# Patient Record
Sex: FEMALE | Race: WHITE | NOT HISPANIC OR LATINO | Employment: OTHER | URBAN - METROPOLITAN AREA
[De-identification: names, ages, dates, MRNs, and addresses within clinical notes are randomized per-mention and may not be internally consistent; named-entity substitution may affect disease eponyms.]

---

## 2017-08-28 ENCOUNTER — TRANSFERRED RECORDS (OUTPATIENT)
Dept: HEALTH INFORMATION MANAGEMENT | Facility: CLINIC | Age: 67
End: 2017-08-28

## 2017-10-17 ENCOUNTER — TRANSFERRED RECORDS (OUTPATIENT)
Dept: HEALTH INFORMATION MANAGEMENT | Facility: CLINIC | Age: 67
End: 2017-10-17

## 2017-11-01 ENCOUNTER — TRANSFERRED RECORDS (OUTPATIENT)
Dept: HEALTH INFORMATION MANAGEMENT | Facility: CLINIC | Age: 67
End: 2017-11-01

## 2017-11-06 ENCOUNTER — TRANSFERRED RECORDS (OUTPATIENT)
Dept: HEALTH INFORMATION MANAGEMENT | Facility: CLINIC | Age: 67
End: 2017-11-06

## 2017-11-24 ENCOUNTER — TRANSFERRED RECORDS (OUTPATIENT)
Dept: HEALTH INFORMATION MANAGEMENT | Facility: CLINIC | Age: 67
End: 2017-11-24

## 2017-12-27 ENCOUNTER — TRANSFERRED RECORDS (OUTPATIENT)
Dept: HEALTH INFORMATION MANAGEMENT | Facility: CLINIC | Age: 67
End: 2017-12-27

## 2017-12-28 ENCOUNTER — TRANSFERRED RECORDS (OUTPATIENT)
Dept: HEALTH INFORMATION MANAGEMENT | Facility: CLINIC | Age: 67
End: 2017-12-28

## 2018-01-15 ENCOUNTER — TRANSFERRED RECORDS (OUTPATIENT)
Dept: HEALTH INFORMATION MANAGEMENT | Facility: CLINIC | Age: 68
End: 2018-01-15

## 2018-01-17 ENCOUNTER — MEDICAL CORRESPONDENCE (OUTPATIENT)
Dept: HEALTH INFORMATION MANAGEMENT | Facility: CLINIC | Age: 68
End: 2018-01-17

## 2019-09-17 ENCOUNTER — OFFICE VISIT (OUTPATIENT)
Dept: URBAN - METROPOLITAN AREA CLINIC 29 | Facility: CLINIC | Age: 69
End: 2019-09-17
Payer: MEDICARE

## 2019-09-17 DIAGNOSIS — H40.012 OPEN ANGLE WITH BORDERLINE FINDINGS, LOW RISK, LEFT EYE: ICD-10-CM

## 2019-09-17 PROCEDURE — 76514 ECHO EXAM OF EYE THICKNESS: CPT | Performed by: OPHTHALMOLOGY

## 2019-09-17 PROCEDURE — 92020 GONIOSCOPY: CPT | Performed by: OPHTHALMOLOGY

## 2019-09-17 PROCEDURE — 92133 CPTRZD OPH DX IMG PST SGM ON: CPT | Performed by: OPHTHALMOLOGY

## 2019-09-17 PROCEDURE — 92004 COMPRE OPH EXAM NEW PT 1/>: CPT | Performed by: OPHTHALMOLOGY

## 2019-09-17 ASSESSMENT — INTRAOCULAR PRESSURE
OD: 27
OS: 20

## 2019-09-17 NOTE — IMPRESSION/PLAN
Impression: Primary open-angle glaucoma, right eye, moderate stage: H40.1112. Mother had glaucoma -- CCT average OU ONH Enlarged cupping OU -- IOP elevated OD>OS Plan: Discussed diagnosis, explained and understood by patient. Discussed IOP/ONH/Glaucoma management and risks. OCT ordered, performed and reviewed. Start lumigan qhs od, sample given. Discussed side effects of glaucoma meds. Will continue to monitor condition and symptoms.

## 2019-10-21 ENCOUNTER — TESTING ONLY (OUTPATIENT)
Dept: URBAN - METROPOLITAN AREA CLINIC 29 | Facility: CLINIC | Age: 69
End: 2019-10-21
Payer: MEDICARE

## 2019-10-21 PROCEDURE — 92083 EXTENDED VISUAL FIELD XM: CPT | Performed by: OPHTHALMOLOGY

## 2019-10-29 ENCOUNTER — OFFICE VISIT (OUTPATIENT)
Dept: URBAN - METROPOLITAN AREA CLINIC 29 | Facility: CLINIC | Age: 69
End: 2019-10-29
Payer: MEDICARE

## 2019-10-29 DIAGNOSIS — H40.1112 PRIMARY OPEN-ANGLE GLAUCOMA, RIGHT EYE, MODERATE STAGE: Primary | ICD-10-CM

## 2019-10-29 PROCEDURE — 99213 OFFICE O/P EST LOW 20 MIN: CPT | Performed by: OPHTHALMOLOGY

## 2019-10-29 RX ORDER — BIMATOPROST 0.1 MG/ML
0.01 % SOLUTION/ DROPS OPHTHALMIC
Qty: 2.5 | Refills: 5 | Status: INACTIVE
Start: 2019-10-29 | End: 2019-10-29

## 2019-10-29 ASSESSMENT — INTRAOCULAR PRESSURE
OD: 21
OS: 21

## 2019-10-29 NOTE — IMPRESSION/PLAN
Impression: Primary open-angle glaucoma, right eye, moderate stage: H40.1112. Mother had glaucoma -- CCT average OU ONH Enlarged cupping OU -- IOP OD lowered with lumigan - 
IOP doing well ou - Plan: Discussed diagnosis, explained and understood by patient. Discussed IOP/ONH/Glaucoma management and risks. visual field results reviewed with patient. continue lumigan qhs od. no gtts OS needed. Discussed side effects of glaucoma meds. Will continue to monitor condition and symptoms.

## 2020-02-18 ENCOUNTER — TRANSFERRED RECORDS (OUTPATIENT)
Dept: HEALTH INFORMATION MANAGEMENT | Facility: CLINIC | Age: 70
End: 2020-02-18

## 2020-02-27 ENCOUNTER — OFFICE VISIT (OUTPATIENT)
Dept: URBAN - METROPOLITAN AREA CLINIC 29 | Facility: CLINIC | Age: 70
End: 2020-02-27
Payer: MEDICARE

## 2020-02-27 PROCEDURE — 99213 OFFICE O/P EST LOW 20 MIN: CPT | Performed by: OPHTHALMOLOGY

## 2020-02-27 ASSESSMENT — INTRAOCULAR PRESSURE
OD: 19
OS: 19

## 2020-02-27 NOTE — IMPRESSION/PLAN
Impression: Primary open-angle glaucoma, right eye, moderate stage: H40.1112. Mother had glaucoma CCT average OU ONH Enlarged cupping OU
IOP OD stable with lumigan 
IOP doing well OU Plan: Discussed diagnosis, explained and understood by patient. Discussed IOP/ONH/Glaucoma management and risks. visual field results reviewed with patient. continue lumigan qhs OD,  no gtts OS needed. Discussed side effects of glaucoma meds. Will continue to monitor condition and symptoms.

## 2020-07-16 ENCOUNTER — OFFICE VISIT (OUTPATIENT)
Dept: URBAN - METROPOLITAN AREA CLINIC 29 | Facility: CLINIC | Age: 70
End: 2020-07-16
Payer: MEDICARE

## 2020-07-16 DIAGNOSIS — H25.13 AGE-RELATED NUCLEAR CATARACT, BILATERAL: ICD-10-CM

## 2020-07-16 PROCEDURE — 99213 OFFICE O/P EST LOW 20 MIN: CPT | Performed by: OPHTHALMOLOGY

## 2020-07-16 ASSESSMENT — INTRAOCULAR PRESSURE
OS: 19
OD: 17

## 2020-07-16 NOTE — IMPRESSION/PLAN
Impression: Primary open-angle glaucoma, right eye, moderate stage: H40.1112. Mother had glaucoma CCT average OU ONH/IOP stable OU Plan: Discussed diagnosis, explained and understood by patient. Discussed IOP/ONH/Glaucoma management and risks. Continue lumigan qhs OD,  no gtts OS needed. Will continue to monitor condition and symptoms.

## 2020-12-08 ENCOUNTER — OFFICE VISIT (OUTPATIENT)
Dept: URBAN - METROPOLITAN AREA CLINIC 29 | Facility: CLINIC | Age: 70
End: 2020-12-08
Payer: MEDICARE

## 2020-12-08 PROCEDURE — 92083 EXTENDED VISUAL FIELD XM: CPT | Performed by: OPHTHALMOLOGY

## 2020-12-08 PROCEDURE — 92133 CPTRZD OPH DX IMG PST SGM ON: CPT | Performed by: OPHTHALMOLOGY

## 2020-12-08 PROCEDURE — 99214 OFFICE O/P EST MOD 30 MIN: CPT | Performed by: OPHTHALMOLOGY

## 2020-12-08 ASSESSMENT — INTRAOCULAR PRESSURE
OD: 21
OS: 20

## 2020-12-08 NOTE — IMPRESSION/PLAN
Impression: Primary open-angle glaucoma, right eye, moderate stage: H40.1112. Mother had glaucoma CCT average OU ONH/IOP stable OU Plan: Discussed diagnosis, explained and understood by patient. Discussed IOP/ONH/Glaucoma management and risks. VF and OCT shows some progression OD, we discussed finding. D/C lumigan qhs OD, recommend Rocklatan OD - sample dispensed, return in 1 month for IOP check with new medication, no gtts OS needed. Will continue to monitor condition and symptoms.

## 2021-01-28 ENCOUNTER — OFFICE VISIT (OUTPATIENT)
Dept: URBAN - METROPOLITAN AREA CLINIC 29 | Facility: CLINIC | Age: 71
End: 2021-01-28
Payer: MEDICARE

## 2021-01-28 PROCEDURE — 99213 OFFICE O/P EST LOW 20 MIN: CPT | Performed by: OPHTHALMOLOGY

## 2021-01-28 ASSESSMENT — INTRAOCULAR PRESSURE
OS: 21
OD: 20

## 2021-01-28 NOTE — IMPRESSION/PLAN
Impression: Primary open-angle glaucoma, right eye, moderate stage: H40.1112. Mother had glaucoma CCT average OU ONH/IOP stable OU Plan: Discussed diagnosis, explained and understood by patient. Discussed IOP/ONH/Glaucoma management and risks. start and continue lumigan qhs OD, unable to tolerate Rocklatan OD,  no gtts OS needed. We discussed the option for SlT laser vs medication, patient elects to continue with medication.

## 2021-08-20 ENCOUNTER — OFFICE VISIT (OUTPATIENT)
Dept: URBAN - METROPOLITAN AREA CLINIC 29 | Facility: CLINIC | Age: 71
End: 2021-08-20
Payer: MEDICARE

## 2021-08-20 PROCEDURE — 99213 OFFICE O/P EST LOW 20 MIN: CPT | Performed by: OPHTHALMOLOGY

## 2021-08-20 ASSESSMENT — INTRAOCULAR PRESSURE
OD: 19
OS: 19

## 2021-08-20 NOTE — IMPRESSION/PLAN
Impression: Primary open-angle glaucoma, right eye, moderate stage: H40.1112. Mother had glaucoma CCT average OU ONH/IOP stable OU Plan: Discussed diagnosis, explained and understood by patient. Discussed IOP/ONH/Glaucoma management and risks. Continue lumigan qhs od. Will continue to monitor condition and symptoms.

## 2021-09-10 ENCOUNTER — TRANSFERRED RECORDS (OUTPATIENT)
Dept: HEALTH INFORMATION MANAGEMENT | Facility: CLINIC | Age: 71
End: 2021-09-10

## 2021-11-19 ENCOUNTER — TRANSFERRED RECORDS (OUTPATIENT)
Dept: HEALTH INFORMATION MANAGEMENT | Facility: CLINIC | Age: 71
End: 2021-11-19

## 2022-04-25 ENCOUNTER — TRANSFERRED RECORDS (OUTPATIENT)
Dept: HEALTH INFORMATION MANAGEMENT | Facility: CLINIC | Age: 72
End: 2022-04-25

## 2022-12-30 ENCOUNTER — TRANSFERRED RECORDS (OUTPATIENT)
Dept: HEALTH INFORMATION MANAGEMENT | Facility: CLINIC | Age: 72
End: 2022-12-30

## 2023-01-26 ENCOUNTER — OFFICE VISIT (OUTPATIENT)
Dept: URBAN - METROPOLITAN AREA CLINIC 28 | Facility: CLINIC | Age: 73
End: 2023-01-26
Payer: MEDICARE

## 2023-01-26 DIAGNOSIS — H40.1112 PRIMARY OPEN-ANGLE GLAUCOMA, RIGHT EYE, MODERATE STAGE: Primary | ICD-10-CM

## 2023-01-26 DIAGNOSIS — H40.1121 PRIMARY OPEN-ANGLE GLAUCOMA, LEFT EYE, MILD STAGE: ICD-10-CM

## 2023-01-26 PROCEDURE — 92020 GONIOSCOPY: CPT | Performed by: OPHTHALMOLOGY

## 2023-01-26 PROCEDURE — 92083 EXTENDED VISUAL FIELD XM: CPT | Performed by: OPHTHALMOLOGY

## 2023-01-26 PROCEDURE — 99214 OFFICE O/P EST MOD 30 MIN: CPT | Performed by: OPHTHALMOLOGY

## 2023-01-26 RX ORDER — BIMATOPROST 0.1 MG/ML
0.01 % SOLUTION/ DROPS OPHTHALMIC
Qty: 7.5 | Refills: 4 | Status: ACTIVE
Start: 2023-01-26

## 2023-01-26 ASSESSMENT — INTRAOCULAR PRESSURE
OD: 20
OS: 20

## 2023-01-26 NOTE — IMPRESSION/PLAN
Impression: Primary open-angle glaucoma, right eye, moderate stage: H40.1112. Mother had glaucoma CCT average OU ONH/IOP stable OU Plan: Discussed diagnosis, explained and understood by patient. Discussed IOP/ONH/Glaucoma management and risks. VF ordered and reviewed today shows progression OU. Continue lumigan qhs od and start qhs os. IOP elevated OU. Discussed adding drops vs laser. Patient elects SLT. Recommend Trabeculoplasty (SLT) OD to possibly lower IOP. Discussed RBA's of laser trabeculoplasty .  RL=2

## 2023-02-07 LAB
ALT SERPL-CCNC: 21 IU/L (ref 5–46)
AST SERPL-CCNC: 19 IU/L (ref 11–40)
CHOLESTEROL (EXTERNAL): 261 MG/DL
CREATININE (EXTERNAL): 0.54 MG/DL (ref 0.6–1.4)
GFR ESTIMATED (EXTERNAL): 96 ML/MIN/1.73M2
GLUCOSE (EXTERNAL): 112 MG/DL (ref 70–99)
HDLC SERPL-MCNC: 103 MG/DL
LDL CHOLESTEROL CALCULATED (EXTERNAL): 142 MG/DL
NON HDL CHOLESTEROL (EXTERNAL): 158 MG/DL
POTASSIUM (EXTERNAL): 4.6 MMOL/L (ref 3.6–5.3)
TRIGLYCERIDES (EXTERNAL): 70 MG/DL
TSH SERPL-ACNC: 1.35 MU/L (ref 0.27–4.2)

## 2023-03-01 ENCOUNTER — TRANSFERRED RECORDS (OUTPATIENT)
Dept: HEALTH INFORMATION MANAGEMENT | Facility: CLINIC | Age: 73
End: 2023-03-01

## 2023-03-23 ENCOUNTER — OFFICE VISIT (OUTPATIENT)
Dept: URBAN - METROPOLITAN AREA CLINIC 28 | Facility: CLINIC | Age: 73
End: 2023-03-23
Payer: MEDICARE

## 2023-03-23 DIAGNOSIS — H40.1112 PRIMARY OPEN-ANGLE GLAUCOMA, RIGHT EYE, MODERATE STAGE: Primary | ICD-10-CM

## 2023-03-23 DIAGNOSIS — H25.13 AGE-RELATED NUCLEAR CATARACT, BILATERAL: ICD-10-CM

## 2023-03-23 DIAGNOSIS — H40.1121 PRIMARY OPEN-ANGLE GLAUCOMA, LEFT EYE, MILD STAGE: ICD-10-CM

## 2023-03-23 PROCEDURE — 92133 CPTRZD OPH DX IMG PST SGM ON: CPT | Performed by: OPHTHALMOLOGY

## 2023-03-23 PROCEDURE — 99214 OFFICE O/P EST MOD 30 MIN: CPT | Performed by: OPHTHALMOLOGY

## 2023-03-23 ASSESSMENT — INTRAOCULAR PRESSURE
OD: 17
OS: 20

## 2023-03-23 NOTE — IMPRESSION/PLAN
Impression: Primary open-angle glaucoma, right eye, moderate stage: H40.1112. Mother had glaucoma CCT average OU
SLT OD 2/24/23 Plan: Discussed diagnosis, explained and understood by patient. Discussed IOP/ONH/Glaucoma management and risks. Continue lumigan qhs ou. SLT OD effectively lowered IOP. IOP elevated OS, cataract worse in OS. Recommend a consult with Dr. Laure Sun for combo surgery options.

## 2023-03-23 NOTE — IMPRESSION/PLAN
Impression: Age-related nuclear cataract, bilateral: H25.13. Plan: Cataracts account for the patient's vision complaints. Discussed diagnosis in detail with patient. Discussed treatment options with patient. Recommend cataract consultation with Dr. Stephanie Dan.

## 2023-04-13 ENCOUNTER — OFFICE VISIT (OUTPATIENT)
Dept: URBAN - METROPOLITAN AREA CLINIC 28 | Facility: CLINIC | Age: 73
End: 2023-04-13
Payer: MEDICARE

## 2023-04-13 DIAGNOSIS — H25.813 COMBINED FORMS OF AGE-RELATED CATARACT, BILATERAL: ICD-10-CM

## 2023-04-13 DIAGNOSIS — H40.1112 PRIMARY OPEN-ANGLE GLAUCOMA, RIGHT EYE, MODERATE STAGE: Primary | ICD-10-CM

## 2023-04-13 DIAGNOSIS — H40.1121 PRIMARY OPEN-ANGLE GLAUCOMA, LEFT EYE, MILD STAGE: ICD-10-CM

## 2023-04-13 PROCEDURE — 99214 OFFICE O/P EST MOD 30 MIN: CPT | Performed by: STUDENT IN AN ORGANIZED HEALTH CARE EDUCATION/TRAINING PROGRAM

## 2023-04-13 PROCEDURE — 76514 ECHO EXAM OF EYE THICKNESS: CPT | Performed by: STUDENT IN AN ORGANIZED HEALTH CARE EDUCATION/TRAINING PROGRAM

## 2023-04-13 PROCEDURE — 92020 GONIOSCOPY: CPT | Performed by: STUDENT IN AN ORGANIZED HEALTH CARE EDUCATION/TRAINING PROGRAM

## 2023-04-13 RX ORDER — BIMATOPROST 0.1 MG/ML
0.01 % SOLUTION/ DROPS OPHTHALMIC
Qty: 7.5 | Refills: 3 | Status: ACTIVE
Start: 2023-04-13

## 2023-04-13 ASSESSMENT — VISUAL ACUITY
OS: 20/50
OD: 20/30

## 2023-04-13 ASSESSMENT — INTRAOCULAR PRESSURE
OS: 17
OD: 17

## 2023-04-13 NOTE — IMPRESSION/PLAN
Impression: Primary open-angle glaucoma, right eye, moderate stage: H40.1112. Plan: Ocular Surgical History: s/p SLT OD Pachy: 550/570 Tmax: 27/21 Target IOP: mid teens OU Med Allergies: none Heart / Lung / Kidney disease/sulfa allergy: Pt. denies Gonioscopy: grade 4, 2+ TMP OU
OCT: 69 early pole thinning/82 wnl HVF: OD: MD -5 double step, EBS, sup arc/OS: MD -3 sup arc
C/D: .65/.6 Better seeing eye: OD
IOP Today: 17/17 Plan: IOP is doing well OU. No changes are being made to treatment at this time. Will continue to monitor IOP and testing. Drops: CHANGE Lumigan from QAM to QHS OU Discussed natural history of glaucoma and that irreversible vision loss can occur without adequate IOP control. Emphasized compliance with eyedrops and other treatment described above.

## 2023-04-13 NOTE — IMPRESSION/PLAN
Impression: Combined forms of age-related cataract, bilateral: H25.813. Plan: SEE [address]: EDU, ASCAN (darek if AT is wanted), DILATED PREOP (tech to check IOP and dilate) 5 line MAC OCT - Phaco/Hydrus OS only for now. 
Dextenza + drops

## 2023-04-18 ENCOUNTER — PRE-OPERATIVE VISIT (OUTPATIENT)
Dept: URBAN - METROPOLITAN AREA CLINIC 28 | Facility: CLINIC | Age: 73
End: 2023-04-18
Payer: MEDICARE

## 2023-04-18 DIAGNOSIS — H25.813 COMBINED FORMS OF AGE-RELATED CATARACT, BILATERAL: Primary | ICD-10-CM

## 2023-04-18 ASSESSMENT — PACHYMETRY
OS: 4.04
OD: 4.02
OS: 24.29
OD: 24.39

## 2023-04-20 ENCOUNTER — TRANSFERRED RECORDS (OUTPATIENT)
Dept: HEALTH INFORMATION MANAGEMENT | Facility: CLINIC | Age: 73
End: 2023-04-20

## 2023-04-24 ENCOUNTER — PRE-OPERATIVE VISIT (OUTPATIENT)
Dept: URBAN - METROPOLITAN AREA CLINIC 10 | Facility: CLINIC | Age: 73
End: 2023-04-24
Payer: MEDICARE

## 2023-04-24 DIAGNOSIS — H52.203 BILATERAL ASTIGMATISM: ICD-10-CM

## 2023-04-24 DIAGNOSIS — H04.123 DRY EYE SYNDROME OF BILATERAL LACRIMAL GLANDS: ICD-10-CM

## 2023-04-24 DIAGNOSIS — H25.813 COMBINED FORMS OF AGE-RELATED CATARACT, BILATERAL: ICD-10-CM

## 2023-04-24 DIAGNOSIS — H40.1112 PRIMARY OPEN-ANGLE GLAUCOMA, RIGHT EYE, MODERATE STAGE: ICD-10-CM

## 2023-04-24 DIAGNOSIS — H40.1121 PRIMARY OPEN-ANGLE GLAUCOMA, MILD STAGE, LEFT EYE: Primary | ICD-10-CM

## 2023-04-24 DIAGNOSIS — H43.813 VITREOUS DEGENERATION, BILATERAL: ICD-10-CM

## 2023-04-24 PROCEDURE — 99214 OFFICE O/P EST MOD 30 MIN: CPT | Performed by: STUDENT IN AN ORGANIZED HEALTH CARE EDUCATION/TRAINING PROGRAM

## 2023-04-24 PROCEDURE — 92134 CPTRZ OPH DX IMG PST SGM RTA: CPT | Performed by: STUDENT IN AN ORGANIZED HEALTH CARE EDUCATION/TRAINING PROGRAM

## 2023-04-24 RX ORDER — OFLOXACIN 3 MG/ML
0.3 % SOLUTION/ DROPS OPHTHALMIC
Qty: 5 | Refills: 1 | Status: ACTIVE
Start: 2023-04-24

## 2023-04-24 ASSESSMENT — INTRAOCULAR PRESSURE
OS: 16
OD: 17

## 2023-04-24 NOTE — IMPRESSION/PLAN
Impression: Dry eye syndrome of bilateral lacrimal glands: H04.123. Plan: Patient to use AFT OU for dryness and irritation. Patient understands condition may limit possible outcome of surgery.

## 2023-04-24 NOTE — IMPRESSION/PLAN
Impression: Vitreous degeneration, bilateral: H43.813. Plan: Patient to call/come in with RD symptoms. Patient understands condition may limit possible outcome of surgery.

## 2023-04-24 NOTE — IMPRESSION/PLAN
Impression: Combined forms of age-related cataract, bilateral: H25.813. Plan: Both eyes examined today and discussed cataract and glaucoma diagnoses in detail with patient. Advised patient of treatment options as well as lens options. Surgery is medically necessary due to impact in activities of daily living. Discussed risks, benefits, and expectations of cataract & MIGS surgery. Risks include bleeding, infection, loss of vision, loss of the eye, need for additional surgery to remove or exchange lens, dysphotopsias, macular/corneal edema. Patient also understands glasses will be necessary for sharpest vision after surgery. Patient understands that MIGS are an attempt to lower the IOP and protect the eye from glaucoma damage. Glaucoma will still be present and can continue to worsen without proper followup care and eye drop use. Patient wishes to proceed with Cataract/MIGS surgery. Discussed activity restrictions: no bending head below waist, rubbing the eye, no swimming straining or lifting over 10 lb. All questions answered. Patient would like to proceed with CE/MIGS OS only for now. Optical biomtery and appropriate testing reviewed. Plan:
CE/Hydrus OS 1st eye  [[Aim: -0.25 OS. IOL: monofocal ; No LRI; patient declines AMP/ORA. Dextenza + drops, Use Oflox QID ]] ERx'd Ofloxacin QID.

## 2023-04-24 NOTE — IMPRESSION/PLAN
Impression: Primary open-angle glaucoma, right eye, moderate stage: H40.1112. Plan: Ocular Surgical History: s/p SLT OD Pachy: 550/570 Tmax: 27/21 Target IOP: mid teens OU Med Allergies: none Heart / Lung / Kidney disease/sulfa allergy: Pt. denies Gonioscopy: grade 4, 2+ TMP OU
OCT: 69 early pole thinning/82 wnl HVF: OD: MD -5 double step, EBS, sup arc/OS: MD -3 sup arc
C/D: .65/.6 Better seeing eye: OD
IOP Today: 17/16 Plan: IOP is doing well. Recommend patient to proceed with phaco/hydrus OS. Drops: CONTINUE Lumigan QHS OU Discussed natural history of glaucoma and that irreversible vision loss can occur without adequate IOP control. Emphasized compliance with eyedrops and other treatment described above. Patient understands condition may limit possible outcome of surgery.

## 2023-04-24 NOTE — IMPRESSION/PLAN
Impression: Bilateral astigmatism: H52.203. Plan: Declined AMP. Patient understands condition may limit possible outcome of surgery.

## 2023-05-04 ENCOUNTER — POST-OPERATIVE VISIT (OUTPATIENT)
Dept: URBAN - METROPOLITAN AREA CLINIC 28 | Facility: CLINIC | Age: 73
End: 2023-05-04
Payer: MEDICARE

## 2023-05-04 DIAGNOSIS — Z48.810 ENCOUNTER FOR SURGICAL AFTERCARE FOLLOWING SURGERY ON A SENSE ORGAN: Primary | ICD-10-CM

## 2023-05-04 PROCEDURE — 99024 POSTOP FOLLOW-UP VISIT: CPT | Performed by: OPTOMETRIST

## 2023-05-04 RX ORDER — OFLOXACIN 3 MG/ML
0.3 % SOLUTION/ DROPS OPHTHALMIC
Qty: 5 | Refills: 1 | Status: ACTIVE
Start: 2023-05-04

## 2023-05-04 RX ORDER — BIMATOPROST 0.1 MG/ML
0.01 % SOLUTION/ DROPS OPHTHALMIC
Qty: 7.5 | Refills: 3 | Status: ACTIVE
Start: 2023-05-04

## 2023-05-04 ASSESSMENT — INTRAOCULAR PRESSURE
OS: 16
OD: 14
OD: 15

## 2023-05-04 NOTE — IMPRESSION/PLAN
Impression: S/P Cataract Extraction by phacoemulsification with IOL placement; HYDRUS; DEXTENZA OS - 1 Day. Encounter for surgical aftercare following surgery on a sense organ  Z48.810.  Plan: surgical drop: ofloxacin QID OS
glaucoma drop: lumigan QHS OD

## 2023-05-09 ENCOUNTER — TRANSFERRED RECORDS (OUTPATIENT)
Dept: HEALTH INFORMATION MANAGEMENT | Facility: CLINIC | Age: 73
End: 2023-05-09

## 2023-05-11 ENCOUNTER — POST-OPERATIVE VISIT (OUTPATIENT)
Dept: URBAN - METROPOLITAN AREA CLINIC 28 | Facility: CLINIC | Age: 73
End: 2023-05-11
Payer: MEDICARE

## 2023-05-11 DIAGNOSIS — Z48.810 ENCOUNTER FOR SURGICAL AFTERCARE FOLLOWING SURGERY ON A SENSE ORGAN: Primary | ICD-10-CM

## 2023-05-11 PROCEDURE — 99024 POSTOP FOLLOW-UP VISIT: CPT | Performed by: OPTOMETRIST

## 2023-05-11 ASSESSMENT — INTRAOCULAR PRESSURE
OS: 16
OD: 14
OS: 15
OD: 15

## 2023-05-11 NOTE — IMPRESSION/PLAN
Impression: S/P Cataract Extraction by phacoemulsification with IOL placement; HYDRUS; DEXTENZA OS - 8 Days. Encounter for surgical aftercare following surgery on a sense organ  Z48.810.  Plan: glaucoma drop: Lumigan QHS OD only

## 2023-06-01 ENCOUNTER — POST-OPERATIVE VISIT (OUTPATIENT)
Dept: URBAN - METROPOLITAN AREA CLINIC 28 | Facility: CLINIC | Age: 73
End: 2023-06-01
Payer: MEDICARE

## 2023-06-01 DIAGNOSIS — H52.223 REGULAR ASTIGMATISM, BILATERAL: Primary | ICD-10-CM

## 2023-06-01 DIAGNOSIS — Z48.810 ENCOUNTER FOR SURGICAL AFTERCARE FOLLOWING SURGERY ON A SENSE ORGAN: ICD-10-CM

## 2023-06-01 PROCEDURE — 99024 POSTOP FOLLOW-UP VISIT: CPT | Performed by: OPTOMETRIST

## 2023-06-01 ASSESSMENT — INTRAOCULAR PRESSURE
OD: 13
OS: 14

## 2023-06-01 ASSESSMENT — VISUAL ACUITY
OS: 20/20
OD: 20/25

## 2023-06-01 NOTE — IMPRESSION/PLAN
Impression: S/P Cataract Extraction by phacoemulsification with IOL placement; HYDRUS; DEXTENZA OS - 29 Days. Encounter for surgical aftercare following surgery on a sense organ  Z48.810.  Plan: glaucoma: lumigan QHS OD only

## 2023-06-07 ENCOUNTER — TRANSFERRED RECORDS (OUTPATIENT)
Dept: HEALTH INFORMATION MANAGEMENT | Facility: CLINIC | Age: 73
End: 2023-06-07

## 2023-07-20 ENCOUNTER — TRANSFERRED RECORDS (OUTPATIENT)
Dept: HEALTH INFORMATION MANAGEMENT | Facility: CLINIC | Age: 73
End: 2023-07-20

## 2023-08-25 ENCOUNTER — TRANSFERRED RECORDS (OUTPATIENT)
Dept: HEALTH INFORMATION MANAGEMENT | Facility: CLINIC | Age: 73
End: 2023-08-25

## 2023-09-07 ENCOUNTER — TRANSFERRED RECORDS (OUTPATIENT)
Dept: HEALTH INFORMATION MANAGEMENT | Facility: CLINIC | Age: 73
End: 2023-09-07

## 2023-09-10 ENCOUNTER — TRANSFERRED RECORDS (OUTPATIENT)
Dept: HEALTH INFORMATION MANAGEMENT | Facility: CLINIC | Age: 73
End: 2023-09-10

## 2023-09-14 ENCOUNTER — TRANSFERRED RECORDS (OUTPATIENT)
Dept: HEALTH INFORMATION MANAGEMENT | Facility: CLINIC | Age: 73
End: 2023-09-14

## 2023-09-18 ENCOUNTER — TRANSFERRED RECORDS (OUTPATIENT)
Dept: HEALTH INFORMATION MANAGEMENT | Facility: CLINIC | Age: 73
End: 2023-09-18

## 2023-09-20 ENCOUNTER — TRANSFERRED RECORDS (OUTPATIENT)
Dept: HEALTH INFORMATION MANAGEMENT | Facility: CLINIC | Age: 73
End: 2023-09-20

## 2023-09-27 ENCOUNTER — TRANSFERRED RECORDS (OUTPATIENT)
Dept: HEALTH INFORMATION MANAGEMENT | Facility: CLINIC | Age: 73
End: 2023-09-27

## 2023-10-05 ENCOUNTER — TRANSFERRED RECORDS (OUTPATIENT)
Dept: HEALTH INFORMATION MANAGEMENT | Facility: CLINIC | Age: 73
End: 2023-10-05

## 2023-11-02 ENCOUNTER — TRANSFERRED RECORDS (OUTPATIENT)
Dept: HEALTH INFORMATION MANAGEMENT | Facility: CLINIC | Age: 73
End: 2023-11-02

## 2024-01-23 ENCOUNTER — OFFICE VISIT (OUTPATIENT)
Dept: FAMILY MEDICINE | Facility: CLINIC | Age: 74
End: 2024-01-23
Payer: COMMERCIAL

## 2024-01-23 ENCOUNTER — MYC MEDICAL ADVICE (OUTPATIENT)
Dept: FAMILY MEDICINE | Facility: CLINIC | Age: 74
End: 2024-01-23

## 2024-01-23 VITALS
HEART RATE: 75 BPM | SYSTOLIC BLOOD PRESSURE: 134 MMHG | OXYGEN SATURATION: 97 % | RESPIRATION RATE: 16 BRPM | HEIGHT: 64 IN | DIASTOLIC BLOOD PRESSURE: 74 MMHG | TEMPERATURE: 97.7 F

## 2024-01-23 DIAGNOSIS — Z78.0 POST-MENOPAUSAL: ICD-10-CM

## 2024-01-23 DIAGNOSIS — M54.41 CHRONIC BILATERAL LOW BACK PAIN WITH BILATERAL SCIATICA: ICD-10-CM

## 2024-01-23 DIAGNOSIS — E78.2 MIXED HYPERLIPIDEMIA: ICD-10-CM

## 2024-01-23 DIAGNOSIS — E78.5 HYPERLIPIDEMIA LDL GOAL <100: ICD-10-CM

## 2024-01-23 DIAGNOSIS — I10 ESSENTIAL HYPERTENSION: ICD-10-CM

## 2024-01-23 DIAGNOSIS — E21.5 PARATHYROID ABNORMALITY (H): ICD-10-CM

## 2024-01-23 DIAGNOSIS — Z90.11 S/P RIGHT MASTECTOMY: ICD-10-CM

## 2024-01-23 DIAGNOSIS — Z85.850 HISTORY OF THYROID CANCER: ICD-10-CM

## 2024-01-23 DIAGNOSIS — E03.9 HYPOTHYROIDISM, UNSPECIFIED TYPE: ICD-10-CM

## 2024-01-23 DIAGNOSIS — Z12.31 ENCOUNTER FOR SCREENING MAMMOGRAM FOR MALIGNANT NEOPLASM OF BREAST: ICD-10-CM

## 2024-01-23 DIAGNOSIS — M54.42 CHRONIC BILATERAL LOW BACK PAIN WITH BILATERAL SCIATICA: ICD-10-CM

## 2024-01-23 DIAGNOSIS — R79.89 ELEVATED FERRITIN: ICD-10-CM

## 2024-01-23 DIAGNOSIS — Z85.3 HX: BREAST CANCER: ICD-10-CM

## 2024-01-23 DIAGNOSIS — Z00.00 ENCOUNTER FOR MEDICAL EXAMINATION TO ESTABLISH CARE: Primary | ICD-10-CM

## 2024-01-23 DIAGNOSIS — G89.29 CHRONIC BILATERAL LOW BACK PAIN WITH BILATERAL SCIATICA: ICD-10-CM

## 2024-01-23 DIAGNOSIS — E55.9 VITAMIN D DEFICIENCY: ICD-10-CM

## 2024-01-23 PROBLEM — H40.1110 PRIMARY OPEN ANGLE GLAUCOMA OF RIGHT EYE: Status: ACTIVE | Noted: 2019-09-17

## 2024-01-23 LAB
ALBUMIN SERPL BCG-MCNC: 4.5 G/DL (ref 3.5–5.2)
ALP SERPL-CCNC: 93 U/L (ref 40–150)
ALT SERPL W P-5'-P-CCNC: 29 U/L (ref 0–50)
ANION GAP SERPL CALCULATED.3IONS-SCNC: 11 MMOL/L (ref 7–15)
AST SERPL W P-5'-P-CCNC: 26 U/L (ref 0–45)
BILIRUB SERPL-MCNC: 0.8 MG/DL
BUN SERPL-MCNC: 10.4 MG/DL (ref 8–23)
CALCIUM SERPL-MCNC: 9.5 MG/DL (ref 8.8–10.2)
CHLORIDE SERPL-SCNC: 94 MMOL/L (ref 98–107)
CHOLEST SERPL-MCNC: 273 MG/DL
CREAT SERPL-MCNC: 0.65 MG/DL (ref 0.51–0.95)
DEPRECATED HCO3 PLAS-SCNC: 27 MMOL/L (ref 22–29)
EGFRCR SERPLBLD CKD-EPI 2021: >90 ML/MIN/1.73M2
ERYTHROCYTE [DISTWIDTH] IN BLOOD BY AUTOMATED COUNT: 12.7 % (ref 10–15)
FASTING STATUS PATIENT QL REPORTED: YES
GLUCOSE SERPL-MCNC: 116 MG/DL (ref 70–99)
HCT VFR BLD AUTO: 42.2 % (ref 35–47)
HDLC SERPL-MCNC: 91 MG/DL
HGB BLD-MCNC: 14.8 G/DL (ref 11.7–15.7)
LDLC SERPL CALC-MCNC: 166 MG/DL
MCH RBC QN AUTO: 36.8 PG (ref 26.5–33)
MCHC RBC AUTO-ENTMCNC: 35.1 G/DL (ref 31.5–36.5)
MCV RBC AUTO: 105 FL (ref 78–100)
NONHDLC SERPL-MCNC: 182 MG/DL
PLATELET # BLD AUTO: 185 10E3/UL (ref 150–450)
POTASSIUM SERPL-SCNC: 4.1 MMOL/L (ref 3.4–5.3)
PROT SERPL-MCNC: 7 G/DL (ref 6.4–8.3)
PTH-INTACT SERPL-MCNC: 37 PG/ML (ref 15–65)
RBC # BLD AUTO: 4.02 10E6/UL (ref 3.8–5.2)
SODIUM SERPL-SCNC: 132 MMOL/L (ref 135–145)
T4 FREE SERPL-MCNC: 1.72 NG/DL (ref 0.9–1.7)
TRIGL SERPL-MCNC: 80 MG/DL
TSH SERPL DL<=0.005 MIU/L-ACNC: 11.7 UIU/ML (ref 0.3–4.2)
WBC # BLD AUTO: 7.2 10E3/UL (ref 4–11)

## 2024-01-23 PROCEDURE — 82306 VITAMIN D 25 HYDROXY: CPT | Performed by: STUDENT IN AN ORGANIZED HEALTH CARE EDUCATION/TRAINING PROGRAM

## 2024-01-23 PROCEDURE — 36415 COLL VENOUS BLD VENIPUNCTURE: CPT | Performed by: STUDENT IN AN ORGANIZED HEALTH CARE EDUCATION/TRAINING PROGRAM

## 2024-01-23 PROCEDURE — 83970 ASSAY OF PARATHORMONE: CPT | Performed by: STUDENT IN AN ORGANIZED HEALTH CARE EDUCATION/TRAINING PROGRAM

## 2024-01-23 PROCEDURE — 84439 ASSAY OF FREE THYROXINE: CPT | Performed by: STUDENT IN AN ORGANIZED HEALTH CARE EDUCATION/TRAINING PROGRAM

## 2024-01-23 PROCEDURE — 85027 COMPLETE CBC AUTOMATED: CPT | Performed by: STUDENT IN AN ORGANIZED HEALTH CARE EDUCATION/TRAINING PROGRAM

## 2024-01-23 PROCEDURE — 80061 LIPID PANEL: CPT | Performed by: STUDENT IN AN ORGANIZED HEALTH CARE EDUCATION/TRAINING PROGRAM

## 2024-01-23 PROCEDURE — 84443 ASSAY THYROID STIM HORMONE: CPT | Performed by: STUDENT IN AN ORGANIZED HEALTH CARE EDUCATION/TRAINING PROGRAM

## 2024-01-23 PROCEDURE — 99204 OFFICE O/P NEW MOD 45 MIN: CPT | Performed by: STUDENT IN AN ORGANIZED HEALTH CARE EDUCATION/TRAINING PROGRAM

## 2024-01-23 PROCEDURE — 82728 ASSAY OF FERRITIN: CPT | Performed by: STUDENT IN AN ORGANIZED HEALTH CARE EDUCATION/TRAINING PROGRAM

## 2024-01-23 PROCEDURE — 80053 COMPREHEN METABOLIC PANEL: CPT | Performed by: STUDENT IN AN ORGANIZED HEALTH CARE EDUCATION/TRAINING PROGRAM

## 2024-01-23 RX ORDER — BIMATOPROST 0.3 MG/ML
1 SOLUTION/ DROPS OPHTHALMIC AT BEDTIME
COMMUNITY
End: 2024-04-05

## 2024-01-23 RX ORDER — LEVOTHYROXINE SODIUM 137 UG/1
137 TABLET ORAL DAILY
COMMUNITY
End: 2024-01-24

## 2024-01-23 RX ORDER — LOSARTAN POTASSIUM AND HYDROCHLOROTHIAZIDE 25; 100 MG/1; MG/1
1 TABLET ORAL DAILY
COMMUNITY
End: 2024-01-24

## 2024-01-23 RX ORDER — LOSARTAN POTASSIUM 50 MG/1
50 TABLET ORAL DAILY
COMMUNITY
End: 2024-01-24

## 2024-01-23 RX ORDER — HYDROCHLOROTHIAZIDE 25 MG/1
25 TABLET ORAL DAILY
COMMUNITY
End: 2024-01-24

## 2024-01-23 RX ORDER — METOPROLOL TARTRATE 100 MG
100 TABLET ORAL 2 TIMES DAILY
COMMUNITY
End: 2024-01-24

## 2024-01-23 RX ORDER — PRAVASTATIN SODIUM 20 MG
20 TABLET ORAL DAILY
COMMUNITY
End: 2024-01-24

## 2024-01-23 NOTE — PROGRESS NOTES
Assessment & Plan       ICD-10-CM    1. Encounter for medical examination to establish care  Z00.00       2. Hyperlipidemia LDL goal <100  E78.5 Lipid panel reflex to direct LDL Non-fasting      3. HX: breast cancer  Z85.3 *MA Screening Digital Bilateral      4. Post-menopausal  Z78.0 DEXA HIP/PELVIS/SPINE - Future      5. History of thyroid cancer  Z85.850 TSH WITH FREE T4 REFLEX     Adult Endocrinology  Referral     Comprehensive metabolic panel (BMP + Alb, Alk Phos, ALT, AST, Total. Bili, TP)     CBC with platelets     T4 free     levothyroxine (SYNTHROID/LEVOTHROID) 137 MCG tablet      6. Parathyroid abnormality (H24)  E21.5 Parathyroid Hormone Intact      7. Encounter for screening mammogram for malignant neoplasm of breast  Z12.31 *MA Screening Digital Bilateral      8. Vitamin D deficiency  E55.9 Vitamin D Deficiency      9. Hypothyroidism, unspecified type  E03.9 levothyroxine (SYNTHROID/LEVOTHROID) 137 MCG tablet      10. Essential hypertension  I10 losartan-hydrochlorothiazide (HYZAAR) 100-25 MG tablet     metoprolol tartrate (LOPRESSOR) 100 MG tablet      11. S/P right mastectomy  Z90.11       12. Chronic bilateral low back pain with bilateral sciatica  M54.42     M54.41     G89.29       13. Mixed hyperlipidemia  E78.2 pravastatin (PRAVACHOL) 20 MG tablet      14. Elevated ferritin  R79.89 Ferritin              History of parathyroid nodules s/p parathyroidectomy  History of thyroid cancer  Patient was being followed by endocrinology  Is currently asymptomatic from a thyroid standpoint  Is on Synthroid 137 mcg  Will refill it for her today     History of glaucoma in bilateral eye:  S/p stent in right eye  Takes Lumigan drops in the left eye  No acute changes in her vision  Is in the midst of getting set up with ophthalmology    Essential hypertension:  Patient is on metoprolol, Hyzaar for blood pressure  Longstanding issue  Blood pressure is within normal range today  Plan:  - Get updated  "CMP  - Please send updated CMP, will refill medications    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  She was told that she is missing her \"big vein\" in her right leg  Is asymptomatic and does not have any worsening hyperpigmentation  Does not chronically follow with vascular    History of DVT?  Patient notes that she had a blood clot in her groin when she was going to vascular.  She was treated with blood thinners for 3 months and the clot resolved.  She is currently asymptomatic.    Chronic low back pain:  Longstanding issue  Is largely from arthritis  She has poor balance and is quite sedentary due to the pain  Has had epidural steroid injections in her back  Plan:  - She does not need a referral to orthopedics today.  She will be following with [facility].  She already has an appointment coming up with them      40 minutes spent by me on the date of the encounter doing chart review, history and exam, documentation and further activities per the note       Subjective   Mirna is a 74 year old, presenting for the following health issues:  Establish Care        1/23/2024     8:51 AM   Additional Questions   Roomed by Anabelle DIAZ CMA   Accompanied by Spouse, Daughter         1/23/2024     8:51 AM   Patient Reported Additional Medications   Patient reports taking the following new medications na     Patient is a pleasant 74-year-old female who is coming here from [place].  She is establishing care    Patient was born in [place] but spent most of her adult life and [place] and [place].  She recently moved back here to [place] because that is where her children are and both she and her " " needed more care.    She has no acute concerns    I have no records except the ones from endocrinology that she brought in today.    Mainly, she would like to reestablish with her specialist        Review of Systems  As per HPI      Objective    /74 (BP Location: Left arm, Patient Position: Sitting, Cuff Size: Adult Large)   Pulse 75   Temp 97.7  F (36.5  C) (Oral)   Resp 16   Ht 1.626 m (5' 4\")   LMP  (LMP Unknown)   SpO2 97%   Breastfeeding No   There is no height or weight on file to calculate BMI.  Physical Exam   GENERAL: alert and no distress  RESP: lungs clear to auscultation - no rales, rhonchi or wheezes  CV: regular rate and rhythm, no murmur, click or rub, no peripheral edema  MS: no gross musculoskeletal defects noted, no edema  PSYCH: mentation appears normal, affect mildly anxious, somewhat confused at times.        Signed Electronically by: Ambar Hernandez DO    "

## 2024-01-24 LAB
FERRITIN SERPL-MCNC: 321 NG/ML (ref 11–328)
VIT D+METAB SERPL-MCNC: 54 NG/ML (ref 20–50)

## 2024-01-24 RX ORDER — METOPROLOL TARTRATE 100 MG
100 TABLET ORAL 2 TIMES DAILY
Qty: 90 TABLET | Refills: 0 | Status: SHIPPED | OUTPATIENT
Start: 2024-01-24 | End: 2024-01-25

## 2024-01-24 RX ORDER — PRAVASTATIN SODIUM 20 MG
20 TABLET ORAL DAILY
Qty: 90 TABLET | Refills: 0 | Status: ON HOLD | OUTPATIENT
Start: 2024-01-24 | End: 2024-04-28

## 2024-01-24 RX ORDER — LOSARTAN POTASSIUM AND HYDROCHLOROTHIAZIDE 25; 100 MG/1; MG/1
1 TABLET ORAL DAILY
Qty: 90 TABLET | Refills: 0 | Status: ON HOLD | OUTPATIENT
Start: 2024-01-24 | End: 2024-04-28

## 2024-01-24 RX ORDER — LEVOTHYROXINE SODIUM 137 UG/1
137 TABLET ORAL DAILY
Qty: 90 TABLET | Refills: 0 | Status: SHIPPED | OUTPATIENT
Start: 2024-01-24 | End: 2024-06-17

## 2024-01-25 ENCOUNTER — TELEPHONE (OUTPATIENT)
Dept: ENDOCRINOLOGY | Facility: CLINIC | Age: 74
End: 2024-01-25

## 2024-01-25 ENCOUNTER — OFFICE VISIT (OUTPATIENT)
Dept: FAMILY MEDICINE | Facility: CLINIC | Age: 74
End: 2024-01-25
Payer: COMMERCIAL

## 2024-01-25 ENCOUNTER — TELEPHONE (OUTPATIENT)
Dept: FAMILY MEDICINE | Facility: CLINIC | Age: 74
End: 2024-01-25
Payer: COMMERCIAL

## 2024-01-25 VITALS
WEIGHT: 220 LBS | TEMPERATURE: 97.7 F | HEART RATE: 79 BPM | SYSTOLIC BLOOD PRESSURE: 155 MMHG | BODY MASS INDEX: 37.76 KG/M2 | OXYGEN SATURATION: 100 % | DIASTOLIC BLOOD PRESSURE: 93 MMHG | RESPIRATION RATE: 18 BRPM

## 2024-01-25 DIAGNOSIS — I10 ESSENTIAL HYPERTENSION: ICD-10-CM

## 2024-01-25 DIAGNOSIS — J40 BRONCHITIS: Primary | ICD-10-CM

## 2024-01-25 PROCEDURE — 87635 SARS-COV-2 COVID-19 AMP PRB: CPT | Performed by: NURSE PRACTITIONER

## 2024-01-25 PROCEDURE — 99213 OFFICE O/P EST LOW 20 MIN: CPT | Performed by: NURSE PRACTITIONER

## 2024-01-25 RX ORDER — METOPROLOL TARTRATE 100 MG
100 TABLET ORAL 2 TIMES DAILY
Qty: 180 TABLET | Refills: 0 | Status: ON HOLD | OUTPATIENT
Start: 2024-01-25 | End: 2024-05-15

## 2024-01-25 ASSESSMENT — ENCOUNTER SYMPTOMS
FEVER: 0
SORE THROAT: 0
SHORTNESS OF BREATH: 0

## 2024-01-25 NOTE — TELEPHONE ENCOUNTER
Reason for Call:  Other prescription 90 Day Supply    Detailed comments: [facility] requesting 90 Day Supply for metoprolol tartrate (LOPRESSOR) 100 MG tablet   Requesting QTY of 180.  Patient RX benefits request 90 day     Patient completed Establish Care appointment with Dr. Hernandez 01/24/2023.  Epic was not updated to reflect Dr. Hernandez as the PCP.       Call taken on 1/25/2024 at 10:36 AM by Juliana Tinoco

## 2024-01-25 NOTE — PROGRESS NOTES
"Assessment & Plan     Bronchitis    - Symptomatic COVID-19 Virus (Coronavirus) by PCR Nose       Focused exam and history done due to COVID-19 pandemic in a walk-in setting.      History, exam, and vital signs consistent with a viral URI.  Is have a few scattered wheezes.  No history of asthma, COPD and or smoking.  Explained viral bronchitis, up to 3 weeks to fully resolve.  COVID screening.    Mentions persistent clear nasal drainage prior to onset of the symptoms.  Recommend asking her ophthalmologist if it is okay to trial a OTC Allegra or similar.    Recheck if shortness of breath or new fevers develop.  Rest.     OTCs recommended: None [   ].  Dextromethorphan  [  ], guaifenesin [  x], pseudoephedrine [   ], Afrin for no greater than 3 days [  ], Tylenol [ x ].                Return in about 10 days (around 2/4/2024) for If no better.    [facility]    Tien Bradley is a 74 year old female who presents to clinic today for the following health issues:  Chief Complaint   Patient presents with    Cough     Cough and more when in supine. Covid neg last couple day. Crackling.     HPI    Congestion ongoing for a couple months.  Coming out clear.  \"Allergies?\"     Started mucinex.        Now coughing up yellow and brown x a few days (4 days).  Doesn't feel sick.      Took 2 covid tests that were neg.      Cough a lot last night    Ear popping.      Has glaucoma hx.      Denies history of asthma or COPD.  Has had frequent pneumonia when she was younger.  Has had her pneumonia vaccines.        Review of Systems   Constitutional:  Negative for fever.   HENT:  Negative for ear pain and sore throat.    Respiratory:  Negative for shortness of breath.            Objective    BP (!) 155/93   Pulse 79   Temp 97.7  F (36.5  C) (Oral)   Resp 18   Wt 99.8 kg (220 lb)   LMP  (LMP Unknown)   SpO2 100%   BMI 37.76 kg/m    Physical Exam  Constitutional:       General: She is " not in acute distress.     Appearance: She is well-developed.   Eyes:      General:         Right eye: No discharge.         Left eye: No discharge.      Conjunctiva/sclera: Conjunctivae normal.   Pulmonary:      Effort: Pulmonary effort is normal.      Breath sounds: Wheezing (Scattered expiratory high-pitched) present.   Musculoskeletal:         General: Normal range of motion.   Skin:     General: Skin is warm and dry.      Capillary Refill: Capillary refill takes less than 2 seconds.   Neurological:      Mental Status: She is alert and oriented to person, place, and time.   Psychiatric:         Mood and Affect: Mood normal.         Behavior: Behavior normal.         Thought Content: Thought content normal.         Judgment: Judgment normal.

## 2024-01-25 NOTE — PATIENT INSTRUCTIONS
You are experiencing common viral symptoms. Viruses take 2-3 weeks to resolve on average. Antibiotics don't work on viruses.      If history of heart disease or high blood pressure: Try Corcidin HBP or plain Mucinex (guaifenesin)    Recheck if fevers shortness of breath, persistent ear pain or not starting to feel better in about 10 days      COVID test.  Your results will come to [facility] tomorrow.    Can take Tylenol 1000 mg 4 times a day or ibuprofen 600 mg 4 times per day asneeded for aches/pain.    For sore throat-Also try Cepacol lozenges, throat coat tea or tea with honey.    Allergies- Call your eye doctor and leave a message about ?? Allegra.

## 2024-01-25 NOTE — TELEPHONE ENCOUNTER
Patient call:     Appointment type: new endocrine   Provider: Bro or another provider who sees for history of thyroid cancer   Return date: reschedule 7/15 appt   Speciality phone number: [phone]  Additional appointment(s) needed:   Additional notes: LVOLIVER and sent sulema Gross on 1/25/2024 at 3:48 PM

## 2024-01-26 ENCOUNTER — TELEPHONE (OUTPATIENT)
Dept: FAMILY MEDICINE | Facility: CLINIC | Age: 74
End: 2024-01-26
Payer: COMMERCIAL

## 2024-01-26 LAB — SARS-COV-2 RNA RESP QL NAA+PROBE: NEGATIVE

## 2024-01-26 NOTE — TELEPHONE ENCOUNTER
Pt calling, stating that the Endo referral called them to schedule and they can't get them in them in until October, pt was wondering if theres a way you can help her get in sooner then that.

## 2024-01-26 NOTE — TELEPHONE ENCOUNTER
Spoke to pt and reviewed that I can get managed thyroid levels until she is seen by endo.     Pt thankful for the call

## 2024-02-01 ENCOUNTER — OFFICE VISIT (OUTPATIENT)
Dept: FAMILY MEDICINE | Facility: CLINIC | Age: 74
End: 2024-02-01
Payer: COMMERCIAL

## 2024-02-01 ENCOUNTER — HOSPITAL ENCOUNTER (OUTPATIENT)
Dept: GENERAL RADIOLOGY | Facility: HOSPITAL | Age: 74
Discharge: HOME | End: 2024-02-01
Attending: STUDENT IN AN ORGANIZED HEALTH CARE EDUCATION/TRAINING PROGRAM | Admitting: STUDENT IN AN ORGANIZED HEALTH CARE EDUCATION/TRAINING PROGRAM
Payer: COMMERCIAL

## 2024-02-01 VITALS
DIASTOLIC BLOOD PRESSURE: 89 MMHG | WEIGHT: 220 LBS | RESPIRATION RATE: 21 BRPM | OXYGEN SATURATION: 98 % | HEART RATE: 74 BPM | BODY MASS INDEX: 37.76 KG/M2 | SYSTOLIC BLOOD PRESSURE: 154 MMHG | TEMPERATURE: 97.6 F

## 2024-02-01 DIAGNOSIS — R05.1 ACUTE COUGH: ICD-10-CM

## 2024-02-01 DIAGNOSIS — J01.90 ACUTE SINUSITIS WITH SYMPTOMS > 10 DAYS: Primary | ICD-10-CM

## 2024-02-01 PROCEDURE — 99214 OFFICE O/P EST MOD 30 MIN: CPT | Performed by: STUDENT IN AN ORGANIZED HEALTH CARE EDUCATION/TRAINING PROGRAM

## 2024-02-01 PROCEDURE — 71046 X-RAY EXAM CHEST 2 VIEWS: CPT

## 2024-02-01 RX ORDER — ALBUTEROL SULFATE 90 UG/1
2 AEROSOL, METERED RESPIRATORY (INHALATION) EVERY 6 HOURS PRN
Qty: 18 G | Refills: 0 | Status: SHIPPED | OUTPATIENT
Start: 2024-02-01 | End: 2024-04-05

## 2024-02-01 RX ORDER — DOXYCYCLINE 100 MG/1
100 CAPSULE ORAL 2 TIMES DAILY
Qty: 20 CAPSULE | Refills: 0 | Status: SHIPPED | OUTPATIENT
Start: 2024-02-01 | End: 2024-02-11

## 2024-02-01 RX ORDER — BENZONATATE 100 MG/1
100 CAPSULE ORAL 3 TIMES DAILY PRN
Qty: 30 CAPSULE | Refills: 0 | Status: SHIPPED | OUTPATIENT
Start: 2024-02-01 | End: 2024-04-05

## 2024-02-01 NOTE — PROGRESS NOTES
ASSESSMENT & PLAN:   Diagnoses and all orders for this visit:  Acute sinusitis with symptoms > 10 days  -     doxycycline hyclate (VIBRAMYCIN) 100 MG capsule; Take 1 capsule (100 mg) by mouth 2 times daily for 10 days  Acute cough  -     XR Chest 2 Views; Future  -     benzonatate (TESSALON) 100 MG capsule; Take 1 capsule (100 mg) by mouth 3 times daily as needed for cough  -     albuterol (PROAIR HFA/PROVENTIL HFA/VENTOLIN HFA) 108 (90 Base) MCG/ACT inhaler; Inhale 2 puffs into the lungs every 6 hours as needed for shortness of breath, wheezing or cough    Productive cough and congestion x 1.5 weeks. Chest XR negative for pneumonia. With duration of symptoms and double worsening will treat for sinusitis with doxycycline x 10 days. Symptomatic treatment with tessalon and albuterol inhaler for cough.     At the end of the encounter, I discussed results, diagnosis, medications. Discussed red flags for immediate return to clinic/ER, as well as indications for follow up if no improvement. Patient and/or caregiver understood and agreed to plan. Patient was stable for discharge.    There are no Patient Instructions on file for this visit.    ------------------------------------------------------------------------  SUBJECTIVE  History was obtained from patient.    Patient presents with:  Cough: Cough is the same. Lots of mucus, was here on 1/25 for the same thing.     HPI  Mirna Grijalva is a(n) 74 year old female presenting to urgent care for URI symptoms x 1.5 week. Endorses congestion and cough. Has developed sinus pressure. Deep breaths trigger her cough. No fever or chest pain. No history of asthma or COPD. History of pneumonia as a child. Seen in  1 week ago - negative Covid test at that time. Symptoms have worsened since then.    Review of Systems    Current Outpatient Medications   Medication Sig Dispense Refill    albuterol (PROAIR HFA/PROVENTIL HFA/VENTOLIN HFA) 108 (90 Base) MCG/ACT inhaler Inhale 2 puffs into  the lungs every 6 hours as needed for shortness of breath, wheezing or cough 18 g 0    benzonatate (TESSALON) 100 MG capsule Take 1 capsule (100 mg) by mouth 3 times daily as needed for cough 30 capsule 0    doxycycline hyclate (VIBRAMYCIN) 100 MG capsule Take 1 capsule (100 mg) by mouth 2 times daily for 10 days 20 capsule 0    Acetaminophen (TYLENOL PO)       B Complex Vitamins (B COMPLEX PO) Take 1 tablet by mouth daily      bimatoprost (LUMIGAN) 0.03 % ophthalmic solution Place 1 drop into the right eye at bedtime (Unknown dose)      Cholecalciferol (VITAMIN D-3 PO) Take 1 tablet by mouth daily      levothyroxine (SYNTHROID/LEVOTHROID) 137 MCG tablet Take 1 tablet (137 mcg) by mouth daily 90 tablet 0    losartan-hydrochlorothiazide (HYZAAR) 100-25 MG tablet Take 1 tablet by mouth daily 90 tablet 0    metoprolol tartrate (LOPRESSOR) 100 MG tablet Take 1 tablet (100 mg) by mouth 2 times daily 180 tablet 0    Multiple Vitamins-Minerals (CENTRUM ADULTS PO) Take 1 tablet by mouth daily      pravastatin (PRAVACHOL) 20 MG tablet Take 1 tablet (20 mg) by mouth daily 90 tablet 0     Problem List:  2019-09: Primary open angle glaucoma of right eye  2015-11: Astigmatism  2014-11: Nuclear senile cataract    Allergies   Allergen Reactions    Atenolol      Rash and Insomnia    Ciprofloxacin      Severe tendon pain and swelling    Eliquis [Apixaban]      Wanting to faint    Nitrofurantoin      'Bad, itchy, feverish, rash on arms and legs'    Sulfa Antibiotics      Rash on feet    Penicillins Rash         OBJECTIVE  Vitals:    02/01/24 1449   BP: (!) 154/89   BP Location: Right arm   Patient Position: Sitting   Cuff Size: Adult Regular   Pulse: 74   Resp: 21   Temp: 97.6  F (36.4  C)   TempSrc: Oral   SpO2: 98%   Weight: 99.8 kg (220 lb)     Physical Exam   GENERAL: healthy, alert, no acute distress.   PSYCH: mentation appears normal. Normal affect  HEAD: normocephalic, atraumatic.  EYE: PERRL. EOMs intact. No scleral injection  bilaterally.   EAR: external ear normal. Bilateral ear canals normal and nonpainful. Bilateral TM intact, pearly, translucent without bulging.  NOSE: external nose atraumatic without lesions.  OROPHARYNX: moist mucous membranes. Posterior oropharynx without erythema or exudate. Uvula midline. Patent airway.  LUNGS: no increased work of breathing. Clear lung sounds bilaterally. No wheezing, rhonchi, or rales.   CV: regular rate and rhythm. No clicks, murmurs, or rubs.    Xrays were preliminarily reviewed by me - negative.     Results for orders placed or performed during the hospital encounter of 02/01/24   XR Chest 2 Views     Status: None    Narrative    EXAM: XR CHEST 2 VIEWS  LOCATION: [facility]  DATE: 2/1/2024    INDICATION: Cough.  COMPARISON: None.      Impression    IMPRESSION:    No focal airspace opacities, pleural effusions, or pneumothorax. Pulmonary vascularity is within normal limits.     Nonenlarged cardiac silhouette. Aortic atherosclerotic calcifications.     Multiple right axillary surgical clips.     Demineralized bones, with a mild compression deformity of a lower thoracic vertebral body.

## 2024-02-13 ENCOUNTER — HOSPITAL ENCOUNTER (OUTPATIENT)
Dept: BONE DENSITY | Facility: HOSPITAL | Age: 74
Discharge: HOME | End: 2024-02-13
Attending: STUDENT IN AN ORGANIZED HEALTH CARE EDUCATION/TRAINING PROGRAM
Payer: COMMERCIAL

## 2024-02-13 ENCOUNTER — ANCILLARY PROCEDURE (OUTPATIENT)
Dept: MAMMOGRAPHY | Facility: HOSPITAL | Age: 74
End: 2024-02-13
Attending: STUDENT IN AN ORGANIZED HEALTH CARE EDUCATION/TRAINING PROGRAM
Payer: COMMERCIAL

## 2024-02-13 DIAGNOSIS — Z85.3 HX: BREAST CANCER: ICD-10-CM

## 2024-02-13 DIAGNOSIS — Z12.31 ENCOUNTER FOR SCREENING MAMMOGRAM FOR MALIGNANT NEOPLASM OF BREAST: ICD-10-CM

## 2024-02-13 DIAGNOSIS — Z78.0 POST-MENOPAUSAL: ICD-10-CM

## 2024-02-13 PROCEDURE — 77067 SCR MAMMO BI INCL CAD: CPT | Mod: 52

## 2024-02-13 PROCEDURE — 77081 DXA BONE DENSITY APPENDICULR: CPT | Mod: XS

## 2024-02-13 PROCEDURE — 77080 DXA BONE DENSITY AXIAL: CPT

## 2024-02-14 ENCOUNTER — MYC MEDICAL ADVICE (OUTPATIENT)
Dept: FAMILY MEDICINE | Facility: CLINIC | Age: 74
End: 2024-02-14
Payer: COMMERCIAL

## 2024-02-16 ENCOUNTER — HOSPITAL ENCOUNTER (EMERGENCY)
Facility: HOSPITAL | Age: 74
Discharge: HOME | End: 2024-02-16
Admitting: STUDENT IN AN ORGANIZED HEALTH CARE EDUCATION/TRAINING PROGRAM
Payer: COMMERCIAL

## 2024-02-16 VITALS
OXYGEN SATURATION: 98 % | RESPIRATION RATE: 18 BRPM | SYSTOLIC BLOOD PRESSURE: 139 MMHG | BODY MASS INDEX: 38.22 KG/M2 | HEART RATE: 86 BPM | DIASTOLIC BLOOD PRESSURE: 82 MMHG | WEIGHT: 222.66 LBS | TEMPERATURE: 97.9 F

## 2024-02-16 DIAGNOSIS — N39.0 URINARY TRACT INFECTION IN FEMALE: ICD-10-CM

## 2024-02-16 LAB
ALBUMIN UR-MCNC: NEGATIVE MG/DL
APPEARANCE UR: CLEAR
BACTERIA #/AREA URNS HPF: ABNORMAL /HPF
BILIRUB UR QL STRIP: NEGATIVE
COLOR UR AUTO: ABNORMAL
GLUCOSE UR STRIP-MCNC: NEGATIVE MG/DL
HGB UR QL STRIP: NEGATIVE
KETONES UR STRIP-MCNC: NEGATIVE MG/DL
LEUKOCYTE ESTERASE UR QL STRIP: ABNORMAL
NITRATE UR QL: NEGATIVE
PH UR STRIP: 7 [PH] (ref 5–7)
RBC URINE: 1 /HPF
SP GR UR STRIP: 1.01 (ref 1–1.03)
SQUAMOUS EPITHELIAL: <1 /HPF
UROBILINOGEN UR STRIP-MCNC: <2 MG/DL
WBC URINE: 111 /HPF
YEAST #/AREA URNS HPF: ABNORMAL /HPF

## 2024-02-16 PROCEDURE — 99283 EMERGENCY DEPT VISIT LOW MDM: CPT

## 2024-02-16 PROCEDURE — 81001 URINALYSIS AUTO W/SCOPE: CPT | Performed by: EMERGENCY MEDICINE

## 2024-02-16 PROCEDURE — 87086 URINE CULTURE/COLONY COUNT: CPT | Performed by: EMERGENCY MEDICINE

## 2024-02-16 RX ORDER — CEFDINIR 300 MG/1
300 CAPSULE ORAL 2 TIMES DAILY
Qty: 14 CAPSULE | Refills: 0 | Status: SHIPPED | OUTPATIENT
Start: 2024-02-16 | End: 2024-02-23

## 2024-02-16 NOTE — ED PROVIDER NOTES
Emergency Department Encounter  [facility] EMERGENCY DEPARTMENT  Mirna Grijalva   AGE: 74 year old SEX: female  YOB: 1950  MRN: [id number]      EVALUATION DATE & TIME: No admission date for patient encounter. ; PCP: Ambar Hernandez   ED PROVIDER: Jany Frank PA-C    CHIEF COMPLAINT: Cystitis      MEDICAL DECISION MAKING   Mirna Grijalva is a 74 year old female with a history of recurrent urinary tract infections who presents to the ED by private vehicle with spouse for evaluation of increased urinary frequency that started 3 days ago.  Patient denies fevers, chills flank pain, nausea, and vomiting. Last UTI treated with an antibiotic was greater than 1 year ago. Patient denies vaginal discharge or other vaginal complaints.  No history of kidney stones    Vitals reviewed and hemodynamically stable, afebrile without the use of fever reducing medications.  On exam, patient is in no acute distress and nontoxic.  No abdominal tenderness.  No CVA tenderness.     Presentation is consistent with urinary tract infection.  There is no pain over the kidney region or systemic signs of infection to indicate ascending urinary tract infection. Low suspicion for kidney stone, ovarian torsion, PID, and appendicitis.  Plan to discharge patient home with antibiotic (cefdinir).  I considered patient's allergy to penicillin but based on cross-reactivity and side chains, very low risk of repeat reaction with cefdinir. Patient has a clear understanding of the discharge diagnosis, written discharge instructions, and planned follow-up with clear instructions to return to the emergency department if the condition worsens, specifically the development of systemic symptoms and the inability to tolerate fluids or antibiotics by mouth.    Medical Decision Making  Obtained supplemental history:Supplemental history obtained?: Documented in chart and Family Member/Significant Other  Reviewed external  records: External records reviewed?: Documented in chart  Care impacted by chronic illness:N/A  Care significantly affected by social determinants of health:N/A  Did you consider but not order tests?: Work up considered but not performed and documented in chart, if applicable  Did you interpret images independently?: Independent interpretation of ECG and images noted in documentation, when applicable.  Consultation discussion with other provider:Did you involve another provider (consultant, , pharmacy, etc.)?: No  Discharge. I prescribed additional prescription strength medication(s) as charted. See documentation for any additional details.    FINAL IMPRESSION       ICD-10-CM    1. Urinary tract infection in female  N39.0           ED COURSE   1:08 PM I met and introduced myself to the patient. I gathered initial history and performed my physical exam. We discussed plan for initial workup.   1:59 PM I rechecked the patient and discussed results, discharge, follow up, and reasons to return to the ED.     MEDICATIONS GIVEN IN THE EMERGENCY DEPARTMENT:   Medications - No data to display   NEW PRESCRIPTIONS STARTED AT TODAY'S ED VISIT:  Discharge Medication List as of 2/16/2024  2:00 PM        START taking these medications    Details   cefdinir (OMNICEF) 300 MG capsule Take 1 capsule (300 mg) by mouth 2 times daily for 7 days, Disp-14 capsule, R-0, E-PrescribeNoted the penicillin allergy.  Cleared cefdinir with ED pharmacist as cefdinir and penicillin do not have a high risk of cross reaction given side chains.                 =================================================================         HISTORY OF PRESENT ILLNESS   Patient information was obtained from: Patient,   Use of Intrepreter: N/A     Mirna Grijalva is a 74 year old female with a history of recurrent urinary tract infections who presents to the ED by private vehicle with spouse for evaluation of increased urinary frequency.  Patient states  she gets urinary tract infections frequently and they always feel like this.  Symptoms started 3 days ago.  Patient denies dysuria, flank pain, nausea, vomiting, and fevers/chills.  No history of kidney stones.  Last urinary tract infection treated with antibiotics was greater than 1 year ago.    MEDICAL HISTORY     No past medical history on file.    No past surgical history on file.    No family history on file.    Social History     Tobacco Use    Smoking status: Former     Types: Cigarettes     Passive exposure: Never    Smokeless tobacco: Never       cefdinir (OMNICEF) 300 MG capsule  Acetaminophen (TYLENOL PO)  albuterol (PROAIR HFA/PROVENTIL HFA/VENTOLIN HFA) 108 (90 Base) MCG/ACT inhaler  B Complex Vitamins (B COMPLEX PO)  benzonatate (TESSALON) 100 MG capsule  bimatoprost (LUMIGAN) 0.03 % ophthalmic solution  Cholecalciferol (VITAMIN D-3 PO)  levothyroxine (SYNTHROID/LEVOTHROID) 137 MCG tablet  losartan-hydrochlorothiazide (HYZAAR) 100-25 MG tablet  metoprolol tartrate (LOPRESSOR) 100 MG tablet  Multiple Vitamins-Minerals (CENTRUM ADULTS PO)  pravastatin (PRAVACHOL) 20 MG tablet        PHYSICAL EXAM   VITALS:  Patient Vitals for the past 24 hrs:   BP Temp Temp src Pulse Resp SpO2 Weight   02/16/24 1235 139/82 97.9  F (36.6  C) Oral 86 18 98 % 101 kg (222 lb 10.6 oz)       Physical Exam  Vitals and nursing note reviewed.   Constitutional:       General: She is not in acute distress.     Appearance: She is not ill-appearing or toxic-appearing.   Cardiovascular:      Rate and Rhythm: Normal rate.      Heart sounds: S1 normal and S2 normal.   Pulmonary:      Effort: Pulmonary effort is normal.      Breath sounds: Normal breath sounds.   Abdominal:      General: Abdomen is flat.      Palpations: Abdomen is soft.      Tenderness: There is no abdominal tenderness. There is no right CVA tenderness or left CVA tenderness.   Skin:     General: Skin is warm.      Capillary Refill: Capillary refill takes less than 2  seconds.      Findings: No rash.   Neurological:      Mental Status: She is alert and oriented to person, place, and time. Mental status is at baseline.       LABS AND IMAGING   All pertinent labs reviewed and interpreted  Labs Ordered and Resulted from Time of ED Arrival to Time of ED Departure   ROUTINE UA WITH MICROSCOPIC REFLEX TO CULTURE - Abnormal       Result Value    Color Urine Light Yellow      Appearance Urine Clear      Glucose Urine Negative      Bilirubin Urine Negative      Ketones Urine Negative      Specific Gravity Urine 1.009      Blood Urine Negative      pH Urine 7.0      Protein Albumin Urine Negative      Urobilinogen Urine <2.0      Nitrite Urine Negative      Leukocyte Esterase Urine 500 Caitlin/uL (*)     Bacteria Urine Few (*)     Budding Yeast Urine Few (*)     RBC Urine 1      WBC Urine 111 (*)     Squamous Epithelials Urine <1     URINE CULTURE       No orders to display     Jany Frank PA-C   Emergency Medicine   [facility] EMERGENCY DEPARTMENT  [address] [place] [id number]  [phone]  Dept: [phone]      Jany Frank PA-C  02/16/24 1524

## 2024-02-16 NOTE — DISCHARGE INSTRUCTIONS
Today your urinalysis showed that you have a urinary tract infection.  I will treat this with antibiotics.  Please take the entire course as directed and do not stop taking just because you feel better.  We have sent your urine for culture one of our nurses will call you if the antibiotics prescribed you today do not cover the bacteria isolated.     Call your doctor now or seek immediate medical care if:    Symptoms such as fever, chills, nausea, or vomiting get worse or appear for the first time.     You have new pain in your back just below your rib cage. This is called flank pain.     There is new blood or pus in your urine.     You have any problems with your antibiotic medicine.   Watch closely for changes in your health, and be sure to contact your doctor if:    You are not getting better after taking an antibiotic for 2 days.     Your symptoms go away but then come back.

## 2024-02-16 NOTE — ED TRIAGE NOTES
"Pt states \"I have a bladder infection.\" PT states she has \"chronic bladder infections\" Pt reports increased urinating and burning for a couple days.      Triage Assessment (Adult)       Row Name 02/16/24 1236          Triage Assessment    Airway WDL WDL        Respiratory WDL    Respiratory WDL WDL        Skin Circulation/Temperature WDL    Skin Circulation/Temperature WDL WDL        Cardiac WDL    Cardiac WDL WDL        Peripheral/Neurovascular WDL    Peripheral Neurovascular WDL WDL        Cognitive/Neuro/Behavioral WDL    Cognitive/Neuro/Behavioral WDL WDL                     "

## 2024-02-18 LAB
BACTERIA UR CULT: ABNORMAL
BACTERIA UR CULT: ABNORMAL

## 2024-02-19 ENCOUNTER — TELEPHONE (OUTPATIENT)
Dept: EMERGENCY MEDICINE | Facility: HOSPITAL | Age: 74
End: 2024-02-19
Payer: COMMERCIAL

## 2024-02-19 NOTE — RESULT ENCOUNTER NOTE
Final Urine Culture Report on 2/18/24  Emergency Dep/Urgent Care discharge antibiotic prescribed: Cefdinir (Omnicef) 300 mg capsule, 1 capsule (300 mg) by mouth 2 times daily for 7 days  #1. Bacteria, >100,000 CFU/ML Proteus mirabilis  is SUSCEPTIBLE to Antibiotic.    #2. Bacteria, 50,000 - 100,000 CFU/ML Proteus mirabilis  is SUSCEPTIBLE to Antibiotic.    No change in treatment per [facility] ED lab result Urine Culture protocol.

## 2024-02-19 NOTE — TELEPHONE ENCOUNTER
[facility] Emergency Department/Urgent Care Lab result notification   [Positive for uti and bacteria is susceptible to antibiotic]    Reason for call:   Notify of lab results  Assess patient current symptoms  Review ED Providers recommendations/discharge instructions (if necessary)  Advise per [facility] ED lab result Urine Culture protocol    Lab Result & Date of Final Report [copied from Result Note]:    Final Urine Culture Report on 2/18/24  Emergency Dep/Urgent Care discharge antibiotic prescribed: Cefdinir (Omnicef) 300 mg capsule, 1 capsule (300 mg) by mouth 2 times daily for 7 days  #1. Bacteria, >100,000 CFU/ML Proteus mirabilis  is SUSCEPTIBLE to Antibiotic.    #2. Bacteria, 50,000 - 100,000 CFU/ML Proteus mirabilis  is SUSCEPTIBLE to Antibiotic.    No change in treatment per [facility] ED lab result Urine Culture protocol.    RN Assessment (Patient's current Symptoms):  Time of call: 10:17A  Assessment: sx's have resolved    Recommendations/Instructions:   [facility] ED lab result protocol used: Urine Culture  Mirna notified of urine culture result  Take antibiotic as directed by the Emergency Dept/Urgent Care Provider.  RN reviewed information about UTI prevention [Yes/No]:  Yes      Please contact you PCP or return to the Emergency Department if your:  Symptoms return  Symptoms worsen or other concerning symptoms      Copy of full urine culture report below:         Yordy Ramirez RN  [facility]  Emergency Dept Lab Result RN  Ph# [phone]

## 2024-02-23 ENCOUNTER — OFFICE VISIT (OUTPATIENT)
Dept: FAMILY MEDICINE | Facility: CLINIC | Age: 74
End: 2024-02-23
Payer: COMMERCIAL

## 2024-02-23 VITALS
DIASTOLIC BLOOD PRESSURE: 83 MMHG | OXYGEN SATURATION: 97 % | WEIGHT: 222 LBS | BODY MASS INDEX: 38.11 KG/M2 | RESPIRATION RATE: 18 BRPM | SYSTOLIC BLOOD PRESSURE: 130 MMHG | TEMPERATURE: 97.8 F | HEART RATE: 77 BPM

## 2024-02-23 DIAGNOSIS — N39.0 CHRONIC LOWER URINARY TRACT INFECTION: ICD-10-CM

## 2024-02-23 DIAGNOSIS — N30.00 ACUTE CYSTITIS WITHOUT HEMATURIA: ICD-10-CM

## 2024-02-23 DIAGNOSIS — R30.0 DYSURIA: Primary | ICD-10-CM

## 2024-02-23 LAB
ALBUMIN UR-MCNC: NEGATIVE MG/DL
APPEARANCE UR: CLEAR
BACTERIA #/AREA URNS HPF: ABNORMAL /HPF
BILIRUB UR QL STRIP: NEGATIVE
COLOR UR AUTO: YELLOW
GLUCOSE UR STRIP-MCNC: NEGATIVE MG/DL
HGB UR QL STRIP: ABNORMAL
KETONES UR STRIP-MCNC: NEGATIVE MG/DL
LEUKOCYTE ESTERASE UR QL STRIP: ABNORMAL
NITRATE UR QL: NEGATIVE
PH UR STRIP: 7 [PH] (ref 5–8)
RBC #/AREA URNS AUTO: ABNORMAL /HPF
SP GR UR STRIP: 1.01 (ref 1–1.03)
SQUAMOUS #/AREA URNS AUTO: ABNORMAL /LPF
UROBILINOGEN UR STRIP-ACNC: 0.2 E.U./DL
WBC #/AREA URNS AUTO: >100 /HPF
WBC CLUMPS #/AREA URNS HPF: PRESENT /HPF

## 2024-02-23 PROCEDURE — 87088 URINE BACTERIA CULTURE: CPT | Performed by: FAMILY MEDICINE

## 2024-02-23 PROCEDURE — 87086 URINE CULTURE/COLONY COUNT: CPT | Performed by: FAMILY MEDICINE

## 2024-02-23 PROCEDURE — 81001 URINALYSIS AUTO W/SCOPE: CPT

## 2024-02-23 PROCEDURE — 87186 SC STD MICRODIL/AGAR DIL: CPT | Performed by: FAMILY MEDICINE

## 2024-02-23 PROCEDURE — 99213 OFFICE O/P EST LOW 20 MIN: CPT | Performed by: FAMILY MEDICINE

## 2024-02-23 RX ORDER — CEFDINIR 300 MG/1
300 CAPSULE ORAL 2 TIMES DAILY
Qty: 20 CAPSULE | Refills: 0 | Status: SHIPPED | OUTPATIENT
Start: 2024-02-23 | End: 2024-03-04

## 2024-02-23 NOTE — PATIENT INSTRUCTIONS
Take prescribed medication as directed.    Keep up fluid intake.    Cranberry as you did before.    Vitamin C.      Return if symptoms don't improve.

## 2024-02-23 NOTE — PROGRESS NOTES
(R30.0) Dysuria  (primary encounter diagnosis)  Comment:   Plan: UA Macroscopic with reflex to Microscopic and         Culture - Clinic Collect, Urine Microscopic         Exam, Urine Culture            (N30.00) Acute cystitis without hematuria  Comment:   Urinalysis remains abnormal.  Since patient says that these usually take quite a long time to resolve, her med is refilled for an additional 10 days.  Plan: cefdinir (OMNICEF) 300 MG capsule, Adult         Urology  Referral        She will get back on her cranberry regimen.      (N39.0) Chronic lower urinary tract infection  Comment:   History of bladder failing to empty properly.  Past urologic consultation was in [place].  Plan: Adult Urology  Referral              CHIEF COMPLAINT    Dysuria and frequency.  History of bladder problems.      HISTORY    She was seen 2- with Proteus UTI.  Treated with cefdinir.    Patient is still having urinary frequency.    She has a history of chronic UTI.  Has history of poorly emptying bladder.  Used to see a urologist down in [place] but now is up in this area.    Not currently having fever or chills.  No backache.  No lower abdominal pain.      Patient Active Problem List   Diagnosis    Astigmatism    Nuclear senile cataract    Primary open angle glaucoma of right eye       Current Outpatient Medications   Medication Sig Dispense Refill    Acetaminophen (TYLENOL PO)       albuterol (PROAIR HFA/PROVENTIL HFA/VENTOLIN HFA) 108 (90 Base) MCG/ACT inhaler Inhale 2 puffs into the lungs every 6 hours as needed for shortness of breath, wheezing or cough 18 g 0    B Complex Vitamins (B COMPLEX PO) Take 1 tablet by mouth daily      benzonatate (TESSALON) 100 MG capsule Take 1 capsule (100 mg) by mouth 3 times daily as needed for cough 30 capsule 0    bimatoprost (LUMIGAN) 0.03 % ophthalmic solution Place 1 drop into the right eye at bedtime (Unknown dose)      cefdinir (OMNICEF) 300 MG capsule Take 1  capsule (300 mg) by mouth 2 times daily for 10 days 20 capsule 0    Cholecalciferol (VITAMIN D-3 PO) Take 1 tablet by mouth daily      levothyroxine (SYNTHROID/LEVOTHROID) 137 MCG tablet Take 1 tablet (137 mcg) by mouth daily 90 tablet 0    losartan-hydrochlorothiazide (HYZAAR) 100-25 MG tablet Take 1 tablet by mouth daily 90 tablet 0    metoprolol tartrate (LOPRESSOR) 100 MG tablet Take 1 tablet (100 mg) by mouth 2 times daily 180 tablet 0    Multiple Vitamins-Minerals (CENTRUM ADULTS PO) Take 1 tablet by mouth daily      pravastatin (PRAVACHOL) 20 MG tablet Take 1 tablet (20 mg) by mouth daily 90 tablet 0       REVIEW OF SYSTEMS    No fever or chills.  No cough or shortness of breath.  No chest pain  No abdominal pain or vomiting.      EXAM  /83 (BP Location: Left arm, Patient Position: Sitting, Cuff Size: Adult Regular)   Pulse 77   Temp 97.8  F (36.6  C) (Core)   Resp 18   Wt 100.7 kg (222 lb)   LMP  (LMP Unknown)   SpO2 97%   BMI 38.11 kg/m      Alert.  Ambulates with a cane.  Nonlabored breathing.  Abdomen nondistended.      Results for orders placed or performed in visit on 02/23/24   UA Macroscopic with reflex to Microscopic and Culture - Clinic Collect     Status: Abnormal    Specimen: Urine, Clean Catch   Result Value Ref Range    Color Urine Yellow Colorless, Straw, Light Yellow, Yellow    Appearance Urine Clear Clear    Glucose Urine Negative Negative mg/dL    Bilirubin Urine Negative Negative    Ketones Urine Negative Negative mg/dL    Specific Gravity Urine 1.015 1.005 - 1.030    Blood Urine Trace (A) Negative    pH Urine 7.0 5.0 - 8.0    Protein Albumin Urine Negative Negative mg/dL    Urobilinogen Urine 0.2 0.2, 1.0 E.U./dL    Nitrite Urine Negative Negative    Leukocyte Esterase Urine Moderate (A) Negative   Urine Microscopic Exam     Status: Abnormal   Result Value Ref Range    Bacteria Urine Few (A) None Seen /HPF    RBC Urine 5-10 (A) 0-2 /HPF /HPF    WBC Urine >100 (A) 0-5 /HPF /HPF     Squamous Epithelials Urine Few (A) None Seen /LPF    WBC Clumps Urine Present (A) None Seen /HPF

## 2024-02-25 LAB — BACTERIA UR CULT: ABNORMAL

## 2024-03-01 ENCOUNTER — HOSPITAL ENCOUNTER (EMERGENCY)
Facility: HOSPITAL | Age: 74
Discharge: HOME | End: 2024-03-01
Attending: EMERGENCY MEDICINE | Admitting: EMERGENCY MEDICINE
Payer: COMMERCIAL

## 2024-03-01 ENCOUNTER — APPOINTMENT (OUTPATIENT)
Dept: CT IMAGING | Facility: HOSPITAL | Age: 74
End: 2024-03-01
Attending: EMERGENCY MEDICINE
Payer: COMMERCIAL

## 2024-03-01 ENCOUNTER — TRANSFERRED RECORDS (OUTPATIENT)
Dept: HEALTH INFORMATION MANAGEMENT | Facility: CLINIC | Age: 74
End: 2024-03-01
Payer: COMMERCIAL

## 2024-03-01 VITALS
WEIGHT: 226.7 LBS | TEMPERATURE: 98 F | HEART RATE: 79 BPM | OXYGEN SATURATION: 94 % | HEIGHT: 64 IN | BODY MASS INDEX: 38.7 KG/M2 | SYSTOLIC BLOOD PRESSURE: 137 MMHG | RESPIRATION RATE: 18 BRPM | DIASTOLIC BLOOD PRESSURE: 82 MMHG

## 2024-03-01 DIAGNOSIS — S09.90XA HEAD INJURY, INITIAL ENCOUNTER: ICD-10-CM

## 2024-03-01 DIAGNOSIS — M54.2 NECK PAIN: ICD-10-CM

## 2024-03-01 PROCEDURE — 70450 CT HEAD/BRAIN W/O DYE: CPT

## 2024-03-01 PROCEDURE — 99284 EMERGENCY DEPT VISIT MOD MDM: CPT | Mod: 25

## 2024-03-01 PROCEDURE — 70486 CT MAXILLOFACIAL W/O DYE: CPT

## 2024-03-01 PROCEDURE — 72125 CT NECK SPINE W/O DYE: CPT

## 2024-03-01 ASSESSMENT — COLUMBIA-SUICIDE SEVERITY RATING SCALE - C-SSRS
2. HAVE YOU ACTUALLY HAD ANY THOUGHTS OF KILLING YOURSELF IN THE PAST MONTH?: NO
6. HAVE YOU EVER DONE ANYTHING, STARTED TO DO ANYTHING, OR PREPARED TO DO ANYTHING TO END YOUR LIFE?: NO
1. IN THE PAST MONTH, HAVE YOU WISHED YOU WERE DEAD OR WISHED YOU COULD GO TO SLEEP AND NOT WAKE UP?: NO

## 2024-03-01 ASSESSMENT — ACTIVITIES OF DAILY LIVING (ADL)
ADLS_ACUITY_SCORE: 33
ADLS_ACUITY_SCORE: 35

## 2024-03-01 NOTE — ED PROVIDER NOTES
"  Emergency Department Encounter     Evaluation Date & Time:   3/1/2024  2:45 PM    CHIEF COMPLAINT:  Fall and Head Injury      Triage Note:Pt was tidying the living room on Monday or Tuesday and didn't use assistive devices and \"did a header\". Lost her balance, didn't have anything to grab, and fell. Tripped on the ottoman. Head hit the ground and glasses broke causing a left sided black eye.  has since glued glasses. Is not on any blood thinners. Denies LOC. No chest pain, dizziness, or sob prior to fall. Pt went to [place] today for her back and ortho doc told her she needed to come get MRI of her brain.     Denies n/v, blurred vision, diplopia, or other neuro deficits. Hx of right leg weakness and that is not new.       Pain to right side of neck. No direct cspine pain on palpation.         Impression and Plan       FINAL IMPRESSION:    ICD-10-CM    1. Head injury, initial encounter  S09.90XA       2. Neck pain  M54.2           ED COURSE & MEDICAL DECISION MAKIN:40 PM I met with the patient, obtained history, performed physical exam, and discussed ED plan.  3:54 PM I rechecked on the patient and updated them on lab results and the plan. We discussed the plan for discharge and the patient is agreeable. Reviewed supportive cares, symptomatic treatment, outpatient follow up, and reasons to return to the Emergency Department. Patient to be discharged by ED RN.     74 year old female, history of HTN, HLD, thyroid and breast cancer, who presents from [facility] for evaluation of head injury after a mechanical fall that occurred 5 days ago.     She was cleaning her living room without use of her cane or walker when she fell forward hitting her head. She denies LOC, but has had headache without nausea or vomiting. She is not anticoagulated. Reports neck pain without extremity weakness or paresthesias. She also sustained bruising to her right upper shin and a superficial laceration to the left lower " shin.     She had an appointment with [facility] today regarding her chronic back pain and they were concerned about her head injury and she was referred here.     Son-in-law reports that she has been having memory issues for some time and is currently undergoing evaluation for this.     On exam, there is left periorbital ecchymosis; EOMI without entrapment. Neuro exam is normal.    Head CT performed and negative for acute intracranial process.    Facial bones CT also performed and negative for acute facial fractures.    She has midline tenderness to palpation of entire cervical spine for which CT cervical spine was performed and negative for acute fracture. I do not suspect ligamentous injury and do not think emergent MRI is indicated.    I offered to perform additional images to evaluate her chronic back pain, however family reports that she is already set up for MRI imaging with [facility].    She has a superficial laceration to the anterior aspect of the LLE without signs of infection.  I did offer to perform x-rays of her left ankle region and her right knee area given bruising, however she and family declined as she has been ambulatory and fully weight-bearing.    Nothing in history or on exam to suggest significant chest, abdominal or pelvic injury and I do not think emergent imaging studies to evaluate for such are indicated.    Patient discharged to home with follow-up with her PCP as well as [facility], as previously arranged, to obtain MRI imaging of her back.  Return precautions provided.  Patient stable throughout ED course.    At the conclusion of the encounter I discussed the results of all the tests and   the disposition. The questions were answered. The patient and family acknowledged understanding and were agreeable with the care plan.      Medical Decision Making  Obtained supplemental history:Supplemental history obtained?: Documented in chart and Family Member/Significant  Other  Reviewed external records: External records reviewed?: Documented in chart and Outpatient Record: [facility] note - they performed thoracic and lumbar spine x-rays today with MRI imaging ordered - referred to ED for evaluation of head injury  Care impacted by chronic illness:Hyperlipidemia and Hypertension  Care significantly affected by social determinants of health:N/A  Did you consider but not order tests?: In addition to work-up documented, I considered the following work up:     Further back imaging; family reported that she is already set up for this    Discharge. No recommendations on prescription strength medication(s). I considered admission, but ultimately discharged patient given reassuring evaluation.      MEDICATIONS GIVEN IN THE EMERGENCY DEPARTMENT:  Medications - No data to display    NEW PRESCRIPTIONS STARTED AT TODAY'S ED VISIT:  New Prescriptions    No medications on file       HPI     Mirna Grijalva is a 74 year old female, history of HTN, HLD, thyroid and breast cancer, who presents to this ED via walk-in from [facility] for evaluation of head injury after a fall.     On Monday morning (5 days ago), the patient was cleaning her living room without use of her cane or walker when she fell. She fell forward hitting her head. She reports associated headache, but denies LOC, nausea / vomiting. She is not anticoagulated. Her eyeglasses broke causing bruising around her left eye; no acute vision changes. She reports associated neck pain without extremity weakness or paresthesias. She sustained bruising to her right upper shin and a superficial laceration to the left lower shin.     She otherwise denies chest pain, shortness of breath, abdominal pain or acute back pain.     She reports chronic back pain for which she had an appointment with [facility] today and they referred her for evaluation of her head injury as there was report of memory issues. The patient's son-in-law  "reports that she has been having memory issues for some time and is currently undergoing evaluation for this.     REVIEW OF SYSTEMS:  All other systems reviewed and are negative.      Medical History     No past medical history on file.    No past surgical history on file.    No family history on file.    Social History     Tobacco Use    Smoking status: Former     Types: Cigarettes     Passive exposure: Never    Smokeless tobacco: Never       Acetaminophen (TYLENOL PO)  albuterol (PROAIR HFA/PROVENTIL HFA/VENTOLIN HFA) 108 (90 Base) MCG/ACT inhaler  B Complex Vitamins (B COMPLEX PO)  benzonatate (TESSALON) 100 MG capsule  bimatoprost (LUMIGAN) 0.03 % ophthalmic solution  cefdinir (OMNICEF) 300 MG capsule  Cholecalciferol (VITAMIN D-3 PO)  levothyroxine (SYNTHROID/LEVOTHROID) 137 MCG tablet  losartan-hydrochlorothiazide (HYZAAR) 100-25 MG tablet  metoprolol tartrate (LOPRESSOR) 100 MG tablet  Multiple Vitamins-Minerals (CENTRUM ADULTS PO)  pravastatin (PRAVACHOL) 20 MG tablet        Physical Exam     First Vitals:  Patient Vitals for the past 24 hrs:   BP Temp Pulse Resp SpO2 Height Weight   03/01/24 1529 137/82 -- 79 -- 94 % -- --   03/01/24 1315 (!) 172/103 98  F (36.7  C) 75 18 96 % 1.626 m (5' 4\") 102.8 kg (226 lb 11.2 oz)       PHYSICAL EXAM:   Physical Exam    GENERAL: Awake, alert.  In no acute distress.   HEENT: Normocephalic. Left periorbital ecchymosis; head is otherwise atraumatic. Pupils equal, round and reactive. Conjunctiva normal. EOMI without entrapment.   NECK: There is midline tenderness to palpation of the entire cervical spine. No pain with ROM of neck. No stridor.  PULMONARY: Chest is atraumatic and non-tender to palpation. No palpable subcutaneous emphysema. Symmetrical breath sounds without distress.  Lungs clear to auscultation bilaterally without wheezes, rhonchi or rales.  CARDIO: Regular rate and rhythm.  No significant murmur, rub or gallop.  Radial pulses strong and " symmetrical.  ABDOMINAL: Abdomen atraumatic, soft, non-distended and non-tender to palpation.  No CVAT, BL.  BACK:  Back is atraumatic. There is midline tenderness to palpation of upper lumbar spine. No palpable bony step-offs.   EXTREMITIES: Superficial laceration to anterior aspect of left lower leg; no surrounding erythema, swelling, purulent drainage or other signs of infection. Ecchymosis to right upper shin.   NEURO: GCS 14 for some confusion (family reports patient is at baseline mental status - currently undergoing evaluation for memory issues).  She is alert and oriented to person, place and time.  Cranial nerves III-XII intact. Strength 5/5 BL upper and lower extremities with sensation to light touch grossly intact.  Independently ambulatory with her cane.  PSYCH: Normal mood and affect.  SKIN: Superficial laceration LLE, as noted above.    Results     LAB:  All pertinent labs reviewed and interpreted  Labs Ordered and Resulted from Time of ED Arrival to Time of ED Departure - No data to display    RADIOLOGY:  CT Cervical Spine w/o Contrast   Final Result   IMPRESSION:   1.  No acute cervical spine fracture.      CT Facial Bones without Contrast   Final Result   IMPRESSION:    1.  No acute facial fracture.         Head CT w/o contrast   Final Result   IMPRESSION:   1.  No CT evidence for acute intracranial process.   2.  Brain atrophy and presumed chronic microvascular ischemic changes as above.            I, Parveen Perez, am serving as a scribe to document services personally performed by Soledad Graham MD, based on my observation and the provider's statements to me. I, Soledad Graham MD, attest that Parveen Perez is acting in a scribe capacity, has observed my performance of the services and has documented them in accordance with my direction.    Soledad Graham MD  Emergency Medicine  [facility] EMERGENCY DEPARTMENT         Soledad Graham MD  03/02/24 4759

## 2024-03-01 NOTE — DISCHARGE INSTRUCTIONS
Please follow-up with your Primary Care Provider next week for a recheck; call to arrange appointment.      Please follow-up with [facility] for your MRI imaging, as previously arranged.    Return to the ER for worsening symptoms, sudden onset or severe headache, focal neurologic deficit (for example, facial droop or right arm weakness), persistent nausea / vomiting, excessive sleepiness, confusion, fever or other concerns.

## 2024-03-01 NOTE — ED TRIAGE NOTES
"Pt was tidying the living room on Monday or Tuesday and didn't use assistive devices and \"did a header\". Lost her balance, didn't have anything to grab, and fell. Tripped on the ottoman. Head hit the ground and glasses broke causing a left sided black eye.  has since glued glasses. Is not on any blood thinners. Denies LOC. No chest pain, dizziness, or sob prior to fall. Pt went [facility] today [facility] her back and ortho doc told her she needed to come get MRI of her brain.     Denies n/v, blurred vision, diplopia, or other neuro deficits. Hx of right leg weakness and that is not new.       Pain to right side of neck. No direct cspine pain on palpation.   "

## 2024-03-02 ENCOUNTER — NURSE TRIAGE (OUTPATIENT)
Dept: NURSING | Facility: CLINIC | Age: 74
End: 2024-03-02
Payer: COMMERCIAL

## 2024-03-02 NOTE — TELEPHONE ENCOUNTER
Pt fell and seen in 's ED yesterday 3/1/24.  Pt calling to let PCP know that pt was seen in ED.  Pt states cough continues with phlegm and no fever.  Pt will follow Care Advice and follow up with clinic if anything needed further.  Koki Babb RN  [facility] Nurse Advisor    Reason for Disposition   Cough    Additional Information   Negative: SEVERE difficulty breathing (e.g., struggling for each breath, speaks in single words)   Negative: Bluish (or gray) lips or face now   Negative: [1] Rapid onset of cough AND [2] has hives   Negative: Coughing started suddenly after medicine, an allergic food or bee sting   Negative: [1] Difficulty breathing AND [2] exposure to flames, smoke, or fumes   Negative: [1] Stridor AND [2] difficulty breathing   Negative: Sounds like a life-threatening emergency to the triager   Negative: Choked on object of food that could be caught in the throat   Negative: Chest pain is main symptom   Negative: [1] Previous asthma attacks AND [2] this feels like asthma attack   Negative: Cough lasts > 3 weeks   Negative: Wet cough (productive; white-yellow, yellow, green, or flores colored sputum)   Negative: [1] Dry cough (non-productive;  no sputum or minimal clear sputum) AND [2] within 14 days of COVID-19 Exposure   Negative: [1] MODERATE difficulty breathing (e.g., speaks in phrases, SOB even at rest, pulse 100-120) AND [2] still present when not coughing   Negative: Chest pain  (Exception: MILD central chest pain, present only when coughing.)   Negative: Patient sounds very sick or weak to the triager   Negative: [1] MILD difficulty breathing (e.g., minimal/no SOB at rest, SOB with walking, pulse <100) AND [2] still present when not coughing   Negative: [1] Coughed up blood AND [2] > 1 tablespoon (15 ml)   (Exception: Blood-tinged sputum.)   Negative: Fever > 103 F (39.4 C)   Negative: [1] Fever > 101 F (38.3 C) AND [2] age > 60 years   Negative: [1] Fever > 100.0 F (37.8 C) AND [2] bedridden  (e.g., CVA, chronic illness, recovering from surgery)   Negative: [1] Fever > 100.0 F (37.8 C) AND [2] diabetes mellitus or weak immune system (e.g., HIV positive, cancer chemo, splenectomy, organ transplant, chronic steroids)   Negative: Wheezing is present   Negative: [1] Ankle swelling AND [2] swelling is increasing   Negative: SEVERE coughing spells (e.g., whooping sound after coughing, vomiting after coughing)   Negative: [1] Continuous (nonstop) coughing interferes with work or school AND [2] no improvement using cough treatment per Care Advice   Negative: Fever present > 3 days (72 hours)   Negative: [1] Fever returns after gone for over 24 hours AND [2] symptoms worse or not improved   Negative: [1] Using nasal washes and pain medicine > 24 hours AND [2] sinus pain (around cheekbone or eye) persists   Negative: Earache is present   Negative: Cough has been present for > 3 weeks   Negative: [1] Nasal discharge AND [2] present > 10 days   Negative: [1] Coughed up blood-tinged sputum AND [2] more than once   Negative: [1] Patient also has allergy symptoms (e.g., itchy eyes, clear nasal discharge, postnasal drip) AND [2] they are acting up   Negative: Taking an ACE Inhibitor medicine (e.g., benazepril / LOTENSIN, captopril / CAPOTEN, enalapril / VASOTEC, lisinopril / ZESTRIL)   Negative: Exposure to TB (Tuberculosis)    Protocols used: Cough - Acute Non-Productive-A-AH

## 2024-03-06 ENCOUNTER — NURSE TRIAGE (OUTPATIENT)
Dept: NURSING | Facility: CLINIC | Age: 74
End: 2024-03-06
Payer: COMMERCIAL

## 2024-03-06 DIAGNOSIS — R35.0 URINARY FREQUENCY: Primary | ICD-10-CM

## 2024-03-06 NOTE — TELEPHONE ENCOUNTER
Provider Response to 2nd Level Triage Request    I have reviewed the RN documentation. My recommendation is:  Please assist in scheduling a Virtual appointment or E-visit today

## 2024-03-06 NOTE — TELEPHONE ENCOUNTER
Nurse Triage SBAR    Is this a 2nd Level Triage? NO    Situation: Bladder infection    Background: Finished antibiotics for bladder infection yesterday. Still having urinary frequency. Wanting to get another urine test done to check for infection.    Assessment: Denies fever, abdominal pain, dysuria or hematuria.     Protocol Recommended Disposition:   See in Office Today or Tomorrow    Recommendation: See if office today or tomorrow. Patient stated she tried to schedule an appointment but was not able to get one so was transferred to [facility]. Patient prefers to just schedule a lab appointment for UA. Will route to PCP team to see if they are willing to order UA for lab appointment only.      Routed to provider      Reason for Disposition   Taking antibiotic > 72 hours (3 days) for UTI and painful urination or frequency is SAME (unchanged, not better)    Additional Information   Negative: Shock suspected (e.g., cold/pale/clammy skin, too weak to stand, low BP, rapid pulse)   Negative: Sounds like a life-threatening emergency to the triager   Negative: Unable to urinate (or only a few drops) > 4 hours and bladder feels very full (e.g., palpable bladder or strong urge to urinate)   Negative: Passing pure blood or large blood clots (i.e., size > a dime)  (Exceptions: Flecks, small strands, or pinkish-red color.)   Negative: Fever > 103 F (39.4 C)   Negative: Patient sounds very sick or weak to the triager   Negative: SEVERE pain (e.g., excruciating) and no improvement 2 hours after pain medications   Negative: Fever > 100.0 F (37.8 C) and new-onset since starting antibiotics   Negative: Side (flank) or lower back pain and new-onset since starting antibiotics   Negative: Taking antibiotic > 24 hours for UTI and flank or lower back pain getting worse   Negative: Vomiting 2 or more times and interferes with taking oral antibiotic   Negative: Taking antibiotic > 24 hours for UTI (urinary tract or bladder infection) and fever  persists (still has fever)   Negative: Taking antibiotic > 72 hours (3 days) for UTI and flank or lower back pain is SAME (unchanged, not better)    Protocols used: Urinary Tract Infection on Antibiotic Follow-up Call - Female-A-[place]

## 2024-03-07 NOTE — TELEPHONE ENCOUNTER
Patient calling in stating she needs a UA. Educated pt that after the triage, we were instructed to inform her that she needs a virtual visit or a evisit and that evisit instructions were sent to patient.     Pt upset that she can not just bring in a UA specimen.     Educated pt that an order was pended, but not signed until a provider can speak with patient.     Pt states she will try to do the evisit.

## 2024-03-10 ENCOUNTER — HEALTH MAINTENANCE LETTER (OUTPATIENT)
Age: 74
End: 2024-03-10

## 2024-03-12 ENCOUNTER — TRANSFERRED RECORDS (OUTPATIENT)
Dept: HEALTH INFORMATION MANAGEMENT | Facility: CLINIC | Age: 74
End: 2024-03-12
Payer: COMMERCIAL

## 2024-03-15 ENCOUNTER — VIRTUAL VISIT (OUTPATIENT)
Dept: UROLOGY | Facility: CLINIC | Age: 74
End: 2024-03-15
Attending: FAMILY MEDICINE
Payer: COMMERCIAL

## 2024-03-15 ENCOUNTER — OFFICE VISIT (OUTPATIENT)
Dept: FAMILY MEDICINE | Facility: CLINIC | Age: 74
End: 2024-03-15
Payer: COMMERCIAL

## 2024-03-15 VITALS
OXYGEN SATURATION: 97 % | BODY MASS INDEX: 37.42 KG/M2 | WEIGHT: 218 LBS | DIASTOLIC BLOOD PRESSURE: 76 MMHG | HEART RATE: 86 BPM | TEMPERATURE: 97.5 F | SYSTOLIC BLOOD PRESSURE: 117 MMHG | RESPIRATION RATE: 14 BRPM

## 2024-03-15 DIAGNOSIS — L03.116 CELLULITIS OF LEFT LOWER EXTREMITY: Primary | ICD-10-CM

## 2024-03-15 DIAGNOSIS — Z23 NEED FOR TD VACCINE: ICD-10-CM

## 2024-03-15 DIAGNOSIS — R39.15 URINARY URGENCY: ICD-10-CM

## 2024-03-15 DIAGNOSIS — Z85.3 PERSONAL HISTORY OF MALIGNANT NEOPLASM OF BREAST: ICD-10-CM

## 2024-03-15 DIAGNOSIS — R32 URINARY INCONTINENCE, UNSPECIFIED TYPE: ICD-10-CM

## 2024-03-15 DIAGNOSIS — S81.802A OPEN WOUND OF LEFT LOWER EXTREMITY, INITIAL ENCOUNTER: ICD-10-CM

## 2024-03-15 DIAGNOSIS — N39.41 URGE INCONTINENCE: ICD-10-CM

## 2024-03-15 DIAGNOSIS — Z87.440 HISTORY OF UTI: Primary | ICD-10-CM

## 2024-03-15 PROCEDURE — 90471 IMMUNIZATION ADMIN: CPT | Performed by: PHYSICIAN ASSISTANT

## 2024-03-15 PROCEDURE — 99204 OFFICE O/P NEW MOD 45 MIN: CPT | Mod: 95 | Performed by: PHYSICIAN ASSISTANT

## 2024-03-15 PROCEDURE — 90714 TD VACC NO PRESV 7 YRS+ IM: CPT | Performed by: PHYSICIAN ASSISTANT

## 2024-03-15 PROCEDURE — 99213 OFFICE O/P EST LOW 20 MIN: CPT | Mod: 25 | Performed by: PHYSICIAN ASSISTANT

## 2024-03-15 RX ORDER — HYDROCODONE BITARTRATE AND ACETAMINOPHEN 5; 325 MG/1; MG/1
1 TABLET ORAL 2 TIMES DAILY PRN
COMMUNITY
Start: 2024-03-12 | End: 2024-04-24

## 2024-03-15 RX ORDER — CEPHALEXIN 500 MG/1
500 CAPSULE ORAL 4 TIMES DAILY
Qty: 40 CAPSULE | Refills: 0 | Status: SHIPPED | OUTPATIENT
Start: 2024-03-15 | End: 2024-03-25

## 2024-03-15 NOTE — PATIENT INSTRUCTIONS
If fevers, rapidly worsening symptoms of pain, fever, swelling, redness should seek immediate care.

## 2024-03-15 NOTE — NURSING NOTE
Is the patient currently in the state of [place]? YES    Visit mode:VIDEO    If the visit is dropped, the patient can be reconnected by: VIDEO VISIT: Send to e-mail at: [email or web address]    Will anyone else be joining the visit? NO  (If patient encounters technical issues they should call [phone])    How would you like to obtain your AVS? MyChart    Are changes needed to the allergy or medication list? No    Reason for visit: Consult    Pt. Reports no pain, however notes a sensitivity in clitoris. Pt states this is indicative of bladder infection from past experiences. Pt would also like to discuss setting up a system with provider where she is able to have urinalysis post-abx to see if there is any sign of infection after the course of treatment.     Lois SHULTZF

## 2024-03-15 NOTE — PROGRESS NOTES
Assessment & Plan     Cellulitis of left lower extremity  Keflex as ordered.  Warm compresses, elevation.  PI given and discussed for cellulitis.    If fevers, rapidly worsening symptoms, not improving as expected over the next 72 hours should be rechecked or present to the ED. At the end of the encounter, I discussed results, diagnosis, medications. Discussed red flags for immediate return to clinic/ER, as well as indications for follow up if no improvement. Patient understood and agreed to plan. Patient was stable for discharge      - cephALEXin (KEFLEX) 500 MG capsule  Dispense: 40 capsule; Refill: 0    Open wound of left lower extremity, initial encounter  Keep clean and dry.  Eschar may need to be debrided.  Pt has a scheduled appt with pcp next week which can serve as a follow-up.      Need for Td vaccine  TD updated.           Pinky Concepcion PA-C  [facility] KATHLEEN Bradley is a 74 year old female who presents to clinic today for the following health issues:  Chief Complaint   Patient presents with    Wound Check     Fell 02/26/24. Being seen with [facility] and has done imaging. Open sore Lt leg. Tender.     HPI: pt fell 2-26-24 with abrasion to the LLE which has been previously evaluted, however in the last 2 days redness, swelling pain have started.    2 day hx of incrased redness, swelling, pain localized to the area of the wound.   No fevers.    No T/N.  Uncertain of the last TD.        Review of Systems  Constitutional, HEENT, cardiovascular, pulmonary, gi and gu systems are negative, except as otherwise noted.      Objective    /76   Pulse 86   Temp 97.5  F (36.4  C) (Oral)   Resp 14   Wt 98.9 kg (218 lb)   LMP  (LMP Unknown)   SpO2 97%   BMI 37.42 kg/m    Physical Exam   Evaluation of the LLE reveals erythema, swelling and warmth lower 1/3 with a central eschar that is not actively draining   Calf is soft.    Sensation and peripheral pulses intact, cap  refill brisk.

## 2024-03-15 NOTE — LETTER
3/15/2024       RE: Mirna Grijalva  [address]  [facility] [address]     Dear Colleague,    Thank you for referring your patient, Mirna Grijalva, to the [facility] at [facility]. Please see a copy of my visit note below.    Virtual Visit Details    Type of service:  Video Visit   Video Start Time: 2:29 PM  Video End Time:3:01 PM    Originating Location (pt. Location): Home    Distant Location (provider location):  On-site  Platform used for Video Visit: [facility]    Urology Clinic    Name: Mirna Grijalva    MRN: [id number]   YOB: 1950  Accompanied at today's visit by: POA (daughter)              Assessment and Plan:   74 year old female with hx of UTIs, UUI, urinary urgency, urinary incontinence unspecified, hx of breast cancer.     - Plan to have patient come to clinic for UA, cathed PVR and exam.   - discussed at length with patient that clitoral pain is not a symptom of UTI and also discussed at length that we do not recommend test of cures.   - consider resuming estrogen cream if cleared by PCP given hx of breast cancer.   - Unclear treatment options and workup patient has had in the past. Will obtain records from [place] urology team.   - advised to contact clinic if develops s/s of UTI in the future.  - planning to have back surgery soon; consider daily abx for prophylaxis while undergoing surgery.   - Discussed at length that incomplete bladder emptying increases risk of UTIs and may need to consider CIC pending her PVR at follow-up appointment.   - Patient unclear of medications taking from urology team; will wait on reports from urology team from [place] and consider adding OTC supplements and or methenamine nightly with vitamin C.   - consider PFPT.   - After discussing the assessment and plan with patient, patient verbalized understanding and agreed to the above plan. All questions answered.     46 minutes were spent today on the  date of the encounter in reviewing the EMR, direct patient care, coordination of care and documentation in addition to reviewing labs.     Lise Mendoza PA-C  March 15, 2024    Patient Care Team:  Ambar Hernandez DO as PCP - General (Family Medicine)  Ambar Hernandez DO as Assigned PCP  Lise Mendoza PA-C as Physician Assistant  MILA MARTÍNEZ          Chief Complaint:   Zarina          History of Present Illness:   March 15, 2024    HISTORY: Mirna Grijalva is a 74 year old female as a new consultation for concerns of hx of UTIs. Hx complicated by breast cancer s/p right mastectomy and thyroid cancer.   Has followed with urologist in [place]; do not have these records but do see from her PCP note from [facility] she was started on estrogen cream and afluzosin. Patient reports seeing Braulio Jay PA-C from [place] urology specialists while living in [place]. Do not have any records from urology team in [place] or any other urology notes in EMR. Patient does not recall starting afluzosin. States she only took estrogen for a short period of time then stopped. Patient states she always gets a test of cure after completing an abx for UTI in the past. Per EMR, has had 2 UTIs in 2/2023. Typical UTI symptoms include soreness along clitoris, increased urinary urgency/frequency, nocturia. Denies s/s of UTI currently.  Patient reports she has hx of her bladder not fully emptying. Does double voiding techniques and positional changes to empty completely. Denies needing to catheterize herself in the past. Reports having what is described as a possible UDS or simple CMG, and was told she does not empty her bladder completely. Reports typically voids every 3 hours during the day and 1-2x/night. Reports small UUI. Changes her pad with 1-3x/day for hygenic purposes. Also reports rare incontinence without warning. Denies ADAM. Has never had a sleep study in the past but states she has been told she has sleep apnea. Denies  gross hematuria or history of kidney stones.  Denies prolapse symptoms. Not sexually active.. Bowels are regular. PMH is significant for PMH RBB, HTN, PVD, open angle glaucoma, chronic low back pain, HLD. PSH hysterectomy. Hx of falls. Scheduled for back surgery soon. Patient voices no other concerns at this time.            Past Medical History:   No past medical history on file.         Past Surgical History:   No past surgical history on file.         Social History:     Social History     Tobacco Use    Smoking status: Former     Types: Cigarettes     Passive exposure: Never    Smokeless tobacco: Never   Substance Use Topics    Alcohol use: Not on file            Family History:   No family history on file.           Allergies:     Allergies   Allergen Reactions    Atenolol      Rash and Insomnia    Ciprofloxacin      Severe tendon pain and swelling    Eliquis [Apixaban]      Wanting to faint    Nitrofurantoin      'Bad, itchy, feverish, rash on arms and legs'    Sulfa Antibiotics      Rash on feet    Penicillins Rash            Medications:     Current Outpatient Medications   Medication Sig    Acetaminophen (TYLENOL PO)     albuterol (PROAIR HFA/PROVENTIL HFA/VENTOLIN HFA) 108 (90 Base) MCG/ACT inhaler Inhale 2 puffs into the lungs every 6 hours as needed for shortness of breath, wheezing or cough    B Complex Vitamins (B COMPLEX PO) Take 1 tablet by mouth daily    benzonatate (TESSALON) 100 MG capsule Take 1 capsule (100 mg) by mouth 3 times daily as needed for cough    bimatoprost (LUMIGAN) 0.03 % ophthalmic solution Place 1 drop into the right eye at bedtime (Unknown dose)    cephALEXin (KEFLEX) 500 MG capsule Take 1 capsule (500 mg) by mouth 4 times daily for 10 days    Cholecalciferol (VITAMIN D-3 PO) Take 1 tablet by mouth daily    HYDROcodone-acetaminophen (NORCO) 5-325 MG tablet Take 1 tablet by mouth 2 times daily as needed    levothyroxine (SYNTHROID/LEVOTHROID) 137 MCG tablet Take 1 tablet (137 mcg)  by mouth daily    losartan-hydrochlorothiazide (HYZAAR) 100-25 MG tablet Take 1 tablet by mouth daily    metoprolol tartrate (LOPRESSOR) 100 MG tablet Take 1 tablet (100 mg) by mouth 2 times daily    Multiple Vitamins-Minerals (CENTRUM ADULTS PO) Take 1 tablet by mouth daily    pravastatin (PRAVACHOL) 20 MG tablet Take 1 tablet (20 mg) by mouth daily     No current facility-administered medications for this visit.             Review of Systems:    ROS: 14 point ROS neg other than the symptoms noted above in the HPI.          Physical Exam:   not currently breastfeeding.  Data Unavailable, There is no height or weight on file to calculate BMI., 0 lbs 0 oz  Gen appearance: Age-appropriate appearing female in NAD.   HEENT:  EOMI, conjunctiva clear/white. Normal ROM of neck for age.   Psych:  alert , In no acute distress.  Neuro:  A&Ox3  Resp:  Normal respiratory effort; not in acute respiratory distress.          Data:    Labs:    UC  2/16/24 >100,000 Proteus mirabilis (resistant to macrobid)  2/23/24 50,000-100,000 Pseudomonaus aeruginosa (pansensitive)    Office Visit on 02/23/2024   Component Date Value Ref Range Status    Color Urine 02/23/2024 Yellow  Colorless, Straw, Light Yellow, Yellow Final    Appearance Urine 02/23/2024 Clear  Clear Final    Glucose Urine 02/23/2024 Negative  Negative mg/dL Final    Bilirubin Urine 02/23/2024 Negative  Negative Final    Ketones Urine 02/23/2024 Negative  Negative mg/dL Final    Specific Gravity Urine 02/23/2024 1.015  1.005 - 1.030 Final    Blood Urine 02/23/2024 Trace (A)  Negative Final    pH Urine 02/23/2024 7.0  5.0 - 8.0 Final    Protein Albumin Urine 02/23/2024 Negative  Negative mg/dL Final    Urobilinogen Urine 02/23/2024 0.2  0.2, 1.0 E.U./dL Final    Nitrite Urine 02/23/2024 Negative  Negative Final    Leukocyte Esterase Urine 02/23/2024 Moderate (A)  Negative Final    Bacteria Urine 02/23/2024 Few (A)  None Seen /HPF Final    RBC Urine 02/23/2024 5-10 (A)  0-2  /HPF /HPF Final    WBC Urine 02/23/2024 >100 (A)  0-5 /HPF /HPF Final    Squamous Epithelials Urine 02/23/2024 Few (A)  None Seen /LPF Final    WBC Clumps Urine 02/23/2024 Present (A)  None Seen /HPF Final    Culture 02/23/2024 50,000-100,000 CFU/mL Pseudomonas aeruginosa (A)   Final            RAVI CASSIDY PA-C

## 2024-03-15 NOTE — PROGRESS NOTES
Virtual Visit Details    Type of service:  Video Visit   Video Start Time: 2:29 PM  Video End Time:3:01 PM    Originating Location (pt. Location): Home    Distant Location (provider location):  On-site  Platform used for Video Visit: [facility]    Urology Clinic    Name: Mirna Grijalva    MRN: [id number]   YOB: 1950  Accompanied at today's visit by: POA (daughter)              Assessment and Plan:   74 year old female with hx of UTIs, UUI, urinary urgency, urinary incontinence unspecified, hx of breast cancer.     - Plan to have patient come to clinic for UA, cathed PVR and exam.   - discussed at length with patient that clitoral pain is not a symptom of UTI and also discussed at length that we do not recommend test of cures.   - consider resuming estrogen cream if cleared by PCP given hx of breast cancer.   - Unclear treatment options and workup patient has had in the past. Will obtain records from [place] urology team.   - advised to contact clinic if develops s/s of UTI in the future.  - planning to have back surgery soon; consider daily abx for prophylaxis while undergoing surgery.   - Discussed at length that incomplete bladder emptying increases risk of UTIs and may need to consider CIC pending her PVR at follow-up appointment.   - Patient unclear of medications taking from urology team; will wait on reports from urology team from [place] and consider adding OTC supplements and or methenamine nightly with vitamin C.   - consider PFPT.   - After discussing the assessment and plan with patient, patient verbalized understanding and agreed to the above plan. All questions answered.     46 minutes were spent today on the date of the encounter in reviewing the EMR, direct patient care, coordination of care and documentation in addition to reviewing labs.     Lise Mendoza PA-C  March 15, 2024    Patient Care Team:  Ambar Hernandez DO as PCP - General (Family Medicine)  Ambar Hernandez DO as Assigned  PCP  Lise Mendoza PA-C as Physician Assistant  MILA MARTÍNEZ          Chief Complaint:   Zarina          History of Present Illness:   March 15, 2024    HISTORY: Mirna Grijalva is a 74 year old female as a new consultation for concerns of hx of UTIs. Hx complicated by breast cancer s/p right mastectomy and thyroid cancer.   Has followed with urologist in [place]; do not have these records but do see from her PCP note from [facility] she was started on estrogen cream and afluzosin. Patient reports seeing Braulio Jay PA-C from [place] urology specialists while living in [place]. Do not have any records from urology team in [place] or any other urology notes in EMR. Patient does not recall starting afluzosin. States she only took estrogen for a short period of time then stopped. Patient states she always gets a test of cure after completing an abx for UTI in the past. Per EMR, has had 2 UTIs in 2/2023. Typical UTI symptoms include soreness along clitoris, increased urinary urgency/frequency, nocturia. Denies s/s of UTI currently.  Patient reports she has hx of her bladder not fully emptying. Does double voiding techniques and positional changes to empty completely. Denies needing to catheterize herself in the past. Reports having what is described as a possible UDS or simple CMG, and was told she does not empty her bladder completely. Reports typically voids every 3 hours during the day and 1-2x/night. Reports small UUI. Changes her pad with 1-3x/day for hygenic purposes. Also reports rare incontinence without warning. Denies ADAM. Has never had a sleep study in the past but states she has been told she has sleep apnea. Denies gross hematuria or history of kidney stones.  Denies prolapse symptoms. Not sexually active.. Bowels are regular. PMH is significant for PMH RBB, HTN, PVD, open angle glaucoma, chronic low back pain, HLD. PSH hysterectomy. Hx of falls. Scheduled for back surgery soon. Patient voices no  other concerns at this time.            Past Medical History:   No past medical history on file.         Past Surgical History:   No past surgical history on file.         Social History:     Social History     Tobacco Use    Smoking status: Former     Types: Cigarettes     Passive exposure: Never    Smokeless tobacco: Never   Substance Use Topics    Alcohol use: Not on file            Family History:   No family history on file.           Allergies:     Allergies   Allergen Reactions    Atenolol      Rash and Insomnia    Ciprofloxacin      Severe tendon pain and swelling    Eliquis [Apixaban]      Wanting to faint    Nitrofurantoin      'Bad, itchy, feverish, rash on arms and legs'    Sulfa Antibiotics      Rash on feet    Penicillins Rash            Medications:     Current Outpatient Medications   Medication Sig    Acetaminophen (TYLENOL PO)     albuterol (PROAIR HFA/PROVENTIL HFA/VENTOLIN HFA) 108 (90 Base) MCG/ACT inhaler Inhale 2 puffs into the lungs every 6 hours as needed for shortness of breath, wheezing or cough    B Complex Vitamins (B COMPLEX PO) Take 1 tablet by mouth daily    benzonatate (TESSALON) 100 MG capsule Take 1 capsule (100 mg) by mouth 3 times daily as needed for cough    bimatoprost (LUMIGAN) 0.03 % ophthalmic solution Place 1 drop into the right eye at bedtime (Unknown dose)    cephALEXin (KEFLEX) 500 MG capsule Take 1 capsule (500 mg) by mouth 4 times daily for 10 days    Cholecalciferol (VITAMIN D-3 PO) Take 1 tablet by mouth daily    HYDROcodone-acetaminophen (NORCO) 5-325 MG tablet Take 1 tablet by mouth 2 times daily as needed    levothyroxine (SYNTHROID/LEVOTHROID) 137 MCG tablet Take 1 tablet (137 mcg) by mouth daily    losartan-hydrochlorothiazide (HYZAAR) 100-25 MG tablet Take 1 tablet by mouth daily    metoprolol tartrate (LOPRESSOR) 100 MG tablet Take 1 tablet (100 mg) by mouth 2 times daily    Multiple Vitamins-Minerals (CENTRUM ADULTS PO) Take 1 tablet by mouth daily     pravastatin (PRAVACHOL) 20 MG tablet Take 1 tablet (20 mg) by mouth daily     No current facility-administered medications for this visit.             Review of Systems:    ROS: 14 point ROS neg other than the symptoms noted above in the HPI.          Physical Exam:   not currently breastfeeding.  Data Unavailable, There is no height or weight on file to calculate BMI., 0 lbs 0 oz  Gen appearance: Age-appropriate appearing female in NAD.   HEENT:  EOMI, conjunctiva clear/white. Normal ROM of neck for age.   Psych:  alert , In no acute distress.  Neuro:  A&Ox3  Resp:  Normal respiratory effort; not in acute respiratory distress.          Data:    Labs:    UC  2/16/24 >100,000 Proteus mirabilis (resistant to macrobid)  2/23/24 50,000-100,000 Pseudomonaus aeruginosa (pansensitive)    Office Visit on 02/23/2024   Component Date Value Ref Range Status    Color Urine 02/23/2024 Yellow  Colorless, Straw, Light Yellow, Yellow Final    Appearance Urine 02/23/2024 Clear  Clear Final    Glucose Urine 02/23/2024 Negative  Negative mg/dL Final    Bilirubin Urine 02/23/2024 Negative  Negative Final    Ketones Urine 02/23/2024 Negative  Negative mg/dL Final    Specific Gravity Urine 02/23/2024 1.015  1.005 - 1.030 Final    Blood Urine 02/23/2024 Trace (A)  Negative Final    pH Urine 02/23/2024 7.0  5.0 - 8.0 Final    Protein Albumin Urine 02/23/2024 Negative  Negative mg/dL Final    Urobilinogen Urine 02/23/2024 0.2  0.2, 1.0 E.U./dL Final    Nitrite Urine 02/23/2024 Negative  Negative Final    Leukocyte Esterase Urine 02/23/2024 Moderate (A)  Negative Final    Bacteria Urine 02/23/2024 Few (A)  None Seen /HPF Final    RBC Urine 02/23/2024 5-10 (A)  0-2 /HPF /HPF Final    WBC Urine 02/23/2024 >100 (A)  0-5 /HPF /HPF Final    Squamous Epithelials Urine 02/23/2024 Few (A)  None Seen /LPF Final    WBC Clumps Urine 02/23/2024 Present (A)  None Seen /HPF Final    Culture 02/23/2024 50,000-100,000 CFU/mL Pseudomonas aeruginosa (A)    Final

## 2024-03-22 ENCOUNTER — TRANSFERRED RECORDS (OUTPATIENT)
Dept: HEALTH INFORMATION MANAGEMENT | Facility: CLINIC | Age: 74
End: 2024-03-22
Payer: COMMERCIAL

## 2024-03-26 ENCOUNTER — TELEPHONE (OUTPATIENT)
Dept: FAMILY MEDICINE | Facility: CLINIC | Age: 74
End: 2024-03-26
Payer: COMMERCIAL

## 2024-03-26 NOTE — TELEPHONE ENCOUNTER
Medication Question or Refill        What medication are you calling about (include dose and sig)?: Hydrochlorothiazide 25 mg    Preferred Pharmacy:   [id number] - [place], [place] - [address]  [address]  [place] [address]  Phone: [phone] Fax: [phone]        Controlled Substance Agreement on file:   CSA -- Patient Level:    CSA: None found at the patient level.       Who prescribed the medication?: Provider in [place]- Albina figueroa endocrinologist    Do you need a refill? Yes    When did you use the medication last? Today    Patient offered an appointment? No Patient has one scheduled    Do you have any questions or concerns?  No      Could we send this information to you in [facility] or would you prefer to receive a phone call?:   No preference   Okay to leave a detailed message?: Yes at Cell number on file:    Telephone Information:   Mobile [phone]

## 2024-03-27 NOTE — TELEPHONE ENCOUNTER
"Pt states that she does not have the losartan-hydrochlorothiazide, she states that she only has \"losartan potassium\"    Writer asked if H C T Z was written on the bottle anywhere. Pt states yes that this follows the losartan and it is 100mg-25mg.     Writer relayed to patient that this is the correct medication we want pt on. Pt states she did not know what H C T Z meant and that she thought she was only taking losartan.     No further questions at this time  "

## 2024-03-27 NOTE — TELEPHONE ENCOUNTER
"Called pt to verify she is only taking the combo losartan-hydrochlorothiazide.     Pt states she has been taking the combo and also plain hydrochlorothiazide \"no one explained to me that I was to stop this\"    Pt denies lightheadness or dizziness, unsure what BP have been running.     Pt states will discontinue the hydrochlorothiazide now.   "

## 2024-03-27 NOTE — TELEPHONE ENCOUNTER
Call pharmacy back     Hydrochlorothiazide was disoncitnue because she is now on a combo losarta-hydrochlorothiazide med.

## 2024-03-29 ENCOUNTER — OFFICE VISIT (OUTPATIENT)
Dept: FAMILY MEDICINE | Facility: CLINIC | Age: 74
End: 2024-03-29
Payer: COMMERCIAL

## 2024-03-29 VITALS
OXYGEN SATURATION: 95 % | BODY MASS INDEX: 38.48 KG/M2 | HEIGHT: 64 IN | DIASTOLIC BLOOD PRESSURE: 72 MMHG | TEMPERATURE: 97.6 F | RESPIRATION RATE: 16 BRPM | HEART RATE: 79 BPM | WEIGHT: 225.4 LBS | SYSTOLIC BLOOD PRESSURE: 124 MMHG

## 2024-03-29 DIAGNOSIS — Z85.850 HISTORY OF THYROID CANCER: ICD-10-CM

## 2024-03-29 DIAGNOSIS — Z23 NEED FOR TD VACCINE: ICD-10-CM

## 2024-03-29 DIAGNOSIS — M54.41 CHRONIC BILATERAL LOW BACK PAIN WITH BILATERAL SCIATICA: ICD-10-CM

## 2024-03-29 DIAGNOSIS — M54.42 CHRONIC BILATERAL LOW BACK PAIN WITH BILATERAL SCIATICA: ICD-10-CM

## 2024-03-29 DIAGNOSIS — G89.29 CHRONIC BILATERAL LOW BACK PAIN WITH BILATERAL SCIATICA: ICD-10-CM

## 2024-03-29 DIAGNOSIS — E67.3 HYPERVITAMINOSIS D: ICD-10-CM

## 2024-03-29 DIAGNOSIS — M85.80 OSTEOPENIA, UNSPECIFIED LOCATION: ICD-10-CM

## 2024-03-29 DIAGNOSIS — Z63.6 CAREGIVER BURDEN: Primary | ICD-10-CM

## 2024-03-29 DIAGNOSIS — E03.9 HYPOTHYROIDISM, UNSPECIFIED TYPE: ICD-10-CM

## 2024-03-29 PROBLEM — I10 ESSENTIAL (PRIMARY) HYPERTENSION: Status: ACTIVE | Noted: 2024-03-29

## 2024-03-29 PROBLEM — E66.812 CLASS 2 OBESITY: Status: ACTIVE | Noted: 2024-03-29

## 2024-03-29 LAB
ANION GAP SERPL CALCULATED.3IONS-SCNC: 12 MMOL/L (ref 7–15)
BUN SERPL-MCNC: 21.4 MG/DL (ref 8–23)
CALCIUM SERPL-MCNC: 9.6 MG/DL (ref 8.8–10.2)
CHLORIDE SERPL-SCNC: 102 MMOL/L (ref 98–107)
CREAT SERPL-MCNC: 0.66 MG/DL (ref 0.51–0.95)
DEPRECATED HCO3 PLAS-SCNC: 25 MMOL/L (ref 22–29)
EGFRCR SERPLBLD CKD-EPI 2021: 90 ML/MIN/1.73M2
GLUCOSE SERPL-MCNC: 105 MG/DL (ref 70–99)
POTASSIUM SERPL-SCNC: 3.9 MMOL/L (ref 3.4–5.3)
SODIUM SERPL-SCNC: 139 MMOL/L (ref 135–145)
TSH SERPL DL<=0.005 MIU/L-ACNC: 2.31 UIU/ML (ref 0.3–4.2)
VIT D+METAB SERPL-MCNC: 51 NG/ML (ref 20–50)

## 2024-03-29 PROCEDURE — 90471 IMMUNIZATION ADMIN: CPT | Mod: GZ | Performed by: STUDENT IN AN ORGANIZED HEALTH CARE EDUCATION/TRAINING PROGRAM

## 2024-03-29 PROCEDURE — 90714 TD VACC NO PRESV 7 YRS+ IM: CPT | Mod: GZ | Performed by: STUDENT IN AN ORGANIZED HEALTH CARE EDUCATION/TRAINING PROGRAM

## 2024-03-29 PROCEDURE — 82306 VITAMIN D 25 HYDROXY: CPT | Performed by: STUDENT IN AN ORGANIZED HEALTH CARE EDUCATION/TRAINING PROGRAM

## 2024-03-29 PROCEDURE — 80048 BASIC METABOLIC PNL TOTAL CA: CPT | Performed by: STUDENT IN AN ORGANIZED HEALTH CARE EDUCATION/TRAINING PROGRAM

## 2024-03-29 PROCEDURE — 36415 COLL VENOUS BLD VENIPUNCTURE: CPT | Performed by: STUDENT IN AN ORGANIZED HEALTH CARE EDUCATION/TRAINING PROGRAM

## 2024-03-29 PROCEDURE — 99215 OFFICE O/P EST HI 40 MIN: CPT | Mod: 25 | Performed by: STUDENT IN AN ORGANIZED HEALTH CARE EDUCATION/TRAINING PROGRAM

## 2024-03-29 PROCEDURE — 99417 PROLNG OP E/M EACH 15 MIN: CPT | Performed by: STUDENT IN AN ORGANIZED HEALTH CARE EDUCATION/TRAINING PROGRAM

## 2024-03-29 PROCEDURE — 84443 ASSAY THYROID STIM HORMONE: CPT | Performed by: STUDENT IN AN ORGANIZED HEALTH CARE EDUCATION/TRAINING PROGRAM

## 2024-03-29 RX ORDER — RESPIRATORY SYNCYTIAL VIRUS VACCINE 120MCG/0.5
0.5 KIT INTRAMUSCULAR ONCE
Qty: 1 EACH | Refills: 0 | Status: CANCELLED | OUTPATIENT
Start: 2024-03-29 | End: 2024-03-29

## 2024-03-29 RX ORDER — BIMATOPROST 0.1 MG/ML
1 SOLUTION/ DROPS OPHTHALMIC AT BEDTIME
COMMUNITY
End: 2024-07-24

## 2024-03-29 SDOH — SOCIAL STABILITY - SOCIAL INSECURITY: DEPENDENT RELATIVE NEEDING CARE AT HOME: Z63.6

## 2024-03-29 ASSESSMENT — ANXIETY QUESTIONNAIRES
1. FEELING NERVOUS, ANXIOUS, OR ON EDGE: NOT AT ALL
7. FEELING AFRAID AS IF SOMETHING AWFUL MIGHT HAPPEN: NOT AT ALL
5. BEING SO RESTLESS THAT IT IS HARD TO SIT STILL: NOT AT ALL
6. BECOMING EASILY ANNOYED OR IRRITABLE: NOT AT ALL
GAD7 TOTAL SCORE: 0
3. WORRYING TOO MUCH ABOUT DIFFERENT THINGS: NOT AT ALL
2. NOT BEING ABLE TO STOP OR CONTROL WORRYING: NOT AT ALL
7. FEELING AFRAID AS IF SOMETHING AWFUL MIGHT HAPPEN: NOT AT ALL
4. TROUBLE RELAXING: NOT AT ALL

## 2024-03-29 ASSESSMENT — PATIENT HEALTH QUESTIONNAIRE - PHQ9
SUM OF ALL RESPONSES TO PHQ QUESTIONS 1-9: 0
SUM OF ALL RESPONSES TO PHQ QUESTIONS 1-9: 0
10. IF YOU CHECKED OFF ANY PROBLEMS, HOW DIFFICULT HAVE THESE PROBLEMS MADE IT FOR YOU TO DO YOUR WORK, TAKE CARE OF THINGS AT HOME, OR GET ALONG WITH OTHER PEOPLE: NOT DIFFICULT AT ALL

## 2024-03-29 NOTE — PROGRESS NOTES
"  Assessment & Plan      Diagnosis Comments   1. Caregiver burden  Adult Mental Health  Referral       2. Need for Td vaccine  TD,PF 7+(TENIVAC)       3. Hypothyroidism, unspecified type  TSH with free T4 reflex, Basic metabolic panel  (Ca, Cl, CO2, Creat, Gluc, K, Na, BUN)       4. Hypervitaminosis D  Vitamin D Deficiency       5. History of thyroid cancer        6. Osteopenia, unspecified location        7. Chronic bilateral low back pain with bilateral sciatica              Low mood  Caregiver burnout  Patient notes she has been under a lot of stress due to the recent move and ongoing health issues with her .  She sees her  and chronic pain and she feels that he is \"changed\" because of the pain.  This is disheartening for her.  She feels worried about him \"all the time\"  She notes she has \"very good support\" and is quite hesitant about the idea that she may either have depression or anxiety.  Notes that she \"knows herself\" and tries to keep \"busy\".  Her mood has improved now that she is \"settling in\"  She notes that her decreased mobility from her chronic low back pain also affects her mood but \"that is getting better as well\"  Her daughter feels that she is \"more emotional\" than she has been and sometimes \"very sensitive\".  Patient does ultimately acknowledge that she has been crying a bit more and is stressed.  Does drink about 4 glasses of wine in a day  Plan:  - Supportive listening provided  - Advised to cut down alcohol use (see discussion below)  - Patient is not amenable to any medications but does think she could benefit from talk therapy.  Referral placed.    Alcohol use:  Patient is drinking 4 glasses of wine daily.  Patient notes she \"just likes her wine\" and does not feel that \"there is an issue\".  She does use alcohol to help with ongoing stressors.  Patient is not interested in cutting down  She does not want to explore this issue further today  Plan:  - Continue to " monitor  - Reminded patient persistent/consistent alcohol use can affect bone density    Hypothyroidism:  History of parathyroid nodules s/p parathyroidectomy  History of thyroid cancer  Last TSH out of range at 11.70  PTH within normal range  We have not received records from her prior PCP regarding the type of cancer.  She notes she used to get blood work done every 3 months to monitor it.  Synthroid was increased to 137 mcg  Plan:  - Will recheck TSH and manage thyroid dose appropriately  - Patient does have an upcoming appointment with endocrinology in July of this year to take over her care    Hypervitaminosis D:  Last vitamin D levels elevated  Had patient discontinue her vitamin D supplementation  She is currently taking multivitamin with calcium and vitamin D in it  Last PTH was normal  Plan:  - Will recheck vitamin D today  -If low/normal, may resume at a lower dose given osteopenia    Osteopenia:  DEXA scan done 2/13/2024 showed osteopenia  They were unable to calculate the FRAX risk due to lack of evaluable hip  Plan is to do vitamin D and calcium for now and repeat in 2 years  Will resume vitamin D based on levels today    Chronic low back pain:  Has arthritis in her low back and follows with [facility]  Is in chronic pain and on pain medications for it  Her movement is significantly limited due to her low back pain  She just got an injection done in her low back that is supposed to be diagnostic.  If it does not work, then they will move ahead with nerve ablation to help with her pain.    Since I do not have records from her prior PCP, it is hard to reconcile her vaccines.  Patient notes that she recalls getting COVID, flu and pneumonia series.  She does not want them today.  She is okay getting the tetanus vaccine.  She says she will follow-up with her pharmacy regarding the shingles and RSV.        60 minutes spent by me on the date of the encounter doing chart review, history and exam,  "documentation and further activities per the note    Subjective   Mirna is a 74 year old, presenting for the following health issues:  Depression        3/29/2024     2:42 PM   Additional Questions   Roomed by Reena MALLORY MA         Review of Systems  As per HPI       Objective    /72 (BP Location: Right arm, Patient Position: Sitting, Cuff Size: Adult Regular)   Pulse 79   Temp 97.6  F (36.4  C) (Oral)   Resp 16   Ht 1.626 m (5' 4\")   Wt 102.2 kg (225 lb 6.4 oz)   LMP  (LMP Unknown)   SpO2 95%   BMI 38.69 kg/m    Body mass index is 38.69 kg/m .  Physical Exam   GENERAL: alert and no distress  MS: no gross musculoskeletal defects noted, no edema  PSYCH: mentation appears normal, affect mildly anxious, somewhat circular reasoning) psych      Signed Electronically by: Ambar Hernandez DO    "

## 2024-04-04 ENCOUNTER — TELEPHONE (OUTPATIENT)
Dept: FAMILY MEDICINE | Facility: CLINIC | Age: 74
End: 2024-04-04
Payer: COMMERCIAL

## 2024-04-04 NOTE — LETTER
April 15, 2024      Mirna Grijalva  [address] [address]        Dear Mirna,     We attempted to reach you via phone, but received no response. We were trying to gather more information. Please call us at [phone]      Sincerely,        Ambar Hernandez, DO

## 2024-04-04 NOTE — TELEPHONE ENCOUNTER
Pt calling and wanted to update PCP that her Records from [facility] are here, she stated she called our medical records and was we have them.

## 2024-04-05 ENCOUNTER — OFFICE VISIT (OUTPATIENT)
Dept: UROLOGY | Facility: CLINIC | Age: 74
End: 2024-04-05
Payer: COMMERCIAL

## 2024-04-05 VITALS
HEIGHT: 64 IN | BODY MASS INDEX: 38.24 KG/M2 | HEART RATE: 61 BPM | OXYGEN SATURATION: 97 % | DIASTOLIC BLOOD PRESSURE: 82 MMHG | WEIGHT: 224 LBS | SYSTOLIC BLOOD PRESSURE: 145 MMHG

## 2024-04-05 DIAGNOSIS — R10.2 VULVAR PAIN: ICD-10-CM

## 2024-04-05 DIAGNOSIS — Z87.440 HISTORY OF UTI: Primary | ICD-10-CM

## 2024-04-05 DIAGNOSIS — R39.15 URINARY URGENCY: ICD-10-CM

## 2024-04-05 DIAGNOSIS — R39.81 FUNCTIONAL INCONTINENCE: ICD-10-CM

## 2024-04-05 DIAGNOSIS — N39.41 URGE INCONTINENCE: ICD-10-CM

## 2024-04-05 LAB — RESIDUAL VOLUME (RV) (EXTERNAL): 13

## 2024-04-05 PROCEDURE — 99215 OFFICE O/P EST HI 40 MIN: CPT | Mod: 25 | Performed by: PHYSICIAN ASSISTANT

## 2024-04-05 PROCEDURE — 51798 US URINE CAPACITY MEASURE: CPT | Performed by: PHYSICIAN ASSISTANT

## 2024-04-05 RX ORDER — MIRABEGRON 25 MG/1
25 TABLET, FILM COATED, EXTENDED RELEASE ORAL DAILY
Qty: 90 TABLET | Refills: 3 | Status: SHIPPED | OUTPATIENT
Start: 2024-04-05 | End: 2024-07-15

## 2024-04-05 RX ORDER — ESTRADIOL 0.1 MG/G
1 CREAM VAGINAL
Qty: 40 G | Refills: 3 | Status: ON HOLD | OUTPATIENT
Start: 2024-04-08 | End: 2024-04-28

## 2024-04-05 ASSESSMENT — PAIN SCALES - GENERAL: PAINLEVEL: NO PAIN (1)

## 2024-04-05 NOTE — PATIENT INSTRUCTIONS
- Start estrogen cream 2/week at night. Apply blueberry sized amount on finger and rub along vaginal tissue along clitoris, vaginal opening and up inside the vagina like a face cream. Call clinic if medication is too expensive. You can also try using a Good Rx card to see if cheaper than insurance coverage. Please clear estrogen cream with your PCP, given your history of breast cancer. If not cleared by your primary care doctor contact our clinic and do not  the prescription.     _______________________________________________     You have been referred to Pelvic Floor Physical Therapy. They will call you to schedule this appointment  Locations for Pelvic Floor Physical Therapy:  [facility] - [facility] - [facility] - [facility] services - [facility] - [facility]    _________________________________________________     Myrbetriq 25mg daily can help with urinary urgency/frequency and urge incontinence. Some potential risks of Myrbetriq include but not limited to headache, nasal congestion and increase in blood pressure. Advise you to monitor your blood pressure  at home while taking Myrbetriq, and to contact our clinic if develop elevated blood pressures. Studies show that changes in blood pressure are typically not clinically significant (per UpToDate). Recommend notifying your primary care provider and/or your cardiologist if have history of HTN or other heart issues before taking Myrbetriq, and to contact our clinic if your provider does not recommend you start this medication. Contact our clinic if you develop side effects to this medication. Remember, that the medication can take up to 6 weeks to take full effect.        _____________________    Follow-up in 3 months

## 2024-04-05 NOTE — LETTER
4/5/2024       RE: Mirna Grijalva  [address]  [place] [address]     Dear Colleague,    Thank you for referring your patient, Mirna Grijalva, to the [facility] at [facility]. Please see a copy of my visit note below.    Urology Clinic      Name: Mirna Grijalva    MRN: [id number]   YOB: 1950  Accompanied at today's visit by:spouse                 Chief Complaint:   UUI          History of Present Illness:   April 5, 2024    HISTORY:   We have been following 74 year old Mirna Grijalva for hx of UTIs, UUI, urinary urgency, urinary incontinence unspecified, hx of breast cancer and thyroid cancer. Also has hx of glaucoma and hysterectomy. Last seen on 3/15/24 for consultation via VV here today for PVR and exam. Patient has been struggling with pain along the clitorus and been using desitin along this area which helps some but not completely. Have discussed at length at last encounter that clitoral pain is not a typical symptom of a UTI. Does have UUI. Per [facility] urology team for hx of incomplete bladder emptying, UTIs and urinary incontinence. Her PVR at the encounter on 9/14/23 was 9mL. Was started on Alfuzosin ER 10mg daily on 9/14/23, and advised to start estrogen cream and OTC supplements of cranberry and D-mannose. Patient states she did start the estrogen cream but has not used it in the remote past. Continues on cranberry supplement. Of note, UC from urology team in [place] showed no growth. Patient reports she did not think Alfuzosin helped. Continues to have UUI and functional incontinence due to her back pain that limits her mobility. States she also will do positional changes on toilet which helps her empty as well. changes her pad 1-3x/day. Hoping to have back surgery in the near future. Patient voices no other concerns at this time.            Allergies:     Allergies   Allergen Reactions    Atenolol      Rash and Insomnia  "   Ciprofloxacin      Severe tendon pain and swelling    Eliquis [Apixaban]      Wanting to faint    Nitrofurantoin      'Bad, itchy, feverish, rash on arms and legs'    Sulfa Antibiotics      Rash on feet    Penicillins Rash            Medications:     Current Outpatient Medications   Medication Sig Dispense Refill    Acetaminophen (TYLENOL PO)       bimatoprost (LUMIGAN) 0.01 % SOLN Lumigan      [START ON 4/8/2024] estradiol (ESTRACE) 0.1 MG/GM vaginal cream Place 1 g vaginally twice a week 40 g 3    levothyroxine (SYNTHROID/LEVOTHROID) 137 MCG tablet Take 1 tablet (137 mcg) by mouth daily 90 tablet 0    losartan-hydrochlorothiazide (HYZAAR) 100-25 MG tablet Take 1 tablet by mouth daily 90 tablet 0    metoprolol tartrate (LOPRESSOR) 100 MG tablet Take 1 tablet (100 mg) by mouth 2 times daily 180 tablet 0    mirabegron (MYRBETRIQ) 25 MG 24 hr tablet Take 1 tablet (25 mg) by mouth daily 90 tablet 3    Multiple Vitamins-Minerals (CENTRUM ADULTS PO) Take 1 tablet by mouth daily      pravastatin (PRAVACHOL) 20 MG tablet Take 1 tablet (20 mg) by mouth daily 90 tablet 0    HYDROcodone-acetaminophen (NORCO) 5-325 MG tablet Take 1 tablet by mouth 2 times daily as needed (Patient not taking: Reported on 3/29/2024)       No current facility-administered medications for this visit.            Past  Surgical History:     Past Surgical History:   Procedure Laterality Date    CYSTOSCOPY              Physical Exam:     Vitals:    04/05/24 1141   BP: (!) 145/82   BP Location: Left arm   Patient Position: Sitting   Pulse: 61   SpO2: 97%   Weight: 101.6 kg (224 lb)   Height: 1.626 m (5' 4\")     PSYCH: NAD  EYES: EOMI  NEURO: AAO x3  : Vulva is normal in appearance. Desitin cream noted along vulva and clitoral rayo mostly. Mild irritation along introitus likely from body habitus/rubbing and pad usage; no signs of infection, ulcers, lesions, bleeding, drainage. Urethra normal. Negative for ADAM with reduction of speculum when " instructed to cough. Vaginal mucosa atrophic. No pain to palpation on internal exam. Cystocele grade 1.. Strength 1/5. No obvious masses, lesions, ulcers, bleeding noted on internal or external exam.       LABS:   PVR 13 mL    Creatinine   Date Value Ref Range Status   03/29/2024 0.66 0.51 - 0.95 mg/dL Final            Assessment and Plan:   74 year old is a pleasant female who has hx of UTI, functional incontinence, UUI, occasional incontinence without warning, vaginal atrophy, hx of incomplete emptying.    Plan:  -  PVR WNL.  -  Clear estrogen cream with PCP first given hx of breast cancer.   - continue cranberry supplement.  - start probiotic daily.  - consider methenamine with Vit. C in the future along with further workup if UTIs persist.   - discussed that clitoral and introital pain likely secondary to combination of vaginal atrophy and irritation from pad usage and body habitus vs other. Recommend starting estrogen cream and using Vaseline.   - start myrbetriq 25mg daily. Discussed risks/benfits and potential side effects. Do not recommend antimuscarinics given age, hx of fall/dizziness, hx of glaucoma. Contact clinic if feeling of incomplete emptying worsens. If unable to afford advise going to PFPT and re-evaluate  in 3 months.  - consider VUDS if fails conservative therapies for incontinence.   - referral to PFPT.   - avoid bladder irritants. Discussed how the ICE drinks can irritate the bladder as well and to cut back on these.   - Plan to follow-up in 3 months with PVR and BP check  - After discussing the assessment and plan with patient, patient verbalizes understanding and agrees to the above plan. All questions answered.       Other orders as below:  Orders Placed This Encounter   Procedures    MEASURE POST-VOID RESIDUAL URINE/BLADDER CAPACITY, US NON-IMAGING (48668)    Physical Therapy  Referral     45 minutes spent on the date of the encounter doing chart review, review of outside records  from [place] (total time 10minutes), review of test results, ordering myrbetriq, ordering estrogen cream, referral to PFTP, interpretation of tests, patient visit and documentation.      Lise Mendoza PA-C  Urology  April 5, 2024      Patient Care Team:  Ambar Hernandez DO as PCP - General (Family Medicine)  Ambar Hernandez DO as Assigned PCP  Lise Mendoza PA-C as Physician Assistant  Lise Mendoza PA-C as Assigned Surgical Provider

## 2024-04-05 NOTE — PROGRESS NOTES
Urology Clinic      Name: Mirna Grijalva    MRN: [id number]   YOB: 1950  Accompanied at today's visit by:spouse                 Chief Complaint:   UUI          History of Present Illness:   April 5, 2024    HISTORY:   We have been following 74 year old Mirna Grijalva for hx of UTIs, UUI, urinary urgency, urinary incontinence unspecified, hx of breast cancer and thyroid cancer. Also has hx of glaucoma and hysterectomy. Last seen on 3/15/24 for consultation via VV here today for PVR and exam. Patient has been struggling with pain along the clitorus and been using desitin along this area which helps some but not completely. Have discussed at length at last encounter that clitoral pain is not a typical symptom of a UTI. Does have UUI. Per [facility] urology team for hx of incomplete bladder emptying, UTIs and urinary incontinence. Her PVR at the encounter on 9/14/23 was 9mL. Was started on Alfuzosin ER 10mg daily on 9/14/23, and advised to start estrogen cream and OTC supplements of cranberry and D-mannose. Patient states she did start the estrogen cream but has not used it in the remote past. Continues on cranberry supplement. Of note, UC from urology team in [place] showed no growth. Patient reports she did not think Alfuzosin helped. Continues to have UUI and functional incontinence due to her back pain that limits her mobility. States she also will do positional changes on toilet which helps her empty as well. changes her pad 1-3x/day. Hoping to have back surgery in the near future. Patient voices no other concerns at this time.            Allergies:     Allergies   Allergen Reactions    Atenolol      Rash and Insomnia    Ciprofloxacin      Severe tendon pain and swelling    Eliquis [Apixaban]      Wanting to faint    Nitrofurantoin      'Bad, itchy, feverish, rash on arms and legs'    Sulfa Antibiotics      Rash on feet    Penicillins Rash            Medications:     Current Outpatient Medications   Medication  "Sig Dispense Refill    Acetaminophen (TYLENOL PO)       bimatoprost (LUMIGAN) 0.01 % SOLN Lumigan      [START ON 4/8/2024] estradiol (ESTRACE) 0.1 MG/GM vaginal cream Place 1 g vaginally twice a week 40 g 3    levothyroxine (SYNTHROID/LEVOTHROID) 137 MCG tablet Take 1 tablet (137 mcg) by mouth daily 90 tablet 0    losartan-hydrochlorothiazide (HYZAAR) 100-25 MG tablet Take 1 tablet by mouth daily 90 tablet 0    metoprolol tartrate (LOPRESSOR) 100 MG tablet Take 1 tablet (100 mg) by mouth 2 times daily 180 tablet 0    mirabegron (MYRBETRIQ) 25 MG 24 hr tablet Take 1 tablet (25 mg) by mouth daily 90 tablet 3    Multiple Vitamins-Minerals (CENTRUM ADULTS PO) Take 1 tablet by mouth daily      pravastatin (PRAVACHOL) 20 MG tablet Take 1 tablet (20 mg) by mouth daily 90 tablet 0    HYDROcodone-acetaminophen (NORCO) 5-325 MG tablet Take 1 tablet by mouth 2 times daily as needed (Patient not taking: Reported on 3/29/2024)       No current facility-administered medications for this visit.            Past  Surgical History:     Past Surgical History:   Procedure Laterality Date    CYSTOSCOPY              Physical Exam:     Vitals:    04/05/24 1141   BP: (!) 145/82   BP Location: Left arm   Patient Position: Sitting   Pulse: 61   SpO2: 97%   Weight: 101.6 kg (224 lb)   Height: 1.626 m (5' 4\")     PSYCH: NAD  EYES: EOMI  NEURO: AAO x3  : Vulva is normal in appearance. Desitin cream noted along vulva and clitoral rayo mostly. Mild irritation along introitus likely from body habitus/rubbing and pad usage; no signs of infection, ulcers, lesions, bleeding, drainage. Urethra normal. Negative for ADAM with reduction of speculum when instructed to cough. Vaginal mucosa atrophic. No pain to palpation on internal exam. Cystocele grade 1.. Strength 1/5. No obvious masses, lesions, ulcers, bleeding noted on internal or external exam.       LABS:   PVR 13 mL    Creatinine   Date Value Ref Range Status   03/29/2024 0.66 0.51 - 0.95 mg/dL " Final            Assessment and Plan:   74 year old is a pleasant female who has hx of UTI, functional incontinence, UUI, occasional incontinence without warning, vaginal atrophy, hx of incomplete emptying.    Plan:  -  PVR WNL.  -  Clear estrogen cream with PCP first given hx of breast cancer.   - continue cranberry supplement.  - start probiotic daily.  - consider methenamine with Vit. C in the future along with further workup if UTIs persist.   - discussed that clitoral and introital pain likely secondary to combination of vaginal atrophy and irritation from pad usage and body habitus vs other. Recommend starting estrogen cream and using Vaseline.   - start myrbetriq 25mg daily. Discussed risks/benfits and potential side effects. Do not recommend antimuscarinics given age, hx of fall/dizziness, hx of glaucoma. Contact clinic if feeling of incomplete emptying worsens. If unable to afford advise going to PFPT and re-evaluate  in 3 months.  - consider VUDS if fails conservative therapies for incontinence.   - referral to PFPT.   - avoid bladder irritants. Discussed how the ICE drinks can irritate the bladder as well and to cut back on these.   - Plan to follow-up in 3 months with PVR and BP check  - After discussing the assessment and plan with patient, patient verbalizes understanding and agrees to the above plan. All questions answered.       Other orders as below:  Orders Placed This Encounter   Procedures    MEASURE POST-VOID RESIDUAL URINE/BLADDER CAPACITY, US NON-IMAGING ([id number])    Physical Therapy  Referral     45 minutes spent on the date of the encounter doing chart review, review of outside records from [place] (total time 10minutes), review of test results, ordering myrbetriq, ordering estrogen cream, referral to PFTP, interpretation of tests, patient visit and documentation.      Lise Mendoza PA-C  Urology  April 5, 2024      Patient Care Team:  Ambar Hernandez DO as PCP - General (Family  Medicine)  Ambar Hernandez DO as Assigned PCP  Lise Mendoza PA-C as Physician Assistant  Lise Mendoza PA-C as Assigned Surgical Provider

## 2024-04-09 ENCOUNTER — TRANSFERRED RECORDS (OUTPATIENT)
Dept: HEALTH INFORMATION MANAGEMENT | Facility: CLINIC | Age: 74
End: 2024-04-09
Payer: COMMERCIAL

## 2024-04-11 NOTE — TELEPHONE ENCOUNTER
Please call patient back and let her know-thank you so much for following up on that.    It looks like the records have not been uploaded to the chart yet.  It may take a few days.  I will review them once they are available.  It looks like there is maybe 1 office note from 9/20/2023 that is available for review but it does not have the list of immunizations.

## 2024-04-19 NOTE — TELEPHONE ENCOUNTER
Pt calling to state that they have never received immunizations at her clinic, that they were always done at [facility] and that she is up todate.     Writer informed pt if she can get a record from [facility] we can add this to her chart. She states she will try to get a copy prior to her next appointment.

## 2024-04-23 ENCOUNTER — HOSPITAL ENCOUNTER (OUTPATIENT)
Dept: GENERAL RADIOLOGY | Facility: HOSPITAL | Age: 74
Discharge: HOME | End: 2024-04-23
Attending: STUDENT IN AN ORGANIZED HEALTH CARE EDUCATION/TRAINING PROGRAM | Admitting: STUDENT IN AN ORGANIZED HEALTH CARE EDUCATION/TRAINING PROGRAM
Payer: COMMERCIAL

## 2024-04-23 ENCOUNTER — OFFICE VISIT (OUTPATIENT)
Dept: FAMILY MEDICINE | Facility: CLINIC | Age: 74
End: 2024-04-23
Payer: COMMERCIAL

## 2024-04-23 VITALS
SYSTOLIC BLOOD PRESSURE: 112 MMHG | HEART RATE: 84 BPM | DIASTOLIC BLOOD PRESSURE: 64 MMHG | TEMPERATURE: 97.6 F | OXYGEN SATURATION: 96 % | BODY MASS INDEX: 38.45 KG/M2 | WEIGHT: 224 LBS | RESPIRATION RATE: 20 BRPM

## 2024-04-23 DIAGNOSIS — R39.15 URINARY URGENCY: ICD-10-CM

## 2024-04-23 DIAGNOSIS — Z78.9 ALCOHOL USE: ICD-10-CM

## 2024-04-23 DIAGNOSIS — H52.13 MYOPIA, BILATERAL: ICD-10-CM

## 2024-04-23 DIAGNOSIS — M79.645 PAIN IN FINGER OF BOTH HANDS: ICD-10-CM

## 2024-04-23 DIAGNOSIS — E66.01 CLASS 2 SEVERE OBESITY DUE TO EXCESS CALORIES WITH SERIOUS COMORBIDITY AND BODY MASS INDEX (BMI) OF 38.0 TO 38.9 IN ADULT (H): ICD-10-CM

## 2024-04-23 DIAGNOSIS — M79.644 PAIN IN FINGER OF BOTH HANDS: ICD-10-CM

## 2024-04-23 DIAGNOSIS — E66.812 CLASS 2 SEVERE OBESITY DUE TO EXCESS CALORIES WITH SERIOUS COMORBIDITY AND BODY MASS INDEX (BMI) OF 38.0 TO 38.9 IN ADULT (H): ICD-10-CM

## 2024-04-23 DIAGNOSIS — N95.2 ATROPHIC VAGINITIS: ICD-10-CM

## 2024-04-23 DIAGNOSIS — N39.0 RECURRENT UTI: ICD-10-CM

## 2024-04-23 DIAGNOSIS — E67.3 HYPERVITAMINOSIS D: ICD-10-CM

## 2024-04-23 DIAGNOSIS — M54.41 CHRONIC BILATERAL LOW BACK PAIN WITH BILATERAL SCIATICA: ICD-10-CM

## 2024-04-23 DIAGNOSIS — M54.42 CHRONIC BILATERAL LOW BACK PAIN WITH BILATERAL SCIATICA: ICD-10-CM

## 2024-04-23 DIAGNOSIS — M85.80 OSTEOPENIA, UNSPECIFIED LOCATION: ICD-10-CM

## 2024-04-23 DIAGNOSIS — G89.29 CHRONIC BILATERAL LOW BACK PAIN WITH BILATERAL SCIATICA: ICD-10-CM

## 2024-04-23 DIAGNOSIS — Z73.0 BURNOUT OF CAREGIVER: Primary | ICD-10-CM

## 2024-04-23 PROCEDURE — 99213 OFFICE O/P EST LOW 20 MIN: CPT | Performed by: STUDENT IN AN ORGANIZED HEALTH CARE EDUCATION/TRAINING PROGRAM

## 2024-04-23 PROCEDURE — 73130 X-RAY EXAM OF HAND: CPT | Mod: 50

## 2024-04-23 PROCEDURE — G2211 COMPLEX E/M VISIT ADD ON: HCPCS | Performed by: STUDENT IN AN ORGANIZED HEALTH CARE EDUCATION/TRAINING PROGRAM

## 2024-04-23 ASSESSMENT — PAIN SCALES - GENERAL: PAINLEVEL: NO PAIN (0)

## 2024-04-23 NOTE — PROGRESS NOTES
"  Assessment & Plan      Diagnosis Comments   1. Burnout of caregiver        2. Pain in finger of both hands  XR Hand Bilateral G/E 3 Views, diclofenac (VOLTAREN) 1 % topical gel       3. Myopia, bilateral  Adult Eye  Referral       4. Class 2 severe obesity due to excess calories with serious comorbidity and body mass index (BMI) of 38.0 to 38.9 in adult (H)        5. Atrophic vaginitis        6. Recurrent UTI        7. Hypervitaminosis D        8. Osteopenia, unspecified location        9. Urinary urgency        10. Alcohol use        11. Chronic bilateral low back pain with bilateral sciatica              Chronic low back pain:  Morbid obesity  Underwent ablation #2 with [facility] yesterday.  Will have her third 1 in a few weeks.  Feels like it is helping her pain level.  Weight is elevated but stable.  Movement is limited 2/2 pain.  Uses cane at baseline.    Hypervitaminosis D:  Osteopenia  Vitamin D was downtrending.  Patient notes that she was getting high doses of vitamin D and her daily Centrum plus independent vitamin D supplementation.  She is holding on that for now and we will reassess in 2 months to see if she can perhaps resume vitamin D every other day given history of osteopenia.    Low mood  Caregiver burnout  Alcohol use  Recently met with patient regarding this  Notes that her mood is \"much better today\"  She feels that she is settled down from her move and has no other concerns.  Has not pursued talk therapy.  She would like to monitor for now.  She is continuing to drink about 4 glasses of wine a day but did not want to talk about that    Recurrent UTIs:  Atrophic vaginitis  Urinary urgency  Following with urology  Just saw them 4/5/2020 for and has been started on Myrbetriq and estrogen cream that she is doing twice a week.  Feels that her symptoms are improving.  Will continue to follow with urology.    Bilateral hand pain:  Has advanced arthritis in both of her hands.  Has " been bothering her a bit more recently.  Notes that when she used to live in or [facility], she had a provider do an injection in her joints and that helped immensely.  Is not sure if she needs to follow-up with orthopedics or rheumatology for that.  Plan:  - We will start with x-rays of the hand today and based on the results, we will refer either to rheumatology or orthopedics.  - Can do Voltaren gel 4 times daily for symptom control in the interim    Patient would also like referral to ophthalmology.  Referral placed.      The longitudinal plan of care for the diagnosis(es)/condition(s) as documented were addressed during this visit. Due to the added complexity in care, I will continue to support Mirna in the subsequent management and with ongoing continuity of care.        Subjective   Mirna is a 74 year old, presenting for the following health issues:  RECHECK      4/23/2024    12:26 PM   Additional Questions   Roomed by davonte         Review of Systems  As per HPI       Objective    /64 (BP Location: Right arm, Patient Position: Sitting)   Pulse 84   Temp 97.6  F (36.4  C) (Oral)   Resp 20   Wt 101.6 kg (224 lb)   LMP  (LMP Unknown)   SpO2 96%   BMI 38.45 kg/m    Body mass index is 38.45 kg/m .  Physical Exam   GENERAL: alert and no distress  MS: no gross musculoskeletal defects noted, no edema  (+) arthritis related changes of all 10 digits of the hangs  PSYCH: mentation appears normal, affect  mildly anxious     Signed Electronically by: Ambar Hernandez DO

## 2024-04-24 DIAGNOSIS — M19.049 HAND ARTHRITIS: Primary | ICD-10-CM

## 2024-04-27 ENCOUNTER — NURSE TRIAGE (OUTPATIENT)
Dept: NURSING | Facility: CLINIC | Age: 74
End: 2024-04-27
Payer: COMMERCIAL

## 2024-04-27 ENCOUNTER — APPOINTMENT (OUTPATIENT)
Dept: MRI IMAGING | Facility: HOSPITAL | Age: 74
End: 2024-04-27
Attending: EMERGENCY MEDICINE
Payer: COMMERCIAL

## 2024-04-27 ENCOUNTER — HOSPITAL ENCOUNTER (OUTPATIENT)
Facility: HOSPITAL | Age: 74
Setting detail: OBSERVATION
Discharge: HOME | End: 2024-04-28
Attending: EMERGENCY MEDICINE | Admitting: STUDENT IN AN ORGANIZED HEALTH CARE EDUCATION/TRAINING PROGRAM
Payer: COMMERCIAL

## 2024-04-27 ENCOUNTER — APPOINTMENT (OUTPATIENT)
Dept: CT IMAGING | Facility: HOSPITAL | Age: 74
End: 2024-04-27
Attending: EMERGENCY MEDICINE
Payer: COMMERCIAL

## 2024-04-27 DIAGNOSIS — H54.61 VISION LOSS OF RIGHT EYE: ICD-10-CM

## 2024-04-27 DIAGNOSIS — G45.3 AFX (AMAUROSIS FUGAX): Primary | ICD-10-CM

## 2024-04-27 LAB
ALBUMIN SERPL BCG-MCNC: 3.9 G/DL (ref 3.5–5.2)
ALP SERPL-CCNC: 93 U/L (ref 40–150)
ALT SERPL W P-5'-P-CCNC: 32 U/L (ref 0–50)
ANION GAP SERPL CALCULATED.3IONS-SCNC: 11 MMOL/L (ref 7–15)
APTT PPP: 29 SECONDS (ref 22–38)
AST SERPL W P-5'-P-CCNC: 26 U/L (ref 0–45)
BASOPHILS # BLD AUTO: 0.1 10E3/UL (ref 0–0.2)
BASOPHILS NFR BLD AUTO: 1 %
BILIRUB SERPL-MCNC: 0.8 MG/DL
BUN SERPL-MCNC: 11.9 MG/DL (ref 8–23)
CALCIUM SERPL-MCNC: 9.4 MG/DL (ref 8.8–10.2)
CHLORIDE SERPL-SCNC: 95 MMOL/L (ref 98–107)
CHOLEST SERPL-MCNC: 259 MG/DL
CREAT SERPL-MCNC: 0.55 MG/DL (ref 0.51–0.95)
DEPRECATED HCO3 PLAS-SCNC: 26 MMOL/L (ref 22–29)
EGFRCR SERPLBLD CKD-EPI 2021: >90 ML/MIN/1.73M2
EOSINOPHIL # BLD AUTO: 0.2 10E3/UL (ref 0–0.7)
EOSINOPHIL NFR BLD AUTO: 3 %
ERYTHROCYTE [DISTWIDTH] IN BLOOD BY AUTOMATED COUNT: 12.9 % (ref 10–15)
GLUCOSE BLDC GLUCOMTR-MCNC: 138 MG/DL (ref 70–99)
GLUCOSE SERPL-MCNC: 110 MG/DL (ref 70–99)
HBA1C MFR BLD: 5.4 %
HCT VFR BLD AUTO: 40.3 % (ref 35–47)
HDLC SERPL-MCNC: 75 MG/DL
HGB BLD-MCNC: 14.3 G/DL (ref 11.7–15.7)
IMM GRANULOCYTES # BLD: 0 10E3/UL
IMM GRANULOCYTES NFR BLD: 1 %
INR PPP: 0.94 (ref 0.85–1.15)
LDLC SERPL CALC-MCNC: 160 MG/DL
LYMPHOCYTES # BLD AUTO: 1.8 10E3/UL (ref 0.8–5.3)
LYMPHOCYTES NFR BLD AUTO: 29 %
MAGNESIUM SERPL-MCNC: 2.1 MG/DL (ref 1.7–2.3)
MCH RBC QN AUTO: 36.6 PG (ref 26.5–33)
MCHC RBC AUTO-ENTMCNC: 35.5 G/DL (ref 31.5–36.5)
MCV RBC AUTO: 103 FL (ref 78–100)
MONOCYTES # BLD AUTO: 0.7 10E3/UL (ref 0–1.3)
MONOCYTES NFR BLD AUTO: 12 %
NEUTROPHILS # BLD AUTO: 3.4 10E3/UL (ref 1.6–8.3)
NEUTROPHILS NFR BLD AUTO: 54 %
NONHDLC SERPL-MCNC: 184 MG/DL
NRBC # BLD AUTO: 0 10E3/UL
NRBC BLD AUTO-RTO: 0 /100
PHOSPHATE SERPL-MCNC: 3.3 MG/DL (ref 2.5–4.5)
PLATELET # BLD AUTO: 170 10E3/UL (ref 150–450)
POTASSIUM SERPL-SCNC: 4.2 MMOL/L (ref 3.4–5.3)
PROT SERPL-MCNC: 6.7 G/DL (ref 6.4–8.3)
RBC # BLD AUTO: 3.91 10E6/UL (ref 3.8–5.2)
SODIUM SERPL-SCNC: 132 MMOL/L (ref 135–145)
TRIGL SERPL-MCNC: 118 MG/DL
TROPONIN T SERPL HS-MCNC: 14 NG/L
WBC # BLD AUTO: 6.2 10E3/UL (ref 4–11)

## 2024-04-27 PROCEDURE — 80053 COMPREHEN METABOLIC PANEL: CPT | Performed by: EMERGENCY MEDICINE

## 2024-04-27 PROCEDURE — G0378 HOSPITAL OBSERVATION PER HR: HCPCS

## 2024-04-27 PROCEDURE — 85610 PROTHROMBIN TIME: CPT | Performed by: EMERGENCY MEDICINE

## 2024-04-27 PROCEDURE — 250N000013 HC RX MED GY IP 250 OP 250 PS 637: Performed by: EMERGENCY MEDICINE

## 2024-04-27 PROCEDURE — 258N000003 HC RX IP 258 OP 636: Performed by: STUDENT IN AN ORGANIZED HEALTH CARE EDUCATION/TRAINING PROGRAM

## 2024-04-27 PROCEDURE — 99291 CRITICAL CARE FIRST HOUR: CPT | Mod: 25

## 2024-04-27 PROCEDURE — 82962 GLUCOSE BLOOD TEST: CPT

## 2024-04-27 PROCEDURE — 250N000009 HC RX 250: Performed by: EMERGENCY MEDICINE

## 2024-04-27 PROCEDURE — 82465 ASSAY BLD/SERUM CHOLESTEROL: CPT | Performed by: STUDENT IN AN ORGANIZED HEALTH CARE EDUCATION/TRAINING PROGRAM

## 2024-04-27 PROCEDURE — 93005 ELECTROCARDIOGRAM TRACING: CPT | Performed by: EMERGENCY MEDICINE

## 2024-04-27 PROCEDURE — 83735 ASSAY OF MAGNESIUM: CPT | Performed by: STUDENT IN AN ORGANIZED HEALTH CARE EDUCATION/TRAINING PROGRAM

## 2024-04-27 PROCEDURE — 250N000011 HC RX IP 250 OP 636: Performed by: EMERGENCY MEDICINE

## 2024-04-27 PROCEDURE — 85025 COMPLETE CBC W/AUTO DIFF WBC: CPT | Performed by: EMERGENCY MEDICINE

## 2024-04-27 PROCEDURE — 84100 ASSAY OF PHOSPHORUS: CPT | Performed by: STUDENT IN AN ORGANIZED HEALTH CARE EDUCATION/TRAINING PROGRAM

## 2024-04-27 PROCEDURE — 83036 HEMOGLOBIN GLYCOSYLATED A1C: CPT | Performed by: STUDENT IN AN ORGANIZED HEALTH CARE EDUCATION/TRAINING PROGRAM

## 2024-04-27 PROCEDURE — 99223 1ST HOSP IP/OBS HIGH 75: CPT | Performed by: STUDENT IN AN ORGANIZED HEALTH CARE EDUCATION/TRAINING PROGRAM

## 2024-04-27 PROCEDURE — 70551 MRI BRAIN STEM W/O DYE: CPT

## 2024-04-27 PROCEDURE — 70496 CT ANGIOGRAPHY HEAD: CPT

## 2024-04-27 PROCEDURE — 70544 MR ANGIOGRAPHY HEAD W/O DYE: CPT

## 2024-04-27 PROCEDURE — 36415 COLL VENOUS BLD VENIPUNCTURE: CPT | Performed by: EMERGENCY MEDICINE

## 2024-04-27 PROCEDURE — 85730 THROMBOPLASTIN TIME PARTIAL: CPT | Performed by: EMERGENCY MEDICINE

## 2024-04-27 PROCEDURE — 84484 ASSAY OF TROPONIN QUANT: CPT | Performed by: STUDENT IN AN ORGANIZED HEALTH CARE EDUCATION/TRAINING PROGRAM

## 2024-04-27 RX ORDER — NALOXONE HYDROCHLORIDE 0.4 MG/ML
0.4 INJECTION, SOLUTION INTRAMUSCULAR; INTRAVENOUS; SUBCUTANEOUS
Status: DISCONTINUED | OUTPATIENT
Start: 2024-04-27 | End: 2024-04-28 | Stop reason: HOSPADM

## 2024-04-27 RX ORDER — TETRACAINE HYDROCHLORIDE 5 MG/ML
1-2 SOLUTION OPHTHALMIC ONCE
Status: COMPLETED | OUTPATIENT
Start: 2024-04-27 | End: 2024-04-27

## 2024-04-27 RX ORDER — SODIUM CHLORIDE 9 MG/ML
INJECTION, SOLUTION INTRAVENOUS CONTINUOUS
Status: DISCONTINUED | OUTPATIENT
Start: 2024-04-27 | End: 2024-04-28

## 2024-04-27 RX ORDER — ATORVASTATIN CALCIUM 40 MG/1
80 TABLET, FILM COATED ORAL EVERY EVENING
Status: DISCONTINUED | OUTPATIENT
Start: 2024-04-27 | End: 2024-04-28 | Stop reason: HOSPADM

## 2024-04-27 RX ORDER — LIDOCAINE 40 MG/G
CREAM TOPICAL
Status: DISCONTINUED | OUTPATIENT
Start: 2024-04-27 | End: 2024-04-28 | Stop reason: HOSPADM

## 2024-04-27 RX ORDER — OXYCODONE HYDROCHLORIDE 5 MG/1
5 TABLET ORAL ONCE
Status: COMPLETED | OUTPATIENT
Start: 2024-04-27 | End: 2024-04-27

## 2024-04-27 RX ORDER — IOPAMIDOL 755 MG/ML
75 INJECTION, SOLUTION INTRAVASCULAR ONCE
Status: COMPLETED | OUTPATIENT
Start: 2024-04-27 | End: 2024-04-27

## 2024-04-27 RX ORDER — ACETAMINOPHEN 500 MG
500 TABLET ORAL EVERY 6 HOURS PRN
Status: DISCONTINUED | OUTPATIENT
Start: 2024-04-27 | End: 2024-04-28 | Stop reason: HOSPADM

## 2024-04-27 RX ORDER — MIRABEGRON 25 MG/1
25 TABLET, FILM COATED, EXTENDED RELEASE ORAL DAILY
Status: DISCONTINUED | OUTPATIENT
Start: 2024-04-28 | End: 2024-04-28 | Stop reason: HOSPADM

## 2024-04-27 RX ORDER — CLOPIDOGREL BISULFATE 75 MG/1
75 TABLET ORAL DAILY
Status: DISCONTINUED | OUTPATIENT
Start: 2024-04-28 | End: 2024-04-28 | Stop reason: HOSPADM

## 2024-04-27 RX ORDER — ASPIRIN 81 MG/1
81 TABLET ORAL DAILY
Status: DISCONTINUED | OUTPATIENT
Start: 2024-04-28 | End: 2024-04-28 | Stop reason: HOSPADM

## 2024-04-27 RX ORDER — METOPROLOL TARTRATE 100 MG
100 TABLET ORAL 2 TIMES DAILY
Status: DISCONTINUED | OUTPATIENT
Start: 2024-04-27 | End: 2024-04-28 | Stop reason: HOSPADM

## 2024-04-27 RX ORDER — ONDANSETRON 2 MG/ML
4 INJECTION INTRAMUSCULAR; INTRAVENOUS EVERY 6 HOURS PRN
Status: DISCONTINUED | OUTPATIENT
Start: 2024-04-27 | End: 2024-04-28 | Stop reason: HOSPADM

## 2024-04-27 RX ORDER — ONDANSETRON 4 MG/1
4 TABLET, ORALLY DISINTEGRATING ORAL EVERY 6 HOURS PRN
Status: DISCONTINUED | OUTPATIENT
Start: 2024-04-27 | End: 2024-04-28 | Stop reason: HOSPADM

## 2024-04-27 RX ORDER — OXYCODONE HYDROCHLORIDE 5 MG/1
5 TABLET ORAL EVERY 4 HOURS PRN
Status: DISCONTINUED | OUTPATIENT
Start: 2024-04-27 | End: 2024-04-28 | Stop reason: HOSPADM

## 2024-04-27 RX ORDER — CLOPIDOGREL BISULFATE 75 MG/1
300 TABLET ORAL ONCE
Status: COMPLETED | OUTPATIENT
Start: 2024-04-27 | End: 2024-04-27

## 2024-04-27 RX ORDER — NALOXONE HYDROCHLORIDE 0.4 MG/ML
0.2 INJECTION, SOLUTION INTRAMUSCULAR; INTRAVENOUS; SUBCUTANEOUS
Status: DISCONTINUED | OUTPATIENT
Start: 2024-04-27 | End: 2024-04-28 | Stop reason: HOSPADM

## 2024-04-27 RX ORDER — ASPIRIN 325 MG
325 TABLET ORAL ONCE
Status: COMPLETED | OUTPATIENT
Start: 2024-04-27 | End: 2024-04-27

## 2024-04-27 RX ORDER — ATORVASTATIN CALCIUM 40 MG/1
40 TABLET, FILM COATED ORAL EVERY EVENING
Status: DISCONTINUED | OUTPATIENT
Start: 2024-04-27 | End: 2024-04-27

## 2024-04-27 RX ORDER — LOSARTAN POTASSIUM 50 MG/1
100 TABLET ORAL DAILY
Status: DISCONTINUED | OUTPATIENT
Start: 2024-04-28 | End: 2024-04-28 | Stop reason: HOSPADM

## 2024-04-27 RX ORDER — ACETAMINOPHEN 500 MG
500 TABLET ORAL EVERY 6 HOURS PRN
COMMUNITY
End: 2024-05-02

## 2024-04-27 RX ADMIN — ASPIRIN 325 MG ORAL TABLET 325 MG: 325 PILL ORAL at 15:26

## 2024-04-27 RX ADMIN — SODIUM CHLORIDE: 9 INJECTION, SOLUTION INTRAVENOUS at 19:22

## 2024-04-27 RX ADMIN — TETRACAINE HYDROCHLORIDE 2 DROP: 5 SOLUTION OPHTHALMIC at 12:35

## 2024-04-27 RX ADMIN — OXYCODONE HYDROCHLORIDE 5 MG: 5 TABLET ORAL at 15:03

## 2024-04-27 RX ADMIN — IOPAMIDOL 75 ML: 755 INJECTION, SOLUTION INTRAVENOUS at 16:59

## 2024-04-27 RX ADMIN — FLUORESCEIN SODIUM 1 STRIP: 1 STRIP OPHTHALMIC at 12:34

## 2024-04-27 RX ADMIN — CLOPIDOGREL BISULFATE 300 MG: 75 TABLET ORAL at 19:07

## 2024-04-27 ASSESSMENT — ACTIVITIES OF DAILY LIVING (ADL)
ADLS_ACUITY_SCORE: 35
ADLS_ACUITY_SCORE: 38
ADLS_ACUITY_SCORE: 35

## 2024-04-27 ASSESSMENT — VISUAL ACUITY
OD: 20/200
OS: NOT TESTED

## 2024-04-27 ASSESSMENT — COLUMBIA-SUICIDE SEVERITY RATING SCALE - C-SSRS
1. IN THE PAST MONTH, HAVE YOU WISHED YOU WERE DEAD OR WISHED YOU COULD GO TO SLEEP AND NOT WAKE UP?: NO
6. HAVE YOU EVER DONE ANYTHING, STARTED TO DO ANYTHING, OR PREPARED TO DO ANYTHING TO END YOUR LIFE?: NO
2. HAVE YOU ACTUALLY HAD ANY THOUGHTS OF KILLING YOURSELF IN THE PAST MONTH?: NO

## 2024-04-27 NOTE — CONSULTS
"[facility]    Stroke Telephone Note    I was called by Imani Peters on 04/27/24 regarding patient Mirna Grijalva. The patient is a 74 year old female presenting with R EYE vision loss and possible RIGHT CRAO and called R ophthalmic artery occlusion possibly.     Vitals  BP: (!) 156/78   Pulse: 78   Resp: 16   Temp: 97.9  F (36.6  C)   Weight: 101.6 kg (224 lb)    Imaging Findings  IMPRESSION:  HEAD MRI:   1.  No acute intracranial process.  2.  Generalized brain atrophy and presumed microvascular ischemic changes as detailed above.  3.  Possible high left frontal scalp lesion or foreign body; suggest correlation with physical exam.     HEAD MRA:   1.  Possible occlusion within the proximal right ophthalmic artery.  2.  Otherwise patent intracranial vasculature.    Impression  Acute ischemic stroke of right eye due to undetermined etiology   Suspect CRAO based upon artery occlusion    Recommendations  Obtain CTA of head and neck to evaluate for any sign of carotid disease including source of embolus.  Need to determine why that she is allergic to Eliquis past: Hold on the initiation of any antiplatelet results from the CTA.  Call vascular neurology again CTA complete for further recommendations regarding disposition.    My recommendations are based on the information provided over the phone by Mirna Grijalva's in-person providers. They are not intended to replace the clinical judgment of her in-person providers. I was not requested to personally see or examine the patient at this time.     Jesse Esteban MD  Vascular Neurology/Neurocritical Care    To page me or covering stroke neurology team member, click here: AMCOM  Choose \"On Call\" tab at top, then select \"NEUROLOGY/ALL SITES\" from middle drop-down box, press Enter, then look for \"stroke\" or \"telestroke\" for your site.         "

## 2024-04-27 NOTE — TELEPHONE ENCOUNTER
Woke this am not being able to see clearly out of R eye. Is fuzzy. Does have cataract in R Eye. Also has glaucoma but has never been this fuzzy. No pain in eye. Can see out of Left eye per usual. On meds for hypertension. BP during phone call is: 171/100. Has not taken meds yet this am. HR 82. 2nd reading is: 150/92 HR 79.    Protocols reviewed, DISPOSITION: Go to ED Now, Call back with worsening symptoms, further questions/concerns.     JAKE BOWLING RN  [facility] nurse advisors  4/27/2024  8:25 AM  Reason for Disposition   [1] Blurred vision or visual changes AND [2] present now AND [3] sudden onset or new (e.g., minutes, hours, days)  (Exception: Seeing floaters / black specks OR previously diagnosed migraine headaches with same symptoms.)   [1] Systolic BP  >= 160 OR Diastolic >= 100 AND [2] cardiac (e.g., breathing difficulty, chest pain) or neurologic symptoms (e.g., new-onset blurred or double vision, unsteady gait)    Additional Information   Negative: Weakness of the face, arm or leg on one side of the body   Negative: Followed getting substance in the eye   Negative: Foreign body stuck in the eye   Negative: Followed an eye injury   Negative: Followed sun lamp or sun exposure (UV keratitis)   Negative: Yellow or green discharge (pus) in the eye   Negative: Pregnant   Negative: Postpartum (from 0 to 6 weeks after delivery)   Negative: Complete loss of vision in one or both eyes   Negative: SEVERE eye pain   Negative: SEVERE headache   Negative: Double vision   Negative: Difficult to awaken or acting confused (e.g., disoriented, slurred speech)   Negative: SEVERE difficulty breathing (e.g., struggling for each breath, speaks in single words)   Negative: [1] Weakness of the face, arm or leg on one side of the body AND [2] new-onset   Negative: [1] Numbness (i.e., loss of sensation) of the face, arm or leg on one side of the body AND [2] new-onset   Negative: [1] Chest pain lasts > 5 minutes AND [2]  history of heart disease (i.e., heart attack, bypass surgery, angina, angioplasty, CHF)   Negative: [1] Chest pain AND [2] took nitrogylcerin AND [3] pain was not relieved   Negative: Sounds like a life-threatening emergency to the triager   Negative: Symptom is main concern (e.g., headache, chest pain)   Negative: Low blood pressure is main concern    Protocols used: Vision Loss or Change-A-AH, Blood Pressure - High-A-AH

## 2024-04-27 NOTE — ED PROVIDER NOTES
EMERGENCY DEPARTMENT ENCOUNTER      NAME: Mirna Grijalva  AGE: 74 year old female  YOB: 1950  MRN: [id number]  EVALUATION DATE & TIME: 2024  9:24 AM    PCP: Ambar Hernandez    ED PROVIDER: Imani Peters M.D.      Chief Complaint   Patient presents with    Eye Problem       FINAL IMPRESSION:  1. Vision loss of right eye        ED COURSE & MEDICAL DECISION MAKIN.  Acute vision loss.  Pressures are normal in the right eye, no acute angle closure glaucoma.  Slit-lamp exam showed no uptake of fluorescein.  Patient does not have any eye pain regardless.  Patient's presentation possibly due to central retinal artery occlusion, I discussed imaging with neurology, I ordered MRI brain without contrast, MRA brain with and without contrast as recommended by neurology. I considered tPA but patient was out of window.  I considered possible LVO but this is not consistent with patient's presentation.  I attempted panoptic exam of right eye, did not dilate eye. Fundus appeared normal but I could not visualize other structures, no definitive diagnosis suspected from fundus exam.  I also considered worsening cataract, unlikely as patient's vision changed acutely overnight.  MRIs MRAs showed possible proximal right ophthalmic artery occlusion.  EKG ordered and reviewed by myself which shows sinus rhythm.  Aspirin ordered x 1.  Patient requested some pain medication due to chronic back pain, history of multiple back surgeries.  I ordered 5 mg oxycodone.          9:35 AM I met with the patient to gather history and to perform my initial exam. I discussed the plan for care while in the Emergency Department.  11:44 AM I spoke with Dr. Love, neurology.  Imaging recommended including MRI brain with and without contrast, MRI brain without contrast.  Results of MRI MRA reviewed, revealed possible proximal ophthalmic artery occlusion.  1522-I paged stroke neurology.[id number] I spoke to stroke neurology. Dr. Esteban recgaurav'd  CTA head and neck.  1627 I signed patient out to Dr. Patel pending CTA head and neck and likely admission afterwards.      ED Course as of 05/05/24 0735   Sat Apr 27, 2024 1627 Patient was signed out to me at change of shift pending CTA of the head and neck.  Patient had presented with right vision loss and no other symptoms that started yesterday.  Stroke neurology was consulted and recommended MRI/MRA imaging.  The MRA imaging was done without contrast and there was an area of question so CTA head and neck were ordered.  Patient was given aspirin and stroke neurology once a call back once the imaging returns.   1745 CTA shows narrowing of the right ophthalmic artery similar to what they saw on MRA.  I have put a call out to stroke neurology again and waiting for them to respond.   1834 I went and checked that time with patient.  Today she says that her vision is nearly back to normal and that right eye.  She is sitting or eating dinner.   1841 I spoke with Stroke Neurology who recommended admission here.   1850 I spoke with Dr. Gondal with hospitalist who agrees with a neuro tele obs admission.    3:07 PM I spoke with Radiology  3:51 PM I spoke with Dr. Esteban, stroke Neurology who recommended CT CTA.    Pertinent Labs & Imaging studies reviewed. (See chart for details).    Medical Decision Making  Obtained supplemental history:Supplemental history obtained?: Documented in chart and Family Member/Significant Other  Reviewed external records: External records reviewed?: Documented in chart and Other: office visit 4/5/24  Care impacted by chronic illness:Hyperlipidemia and Other: cataracts, macular degeneration, hypothyroidism  Care significantly affected by social determinants of health:Access to Medical Care  Did you consider but not order tests?: Work up considered but not performed and documented in chart, if applicable  Did you interpret images independently?: Independent interpretation of ECG and images  noted in documentation, when applicable.  Consultation discussion with other provider:Did you involve another provider (consultant, , pharmacy, etc.)?: I discussed the care with another health care provider, see documentation for details.  Admission considered. Patient was signed out to the oncoming physician, disposition pending.    At the conclusion of the encounter I discussed the results of all of the tests and the disposition. The questions were answered. The patient or family acknowledged understanding and was agreeable with the care plan.      CRITICAL CARE:  Performed by: Imani Peters  Authorized by: Imani Peters  Total critical care time: 58 minutes  Critical care time was exclusive of separately billable procedures and treating other patients.  Critical care was necessary to treat or prevent imminent or life-threatening deterioration of the following conditions: acute cva with TPA considered  Critical care was time spent personally by me on the following activities: development of treatment plan with patient or surrogate, discussions with consultants, examination of patient, evaluation of patient's response to treatment, obtaining history from patient or surrogate, ordering and performing treatments and interventions, ordering and review of laboratory studies, ordering and review of radiographic studies and re-evaluation of patient's condition, this excludes any separately billable procedures.      HPI    Patient information was obtained from: patient,     Use of : N/A.       Mirna Grijalva is a 74 year old female who presents for right eye blurred vision starting at 4am today, had gone to sleep 11 PM last night.  Woke up to go to the bathroom and noticed that her right eye vision was not normal.  She has had left cataract and glaucoma surgery, she has a known right eye cataract that was not operated on, waiting for it to ripen.  Previous ophthalmologic surgery and [place], lived there for  11 years and moved back home recently.  Has an appointment at  for ophthalmology at the end of the month.  Uses glasses for near and far distance.  Denies any other eye symptoms such as foreign body sensation, pain, injection, discharge.  Denies any floaters or flashers.  Denies any visual field cuts to the right eye.  Denies any motor or sensory changes.  Is not on any blood thinners.    Per Chart Review: hx of allergy to eliquis, patient denies knowing why she was on this med in the past.      REVIEW OF SYSTEMS  All other systems negative unless noted in HPI.    PAST MEDICAL HISTORY:  Past Medical History:   Diagnosis Date    Mumps        PAST SURGICAL HISTORY:  Past Surgical History:   Procedure Laterality Date    CYSTOSCOPY           CURRENT MEDICATIONS:    No current facility-administered medications for this encounter.     Current Outpatient Medications   Medication Sig Dispense Refill    aspirin 81 MG EC tablet Take 1 tablet (81 mg) by mouth daily 90 tablet 0    atorvastatin (LIPITOR) 80 MG tablet Take 1 tablet (80 mg) by mouth every evening 30 tablet 0    bimatoprost (LUMIGAN) 0.01 % SOLN Place 1 drop into the right eye at bedtime      clopidogrel (PLAVIX) 75 MG tablet Take 1 tablet (75 mg) by mouth daily 90 tablet 0    CRANBERRY-VITAMIN C PO Take 1 capsule by mouth daily      diclofenac (VOLTAREN) 1 % topical gel Apply 4 g topically 4 times daily 350 g 1    levothyroxine (SYNTHROID/LEVOTHROID) 137 MCG tablet Take 1 tablet (137 mcg) by mouth daily 90 tablet 0    metoprolol tartrate (LOPRESSOR) 100 MG tablet Take 1 tablet (100 mg) by mouth 2 times daily 180 tablet 0    mirabegron (MYRBETRIQ) 25 MG 24 hr tablet Take 1 tablet (25 mg) by mouth daily 90 tablet 3    acetaminophen (TYLENOL) 500 MG tablet Take 1 tablet (500 mg) by mouth every 6 hours as needed for mild pain 60 tablet 1         ALLERGIES:  Allergies   Allergen Reactions    Atenolol      Rash and Insomnia    Ciprofloxacin      Severe tendon pain and  swelling    Eliquis [Apixaban]      Wanting to faint    Nitrofurantoin      'Bad, itchy, feverish, rash on arms and legs'    Sulfa Antibiotics      Rash on feet    Penicillins Rash       FAMILY HISTORY:  No family history on file.    SOCIAL HISTORY:  Social History     Socioeconomic History    Marital status:    Tobacco Use    Smoking status: Former     Types: Cigarettes     Passive exposure: Never    Smokeless tobacco: Never   Vaping Use    Vaping status: Never Used   Substance and Sexual Activity    Alcohol use: Yes    Drug use: Never     Social Determinants of Health     Financial Resource Strain: Low Risk  (1/23/2024)    Financial Resource Strain     Within the past 12 months, have you or your family members you live with been unable to get utilities (heat, electricity) when it was really needed?: No   Food Insecurity: Low Risk  (1/23/2024)    Food Insecurity     Within the past 12 months, did you worry that your food would run out before you got money to buy more?: No     Within the past 12 months, did the food you bought just not last and you didn t have money to get more?: No   Transportation Needs: Low Risk  (1/23/2024)    Transportation Needs     Within the past 12 months, has lack of transportation kept you from medical appointments, getting your medicines, non-medical meetings or appointments, work, or from getting things that you need?: No   Interpersonal Safety: Low Risk  (4/23/2024)    Interpersonal Safety     Do you feel physically and emotionally safe where you currently live?: Yes     Within the past 12 months, have you been hit, slapped, kicked or otherwise physically hurt by someone?: No     Within the past 12 months, have you been humiliated or emotionally abused in other ways by your partner or ex-partner?: No   Housing Stability: Low Risk  (1/23/2024)    Housing Stability     Do you have housing? : Yes     Are you worried about losing your housing?: No       VITALS:  No data  found.      PHYSICAL EXAM    VITAL SIGNS: BP (!) 161/98 (BP Location: Left arm)   Pulse 84   Temp 97.9  F (36.6  C) (Oral)   Resp 19   Wt 101.6 kg (224 lb)   LMP  (LMP Unknown)   SpO2 95%   BMI 38.45 kg/m    Physical Exam  Vitals and nursing note reviewed.   Constitutional:       General: She is not in acute distress.     Appearance: She is obese. She is not toxic-appearing.   HENT:      Head: Normocephalic and atraumatic.      Mouth/Throat:      Mouth: Mucous membranes are moist.   Eyes:      General: No scleral icterus.        Right eye: No discharge.         Left eye: No discharge.      Extraocular Movements: Extraocular movements intact.      Pupils: Pupils are equal, round, and reactive to light.      Comments: R eye: visual acuity 20/200 corrected, cornea is cloudy diffusely to the right eye.  Urbano-Pen measurement 14 right eye.  Visual fields are intact to confrontation bilaterally. extraocular movements are intact bilaterally.   Cardiovascular:      Rate and Rhythm: Normal rate and regular rhythm.   Pulmonary:      Effort: Pulmonary effort is normal. No respiratory distress.      Breath sounds: Normal breath sounds.   Abdominal:      General: There is no distension.      Palpations: Abdomen is soft.      Tenderness: There is no abdominal tenderness.   Musculoskeletal:         General: No swelling or deformity.      Cervical back: Neck supple. No rigidity.   Skin:     General: Skin is warm and dry.      Capillary Refill: Capillary refill takes less than 2 seconds.      Findings: No bruising or erythema.   Neurological:      Mental Status: She is alert.      Comments: Alert and oriented x3, finger-to-nose intact bilaterally, clear speech without dysarthria or aphasia, no facial droop, 5 out of 5 strength to upper and lower extremities bilaterally.  Sensation grossly intact to upper and lower extremities bilaterally.    Psychiatric:         Mood and Affect: Mood normal.         Behavior: Behavior normal.          LABS  Labs Ordered and Resulted from Time of ED Arrival to Time of ED Departure   GLUCOSE BY METER - Abnormal       Result Value    GLUCOSE BY METER POCT 138 (*)    COMPREHENSIVE METABOLIC PANEL - Abnormal    Sodium 132 (*)     Potassium 4.2      Carbon Dioxide (CO2) 26      Anion Gap 11      Urea Nitrogen 11.9      Creatinine 0.55      GFR Estimate >90      Calcium 9.4      Chloride 95 (*)     Glucose 110 (*)     Alkaline Phosphatase 93      AST 26      ALT 32      Protein Total 6.7      Albumin 3.9      Bilirubin Total 0.8     CBC WITH PLATELETS AND DIFFERENTIAL - Abnormal    WBC Count 6.2      RBC Count 3.91      Hemoglobin 14.3      Hematocrit 40.3       (*)     MCH 36.6 (*)     MCHC 35.5      RDW 12.9      Platelet Count 170      % Neutrophils 54      % Lymphocytes 29      % Monocytes 12      % Eosinophils 3      % Basophils 1      % Immature Granulocytes 1      NRBCs per 100 WBC 0      Absolute Neutrophils 3.4      Absolute Lymphocytes 1.8      Absolute Monocytes 0.7      Absolute Eosinophils 0.2      Absolute Basophils 0.1      Absolute Immature Granulocytes 0.0      Absolute NRBCs 0.0     LIPID PROFILE - Abnormal    Cholesterol 259 (*)     Triglycerides 118      Direct Measure HDL 75      LDL Cholesterol Calculated 160 (*)     Non HDL Cholesterol 184 (*)    INR - Normal    INR 0.94     PARTIAL THROMBOPLASTIN TIME - Normal    aPTT 29     TROPONIN T, HIGH SENSITIVITY - Normal    Troponin T, High Sensitivity 14     HEMOGLOBIN A1C - Normal    Hemoglobin A1C 5.4     MAGNESIUM - Normal    Magnesium 2.1     PHOSPHORUS - Normal    Phosphorus 3.3           RADIOLOGY  Echocardiogram Complete - For age > 60 yrs   Final Result      XR Lumbar Spine 2/3 Views   Final Result   IMPRESSION:  Redemonstrated severe degenerative changes throughout the lumbar spine with facet arthropathy, disc height loss, vertebral body osteophyte formation, and sclerotic endplate changes, better characterized on recent MRI.  Redemonstrated moderate    stepwise retrolisthesis from L1-L3. Grade 1 anterolisthesis at L1-L2. Vertebral body heights unchanged. No findings to suggest acute fracture.      CTA Head Neck with Contrast   Final Result   IMPRESSION:    HEAD CT:   1.  No CT evidence for acute intracranial process.      2.  Brain atrophy and presumed chronic microvascular ischemic changes as above.      3.  Partial opacification right sphenoid sinus.      4.  Stable intracranial appearance from 03/01/2024.      HEAD CTA:    1.  Possible short segment high-grade stenosis at the right ophthalmic arterial origin at the level of the right orbital apex corresponds to MRA abnormality suggested on earlier examination today 04/27/2024. Right-sided visual loss is reported. The    remainder of the intraorbital portion of the right ophthalmic artery appears well-perfused. Left ophthalmic arterial appearance is satisfactory. There is mild to moderate stenosis of the cavernous ICA segments bilaterally with calcific plaque. 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 Vascular variation anatomy at the    level of the [place] with small - moderate bilateral posterior communicating arteries. No additional evidence for thromboembolic occlusion, aneurysm, dissection or hemodynamic high-grade stenosis. No high flow vascular malformation. Dural venous    sinus enhancement is normal. No additional pathologic enhancement intracranially.      NECK CTA:   1.  No high-grade stenosis of the major neck arteries or at the great vessel origin level. Calcific plaque at the distal CCA, bifurcation proximal ICA level noted bilaterally more prominent than right with mild to moderate right ICA stenosis 30-45%    proximally. No high-grade stenosis of the internal carotid arteries  within the neck. No dissection. No evidence for hemodynamically significant great vessel origin stenosis. 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      2.  No additional pathologic enhancement noted intracranially, within the neck or in the mediastinum.      MRA Brain ([place]) wo Contrast   Final Result   Addendum (preliminary) 1 of 1   COMMUNICATION ADDENDUM:   Findings were discussed with Dr. Peters on 4/27/2024 3:08 PM CDT.      END ADDENDUM      Final   IMPRESSION:   HEAD MRI:    1.  No acute intracranial process.   2.  Generalized brain atrophy and presumed microvascular ischemic changes as detailed above.   3.  Possible high left frontal scalp lesion or foreign body; suggest correlation with physical exam.      HEAD MRA:    1.  Possible occlusion within the proximal right ophthalmic artery.   2.  Otherwise patent intracranial vasculature.      Report finalized to expedite patient care. Communication pending.      MR Brain w/o Contrast   Final Result   Addendum (preliminary) 1 of 1   COMMUNICATION ADDENDUM:   Findings were discussed with Dr. Peters on 4/27/2024 3:08 PM CDT.      END ADDENDUM      Final   IMPRESSION:   HEAD MRI:    1.  No acute intracranial process.   2.  Generalized brain atrophy and presumed microvascular ischemic changes as detailed above.   3.  Possible high left frontal scalp lesion or foreign body; suggest correlation with physical exam.      HEAD MRA:    1.  Possible occlusion within the proximal right ophthalmic artery.   2.  Otherwise patent intracranial vasculature.      Report finalized to expedite patient care. Communication pending.         I have independently reviewed the above image. See radiology report for detail.      EKG:    EKG obtained at 1528 and shows sinus rhythm rate 74, Qtc 461 compared to previous EKG on incomplete right bundle branch block with PVCs present. I have independently reviewed and interpreted today's EKG, pending Cardiologist  read.       PROCEDURES:    PROCEDURE: Slit lamp and [place] lamp Exam   INDICATIONS: Vision loss   PROCEDURE PROVIDER: Dr Imani Peters   SITE: right eye   CONSENT:  The risks, benefits and alternatives for this procedure were explained to the patient and verbally accepted.     MEDICATION: fluorescein stain   EXAM FINDINGS: Right Eye: no uptake, cornea is diffusely slightly cloudy  Left Eye: na   COMPLICATIONS: Patient tolerated procedure well, without complication           MEDICATIONS GIVEN IN THE EMERGENCY:  Medications   fluorescein (FUL-REINA) ophthalmic strip 1 strip (1 strip Right Eye $Given by Other Clinician 4/27/24 1237)   tetracaine (PONTOCAINE) 0.5 % ophthalmic solution 1-2 drop (2 drops Both Eyes $Given by Other 4/27/24 123)   oxyCODONE (ROXICODONE) tablet 5 mg (5 mg Oral $Given 4/27/24 1503)   aspirin (ASA) tablet 325 mg (325 mg Oral $Given 4/27/24 1526)   iopamidol (ISOVUE-370) solution 75 mL (75 mLs Intravenous $Given 4/27/24 1659)   clopidogrel (PLAVIX) tablet 300 mg (300 mg Oral $Given 4/27/24 1907)       NEW PRESCRIPTIONS STARTED AT TODAY'S ER VISIT  ALLAN Peters MD  Emergency Medicine  [facility] EMERGENCY DEPARTMENT  [address] [id number] [phone]  Dept: [phone]             Imani Peters MD  05/05/24 0849

## 2024-04-27 NOTE — ED NOTES
Pt returns from MRI she reports low / mid back pain 9/10. She has chronic back pain and states that she twisted wrong while in the BR. And she took 2 tabs of tylenol that she had in her purse. Provider aware.

## 2024-04-27 NOTE — CONSULTS
"  Neurology Update:    75 yo with R eye vision loss concern for possible CRA. MRA suggestive of possible ophthalmic artery occlusion. CTA completed which shows high-grade stenosis at the right ophthalmic arterial origin and athero changes in bilateral carotid artery. Per ED MD repeat exam, vision improved to baseline. Initial plan to [place] [facility] [place] however given improvement will defer transfer and admit for further workup.    Impression  Amaurosis Fugax  Ophthalmic artery stenosis    Recommendations  - Use orderset: \"Ischemic Stroke Routine Admission\" or \"Ischemic Stroke No Thrombolytics/No Thrombectomy ICU Admission\"  - Place Neurology IP Stroke Consult order   - Neurochecks and Vital Signs every 4h   - Permissive HTN; goal SBP < 220 mmHg  - Daily aspirin 81 mg for secondary stroke prevention  - Plavix (clopidogrel) 300 mg PO loading dose x 1  - Plavix (clopidogrel) 75 mg PO Daily  - Statin: initiate based on lipid profile  - TTE (with Bubble Study if age 60 yrs or less)  - Telemetry, EKG  - Bedside Glucose Monitoring  - A1c, Lipid Panel, Troponin x 3  - PT/OT/SLP  - Stroke Education  - Euthermia, Euglycemia    My recommendations are based on the information provided over the phone by Mirna Grijalva's in-person providers. They are not intended to replace the clinical judgment of her in-person providers. I was not requested to personally see or examine the patient at this time.     The Stroke Staff is Dr. Devi.    Saniya Barba MD  Vascular Neurology Fellow    To page me or covering stroke neurology team member, click here: [facility]  Choose \"On Call\" tab at top, then select \"NEUROLOGY/ALL SITES\" from middle drop-down box, press Enter, then look for \"stroke\" or \"telestroke\" for your site.          "

## 2024-04-27 NOTE — ED TRIAGE NOTES
Patient reports she awoke this AM at 0400 with blurred vision to her right eye.  States able to see objects but is unclear.  Denies any visual field cut.  Hx of glaucoma and cataract to eye.  Hx of HTN.  Asked for provider to triage for neuro exam- Dr. Peters assessed patient and no stroke code.

## 2024-04-28 ENCOUNTER — APPOINTMENT (OUTPATIENT)
Dept: RADIOLOGY | Facility: HOSPITAL | Age: 74
End: 2024-04-28
Attending: STUDENT IN AN ORGANIZED HEALTH CARE EDUCATION/TRAINING PROGRAM
Payer: COMMERCIAL

## 2024-04-28 ENCOUNTER — APPOINTMENT (OUTPATIENT)
Dept: PHYSICAL THERAPY | Facility: HOSPITAL | Age: 74
End: 2024-04-28
Attending: STUDENT IN AN ORGANIZED HEALTH CARE EDUCATION/TRAINING PROGRAM
Payer: COMMERCIAL

## 2024-04-28 ENCOUNTER — APPOINTMENT (OUTPATIENT)
Dept: CARDIOLOGY | Facility: HOSPITAL | Age: 74
End: 2024-04-28
Attending: STUDENT IN AN ORGANIZED HEALTH CARE EDUCATION/TRAINING PROGRAM
Payer: COMMERCIAL

## 2024-04-28 VITALS
TEMPERATURE: 97.9 F | DIASTOLIC BLOOD PRESSURE: 98 MMHG | OXYGEN SATURATION: 95 % | SYSTOLIC BLOOD PRESSURE: 161 MMHG | WEIGHT: 224 LBS | RESPIRATION RATE: 19 BRPM | HEART RATE: 84 BPM | BODY MASS INDEX: 38.45 KG/M2

## 2024-04-28 PROBLEM — G45.3: Status: ACTIVE | Noted: 2024-04-28

## 2024-04-28 LAB
ANION GAP SERPL CALCULATED.3IONS-SCNC: 13 MMOL/L (ref 7–15)
ATRIAL RATE - MUSE: 74 BPM
BUN SERPL-MCNC: 10.9 MG/DL (ref 8–23)
CALCIUM SERPL-MCNC: 9 MG/DL (ref 8.8–10.2)
CHLORIDE SERPL-SCNC: 100 MMOL/L (ref 98–107)
CREAT SERPL-MCNC: 0.62 MG/DL (ref 0.51–0.95)
CRP SERPL-MCNC: <3 MG/L
DEPRECATED HCO3 PLAS-SCNC: 22 MMOL/L (ref 22–29)
DIASTOLIC BLOOD PRESSURE - MUSE: NORMAL MMHG
EGFRCR SERPLBLD CKD-EPI 2021: >90 ML/MIN/1.73M2
ERYTHROCYTE [DISTWIDTH] IN BLOOD BY AUTOMATED COUNT: 13 % (ref 10–15)
ERYTHROCYTE [SEDIMENTATION RATE] IN BLOOD BY WESTERGREN METHOD: 11 MM/HR (ref 0–30)
GLUCOSE BLDC GLUCOMTR-MCNC: 117 MG/DL (ref 70–99)
GLUCOSE BLDC GLUCOMTR-MCNC: 128 MG/DL (ref 70–99)
GLUCOSE SERPL-MCNC: 128 MG/DL (ref 70–99)
HCT VFR BLD AUTO: 41 % (ref 35–47)
HCYS SERPL-SCNC: 13.1 UMOL/L (ref 0–15)
HGB BLD-MCNC: 14.3 G/DL (ref 11.7–15.7)
INTERPRETATION ECG - MUSE: NORMAL
LVEF ECHO: NORMAL
MAGNESIUM SERPL-MCNC: 2 MG/DL (ref 1.7–2.3)
MCH RBC QN AUTO: 36.5 PG (ref 26.5–33)
MCHC RBC AUTO-ENTMCNC: 34.9 G/DL (ref 31.5–36.5)
MCV RBC AUTO: 105 FL (ref 78–100)
P AXIS - MUSE: 10 DEGREES
PHOSPHATE SERPL-MCNC: 3.4 MG/DL (ref 2.5–4.5)
PLATELET # BLD AUTO: 165 10E3/UL (ref 150–450)
POTASSIUM SERPL-SCNC: 4 MMOL/L (ref 3.4–5.3)
PR INTERVAL - MUSE: 156 MS
QRS DURATION - MUSE: 106 MS
QT - MUSE: 416 MS
QTC - MUSE: 461 MS
R AXIS - MUSE: 37 DEGREES
RBC # BLD AUTO: 3.92 10E6/UL (ref 3.8–5.2)
SODIUM SERPL-SCNC: 135 MMOL/L (ref 135–145)
SYSTOLIC BLOOD PRESSURE - MUSE: NORMAL MMHG
T AXIS - MUSE: 0 DEGREES
VENTRICULAR RATE- MUSE: 74 BPM
WBC # BLD AUTO: 6.4 10E3/UL (ref 4–11)

## 2024-04-28 PROCEDURE — 86140 C-REACTIVE PROTEIN: CPT | Performed by: PSYCHIATRY & NEUROLOGY

## 2024-04-28 PROCEDURE — 86038 ANTINUCLEAR ANTIBODIES: CPT | Performed by: PSYCHIATRY & NEUROLOGY

## 2024-04-28 PROCEDURE — 99239 HOSP IP/OBS DSCHRG MGMT >30: CPT | Performed by: INTERNAL MEDICINE

## 2024-04-28 PROCEDURE — 80048 BASIC METABOLIC PNL TOTAL CA: CPT | Performed by: STUDENT IN AN ORGANIZED HEALTH CARE EDUCATION/TRAINING PROGRAM

## 2024-04-28 PROCEDURE — G0378 HOSPITAL OBSERVATION PER HR: HCPCS

## 2024-04-28 PROCEDURE — 86147 CARDIOLIPIN ANTIBODY EA IG: CPT | Performed by: PSYCHIATRY & NEUROLOGY

## 2024-04-28 PROCEDURE — 93306 TTE W/DOPPLER COMPLETE: CPT

## 2024-04-28 PROCEDURE — 250N000013 HC RX MED GY IP 250 OP 250 PS 637: Performed by: STUDENT IN AN ORGANIZED HEALTH CARE EDUCATION/TRAINING PROGRAM

## 2024-04-28 PROCEDURE — 84100 ASSAY OF PHOSPHORUS: CPT | Performed by: STUDENT IN AN ORGANIZED HEALTH CARE EDUCATION/TRAINING PROGRAM

## 2024-04-28 PROCEDURE — 82962 GLUCOSE BLOOD TEST: CPT

## 2024-04-28 PROCEDURE — 99417 PROLNG OP E/M EACH 15 MIN: CPT | Performed by: PSYCHIATRY & NEUROLOGY

## 2024-04-28 PROCEDURE — 85652 RBC SED RATE AUTOMATED: CPT | Performed by: PSYCHIATRY & NEUROLOGY

## 2024-04-28 PROCEDURE — 99205 OFFICE O/P NEW HI 60 MIN: CPT | Performed by: PSYCHIATRY & NEUROLOGY

## 2024-04-28 PROCEDURE — 36415 COLL VENOUS BLD VENIPUNCTURE: CPT | Performed by: STUDENT IN AN ORGANIZED HEALTH CARE EDUCATION/TRAINING PROGRAM

## 2024-04-28 PROCEDURE — 85027 COMPLETE CBC AUTOMATED: CPT | Performed by: STUDENT IN AN ORGANIZED HEALTH CARE EDUCATION/TRAINING PROGRAM

## 2024-04-28 PROCEDURE — 72100 X-RAY EXAM L-S SPINE 2/3 VWS: CPT

## 2024-04-28 PROCEDURE — 36415 COLL VENOUS BLD VENIPUNCTURE: CPT | Performed by: PSYCHIATRY & NEUROLOGY

## 2024-04-28 PROCEDURE — 97161 PT EVAL LOW COMPLEX 20 MIN: CPT | Mod: GP

## 2024-04-28 PROCEDURE — 83090 ASSAY OF HOMOCYSTEINE: CPT | Performed by: PSYCHIATRY & NEUROLOGY

## 2024-04-28 PROCEDURE — 93306 TTE W/DOPPLER COMPLETE: CPT | Mod: 26 | Performed by: STUDENT IN AN ORGANIZED HEALTH CARE EDUCATION/TRAINING PROGRAM

## 2024-04-28 PROCEDURE — 85303 CLOT INHIBIT PROT C ACTIVITY: CPT | Performed by: PSYCHIATRY & NEUROLOGY

## 2024-04-28 PROCEDURE — 999N000226 HC STATISTIC SLP IP EVAL DEFER

## 2024-04-28 PROCEDURE — 83735 ASSAY OF MAGNESIUM: CPT | Performed by: STUDENT IN AN ORGANIZED HEALTH CARE EDUCATION/TRAINING PROGRAM

## 2024-04-28 PROCEDURE — 85306 CLOT INHIBIT PROT S FREE: CPT | Performed by: PSYCHIATRY & NEUROLOGY

## 2024-04-28 RX ORDER — CLOPIDOGREL BISULFATE 75 MG/1
75 TABLET ORAL DAILY
Qty: 90 TABLET | Refills: 0 | Status: SHIPPED | OUTPATIENT
Start: 2024-04-29 | End: 2024-07-15

## 2024-04-28 RX ORDER — ATORVASTATIN CALCIUM 80 MG/1
80 TABLET, FILM COATED ORAL EVERY EVENING
Qty: 30 TABLET | Refills: 0 | Status: SHIPPED | OUTPATIENT
Start: 2024-04-28 | End: 2024-04-28

## 2024-04-28 RX ORDER — ATORVASTATIN CALCIUM 80 MG/1
80 TABLET, FILM COATED ORAL EVERY EVENING
Qty: 30 TABLET | Refills: 0 | Status: SHIPPED | OUTPATIENT
Start: 2024-04-28 | End: 2024-05-20

## 2024-04-28 RX ORDER — ASPIRIN 81 MG/1
81 TABLET ORAL DAILY
Qty: 90 TABLET | Refills: 0 | Status: SHIPPED | OUTPATIENT
Start: 2024-04-29 | End: 2024-07-24

## 2024-04-28 RX ORDER — ASPIRIN 81 MG/1
81 TABLET ORAL DAILY
Qty: 90 TABLET | Refills: 0 | Status: SHIPPED | OUTPATIENT
Start: 2024-04-29 | End: 2024-04-28

## 2024-04-28 RX ORDER — CLOPIDOGREL BISULFATE 75 MG/1
75 TABLET ORAL DAILY
Qty: 90 TABLET | Refills: 0 | Status: SHIPPED | OUTPATIENT
Start: 2024-04-29 | End: 2024-04-28

## 2024-04-28 RX ADMIN — LEVOTHYROXINE SODIUM 137 MCG: 0.11 TABLET ORAL at 06:31

## 2024-04-28 RX ADMIN — BIMATOPROST 1 DROP: 0.1 SOLUTION/ DROPS OPHTHALMIC at 00:07

## 2024-04-28 RX ADMIN — ASPIRIN 81 MG: 81 TABLET, COATED ORAL at 08:20

## 2024-04-28 RX ADMIN — CLOPIDOGREL BISULFATE 75 MG: 75 TABLET ORAL at 08:20

## 2024-04-28 RX ADMIN — ATORVASTATIN CALCIUM 80 MG: 40 TABLET, FILM COATED ORAL at 16:54

## 2024-04-28 RX ADMIN — OXYCODONE HYDROCHLORIDE 5 MG: 5 TABLET ORAL at 08:20

## 2024-04-28 RX ADMIN — MIRABEGRON 25 MG: 25 TABLET, FILM COATED, EXTENDED RELEASE ORAL at 08:20

## 2024-04-28 RX ADMIN — Medication 5 MG: at 00:08

## 2024-04-28 RX ADMIN — OXYCODONE HYDROCHLORIDE 5 MG: 5 TABLET ORAL at 13:15

## 2024-04-28 RX ADMIN — ATORVASTATIN CALCIUM 80 MG: 40 TABLET, FILM COATED ORAL at 00:10

## 2024-04-28 ASSESSMENT — ACTIVITIES OF DAILY LIVING (ADL)
ADLS_ACUITY_SCORE: 23
ADLS_ACUITY_SCORE: 38
ADLS_ACUITY_SCORE: 23
ADLS_ACUITY_SCORE: 38
ADLS_ACUITY_SCORE: 23
ADLS_ACUITY_SCORE: 23

## 2024-04-28 NOTE — DISCHARGE SUMMARY
"[facility]  Hospitalist Discharge Summary      Date of Admission:  4/27/2024  Date of Discharge:  4/28/2024  Discharging Provider: Kayla Hale MD  Discharge Service: Hospitalist Service    Discharge Diagnoses   Active Problems:    Astigmatism    Nuclear senile cataract    Primary open angle glaucoma of right eye    Class 2 obesity    Essential (primary) hypertension    Class 2 severe obesity due to excess calories with serious comorbidity in adult (H)    Vision loss of right eye    AFX (amaurosis fugax)      Clinically Significant Risk Factors     # Obesity: Estimated body mass index is 38.45 kg/m  as calculated from the following:    Height as of 4/5/24: 1.626 m (5' 4\").    Weight as of this encounter: 101.6 kg (224 lb).       Follow-ups Needed After Discharge   Follow-up Appointments     Follow-up and recommended labs and tests       Follow up with primary care provider, Ambar Hernandez, within 3-5days, to   evaluate medication change, for hospital follow- up, and regarding new   diagnosis. Follow up Blood pressures. Per neurology recommendations   stopped Losartan-hydrochlorothiazide     Neurology arranging outpatient follow up            Unresulted Labs Ordered in the Past 30 Days of this Admission       No orders found for last 31 day(s).        These results will be followed up by     Discharge Disposition   Discharged to home  Condition at discharge: Stable    Hospital Course   Mirna Grijalva is a 74 year old female admitted on 4/27/2024. She presented to the ER with complaint of blurry and double vision.       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 Right-sided visual loss is reported. The remainder of " the intraorbital portion of the right ophthalmic artery appears well-perfused. Left ophthalmic arterial appearance is satisfactory. There is mild to moderate stenosis of the cavernous ICA segments bilaterally with calcific plaque. 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 Vascular variation anatomy at the level of the [place] of [place] with small - moderate bilateral posterior communicating arteries. No additional evidence for thromboembolic occlusion, aneurysm, dissection or hemodynamic high-grade stenosis. No high flow vascular malformation. Dural venous sinus enhancement is normal.   NECK CTA:  1.  No high-grade stenosis of the major neck arteries or at the great vessel origin level. Calcific plaque at the distal CCA, bifurcation proximal ICA level noted bilaterally more prominent than right with mild to moderate right ICA stenosis 30-45%  proximally. No high-grade stenosis of the internal carotid arteries within the neck. No dissection. No evidence for hemodynamically significant great vessel origin stenosis. 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  - MRI  No acute intracranial process. Generalized brain atrophy and presumed microvascular ischemic changes as detailed above. Possible high left frontal scalp lesion or foreign body; suggest correlation with physical exam.HEAD MRA: Possible occlusion within the proximal right ophthalmic artery.  Otherwise patent intracranial vasculature.  - neurology seen and case discussed outpatient follow up with stroke neuro  - Asa and plavix DAPT for 3months   - sent out on atorvastatin 80mg daily stopped previous statin, repeat FLP in 2-3 months   - stop estradiol       Mild hyponatremia and hypochloremia  - resolved      Mild hyperglycemia most likely reactive  Hemoglobin A1c  5.4     History of hypertension  - Ok to resume metoprolol  - holding losartan and hydrochlorothiazide for permissive control of blood pressure and reassess at outpatient follow up     History of glaucoma  Continue with Lumigan     History of hyperlipidemia  Continue levothyroxine    Consultations This Hospital Stay   SPEECH LANGUAGE PATH ADULT IP CONSULT  PHARMACY IP CONSULT  PHARMACY IP CONSULT  PHARMACY IP CONSULT  PHYSICAL THERAPY ADULT IP CONSULT  OCCUPATIONAL THERAPY ADULT IP CONSULT  REHAB ADMISSIONS LIAISON IP CONSULT  CARE MANAGEMENT / SOCIAL WORK IP CONSULT  NEUROLOGY IP CONSULT  SMOKING CESSATION PROGRAM IP CONSULT    Code Status   Full Code    Time Spent on this Encounter   I, Kayla Hale MD, personally saw the patient today and spent greater than 30 minutes discharging this patient.       Kayla Hale MD  [address] [address]  Phone: [phone]  Fax: [phone]  ______________________________________________________________________    Physical Exam   Vital Signs: Temp: 97.9  F (36.6  C) Temp src: Oral BP: (!) 161/98 (nurse notified) Pulse: 84   Resp: 19 SpO2: 95 % O2 Device: None (Room air)    Weight: 224 lbs 0 oz  Physical Exam  Vitals and nursing note reviewed.   Constitutional:       General: She is not in acute distress.     Appearance: She is obese. She is not ill-appearing.   Eyes:      General: No visual field deficit.  Neck:      Vascular: No carotid bruit.   Cardiovascular:      Rate and Rhythm: Normal rate and regular rhythm.      Pulses: Normal pulses.   Pulmonary:      Effort: Pulmonary effort is normal.      Breath sounds: Normal breath sounds.   Abdominal:      General: Bowel sounds are normal.      Palpations: Abdomen is soft.   Musculoskeletal:         General: Normal range of motion.      Cervical back: Normal range of motion and neck supple. No rigidity or tenderness.   Lymphadenopathy:      Cervical: No  cervical adenopathy.   Skin:     General: Skin is warm and dry.      Capillary Refill: Capillary refill takes less than 2 seconds.   Neurological:      General: No focal deficit present.      Mental Status: She is alert and oriented to person, place, and time. Mental status is at baseline.      Cranial Nerves: Cranial nerves 2-12 are intact. No dysarthria or facial asymmetry.      Sensory: Sensation is intact.      Motor: Motor function is intact.      Coordination: Coordination is intact. Coordination normal. Finger-Nose-Finger Test normal.      Deep Tendon Reflexes: Babinski sign absent on the right side. Babinski sign absent on the left side.              Primary Care Physician   Ambar Hernandez    Discharge Orders      Reason for your hospital stay    Active Problems:    Astigmatism    Nuclear senile cataract    Primary open angle glaucoma of right eye    Class 2 obesity    Essential (primary) hypertension    Class 2 severe obesity due to excess calories with serious comorbidity in adult (H)    Vision loss of right eye    AFX (amaurosis fugax)     Follow-up and recommended labs and tests     Follow up with primary care provider, Ambar Hernandez, within 3-5days, to evaluate medication change, for hospital follow- up, and regarding new diagnosis. Follow up Blood pressures. Per neurology recommendations stopped Losartan-hydrochlorothiazide     Neurology arranging outpatient follow up     Activity    Your activity upon discharge: activity as tolerated     Discharge Instructions    Per neurolgoy recommendations stopped Losartan-hydrochlorothiazide and follow up with your PCP for blood pressure recheck    Stop estradiol even though topical can be still can be absorbed increase risk for thrombus and stroke     Diet    Follow this diet upon discharge: Orders Placed This Encounter      Regular Diet Adult       Significant Results and Procedures   Most Recent 3 CBC's:  Recent Labs   Lab Test 04/28/24  0925 04/27/24  1218  01/23/24  1019   WBC 6.4 6.2 7.2   HGB 14.3 14.3 14.8   * 103* 105*    170 185     Most Recent 3 BMP's:  Recent Labs   Lab Test 04/28/24  1204 04/28/24  0925 04/28/24  0724 04/27/24  1218 04/27/24  0922 03/29/24  1604   NA  --  135  --  132*  --  139   POTASSIUM  --  4.0  --  4.2  --  3.9   CHLORIDE  --  100  --  95*  --  102   CO2  --  22  --  26  --  25   BUN  --  10.9  --  11.9  --  21.4   CR  --  0.62  --  0.55  --  0.66   ANIONGAP  --  13  --  11  --  12   SAMANTHA  --  9.0  --  9.4  --  9.6   * 128* 128* 110*   < > 105*    < > = values in this interval not displayed.     Most Recent 2 LFT's:  Recent Labs   Lab Test 04/27/24  1218 01/23/24  1019   AST 26 26   ALT 32 29   ALKPHOS 93 93   BILITOTAL 0.8 0.8     7-Day Micro Results       No results found for the last 168 hours.          Most Recent TSH and T4:  Recent Labs   Lab Test 03/29/24  1604 01/23/24  1019   TSH 2.31 11.70*   T4  --  1.72*     Most Recent Hemoglobin A1c:  Recent Labs   Lab Test 04/27/24  1218   A1C 5.4     Most Recent 6 glucoses:  Recent Labs   Lab Test 04/28/24  1204 04/28/24  0925 04/28/24  0724 04/27/24  1218 04/27/24  0922 03/29/24  1604   * 128* 128* 110* 138* 105*     Most Recent Urinalysis:  Recent Labs   Lab Test 02/23/24  1158   COLOR Yellow   APPEARANCE Clear   URINEGLC Negative   URINEBILI Negative   URINEKETONE Negative   SG 1.015   UBLD Trace*   URINEPH 7.0   PROTEIN Negative   UROBILINOGEN 0.2   NITRITE Negative   LEUKEST Moderate*   RBCU 5-10*   WBCU >100*   ,   Results for orders placed or performed during the hospital encounter of 04/27/24   MR Brain w/o Contrast    Addendum: 4/27/2024    COMMUNICATION ADDENDUM:  Findings were discussed with Dr. Peters on 4/27/2024 3:08 PM CDT.    END ADDENDUM      Narrative    EXAM: MR BRAIN W/O CONTRAST, MRA BRAIN ([place] [facility] [place]) W/O CONTRAST  LOCATION: [facility]  DATE: 4/27/2024    INDICATION: right vision loss  COMPARISON: Noncontrast  head CT 03/01/2024.  TECHNIQUE:   1) Routine multiplanar multisequence head MRI without intravenous contrast.  2) 3D time-of-flight head MRA without intravenous contrast.    FINDINGS:  HEAD MRI:  INTRACRANIAL CONTENTS: No acute or subacute infarct. No mass, acute hemorrhage, or extra-axial fluid collections. Patchy nonspecific T2/FLAIR hyperintensities within the cerebral white matter most consistent with mild to moderate chronic microvascular   ischemic change. Moderate generalized cerebral atrophy. No hydrocephalus. Normal position of the cerebellar tonsils.     SELLA: No abnormality accounting for technique.    OSSEOUS STRUCTURES/SOFT TISSUES: Normal marrow signal. Possible high left frontal scalp lesion, measuring up to 18 mm in dimension.    ORBITS: No abnormality accounting for technique.     SINUSES/MASTOIDS: No paranasal sinus mucosal disease. No middle ear or mastoid effusion.     HEAD MRA:   ANTERIOR CIRCULATION: Possible occlusion within the proximal right ophthalmic artery (series 2, image 80). Otherwise patent anterior circulation. Anterior communicating artery fenestration. No aneurysm.    POSTERIOR CIRCULATION: Dominant right vertebral artery. Patent posterior circulation with robust posterior communicating arteries. Balanced vertebral arteries supply a normal basilar artery. No aneurysm.      Impression    IMPRESSION:  HEAD MRI:   1.  No acute intracranial process.  2.  Generalized brain atrophy and presumed microvascular ischemic changes as detailed above.  3.  Possible high left frontal scalp lesion or foreign body; suggest correlation with physical exam.    HEAD MRA:   1.  Possible occlusion within the proximal right ophthalmic artery.  2.  Otherwise patent intracranial vasculature.    Report finalized to expedite patient care. Communication pending.   MRA Brain ([place] [facility] [place]) wo Contrast    Addendum: 4/27/2024    COMMUNICATION ADDENDUM:  Findings were discussed with Dr. Peters on 4/27/2024  3:08 PM CDT.    END ADDENDUM      Narrative    EXAM: MR BRAIN W/O CONTRAST, MRA BRAIN ([place] [facility] [place]) W/O CONTRAST  LOCATION: [facility]  DATE: 4/27/2024    INDICATION: right vision loss  COMPARISON: Noncontrast head CT 03/01/2024.  TECHNIQUE:   1) Routine multiplanar multisequence head MRI without intravenous contrast.  2) 3D time-of-flight head MRA without intravenous contrast.    FINDINGS:  HEAD MRI:  INTRACRANIAL CONTENTS: No acute or subacute infarct. No mass, acute hemorrhage, or extra-axial fluid collections. Patchy nonspecific T2/FLAIR hyperintensities within the cerebral white matter most consistent with mild to moderate chronic microvascular   ischemic change. Moderate generalized cerebral atrophy. No hydrocephalus. Normal position of the cerebellar tonsils.     SELLA: No abnormality accounting for technique.    OSSEOUS STRUCTURES/SOFT TISSUES: Normal marrow signal. Possible high left frontal scalp lesion, measuring up to 18 mm in dimension.    ORBITS: No abnormality accounting for technique.     SINUSES/MASTOIDS: No paranasal sinus mucosal disease. No middle ear or mastoid effusion.     HEAD MRA:   ANTERIOR CIRCULATION: Possible occlusion within the proximal right ophthalmic artery (series 2, image 80). Otherwise patent anterior circulation. Anterior communicating artery fenestration. No aneurysm.    POSTERIOR CIRCULATION: Dominant right vertebral artery. Patent posterior circulation with robust posterior communicating arteries. Balanced vertebral arteries supply a normal basilar artery. No aneurysm.      Impression    IMPRESSION:  HEAD MRI:   1.  No acute intracranial process.  2.  Generalized brain atrophy and presumed microvascular ischemic changes as detailed above.  3.  Possible high left frontal scalp lesion or foreign body; suggest correlation with physical exam.    HEAD MRA:   1.  Possible occlusion within the proximal right ophthalmic artery.  2.  Otherwise patent  intracranial vasculature.    Report finalized to expedite patient care. Communication pending.   CTA Head Neck with Contrast    Narrative    EXAM: CTA HEAD NECK W CONTRAST  LOCATION: [facility]  DATE: 4/27/2024    INDICATION: Right-sided visual loss.  COMPARISON: Brain MRI 04/27/2024 head CT 03/01/2024  CONTRAST: Isovue 370 75ml  TECHNIQUE: Head and neck CT angiogram with IV contrast. Noncontrast head CT followed by axial helical CT images of the head and neck vessels obtained during the arterial phase of intravenous contrast administration. Axial 2D reconstructed images and   multiplanar 3D MIP reconstructed images of the head and neck vessels were performed by the technologist. Dose reduction techniques were used. All stenosis measurements made according to NASCET criteria unless otherwise specified.    FINDINGS:   NONCONTRAST HEAD CT:   INTRACRANIAL CONTENTS: No intracranial hemorrhage, extraaxial collection, or mass effect.  No CT evidence of acute infarct. Mild to moderate presumed chronic small vessel ischemic changes. Moderate generalized volume loss. No hydrocephalus. Corpus   callosum is normal. Cerebellar tonsillar position is within normal limits. Sella and supersellar structures are normal. Vascular calcification.     VISUALIZED ORBITS/SINUSES/MASTOIDS: Prior left cataract surgery. Visualized portions of the orbits are otherwise unremarkable. No retrobulbar edema. There is partial opacification of the right sphenoid sinus. No middle ear or mastoid effusion.    BONES/SOFT TISSUES: No scalp hematoma. No skull fracture. Demineralization of skull base. Degenerative changes both TMJs. Nasopharynx is patent.    HEAD CTA: Prior MRA reviewed from 04/27/2024 suggested possible short segment occlusion proximal right ophthalmic artery. This corresponds to apparent loss of intravascular enhancement which may represent a short segment occlusion or small area of   high-grade stenosis at the  level of the right orbital apex series 11 images 397 - 401, with the area of interest suggested series 11 image 399. The remainder of the intraorbital portion of the right ophthalmic artery appears well perfused with no   additional areas of stenosis or distal thrombosis. Left ophthalmic artery appears satisfactory.    ANTERIOR CIRCULATION: No additional anterior circulation stenosis/occlusion, aneurysm, or high flow vascular malformation. Bilateral posterior communicating arteries larger on the left augment the posterior circulation. Calcific plaque and tortuosity of   the cavernous ICA segments bilaterally left more than right with mild to moderate stenosis 30-45%. Satisfactory branch arborization pattern and caliber of the ALEKSEY and MCA vascular territory bilaterally.    POSTERIOR CIRCULATION: No stenosis/occlusion, aneurysm, or high flow vascular malformation. Normal caliber basilar artery supplied from the right V4 segment. Augmentation of the posterior circulation with small - moderate bilateral posterior   communicating arteries larger on the left. Satisfactory and symmetric branch arborization pattern and caliber of the PCA vascular territory overall.     DURAL VENOUS SINUSES: Expected enhancement of the major dural venous sinuses.    NECK CTA:  RIGHT CAROTID: Coarse eccentric calcific plaque at the distal CCA, bifurcation and proximal right ICA. There is moderate stenosis of the distal right CCA level probably 40% in degree. No ulcerated plaque or endoluminal hypodense thrombosis noted. No   tandem right ICA stenosis or dissection. No high-grade right ICA stenosis. Mild origin stenosis right ECA under 15%.    LEFT CAROTID: Calcific and fibrofatty plaque at the distal CCA and bifurcation/proximal left ICA with very mild 5-10% hemodynamic stenosis. No tandem stenosis, high-grade stenosis or dissection of left ICA within the neck.    VERTEBRAL ARTERIES: No focal stenosis or dissection. Dominant right and smaller  left vertebral arteries. The right V4 segment is congenitally dominant and supplies the majority of the basilar artery with left diminutive V3 and V4 segment terminating   predominantly in a left PICA.    AORTIC ARCH: Classic aortic arch anatomy with no significant stenosis at the origin of the great vessels. Tortuosity at the great vessel origin level noted including medial deviation of the proximal common carotid arteries with retroesophageal course.    NONVASCULAR STRUCTURES: Unremarkable.      Impression    IMPRESSION:   HEAD CT:  1.  No CT evidence for acute intracranial process.    2.  Brain atrophy and presumed chronic microvascular ischemic changes as above.    3.  Partial opacification right sphenoid sinus.    4.  Stable intracranial appearance from 03/01/2024.    HEAD CTA:   1.  Possible short segment high-grade stenosis at the right ophthalmic arterial origin at the level of the right orbital apex corresponds to MRA abnormality suggested on earlier examination today 04/27/2024. Right-sided visual loss is reported. The   remainder of the intraorbital portion of the right ophthalmic artery appears well-perfused. Left ophthalmic arterial appearance is satisfactory. There is mild to moderate stenosis of the cavernous ICA segments bilaterally with calcific plaque. 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 Vascular variation anatomy at the   level of the [place] of [place] with small - moderate bilateral posterior communicating arteries. No additional evidence for thromboembolic occlusion, aneurysm, dissection or hemodynamic high-grade stenosis. No high flow vascular malformation. Dural venous   sinus enhancement is normal. No additional pathologic enhancement intracranially.    NECK CTA:  1.  No high-grade stenosis of the major  neck arteries or at the great vessel origin level. Calcific plaque at the distal CCA, bifurcation proximal ICA level noted bilaterally more prominent than right with mild to moderate right ICA stenosis 30-45%   proximally. No high-grade stenosis of the internal carotid arteries within the neck. No dissection. No evidence for hemodynamically significant great vessel origin stenosis. 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    2.  No additional pathologic enhancement noted intracranially, within the neck or in the mediastinum.   XR Lumbar Spine 2/3 Views    Narrative    EXAM: XR LUMBAR SPINE 2/3 VIEWS  LOCATION: [facility]  DATE: 2024    INDICATION: back pain  COMPARISON: 2024 MRI lumbar spine      Impression    IMPRESSION:  Redemonstrated severe degenerative changes throughout the lumbar spine with facet arthropathy, disc height loss, vertebral body osteophyte formation, and sclerotic endplate changes, better characterized on recent MRI. Redemonstrated moderate   stepwise retrolisthesis from L1-L3. Grade 1 anterolisthesis at L1-L2. Vertebral body heights unchanged. No findings to suggest acute fracture.   Echocardiogram Complete - For age > 60 yrs     Value    LVEF  60-65%    Narrative    [id number]  [id number]  [id number]  [id number]^GONDAL^RENAE^[place] [address]     Name: LINDA YI  MRN: [id number]  : 1950  Study Date: 2024 09:12 AM  Age: 74 yrs  Gender: Female  Patient Location: [facility]  Reason For Study: Cerebrovascular Incident  Ordering Physician: GONDAL, SAAD J  Performed By: MAREN     BSA: 2.1 m2  Height: 64 in  Weight: 224 lb  BP: 134/86 mmHg  ______________________________________________________________________________  Procedure  Adequate quality two-dimensional was performed and interpreted. There is no  comparison study  available.  ______________________________________________________________________________  Interpretation Summary     The left ventricle is normal in size. There is mild concentric left  ventricular hypertrophy.  Left ventricular systolic function is normal. The visual ejection fraction is  60-65%. No regional wall motion abnormalities noted.  Grade I or early diastolic dysfunction.     The right ventricle is normal in size and function.  The left atrium is mildly dilated. Right atrium not well visualized.  IVC diameter <2.1 cm collapsing >50% with sniff suggests a normal RA pressure  of 3 mmHg.  There is no comparison study available.  ______________________________________________________________________________  Left Ventricle  The left ventricle is normal in size. There is mild concentric left  ventricular hypertrophy. Left ventricular systolic function is normal. The  visual ejection fraction is 60-65%. Grade I or early diastolic dysfunction. No  regional wall motion abnormalities noted.     Right Ventricle  The right ventricle is normal in size and function.     Atria  The left atrium is mildly dilated. Right atrium not well visualized.     Mitral Valve  Mitral valve leaflets appear normal. There is mild to moderate mitral annular  calcification. There is trace mitral regurgitation. There is no mitral valve  stenosis.     Tricuspid Valve  The tricuspid valve is not well visualized. There is trace tricuspid  regurgitation. Right ventricular systolic pressure could not be approximated  due to inadequate tricuspid regurgitation.     Aortic Valve  The aortic valve is trileaflet. Mild aortic valve calcification is present. No  aortic regurgitation is present. No aortic stenosis is present.     Pulmonic Valve  The pulmonic valve is not well visualized. There is trace pulmonic valvular  regurgitation.     Vessels  The aorta root is normal. IVC diameter <2.1 cm collapsing >50% with sniff  suggests a normal RA  pressure of 3 mmHg.     Pericardium  There is no pericardial effusion.     Rhythm  Sinus rhythm was noted.  ______________________________________________________________________________  MMode/2D Measurements & Calculations  IVSd: 1.3 cm  LVIDd: 4.8 cm  LVIDs: 3.6 cm  LVPWd: 1.3 cm  FS: 25.6 %  LV mass(C)d: 233.0 grams  LV mass(C)dI: 113.5 grams/m2  Ao root diam: 3.8 cm  asc Aorta Diam: 3.6 cm  LVOT diam: 2.2 cm  LVOT area: 3.8 cm2  Ao root diam index Ht(cm/m): 2.3  Ao root diam index BSA (cm/m2): 1.9  Asc Ao diam index BSA (cm/m2): 1.8  Asc Ao diam index Ht(cm/m): 2.2  EF Biplane: 70.5 %     LA Volume Indexed (AL/bp): 42.3 ml/m2  RWT: 0.52  TAPSE: 2.7 cm     Doppler Measurements & Calculations  MV E max kwan: 95.6 cm/sec  MV A max kwan: 140.0 cm/sec  MV E/A: 0.68  MV dec slope: 392.0 cm/sec2  MV dec time: 0.24 sec  Ao V2 max: 146.0 cm/sec  Ao max P.0 mmHg  Ao V2 mean: 95.9 cm/sec  Ao mean P.0 mmHg  Ao V2 VTI: 23.8 cm  DAILY(I,D): 3.9 cm2  DAILY(V,D): 3.5 cm2  LV V1 max P.3 mmHg  LV V1 max: 135.0 cm/sec  LV V1 VTI: 24.5 cm  SV(LVOT): 93.1 ml  SI(LVOT): 45.4 ml/m2  PA V2 max: 92.4 cm/sec  PA max PG: 3.4 mmHg  PA acc time: 0.11 sec  AV Kwan Ratio (DI): 0.92  DAILY Index (cm2/m2): 1.9     E/E' av.7  Lateral E/e': 12.9  Medial E/e': 24.4  RV S Kwan: 20.8 cm/sec     ______________________________________________________________________________  Report approved by: Breanna Kwan 2024 03:55 PM             Discharge Medications   Current Discharge Medication List        START taking these medications    Details   aspirin 81 MG EC tablet Take 1 tablet (81 mg) by mouth daily for 90 days  Qty: 90 tablet, Refills: 0    Associated Diagnoses: AFX (amaurosis fugax)      atorvastatin (LIPITOR) 80 MG tablet Take 1 tablet (80 mg) by mouth every evening for 30 days  Qty: 30 tablet, Refills: 0    Associated Diagnoses: Vision loss of right eye      clopidogrel (PLAVIX) 75 MG tablet Take 1 tablet (75 mg) by mouth daily  for 90 days  Qty: 90 tablet, Refills: 0    Associated Diagnoses: AFX (amaurosis fugax)           CONTINUE these medications which have NOT CHANGED    Details   acetaminophen (TYLENOL) 500 MG tablet Take 500 mg by mouth every 6 hours as needed for mild pain      bimatoprost (LUMIGAN) 0.01 % SOLN Place 1 drop into the right eye at bedtime      CRANBERRY-VITAMIN C PO Take 1 capsule by mouth daily      diclofenac (VOLTAREN) 1 % topical gel Apply 4 g topically 4 times daily  Qty: 350 g, Refills: 1    Associated Diagnoses: Pain in finger of both hands      levothyroxine (SYNTHROID/LEVOTHROID) 137 MCG tablet Take 1 tablet (137 mcg) by mouth daily  Qty: 90 tablet, Refills: 0    Associated Diagnoses: History of thyroid cancer; Hypothyroidism, unspecified type      metoprolol tartrate (LOPRESSOR) 100 MG tablet Take 1 tablet (100 mg) by mouth 2 times daily  Qty: 180 tablet, Refills: 0    Associated Diagnoses: Essential hypertension      mirabegron (MYRBETRIQ) 25 MG 24 hr tablet Take 1 tablet (25 mg) by mouth daily  Qty: 90 tablet, Refills: 3    Associated Diagnoses: Urge incontinence           STOP taking these medications       estradiol (ESTRACE) 0.1 MG/GM vaginal cream Comments:   Reason for Stopping:         losartan-hydrochlorothiazide (HYZAAR) 100-25 MG tablet Comments:   Reason for Stopping:         pravastatin (PRAVACHOL) 20 MG tablet Comments:   Reason for Stopping:             Allergies   Allergies   Allergen Reactions    Atenolol      Rash and Insomnia    Ciprofloxacin      Severe tendon pain and swelling    Eliquis [Apixaban]      Wanting to faint    Nitrofurantoin      'Bad, itchy, feverish, rash on arms and legs'    Sulfa Antibiotics      Rash on feet    Penicillins Rash

## 2024-04-28 NOTE — CONSULTS
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 Right eye affected    - MRI shows nothing acute  - MRA/CTA concerning for Right ophthalmic artery occlusion   - Spoke with stroke neurology.  No need for hyperbaric treatment at this point, given the timeframe  - Neurochecks and Vital Signs every 4h while in house  - Permissive HTN; goal SBP < 220 mmHg  - Continue aspirin and Plavix per stroke neurology (at least 3 months)  - . Continue high dose atorvastatin  - TTE  is unremarkable.  - PT/OT/SLP  - Stroke Education  - Neurology will sign off.  Our office out to Mirna to set up follow-up in about a month.  Okay to discharge from my standpoint        Chief Complaint   Patient presents with    Eye Problem        HISTORY OF PRESENT ILLNESS     We have been requested by Dr. Gondal to evaluate Mirna Grijalva who is a 74 year old female for blurred vision in the Right eye. She presented on 24 with the bluured vision and double vision. Stroke neurology initially consulted. Possible ophthalmic artery occlusion on vessel imaging. There was discussion about transfer to [facility] [place] [facility] for hyperbaric treatment, but stroke neurologist recommended admission for stroke work-up instead. Vision reportedly improved to baseline in ED. However, nursing notes indicate continued symptoms overnight. Patient had trouble sleeping.     Mirna and her family are at bedside (daughter is a patient of mine).  Mirna clarifies for me that her vision is not back to normal, improved but not normal.  Even her glasses do not correct the blurriness that she is noticing postop.  She mentions that she does have an appointment with an ophthalmologist in about a month.  No weakness  otherwise.  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  She had side effects on the Eliquis.  Mirna tells me that she was taking aspirin here and there for pain but not on a regular basis, no other blood thinners currently.     PAST MEDICAL & SURGICAL HISTORY     Medical History  Past Medical History:   Diagnosis Date    Mumps      Surgical History  Past Surgical History:   Procedure Laterality Date    CYSTOSCOPY          SOCIAL HISTORY     Social History      FAMILY HISTORY     Reviewed, and family history is not on file.     ALLERGIES     Allergies   Allergen Reactions    Atenolol      Rash and Insomnia    Ciprofloxacin      Severe tendon pain and swelling    Eliquis [Apixaban]      Wanting to faint    Nitrofurantoin      'Bad, itchy, feverish, rash on arms and legs'    Sulfa Antibiotics      Rash on feet    Penicillins Rash        REVIEW OF SYSTEMS     Pertinent items are noted in HPI.     HOME & HOSPITAL MEDICATIONS     Prior to Admission Medications  Medications Prior to Admission   Medication Sig Dispense Refill Last Dose    acetaminophen (TYLENOL) 500 MG tablet Take 500 mg by mouth every 6 hours as needed for mild pain       bimatoprost (LUMIGAN) 0.01 % SOLN Place 1 drop into the right eye at bedtime   4/26/2024 at hs    CRANBERRY-VITAMIN C PO Take 1 capsule by mouth daily   4/27/2024 at am    diclofenac (VOLTAREN) 1 % topical gel Apply 4 g topically 4 times daily 350 g 1 4/27/2024 at am    estradiol (ESTRACE) 0.1 MG/GM vaginal cream Place 1 g vaginally twice a week 40 g 3 Past Week at am    levothyroxine (SYNTHROID/LEVOTHROID) 137 MCG tablet Take 1 tablet (137 mcg) by mouth daily 90 tablet 0 4/27/2024 at 0600    losartan-hydrochlorothiazide (HYZAAR) 100-25 MG tablet Take 1 tablet by mouth daily 90 tablet 0 4/27/2024 at am    metoprolol tartrate (LOPRESSOR) 100 MG tablet Take 1 tablet (100 mg) by mouth 2 times daily 180 tablet 0 4/27/2024 at  am    mirabegron (MYRBETRIQ) 25 MG 24 hr tablet Take 1 tablet (25 mg) by mouth daily 90 tablet 3 4/27/2024 at am    pravastatin (PRAVACHOL) 20 MG tablet Take 1 tablet (20 mg) by mouth daily 90 tablet 0 4/27/2024 at am       Hospital Medications  Current Facility-Administered Medications   Medication Dose Route Frequency Provider Last Rate Last Admin    aspirin EC tablet 81 mg  81 mg Oral Daily Gondal, Saad J, MD        atorvastatin (LIPITOR) tablet 80 mg  80 mg Oral QPM Gondal, Saad J, MD   80 mg at 04/28/24 0010    bimatoprost (LUMIGAN) 0.01 % ophthalmic drops 1 drop  1 drop Right Eye At Bedtime Gondal, Saad J, MD   1 drop at 04/28/24 0007    clopidogrel (PLAVIX) tablet 75 mg  75 mg Oral Daily Gondal, Saad J, MD        levothyroxine (SYNTHROID/LEVOTHROID) tablet 137 mcg  137 mcg Oral Daily Gondal, Saad J, MD   137 mcg at 04/28/24 0631    [Held by provider] losartan (COZAAR) tablet 100 mg  100 mg Oral Daily Gondal, Saad J, MD        [Held by provider] metoprolol tartrate (LOPRESSOR) tablet 100 mg  100 mg Oral BID Gondal, Saad J, MD        mirabegron (MYRBETRIQ) 24 hr tablet 25 mg  25 mg Oral Daily Gondal, Saad J, MD        sodium chloride (PF) 0.9% PF flush 3 mL  3 mL Intracatheter Q8H Gondal, Saad J, MD            PHYSICAL EXAM     Vital signs  Temp:  [97  F (36.1  C)-98.3  F (36.8  C)] 97  F (36.1  C)  Pulse:  [78-83] 82  Resp:  [16-20] 20  BP: (127-170)/(73-88) 134/86  SpO2:  [94 %-97 %] 96 %    Weight:   [unfilled]    General Physical Exam: Patient is alert and oriented x 3. Vital signs were reviewed and are documented in EMR.   Neurological Exam:  Mental status: Attentive, interactive  Speech: Fluent  Cranial nerves: 2-12 intact, other than some blurriness with acuity in the right eye  Motor: Moves all extremities without difficulty  Gait: Deferred for safety     DIAGNOSTIC STUDIES     Pertinent Radiology   Radiology Results: Personally reviewed image/s    CT/CTA  IMPRESSION:   HEAD CT:  1.  No CT evidence for  acute intracranial process.     2.  Brain atrophy and presumed chronic microvascular ischemic changes as above.     3.  Partial opacification right sphenoid sinus.     4.  Stable intracranial appearance from 03/01/2024.     HEAD CTA:   1.  Possible short segment high-grade stenosis at the right ophthalmic arterial origin at the level of the right orbital apex corresponds to MRA abnormality suggested on earlier examination today 04/27/2024. Right-sided visual loss is reported. The   remainder of the intraorbital portion of the right ophthalmic artery appears well-perfused. Left ophthalmic arterial appearance is satisfactory. There is mild to moderate stenosis of the cavernous ICA segments bilaterally with calcific plaque. 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 Vascular variation anatomy at the   level of the [place] with small - moderate bilateral posterior communicating arteries. No additional evidence for thromboembolic occlusion, aneurysm, dissection or hemodynamic high-grade stenosis. No high flow vascular malformation. Dural venous   sinus enhancement is normal. No additional pathologic enhancement intracranially.     NECK CTA:  1.  No high-grade stenosis of the major neck arteries or at the great vessel origin level. Calcific plaque at the distal CCA, bifurcation proximal ICA level noted bilaterally more prominent than right with mild to moderate right ICA stenosis 30-45%   proximally. No high-grade stenosis of the internal carotid arteries within the neck. No dissection. No evidence for hemodynamically significant great vessel origin stenosis. 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     2.  No additional pathologic enhancement noted intracranially, within the neck or in the  mediastinum.    MRI/MRA  IMPRESSION:  HEAD MRI:   1.  No acute intracranial process.  2.  Generalized brain atrophy and presumed microvascular ischemic changes as detailed above.  3.  Possible high left frontal scalp lesion or foreign body; suggest correlation with physical exam.     HEAD MRA:   1.  Possible occlusion within the proximal right ophthalmic artery.  2.  Otherwise patent intracranial vasculature.    Echocardiogram  Interpretation Summary     The left ventricle is normal in size. There is mild concentric left  ventricular hypertrophy.  Left ventricular systolic function is normal. The visual ejection fraction is  60-65%. No regional wall motion abnormalities noted.  Grade I or early diastolic dysfunction.     The right ventricle is normal in size and function.  The left atrium is mildly dilated. Right atrium not well visualized.  IVC diameter <2.1 cm collapsing >50% with sniff suggests a normal RA pressure  of 3 mmHg.  There is no comparison study available.    Pertinent Labs   Lab Results: personally reviewed.   Recent Results (from the past 24 hour(s))   Glucose by meter    Collection Time: 04/27/24  9:22 AM   Result Value Ref Range    GLUCOSE BY METER POCT 138 (H) 70 - 99 mg/dL   Comprehensive metabolic panel    Collection Time: 04/27/24 12:18 PM   Result Value Ref Range    Sodium 132 (L) 135 - 145 mmol/L    Potassium 4.2 3.4 - 5.3 mmol/L    Carbon Dioxide (CO2) 26 22 - 29 mmol/L    Anion Gap 11 7 - 15 mmol/L    Urea Nitrogen 11.9 8.0 - 23.0 mg/dL    Creatinine 0.55 0.51 - 0.95 mg/dL    GFR Estimate >90 >60 mL/min/1.73m2    Calcium 9.4 8.8 - 10.2 mg/dL    Chloride 95 (L) 98 - 107 mmol/L    Glucose 110 (H) 70 - 99 mg/dL    Alkaline Phosphatase 93 40 - 150 U/L    AST 26 0 - 45 U/L    ALT 32 0 - 50 U/L    Protein Total 6.7 6.4 - 8.3 g/dL    Albumin 3.9 3.5 - 5.2 g/dL    Bilirubin Total 0.8 <=1.2 mg/dL   INR    Collection Time: 04/27/24 12:18 PM   Result Value Ref Range    INR 0.94 0.85 - 1.15   PTT     Collection Time: 04/27/24 12:18 PM   Result Value Ref Range    aPTT 29 22 - 38 Seconds   CBC with platelets and differential    Collection Time: 04/27/24 12:18 PM   Result Value Ref Range    WBC Count 6.2 4.0 - 11.0 10e3/uL    RBC Count 3.91 3.80 - 5.20 10e6/uL    Hemoglobin 14.3 11.7 - 15.7 g/dL    Hematocrit 40.3 35.0 - 47.0 %     (H) 78 - 100 fL    MCH 36.6 (H) 26.5 - 33.0 pg    MCHC 35.5 31.5 - 36.5 g/dL    RDW 12.9 10.0 - 15.0 %    Platelet Count 170 150 - 450 10e3/uL    % Neutrophils 54 %    % Lymphocytes 29 %    % Monocytes 12 %    % Eosinophils 3 %    % Basophils 1 %    % Immature Granulocytes 1 %    NRBCs per 100 WBC 0 <1 /100    Absolute Neutrophils 3.4 1.6 - 8.3 10e3/uL    Absolute Lymphocytes 1.8 0.8 - 5.3 10e3/uL    Absolute Monocytes 0.7 0.0 - 1.3 10e3/uL    Absolute Eosinophils 0.2 0.0 - 0.7 10e3/uL    Absolute Basophils 0.1 0.0 - 0.2 10e3/uL    Absolute Immature Granulocytes 0.0 <=0.4 10e3/uL    Absolute NRBCs 0.0 10e3/uL   Troponin T, High Sensitivity    Collection Time: 04/27/24 12:18 PM   Result Value Ref Range    Troponin T, High Sensitivity 14 <=14 ng/L   Hemoglobin A1c    Collection Time: 04/27/24 12:18 PM   Result Value Ref Range    Hemoglobin A1C 5.4 <5.7 %   Lipid panel reflex to direct LDL: Non-fasting    Collection Time: 04/27/24 12:18 PM   Result Value Ref Range    Cholesterol 259 (H) <200 mg/dL    Triglycerides 118 <150 mg/dL    Direct Measure HDL 75 >=50 mg/dL    LDL Cholesterol Calculated 160 (H) <=100 mg/dL    Non HDL Cholesterol 184 (H) <130 mg/dL   Magnesium    Collection Time: 04/27/24 12:18 PM   Result Value Ref Range    Magnesium 2.1 1.7 - 2.3 mg/dL   Phosphorus    Collection Time: 04/27/24 12:18 PM   Result Value Ref Range    Phosphorus 3.3 2.5 - 4.5 mg/dL   ECG 12-LEAD WITH MUSE (LHE)    Collection Time: 04/27/24  3:28 PM   Result Value Ref Range    Systolic Blood Pressure  mmHg    Diastolic Blood Pressure  mmHg    Ventricular Rate 74 BPM    Atrial Rate 74 BPM    GA  Interval 156 ms    QRS Duration 106 ms     ms    QTc 461 ms    P Axis 10 degrees    R AXIS 37 degrees    T Axis 0 degrees    Interpretation ECG       Sinus rhythm with frequent Premature ventricular complexes  Possible Left atrial enlargement  Incomplete right bundle branch block  ST & T wave abnormality, consider anterolateral ischemia  Abnormal ECG  No previous ECGs available  Confirmed by SEE ED PROVIDER NOTE FOR, ECG INTERPRETATION ([id number]),  STELLA MARTINES ([id number]) on 4/28/2024 2:33:13 AM     Glucose by meter    Collection Time: 04/28/24  7:24 AM   Result Value Ref Range    GLUCOSE BY METER POCT 128 (H) 70 - 99 mg/dL       Total time spent for face to face visit, reviewing labs/imaging studies, counseling and coordination of care was: 1 Hour 30 Minutes. More than 50% of this time was spent on counseling and coordination of care.    Dragon software used in the dictation of this note.    Sravanthi Love MD  [facility] - [address], [address]  [place] [address]  [phone]

## 2024-04-28 NOTE — PHARMACY-ADMISSION MEDICATION HISTORY
Pharmacist Admission Medication History    Admission medication history is complete. The information provided in this note is only as accurate as the sources available at the time of the update.    Information Source(s): Patient, Family member, Clinic records, and CareEverywhere/SureScripts via in-person    Pertinent Information: none    Changes made to PTA medication list:  Added: cranberry-C  Deleted: None  Changed: Lumigan, APAP    Allergies reviewed with patient and updates made in EHR: yes    Medication History Completed By: Jose Grier [facility] 4/27/2024 7:13 PM    PTA Med List   Medication Sig Note Last Dose    acetaminophen (TYLENOL) 500 MG tablet Take 500 mg by mouth every 6 hours as needed for mild pain      bimatoprost (LUMIGAN) 0.01 % SOLN Place 1 drop into the right eye at bedtime  4/26/2024 at hs    CRANBERRY-VITAMIN C PO Take 1 capsule by mouth daily  4/27/2024 at am    diclofenac (VOLTAREN) 1 % topical gel Apply 4 g topically 4 times daily 4/27/2024: Hands   4/27/2024 at am    estradiol (ESTRACE) 0.1 MG/GM vaginal cream Place 1 g vaginally twice a week  Past Week at am    levothyroxine (SYNTHROID/LEVOTHROID) 137 MCG tablet Take 1 tablet (137 mcg) by mouth daily  4/27/2024 at 0600    losartan-hydrochlorothiazide (HYZAAR) 100-25 MG tablet Take 1 tablet by mouth daily  4/27/2024 at am    metoprolol tartrate (LOPRESSOR) 100 MG tablet Take 1 tablet (100 mg) by mouth 2 times daily  4/27/2024 at am    mirabegron (MYRBETRIQ) 25 MG 24 hr tablet Take 1 tablet (25 mg) by mouth daily  4/27/2024 at am    pravastatin (PRAVACHOL) 20 MG tablet Take 1 tablet (20 mg) by mouth daily  4/27/2024 at am

## 2024-04-28 NOTE — ED NOTES
[facility] ED Handoff Report    ED Chief Complaint: blurred vision R eye     ED Diagnosis:  (H54.61) Vision loss of right eye  Comment:  Plan:        PMH:    Past Medical History:   Diagnosis Date    Mumps         Code Status:  No Order     Falls Risk: No Band: Not applicable    Current Living Situation/Residence: lives with a significant other     Elimination Status: Continent: Yes     Activity Level: Independent    Patients Preferred Language:  English     Needed: No    Vital Signs:  BP (!) 170/88   Pulse 78   Temp 97.9  F (36.6  C) (Temporal)   Resp 16   Wt 101.6 kg (224 lb)   LMP  (LMP Unknown)   SpO2 97%   BMI 38.45 kg/m       Cardiac Rhythm: NA    Pain Score: 4/10    Is the Patient Confused:  No    Last Food or Drink: 04/27/24 at 1800    Focused Assessment:  74F awoke at 0400 with blurred vision R eye. Has hx cataracts  Denies lightheaded / dizziness. Neuros intact w/o deficit.Up independently in room.     Tests Performed: Done: Labs and Imaging    Treatments Provided:  Plavix started    Family Dynamics/Concerns: No    Family Updated On Visitor Policy: Yes    Plan of Care Communicated to Family: Yes    Who Was Updated about Plan of Care:  Gene [phone] Daughter Jada [phone]    Belongings Checklist Done and Signed by Patient: No    Belongings Sent with Patient: yes    Medications sent with patient: NA    Covid: asymptomatic , not tested    Additional Information:     RN: Mirna Dela Cruz RN   4/27/2024 7:23 PM

## 2024-04-28 NOTE — PROGRESS NOTES
Writer was not informed that pt was assigned or arrived to unit. Writer made aware of pt at 2100 and immediately checked on pt. Vitals stable, A&Ox4.

## 2024-04-28 NOTE — PROGRESS NOTES
Chart reviewed - low risk for readmission. Patient is a 73 yo female admitted following loss of vision in right eye. She is  and lives independently with her spouse in a house locally. She uses a cane at baseline. She was assessed to be independent by therapy services here. Per hospitalist, no discharge needs anticipated. Insurance is in place. No advance care planning documents. CM to follow should needs arise. [place] notice reviewed with patient and signed.    ANUP Manriquez

## 2024-04-28 NOTE — PROGRESS NOTES
Speech-Language Pathology  SLP orders received. Chart reviewed and discussed with care team.? Speech-Language Pathology Evaluations not indicated due to no reported or resolved deficits related to speech, language, or cognition. Patient tolerating current diet of regular and thin with no s/s aspiration per RN. No acute SLP needs identified.? Defer discharge recommendations to treatment team.? Will complete orders.

## 2024-04-28 NOTE — PROGRESS NOTES
04/28/24 0800   Appointment Info   Signing Clinician's Name / Credentials (PT) Orly Hess, PT, DPT   Living Environment   People in Home spouse   Current Living Arrangements house   Home Accessibility no concerns   Transportation Anticipated family or friend will provide   Self-Care   Usual Activity Tolerance moderate   Current Activity Tolerance moderate   Equipment Currently Used at Home cane, straight   Fall history within last six months yes   Number of times patient has fallen within last six months 1   General Information   Onset of Illness/Injury or Date of Surgery 04/27/24   Referring Physician Gondal, Saad J, MD   Patient/Family Therapy Goals Statement (PT) Return home   Pertinent History of Current Problem (include personal factors and/or comorbidities that impact the POC) Vision loss of right eye   Existing Precautions/Restrictions fall   General Observations Chronic LBP   Range of Motion (ROM)   Range of Motion ROM is WFL   Strength (Manual Muscle Testing)   Strength (Manual Muscle Testing) strength is WFL   Bed Mobility   Bed Mobility no deficits identified   Transfers   Transfers no deficits identified   Gait/Stairs (Locomotion)   Cisco Level (Gait) modified independence   Assistive Device (Gait) cane, straight   Distance in Feet (Gait) 200'   Pattern (Gait) 2-point   Deviations/Abnormal Patterns (Gait) gait speed decreased;stride length decreased   Balance   Balance no deficits were identified   Sensory Examination   Sensory Perception patient reports no sensory changes   Coordination   Coordination no deficits were identified   Clinical Impression   Criteria for Skilled Therapeutic Intervention Evaluation only   Clinical Presentation (PT Evaluation Complexity) stable   Clinical Presentation Rationale Patient presents as medically diagnosed.   Clinical Decision Making (Complexity) low complexity   Risk & Benefits of therapy have been explained evaluation/treatment results reviewed;care  plan/treatment goals reviewed;patient   PT Total Evaluation Time   PT Brenden, Low Complexity Minutes ([id number]) 15   PT Discharge Planning   PT Plan Evaluation only   PT Discharge Recommendation (DC Rec) home with assist   PT Rationale for DC Rec Patient is at her baseline for mobility. No new balance or strength deficits noted.   PT Brief overview of current status Mod I with straight cane.   PT Equipment Needed at Discharge cane, straight   Total Session Time   Total Session Time (sum of timed and untimed services) 15

## 2024-04-28 NOTE — PROGRESS NOTES
PRIMARY DIAGNOSIS: SYNCOPE/TIA  OUTPATIENT/OBSERVATION GOALS TO BE MET BEFORE DISCHARGE:  1. Orthostatic performed: N/A    2. Diagnostic testing complete & at baseline neurologic testing: No    3. Cleared by consultants (if involved): No    4. Interpretation of cardiac rhythm per telemetry tech:    5. Tolerating adequate PO diet and medications: Yes    6. Return to near baseline physical activity or neurologic status: Yes    Discharge Planner Nurse   Safe discharge environment identified: No  Barriers to discharge: Yes       Entered by: Nuzhat Hope RN 04/27/2024 10:55 PM   No acute events this shift. VSS. NIH 1.  Please review provider order for any additional goals.   Nurse to notify provider when observation goals have been met and patient is ready for discharge.

## 2024-04-28 NOTE — PLAN OF CARE
"PRIMARY DIAGNOSIS: \"GENERIC\" NURSING  OUTPATIENT/OBSERVATION GOALS TO BE MET BEFORE DISCHARGE:  ADLs back to baseline: Yes    Activity and level of assistance: Up with standby assistance.    Pain status: Improved-controlled with oral pain medications.    Return to near baseline physical activity: Yes     Discharge Planner Nurse   Safe discharge environment identified: Yes  Barriers to discharge: Yes       Entered by: Leida Smith RN 04/28/2024 2:29 PM     Please review provider order for any additional goals.   Nurse to notify provider when observation goals have been met and patient is ready for discharge.Goal Outcome Evaluation:       Leida Smith RN                  "

## 2024-04-28 NOTE — PROGRESS NOTES
PRIMARY DIAGNOSIS: Right eye vision loss. Stroke rule out.  OUTPATIENT/OBSERVATION GOALS TO BE MET BEFORE DISCHARGE:  1. Orthostatic performed: No    2. Diagnostic testing complete & at baseline neurologic testing: Yes    3. Cleared by consultants (if involved): No    4. Interpretation of cardiac rhythm per telemetry tech: NSR with PVC's.     5. Tolerating adequate PO diet and medications: Yes    6. Return to near baseline physical activity or neurologic status: Yes    Discharge Planner Nurse   Safe discharge environment identified: Yes  Barriers to discharge: Yes- further neuro work up.       Entered by: Maribell Duffy RN 04/28/2024 5:02 AM     Please review provider order for any additional goals.   Nurse to notify provider when observation goals have been met and patient is ready for discharge.

## 2024-04-28 NOTE — H&P
[facility]    History and Physical - Hospitalist Service       Date of Admission:  4/27/2024    Assessment & Plan    Assessment:  Mirna Grijalva is a 74 year old female admitted on 4/27/2024. She presented to the ER with complaint of blurry and double vision since this morning. Neurology recommended admission to hospital and stroke workup, also recommended aspirin with Plavix load and followed by daily Plavix, echocardiogram patient received aspirin and Plavix load in the ED along with oxycodone and is going to be admitted for stroke workup.    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    CTA head and neck showed possible short segment high-grade stenosis at the right ophthalmic arterial origin at the level of the right orbital apex corresponds to MRI abnormality suggested on earlier examination today, mild to moderate stenosis of the cavernous ICA segment bilaterally with calcific plaque, no high-grade stenosis of the major neck arteries or at the greater vessel origin levels, mild to moderate right ICA stenosis proximally.   MRI of the brain showed no acute intracranial process but generalized brain atrophy and presumed microvascular ischemic changes, high left frontal scalp lesion or foreign body.   Head MRA showed possible occlusion within the proximal right ophthalmic artery, otherwise patent intraventricular vasculature  Ordered echocardiogram to complete stroke workup  Order neurology for further evaluation  Patient stated that her vision has improved slightly  Stroke neurology recommended admission to hospital and stroke workup also recommended aspirin with Plavix load and followed by daily Plavix  Patient received aspirin and Plavix load in the ED along with oxycodone   Continue on daily aspirin Plavix and uptitrated  statins  Holding blood pressure medication for permissive control of blood pressure  Gentle IV hydration as patient appears to be dehydrated but monitor volume status closely  Aspiration and fall precautions  PT and OT evaluation along with speech evaluation  Monitor on cardiac telemetry monitoring    Back pain after returning from MRI  Ordered lumbar x-ray  Pain management as per protocol    Mild hyponatremia and hypochloremia  Gentle IV hydration  Monitor sodium and chloride levels    Hyperlipidemia and hypercholesterolemia  Patient on pravastatin which is discontinued and switched to Lipitor 80 mg  Patient may need lipid panel repeated in 4 to 6 weeks as outpatient  Total cholesterol of 259 with a LDL of 160  If cholesterol and LDL continue to be high may need to be placed on additional antihyperlipidemic medication    Mild hyperglycemia most likely reactive  Hemoglobin A1c 5.4  Monitor blood sugar level intermittently    History of hypertension  Holding metoprolol, losartan and hydrochlorothiazide for permissive control of blood pressure  Monitor vital signs as per protocol  Will continue with metoprolol and losartan in the morning but would advise to continue to hold hydrochlorothiazide as patient appears to be somewhat dehydrated possibly in the setting of hydrochlorothiazide use, would recommend alternative blood pressure medication.    History of glaucoma  Continue with Lumigan    History of hyperlipidemia  Continue levothyroxine    DVT PPx  Intermittent pneumatic compression    CODE STATUS  Full code as per discussion with the patient, as per patient she does want to be resuscitated but only if she can recover and she does not want to continue on a ventilator in a vegetative state.          Diet: Regular Diet Adult    DVT Prophylaxis: Pneumatic Compression Devices  Yun Catheter: Not present  Lines: None     Cardiac Monitoring: ACTIVE order. Indication: Stroke, acute (48 hours)  Code Status: Full Code   "  Mental status: Patient was alert awake and oriented x 3, patient was a good historian most of the history was obtained from the patient, some history was obtained from chart review and the rest of the history was obtained from discussion with the ER physician.  Clinically Significant Risk Factors Present on Admission                  # Hypertension: Noted on problem list      # Obesity: Estimated body mass index is 38.45 kg/m  as calculated from the following:    Height as of 4/5/24: 1.626 m (5' 4\").    Weight as of this encounter: 101.6 kg (224 lb).              Disposition Plan     Medically Ready for Discharge: Anticipated in 2-4 Days           Saad J. Gondal, MD  Hospitalist Service  [facility]  Securely message with [employer] (more info)  Text page via "[employer] Paging/Directory     ______________________________________________________________________    Chief Complaint   Blurry vision, double vision    History is obtained from the patient    History of Present Illness   Mirna Grijalva is a 74 year old female with a past medical history documented below presented to the ER with complaint of blurry and double vision since this morning.  As per patient she was all right till this morning at 4:30 AM she woke up and had blurry vision in the right eye, she also felt some double vision, she initially disregarded it as having a reaction to glaucoma and cataract but as she went to ophthalmologist who got concerned about stroke and advised her to come to the hospital, in the ER vitals were significant for elevated blood pressure, labs were significant for mild hyponatremia, hypochloremia, mild hyperglycemia, hyperlipidemia with a total cholesterol of 259 and LDL of 160, CTA head and neck showed possible short segment high-grade stenosis at the right ophthalmic arterial origin at the level of the right orbital apex corresponds to MRI abnormality suggested on earlier examination today, mild to moderate " stenosis of the cavernous ICA segment bilaterally with calcific plaque, no high-grade stenosis of the major neck arteries or at the greater vessel origin levels, mild to moderate right ICA stenosis proximally. MRI of the brain showed no acute intracranial process but generalized brain atrophy and presumed microvascular ischemic changes, high left frontal scalp lesion or foreign body. Head MRA showed possible occlusion within the proximal right ophthalmic artery, otherwise patent intraventricular vasculature, patient stated that her vision has improved slightly, later after returning from CAT scan was also complaining of some back pain, neurology recommended admission to hospital and stroke workup, also recommended aspirin with Plavix load and followed by daily Plavix, echocardiogram patient received aspirin and Plavix load in the ED along with oxycodone and is going to be admitted for stroke workup.      Past Medical History    Past Medical History:   Diagnosis Date    Mumps        Past Surgical History   Past Surgical History:   Procedure Laterality Date    CYSTOSCOPY         Prior to Admission Medications   Prior to Admission Medications   Prescriptions Last Dose Informant Patient Reported? Taking?   CRANBERRY-VITAMIN C PO 4/27/2024 at am  Yes Yes   Sig: Take 1 capsule by mouth daily   acetaminophen (TYLENOL) 500 MG tablet   Yes Yes   Sig: Take 500 mg by mouth every 6 hours as needed for mild pain   bimatoprost (LUMIGAN) 0.01 % SOLN 4/26/2024 at hs  Yes Yes   Sig: Place 1 drop into the right eye at bedtime   diclofenac (VOLTAREN) 1 % topical gel 4/27/2024 at am  No Yes   Sig: Apply 4 g topically 4 times daily   estradiol (ESTRACE) 0.1 MG/GM vaginal cream Past Week at am  No Yes   Sig: Place 1 g vaginally twice a week   levothyroxine (SYNTHROID/LEVOTHROID) 137 MCG tablet 4/27/2024 at 0600  No Yes   Sig: Take 1 tablet (137 mcg) by mouth daily   losartan-hydrochlorothiazide (HYZAAR) 100-25 MG tablet 4/27/2024 at am   No Yes   Sig: Take 1 tablet by mouth daily   metoprolol tartrate (LOPRESSOR) 100 MG tablet 4/27/2024 at am  No Yes   Sig: Take 1 tablet (100 mg) by mouth 2 times daily   mirabegron (MYRBETRIQ) 25 MG 24 hr tablet 4/27/2024 at am  No Yes   Sig: Take 1 tablet (25 mg) by mouth daily   pravastatin (PRAVACHOL) 20 MG tablet 4/27/2024 at am  No Yes   Sig: Take 1 tablet (20 mg) by mouth daily      Facility-Administered Medications: None        Review of Systems    The 10 point Review of Systems is negative other than noted in the HPI or here.  Blurry and double vision    Physical Exam   Vital Signs: Temp: 98.3  F (36.8  C) Temp src: Oral BP: (!) 167/82 Pulse: 80   Resp: 18 SpO2: 97 %      Weight: 224 lbs 0 oz    GENERAL: Patient was seen and examined at bedside with no acute distress  HENT:  Head is normocephalic, atraumatic.   EYES:  Eyes have anicteric sclerae without conjunctival injection   LUNGS: Bilateral air entry, clear to auscultation bilaterally  CARDIOVASCULAR:  Regular rate and rhythm with no murmurs, gallops or rubs.  ABDOMEN:  Normal bowel sounds. Soft; nontender   EXT: no edema    SKIN:  No acute rashes.   NEUROLOGIC:  Grossly nonfocal.      Medical Decision Making       80 MINUTES SPENT BY ME on the date of service doing chart review, history, exam, documentation & further activities per the note.      Data     I have personally reviewed the following data over the past 24 hrs:    6.2  \   14.3   / 170     132 (L) 95 (L) 11.9 /  110 (H)   4.2 26 0.55 \     ALT: 32 AST: 26 AP: 93 TBILI: 0.8   ALB: 3.9 TOT PROTEIN: 6.7 LIPASE: N/A     Trop: 14 BNP: N/A     TSH: N/A T4: N/A A1C: 5.4     INR:  0.94 PTT:  29   D-dimer:  N/A Fibrinogen:  N/A       Imaging results reviewed over the past 24 hrs:   Recent Results (from the past 24 hour(s))   MR Brain w/o Contrast    Addendum: 4/27/2024    COMMUNICATION ADDENDUM:  Findings were discussed with Dr. Peters on 4/27/2024 3:08 PM CDT.    END ADDENDUM      Narrative    EXAM:  MR BRAIN W/O CONTRAST, MRA BRAIN ([place]) W/O CONTRAST  LOCATION: [facility]  DATE: 4/27/2024    INDICATION: right vision loss  COMPARISON: Noncontrast head CT 03/01/2024.  TECHNIQUE:   1) Routine multiplanar multisequence head MRI without intravenous contrast.  2) 3D time-of-flight head MRA without intravenous contrast.    FINDINGS:  HEAD MRI:  INTRACRANIAL CONTENTS: No acute or subacute infarct. No mass, acute hemorrhage, or extra-axial fluid collections. Patchy nonspecific T2/FLAIR hyperintensities within the cerebral white matter most consistent with mild to moderate chronic microvascular   ischemic change. Moderate generalized cerebral atrophy. No hydrocephalus. Normal position of the cerebellar tonsils.     SELLA: No abnormality accounting for technique.    OSSEOUS STRUCTURES/SOFT TISSUES: Normal marrow signal. Possible high left frontal scalp lesion, measuring up to 18 mm in dimension.    ORBITS: No abnormality accounting for technique.     SINUSES/MASTOIDS: No paranasal sinus mucosal disease. No middle ear or mastoid effusion.     HEAD MRA:   ANTERIOR CIRCULATION: Possible occlusion within the proximal right ophthalmic artery (series 2, image 80). Otherwise patent anterior circulation. Anterior communicating artery fenestration. No aneurysm.    POSTERIOR CIRCULATION: Dominant right vertebral artery. Patent posterior circulation with robust posterior communicating arteries. Balanced vertebral arteries supply a normal basilar artery. No aneurysm.      Impression    IMPRESSION:  HEAD MRI:   1.  No acute intracranial process.  2.  Generalized brain atrophy and presumed microvascular ischemic changes as detailed above.  3.  Possible high left frontal scalp lesion or foreign body; suggest correlation with physical exam.    HEAD MRA:   1.  Possible occlusion within the proximal right ophthalmic artery.  2.  Otherwise patent intracranial vasculature.    Report finalized to expedite  patient care. Communication pending.   MRA Brain ([place]) wo Contrast    Addendum: 4/27/2024    COMMUNICATION ADDENDUM:  Findings were discussed with Dr. Peters on 4/27/2024 3:08 PM CDT.    END ADDENDUM      Narrative    EXAM: MR BRAIN W/O CONTRAST, MRA BRAIN ([place]) W/O CONTRAST  LOCATION: [facility]  DATE: 4/27/2024    INDICATION: right vision loss  COMPARISON: Noncontrast head CT 03/01/2024.  TECHNIQUE:   1) Routine multiplanar multisequence head MRI without intravenous contrast.  2) 3D time-of-flight head MRA without intravenous contrast.    FINDINGS:  HEAD MRI:  INTRACRANIAL CONTENTS: No acute or subacute infarct. No mass, acute hemorrhage, or extra-axial fluid collections. Patchy nonspecific T2/FLAIR hyperintensities within the cerebral white matter most consistent with mild to moderate chronic microvascular   ischemic change. Moderate generalized cerebral atrophy. No hydrocephalus. Normal position of the cerebellar tonsils.     SELLA: No abnormality accounting for technique.    OSSEOUS STRUCTURES/SOFT TISSUES: Normal marrow signal. Possible high left frontal scalp lesion, measuring up to 18 mm in dimension.    ORBITS: No abnormality accounting for technique.     SINUSES/MASTOIDS: No paranasal sinus mucosal disease. No middle ear or mastoid effusion.     HEAD MRA:   ANTERIOR CIRCULATION: Possible occlusion within the proximal right ophthalmic artery (series 2, image 80). Otherwise patent anterior circulation. Anterior communicating artery fenestration. No aneurysm.    POSTERIOR CIRCULATION: Dominant right vertebral artery. Patent posterior circulation with robust posterior communicating arteries. Balanced vertebral arteries supply a normal basilar artery. No aneurysm.      Impression    IMPRESSION:  HEAD MRI:   1.  No acute intracranial process.  2.  Generalized brain atrophy and presumed microvascular ischemic changes as detailed above.  3.  Possible high left  frontal scalp lesion or foreign body; suggest correlation with physical exam.    HEAD MRA:   1.  Possible occlusion within the proximal right ophthalmic artery.  2.  Otherwise patent intracranial vasculature.    Report finalized to expedite patient care. Communication pending.   CTA Head Neck with Contrast    Narrative    EXAM: CTA HEAD NECK W CONTRAST  LOCATION: [facility]  DATE: 4/27/2024    INDICATION: Right-sided visual loss.  COMPARISON: Brain MRI 04/27/2024 head CT 03/01/2024  CONTRAST: Isovue 370 75ml  TECHNIQUE: Head and neck CT angiogram with IV contrast. Noncontrast head CT followed by axial helical CT images of the head and neck vessels obtained during the arterial phase of intravenous contrast administration. Axial 2D reconstructed images and   multiplanar 3D MIP reconstructed images of the head and neck vessels were performed by the technologist. Dose reduction techniques were used. All stenosis measurements made according to NASCET criteria unless otherwise specified.    FINDINGS:   NONCONTRAST HEAD CT:   INTRACRANIAL CONTENTS: No intracranial hemorrhage, extraaxial collection, or mass effect.  No CT evidence of acute infarct. Mild to moderate presumed chronic small vessel ischemic changes. Moderate generalized volume loss. No hydrocephalus. Corpus   callosum is normal. Cerebellar tonsillar position is within normal limits. Sella and supersellar structures are normal. Vascular calcification.     VISUALIZED ORBITS/SINUSES/MASTOIDS: Prior left cataract surgery. Visualized portions of the orbits are otherwise unremarkable. No retrobulbar edema. There is partial opacification of the right sphenoid sinus. No middle ear or mastoid effusion.    BONES/SOFT TISSUES: No scalp hematoma. No skull fracture. Demineralization of skull base. Degenerative changes both TMJs. Nasopharynx is patent.    HEAD CTA: Prior MRA reviewed from 04/27/2024 suggested possible short segment occlusion proximal  right ophthalmic artery. This corresponds to apparent loss of intravascular enhancement which may represent a short segment occlusion or small area of   high-grade stenosis at the level of the right orbital apex series 11 images 397 - 401, with the area of interest suggested series 11 image 399. The remainder of the intraorbital portion of the right ophthalmic artery appears well perfused with no   additional areas of stenosis or distal thrombosis. Left ophthalmic artery appears satisfactory.    ANTERIOR CIRCULATION: No additional anterior circulation stenosis/occlusion, aneurysm, or high flow vascular malformation. Bilateral posterior communicating arteries larger on the left augment the posterior circulation. Calcific plaque and tortuosity of   the cavernous ICA segments bilaterally left more than right with mild to moderate stenosis 30-45%. Satisfactory branch arborization pattern and caliber of the ALEKSEY and MCA vascular territory bilaterally.    POSTERIOR CIRCULATION: No stenosis/occlusion, aneurysm, or high flow vascular malformation. Normal caliber basilar artery supplied from the right V4 segment. Augmentation of the posterior circulation with small - moderate bilateral posterior   communicating arteries larger on the left. Satisfactory and symmetric branch arborization pattern and caliber of the PCA vascular territory overall.     DURAL VENOUS SINUSES: Expected enhancement of the major dural venous sinuses.    NECK CTA:  RIGHT CAROTID: Coarse eccentric calcific plaque at the distal CCA, bifurcation and proximal right ICA. There is moderate stenosis of the distal right CCA level probably 40% in degree. No ulcerated plaque or endoluminal hypodense thrombosis noted. No   tandem right ICA stenosis or dissection. No high-grade right ICA stenosis. Mild origin stenosis right ECA under 15%.    LEFT CAROTID: Calcific and fibrofatty plaque at the distal CCA and bifurcation/proximal left ICA with very mild 5-10%  hemodynamic stenosis. No tandem stenosis, high-grade stenosis or dissection of left ICA within the neck.    VERTEBRAL ARTERIES: No focal stenosis or dissection. Dominant right and smaller left vertebral arteries. The right V4 segment is congenitally dominant and supplies the majority of the basilar artery with left diminutive V3 and V4 segment terminating   predominantly in a left PICA.    AORTIC ARCH: Classic aortic arch anatomy with no significant stenosis at the origin of the great vessels. Tortuosity at the great vessel origin level noted including medial deviation of the proximal common carotid arteries with retroesophageal course.    NONVASCULAR STRUCTURES: Unremarkable.      Impression    IMPRESSION:   HEAD CT:  1.  No CT evidence for acute intracranial process.    2.  Brain atrophy and presumed chronic microvascular ischemic changes as above.    3.  Partial opacification right sphenoid sinus.    4.  Stable intracranial appearance from 03/01/2024.    HEAD CTA:   1.  Possible short segment high-grade stenosis at the right ophthalmic arterial origin at the level of the right orbital apex corresponds to MRA abnormality suggested on earlier examination today 04/27/2024. Right-sided visual loss is reported. The   remainder of the intraorbital portion of the right ophthalmic artery appears well-perfused. Left ophthalmic arterial appearance is satisfactory. There is mild to moderate stenosis of the cavernous ICA segments bilaterally with calcific plaque. 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 Vascular variation anatomy at the   level of the [place] with small - moderate bilateral posterior communicating arteries. No additional evidence for thromboembolic occlusion, aneurysm, dissection or hemodynamic high-grade stenosis.  No high flow vascular malformation. Dural venous   sinus enhancement is normal. No additional pathologic enhancement intracranially.    NECK CTA:  1.  No high-grade stenosis of the major neck arteries or at the great vessel origin level. Calcific plaque at the distal CCA, bifurcation proximal ICA level noted bilaterally more prominent than right with mild to moderate right ICA stenosis 30-45%   proximally. No high-grade stenosis of the internal carotid arteries within the neck. No dissection. No evidence for hemodynamically significant great vessel origin stenosis. 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    2.  No additional pathologic enhancement noted intracranially, within the neck or in the mediastinum.

## 2024-04-28 NOTE — PROGRESS NOTES
PRIMARY DIAGNOSIS: right eye vision loss  OUTPATIENT/OBSERVATION GOALS TO BE MET BEFORE DISCHARGE:  1. Orthostatic performed: No    2. Diagnostic testing complete & at baseline neurologic testing: Yes    3. Cleared by consultants (if involved): No    4. Interpretation of cardiac rhythm per telemetry tech: NSR with PVC's    5. Tolerating adequate PO diet and medications: Yes    6. Return to near baseline physical activity or neurologic status: Yes    Discharge Planner Nurse   Safe discharge environment identified: No  Barriers to discharge: Yes- further neuro work up       Entered by: Maribell Duffy RN 04/28/2024 2:07 AM     Please review provider order for any additional goals.   Nurse to notify provider when observation goals have been met and patient is ready for discharge.

## 2024-04-28 NOTE — PHARMACY-CONSULT NOTE
Pharmacy Consult to evaluate for medication related stroke core measures    Mirna rGijalva, 74 year old female admitted for blurry and double vision  on 4/27/2024.    Thrombolytic was not given because of not documentated  at this time.    VTE Prophylaxis SCDs /PCDs placed on 4/28/24, as appropriate prior to end of hospital day 2.    Antithrombotic: aspirin and clopidogrel started on 4/27/24, as appropriate by end of hospital day 2. Continue antithrombotic therapy on discharge to meet quality measures, unless contraindicated.    Anticoagulation if history of A-fib/flutter: Patient does not have history of A-fib/flutter - anticoagulation not required for medication related stroke core measures.     LDL Cholesterol Calculated   Date Value Ref Range Status   04/27/2024 160 (H) <=100 mg/dL Final       Patient currently receiving Lipitor (atorvastatin) continue statin on discharge to meet quality measures, unless contraindicated.(Changed for home statin (pravastatin)    Recommendations: None at this time    Thank you for the consult.    Dereck Hughes RPH 4/28/2024 11:54 AM

## 2024-04-28 NOTE — PLAN OF CARE
Goal Outcome Evaluation:    Slept poorly- reports she does not sleep well at home either. Paged nocturnist for sleep aid and got order for prn melatonin at midnight- not very helpful. Also provided ear plugs which pt  states she uses at home. Pt tearful intermittently about situation. Alert, oriented. NIHSS scores of 1 for right eye blurryness and continued double vision. SBA with cane with ambulation. SCD's in place. Tele- NSR with occasional PVC's. Denies back pain except when out of bed and declines prn's.     Temp: 97  F (36.1  C) Temp src: Oral BP: 134/86 Pulse: 82   Resp: 20 SpO2: 96 % O2 Device: None (Room air)

## 2024-04-28 NOTE — PLAN OF CARE
"PRIMARY DIAGNOSIS: \"GENERIC\" NURSING  OUTPATIENT/OBSERVATION GOALS TO BE MET BEFORE DISCHARGE:  ADLs back to baseline: Yes    Activity and level of assistance: Up with standby assistance.    Pain status: Improved-controlled with oral pain medications.    Return to near baseline physical activity: Yes     Discharge Planner Nurse   Safe discharge environment identified: Yes  Barriers to discharge: No       Entered by: Leida Smith RN 04/28/2024 12:27 PM     Please review provider order for any additional goals.   Nurse to notify provider when observation goals have been met and patient is ready for discharge.Goal Outcome Evaluation:       NIHSS 0 and 0.  Pt has chronic low back pain, Oxycodone given with fair pain relief. Pt is ambulating in room and halls with stand by assist with cane.  Pt hoping to discharge to home later today.    Leida Smith RN                   "

## 2024-04-29 ENCOUNTER — PATIENT OUTREACH (OUTPATIENT)
Dept: CARE COORDINATION | Facility: CLINIC | Age: 74
End: 2024-04-29
Payer: COMMERCIAL

## 2024-04-29 LAB
ANA PAT SER IF-IMP: ABNORMAL
ANA SER QL IF: ABNORMAL
ANA TITR SER IF: ABNORMAL {TITER}

## 2024-04-29 NOTE — PROGRESS NOTES
"Clinic Care Coordination Contact  [facility]: Post-Discharge Note  SITUATION                                                      Admission:    Admission Date: 04/27/24   Reason for Admission: Astigmatism    Nuclear senile cataract    Primary open angle glaucoma of right eye    Class 2 obesity    Essential (primary) hypertension    Class 2 severe obesity due to excess calories with serious comorbidity in adult (H)    Vision loss of right eye    AFX (amaurosis fugax)  Discharge:   Discharge Date: 04/28/24  Discharge Diagnosis: Astigmatism    Nuclear senile cataract    Primary open angle glaucoma of right eye    Class 2 obesity    Essential (primary) hypertension    Class 2 severe obesity due to excess calories with serious comorbidity in adult (H)    Vision loss of right eye    AFX (amaurosis fugax)    BACKGROUND                                                      Per hospital discharge summary and inpatient provider notes:    Mirna Grijalva is a 74 year old female admitted on 4/27/2024. She presented to the ER with complaint of blurry and double vision.   ASSESSMENT           Discharge Assessment  How are you doing now that you are home?: \" I am Improving \"  How are your symptoms? (Red Flag symptoms escalate to triage hotline per guidelines): Improved  Do you feel your condition is stable enough to be safe at home until your provider visit?: Yes  Does the patient have their discharge instructions? : Yes  Does the patient have questions regarding their discharge instructions? : No  Were you started on any new medications or were there changes to any of your previous medications? : Yes  Does the patient have all of their medications?: Yes  Do you have questions regarding any of your medications? : No  Do you have all of your needed medical supplies or equipment (DME)?  (i.e. oxygen tank, CPAP, cane, etc.): Yes  Discharge follow-up appointment scheduled within 14 calendar days? : No  Discharge Follow Up " Appointment Date: 05/28/24  Discharge Follow Up Appointment Scheduled with?: Specialty Care Provider  Is patient agreeable to assistance with scheduling? : No    Post-op ([facility] CTA Only)  If the patient had a surgery or procedure, do they have any questions for a nurse?: No             PLAN                                                      Outpatient Plan:    Follow up with primary care provider, Ambar Hernandez, within 3-5days, to evaluate medication change, for hospital follow- up, and regarding new diagnosis. Follow up Blood pressures. Per neurology recommendations stopped Losartan-hydrochlorothiazide      Neurology arranging outpatient follow up          Activity     Your activity upon discharge: activity as tolerated        Future Appointments   Date Time Provider Department Center   5/28/2024  1:40 PM Aurelio Salinas MD [facility]   5/31/2024  2:30 PM Ryan Koo MD [facility]   6/25/2024  2:00 PM Ambar Hernandez DO MDF[facility]B [facility]   7/9/2024  1:30 PM Lise Mendoza, PA-C [facility]   7/22/2024 10:30 AM Arlene Crabtree MD [facility]         For any urgent concerns, please contact our 24 hour nurse triage line: [phone] ([phone])         Sara Kaur MA

## 2024-04-30 LAB
PROT C ACT/NOR PPP CHRO: 111 % (ref 70–170)
PROT S FREE AG ACT/NOR PPP IA: 115 % (ref 55–125)

## 2024-05-02 ENCOUNTER — MYC REFILL (OUTPATIENT)
Dept: FAMILY MEDICINE | Facility: CLINIC | Age: 74
End: 2024-05-02
Payer: COMMERCIAL

## 2024-05-02 DIAGNOSIS — G89.29 CHRONIC BILATERAL LOW BACK PAIN WITH BILATERAL SCIATICA: Primary | ICD-10-CM

## 2024-05-02 DIAGNOSIS — M54.41 CHRONIC BILATERAL LOW BACK PAIN WITH BILATERAL SCIATICA: Primary | ICD-10-CM

## 2024-05-02 DIAGNOSIS — M54.42 CHRONIC BILATERAL LOW BACK PAIN WITH BILATERAL SCIATICA: Primary | ICD-10-CM

## 2024-05-03 LAB
CARDIOLIPIN IGG SER IA-ACNC: <2 GPL-U/ML
CARDIOLIPIN IGG SER IA-ACNC: NEGATIVE
CARDIOLIPIN IGM SER IA-ACNC: 2.8 MPL-U/ML
CARDIOLIPIN IGM SER IA-ACNC: NEGATIVE

## 2024-05-03 RX ORDER — ACETAMINOPHEN 500 MG
500 TABLET ORAL EVERY 6 HOURS PRN
Qty: 60 TABLET | Refills: 1 | Status: SHIPPED | OUTPATIENT
Start: 2024-05-03 | End: 2024-09-14

## 2024-05-09 ENCOUNTER — TELEPHONE (OUTPATIENT)
Dept: FAMILY MEDICINE | Facility: CLINIC | Age: 74
End: 2024-05-09
Payer: COMMERCIAL

## 2024-05-09 DIAGNOSIS — G45.3 AFX (AMAUROSIS FUGAX): Primary | ICD-10-CM

## 2024-05-09 NOTE — TELEPHONE ENCOUNTER
Please call patient back     I will put in referral for neuro but she needs to be seen for hospital follow up as she has had some med changes. Ok to override to get her in to see me

## 2024-05-09 NOTE — TELEPHONE ENCOUNTER
Order/Referral Request    Who is requesting: Patient    Orders being requested: Neurology referral    Reason service is needed/diagnosis: T.I.A    When are orders needed by: ASAP    Has this been discussed with Provider: No    Does patient have a preference on a Group/Provider/Facility? Dr. Love - Neurology [facility]    Does patient have an appointment scheduled?: No    Where to send orders: Place orders within Epic    Could we send this information to you in [facility] or would you prefer to receive a phone call?:   Patient would prefer a phone call   Okay to leave a detailed message?: Yes at Cell number on file:    Telephone Information:   Mobile [phone]

## 2024-05-09 NOTE — TELEPHONE ENCOUNTER
Mirna asked to have the name of the neurology doctor she wants to see added to the message sent to Dr Hernandez about getting a referral to see a neurologist.    Dr Love is who Mirna liked to see.    Mirna would like a call once this has been addressed.    Lolis ZHOU RN [facility]

## 2024-05-09 NOTE — TELEPHONE ENCOUNTER
Spoke with patient on referral and scheduling hospital follow up with her. Patient is scheduled on Tuesday

## 2024-05-10 ENCOUNTER — TELEPHONE (OUTPATIENT)
Dept: FAMILY MEDICINE | Facility: CLINIC | Age: 74
End: 2024-05-10

## 2024-05-10 ENCOUNTER — TELEPHONE (OUTPATIENT)
Dept: NEUROLOGY | Facility: CLINIC | Age: 74
End: 2024-05-10

## 2024-05-10 NOTE — TELEPHONE ENCOUNTER
This encounter is being sent to inform the clinic that this patient has a referral from GUILHERME JACOBS for the diagnoses of AFX (amaurosis fugax)  and has requested that this patient be seen within (time not indicated)  and/or with (Not indicated).  Based on the availability of our provider(s), we are unable to accommodate this request.    Were all sites offered this patient?  Offer all available sites    Does scheduling algorithm request to schedule next available?  Patient appointment has not been scheduled.  Please review the referral request for accommodation and contact the patient.  If unable to accommodate, please resubmit a referral and indicate a preferred partner or affiliate location using Provider Finder or Scheduling Instructions field.     Patient was discharged on 04/28/2024. Writer sending TE as no time line is indicated for follow up and General or ANA [facility] also not indicated, writer needing more information for scheduling.

## 2024-05-10 NOTE — TELEPHONE ENCOUNTER
Left message for patient to call back. When she calls back please see if patient is able to come in on Monday May 13th instead. Ok to override provider lunch hour to see patient.

## 2024-05-12 ENCOUNTER — APPOINTMENT (OUTPATIENT)
Dept: CARDIOLOGY | Facility: HOSPITAL | Age: 74
DRG: 291 | End: 2024-05-12
Attending: EMERGENCY MEDICINE
Payer: COMMERCIAL

## 2024-05-12 ENCOUNTER — HOSPITAL ENCOUNTER (INPATIENT)
Facility: HOSPITAL | Age: 74
LOS: 2 days | Discharge: HOME HEALTH | DRG: 291 | End: 2024-05-15
Attending: EMERGENCY MEDICINE | Admitting: INTERNAL MEDICINE
Payer: COMMERCIAL

## 2024-05-12 ENCOUNTER — APPOINTMENT (OUTPATIENT)
Dept: CT IMAGING | Facility: HOSPITAL | Age: 74
DRG: 291 | End: 2024-05-12
Attending: EMERGENCY MEDICINE
Payer: COMMERCIAL

## 2024-05-12 DIAGNOSIS — R07.9 CHEST PAIN, UNSPECIFIED TYPE: ICD-10-CM

## 2024-05-12 DIAGNOSIS — I50.9 ACUTE ON CHRONIC CONGESTIVE HEART FAILURE, UNSPECIFIED HEART FAILURE TYPE (H): ICD-10-CM

## 2024-05-12 DIAGNOSIS — J96.01 ACUTE RESPIRATORY FAILURE WITH HYPOXIA (H): ICD-10-CM

## 2024-05-12 DIAGNOSIS — E87.70 HYPERVOLEMIA, UNSPECIFIED HYPERVOLEMIA TYPE: ICD-10-CM

## 2024-05-12 DIAGNOSIS — I16.1 HYPERTENSIVE EMERGENCY: ICD-10-CM

## 2024-05-12 DIAGNOSIS — J81.0 ACUTE PULMONARY EDEMA (H): Primary | ICD-10-CM

## 2024-05-12 LAB
ALBUMIN UR-MCNC: NEGATIVE MG/DL
ANION GAP SERPL CALCULATED.3IONS-SCNC: 10 MMOL/L (ref 7–15)
APPEARANCE UR: CLEAR
BACTERIA #/AREA URNS HPF: ABNORMAL /HPF
BASE EXCESS BLDV CALC-SCNC: -0.7 MMOL/L (ref -3–3)
BASE EXCESS BLDV CALC-SCNC: 0.9 MMOL/L (ref -3–3)
BASOPHILS # BLD AUTO: 0.1 10E3/UL (ref 0–0.2)
BASOPHILS NFR BLD AUTO: 1 %
BILIRUB UR QL STRIP: NEGATIVE
BUN SERPL-MCNC: 7.9 MG/DL (ref 8–23)
CALCIUM SERPL-MCNC: 8.8 MG/DL (ref 8.8–10.2)
CHLORIDE SERPL-SCNC: 105 MMOL/L (ref 98–107)
COLOR UR AUTO: COLORLESS
CREAT SERPL-MCNC: 0.61 MG/DL (ref 0.51–0.95)
DEPRECATED HCO3 PLAS-SCNC: 21 MMOL/L (ref 22–29)
EGFRCR SERPLBLD CKD-EPI 2021: >90 ML/MIN/1.73M2
EOSINOPHIL # BLD AUTO: 0.2 10E3/UL (ref 0–0.7)
EOSINOPHIL NFR BLD AUTO: 3 %
ERYTHROCYTE [DISTWIDTH] IN BLOOD BY AUTOMATED COUNT: 13.3 % (ref 10–15)
FLUAV RNA SPEC QL NAA+PROBE: NEGATIVE
FLUBV RNA RESP QL NAA+PROBE: NEGATIVE
GLUCOSE SERPL-MCNC: 123 MG/DL (ref 70–99)
GLUCOSE UR STRIP-MCNC: NEGATIVE MG/DL
HCO3 BLDV-SCNC: 24 MMOL/L (ref 21–28)
HCO3 BLDV-SCNC: 25 MMOL/L (ref 21–28)
HCT VFR BLD AUTO: 40.9 % (ref 35–47)
HGB BLD-MCNC: 14.1 G/DL (ref 11.7–15.7)
HGB UR QL STRIP: NEGATIVE
HOLD SPECIMEN: NORMAL
HYALINE CASTS: 1 /LPF
IMM GRANULOCYTES # BLD: 0 10E3/UL
IMM GRANULOCYTES NFR BLD: 1 %
KETONES UR STRIP-MCNC: NEGATIVE MG/DL
LACTATE SERPL-SCNC: 1.2 MMOL/L (ref 0.7–2)
LEUKOCYTE ESTERASE UR QL STRIP: ABNORMAL
LVEF ECHO: NORMAL
LYMPHOCYTES # BLD AUTO: 1.6 10E3/UL (ref 0.8–5.3)
LYMPHOCYTES NFR BLD AUTO: 21 %
MAGNESIUM SERPL-MCNC: 2 MG/DL (ref 1.7–2.3)
MCH RBC QN AUTO: 35.9 PG (ref 26.5–33)
MCHC RBC AUTO-ENTMCNC: 34.5 G/DL (ref 31.5–36.5)
MCV RBC AUTO: 104 FL (ref 78–100)
MONOCYTES # BLD AUTO: 0.8 10E3/UL (ref 0–1.3)
MONOCYTES NFR BLD AUTO: 10 %
NEUTROPHILS # BLD AUTO: 5 10E3/UL (ref 1.6–8.3)
NEUTROPHILS NFR BLD AUTO: 64 %
NITRATE UR QL: NEGATIVE
NRBC # BLD AUTO: 0 10E3/UL
NRBC BLD AUTO-RTO: 0 /100
NT-PROBNP SERPL-MCNC: 1678 PG/ML (ref 0–1800)
O2/TOTAL GAS SETTING VFR VENT: 28 %
O2/TOTAL GAS SETTING VFR VENT: 32 %
OXYHGB MFR BLDV: 66 % (ref 70–75)
OXYHGB MFR BLDV: 69 % (ref 70–75)
PCO2 BLDV: 37 MM HG (ref 40–50)
PCO2 BLDV: 39 MM HG (ref 40–50)
PH BLDV: 7.42 [PH] (ref 7.32–7.43)
PH BLDV: 7.43 [PH] (ref 7.32–7.43)
PH UR STRIP: 6.5 [PH] (ref 5–7)
PLATELET # BLD AUTO: 212 10E3/UL (ref 150–450)
PO2 BLDV: 37 MM HG (ref 25–47)
PO2 BLDV: 37 MM HG (ref 25–47)
POTASSIUM SERPL-SCNC: 4.4 MMOL/L (ref 3.4–5.3)
PROCALCITONIN SERPL IA-MCNC: 0.04 NG/ML
RADIOLOGIST FLAGS: NORMAL
RBC # BLD AUTO: 3.93 10E6/UL (ref 3.8–5.2)
RBC URINE: 1 /HPF
RSV RNA SPEC NAA+PROBE: NEGATIVE
SAO2 % BLDV: 67.7 % (ref 70–75)
SAO2 % BLDV: 69.8 % (ref 70–75)
SARS-COV-2 RNA RESP QL NAA+PROBE: NEGATIVE
SODIUM SERPL-SCNC: 136 MMOL/L (ref 135–145)
SP GR UR STRIP: 1.01 (ref 1–1.03)
SQUAMOUS EPITHELIAL: 4 /HPF
TROPONIN T SERPL HS-MCNC: 14 NG/L
TSH SERPL DL<=0.005 MIU/L-ACNC: 2.46 UIU/ML (ref 0.3–4.2)
UROBILINOGEN UR STRIP-MCNC: <2 MG/DL
WBC # BLD AUTO: 7.7 10E3/UL (ref 4–11)
WBC URINE: 12 /HPF

## 2024-05-12 PROCEDURE — 250N000013 HC RX MED GY IP 250 OP 250 PS 637: Performed by: EMERGENCY MEDICINE

## 2024-05-12 PROCEDURE — 87637 SARSCOV2&INF A&B&RSV AMP PRB: CPT | Performed by: EMERGENCY MEDICINE

## 2024-05-12 PROCEDURE — 36415 COLL VENOUS BLD VENIPUNCTURE: CPT | Performed by: STUDENT IN AN ORGANIZED HEALTH CARE EDUCATION/TRAINING PROGRAM

## 2024-05-12 PROCEDURE — 87040 BLOOD CULTURE FOR BACTERIA: CPT | Performed by: EMERGENCY MEDICINE

## 2024-05-12 PROCEDURE — 93308 TTE F-UP OR LMTD: CPT | Mod: 26 | Performed by: INTERNAL MEDICINE

## 2024-05-12 PROCEDURE — 36415 COLL VENOUS BLD VENIPUNCTURE: CPT | Performed by: EMERGENCY MEDICINE

## 2024-05-12 PROCEDURE — 250N000013 HC RX MED GY IP 250 OP 250 PS 637

## 2024-05-12 PROCEDURE — 82805 BLOOD GASES W/O2 SATURATION: CPT | Performed by: EMERGENCY MEDICINE

## 2024-05-12 PROCEDURE — 99223 1ST HOSP IP/OBS HIGH 75: CPT | Mod: FS | Performed by: INTERNAL MEDICINE

## 2024-05-12 PROCEDURE — 83605 ASSAY OF LACTIC ACID: CPT | Performed by: EMERGENCY MEDICINE

## 2024-05-12 PROCEDURE — 81001 URINALYSIS AUTO W/SCOPE: CPT | Performed by: EMERGENCY MEDICINE

## 2024-05-12 PROCEDURE — 250N000011 HC RX IP 250 OP 636: Performed by: EMERGENCY MEDICINE

## 2024-05-12 PROCEDURE — 99285 EMERGENCY DEPT VISIT HI MDM: CPT | Mod: 25

## 2024-05-12 PROCEDURE — 83880 ASSAY OF NATRIURETIC PEPTIDE: CPT | Performed by: EMERGENCY MEDICINE

## 2024-05-12 PROCEDURE — 85025 COMPLETE CBC W/AUTO DIFF WBC: CPT | Performed by: EMERGENCY MEDICINE

## 2024-05-12 PROCEDURE — 250N000009 HC RX 250: Performed by: EMERGENCY MEDICINE

## 2024-05-12 PROCEDURE — G0378 HOSPITAL OBSERVATION PER HR: HCPCS

## 2024-05-12 PROCEDURE — 87086 URINE CULTURE/COLONY COUNT: CPT | Performed by: EMERGENCY MEDICINE

## 2024-05-12 PROCEDURE — 82805 BLOOD GASES W/O2 SATURATION: CPT | Performed by: STUDENT IN AN ORGANIZED HEALTH CARE EDUCATION/TRAINING PROGRAM

## 2024-05-12 PROCEDURE — 71275 CT ANGIOGRAPHY CHEST: CPT

## 2024-05-12 PROCEDURE — 93321 DOPPLER ECHO F-UP/LMTD STD: CPT | Mod: 26 | Performed by: INTERNAL MEDICINE

## 2024-05-12 PROCEDURE — HZ2ZZZZ DETOXIFICATION SERVICES FOR SUBSTANCE ABUSE TREATMENT: ICD-10-PCS

## 2024-05-12 PROCEDURE — 96375 TX/PRO/DX INJ NEW DRUG ADDON: CPT

## 2024-05-12 PROCEDURE — 83735 ASSAY OF MAGNESIUM: CPT | Performed by: EMERGENCY MEDICINE

## 2024-05-12 PROCEDURE — 96374 THER/PROPH/DIAG INJ IV PUSH: CPT | Mod: 59

## 2024-05-12 PROCEDURE — 80048 BASIC METABOLIC PNL TOTAL CA: CPT | Performed by: EMERGENCY MEDICINE

## 2024-05-12 PROCEDURE — 84443 ASSAY THYROID STIM HORMONE: CPT | Performed by: EMERGENCY MEDICINE

## 2024-05-12 PROCEDURE — 84145 PROCALCITONIN (PCT): CPT

## 2024-05-12 PROCEDURE — 84484 ASSAY OF TROPONIN QUANT: CPT | Performed by: EMERGENCY MEDICINE

## 2024-05-12 PROCEDURE — 93325 DOPPLER ECHO COLOR FLOW MAPG: CPT | Mod: 26 | Performed by: INTERNAL MEDICINE

## 2024-05-12 PROCEDURE — 93005 ELECTROCARDIOGRAM TRACING: CPT | Performed by: STUDENT IN AN ORGANIZED HEALTH CARE EDUCATION/TRAINING PROGRAM

## 2024-05-12 PROCEDURE — 99207 PR APP CREDIT; MD BILLING SHARED VISIT: CPT | Mod: FS

## 2024-05-12 PROCEDURE — 93325 DOPPLER ECHO COLOR FLOW MAPG: CPT

## 2024-05-12 RX ORDER — FUROSEMIDE 10 MG/ML
40 INJECTION INTRAMUSCULAR; INTRAVENOUS ONCE
Status: COMPLETED | OUTPATIENT
Start: 2024-05-12 | End: 2024-05-12

## 2024-05-12 RX ORDER — METOPROLOL TARTRATE 25 MG/1
100 TABLET, FILM COATED ORAL 2 TIMES DAILY
Status: DISCONTINUED | OUTPATIENT
Start: 2024-05-12 | End: 2024-05-13

## 2024-05-12 RX ORDER — ATORVASTATIN CALCIUM 40 MG/1
80 TABLET, FILM COATED ORAL EVERY EVENING
Status: DISCONTINUED | OUTPATIENT
Start: 2024-05-12 | End: 2024-05-15 | Stop reason: HOSPADM

## 2024-05-12 RX ORDER — LIDOCAINE 40 MG/G
CREAM TOPICAL
Status: DISCONTINUED | OUTPATIENT
Start: 2024-05-12 | End: 2024-05-15 | Stop reason: HOSPADM

## 2024-05-12 RX ORDER — METOPROLOL TARTRATE 1 MG/ML
5 INJECTION, SOLUTION INTRAVENOUS ONCE
Status: COMPLETED | OUTPATIENT
Start: 2024-05-12 | End: 2024-05-12

## 2024-05-12 RX ORDER — METOPROLOL TARTRATE 25 MG/1
50 TABLET, FILM COATED ORAL ONCE
Status: COMPLETED | OUTPATIENT
Start: 2024-05-12 | End: 2024-05-12

## 2024-05-12 RX ORDER — HYDROCHLOROTHIAZIDE 25 MG/1
25 TABLET ORAL ONCE
Status: COMPLETED | OUTPATIENT
Start: 2024-05-12 | End: 2024-05-12

## 2024-05-12 RX ORDER — LOSARTAN POTASSIUM 50 MG/1
100 TABLET ORAL DAILY
Status: DISCONTINUED | OUTPATIENT
Start: 2024-05-13 | End: 2024-05-15 | Stop reason: HOSPADM

## 2024-05-12 RX ORDER — POLYETHYLENE GLYCOL 3350 17 G/17G
17 POWDER, FOR SOLUTION ORAL DAILY PRN
Status: DISCONTINUED | OUTPATIENT
Start: 2024-05-12 | End: 2024-05-15 | Stop reason: HOSPADM

## 2024-05-12 RX ORDER — IOPAMIDOL 755 MG/ML
75 INJECTION, SOLUTION INTRAVASCULAR ONCE
Status: COMPLETED | OUTPATIENT
Start: 2024-05-12 | End: 2024-05-12

## 2024-05-12 RX ORDER — CLOPIDOGREL BISULFATE 75 MG/1
75 TABLET ORAL DAILY
Status: DISCONTINUED | OUTPATIENT
Start: 2024-05-13 | End: 2024-05-15 | Stop reason: HOSPADM

## 2024-05-12 RX ORDER — ACETAMINOPHEN 325 MG/1
650 TABLET ORAL EVERY 4 HOURS PRN
Status: DISCONTINUED | OUTPATIENT
Start: 2024-05-12 | End: 2024-05-15 | Stop reason: HOSPADM

## 2024-05-12 RX ORDER — ASPIRIN 81 MG/1
81 TABLET ORAL DAILY
Status: DISCONTINUED | OUTPATIENT
Start: 2024-05-13 | End: 2024-05-15 | Stop reason: HOSPADM

## 2024-05-12 RX ORDER — MIRABEGRON 25 MG/1
25 TABLET, FILM COATED, EXTENDED RELEASE ORAL DAILY
Status: DISCONTINUED | OUTPATIENT
Start: 2024-05-13 | End: 2024-05-15 | Stop reason: HOSPADM

## 2024-05-12 RX ORDER — CHOLECALCIFEROL (VITAMIN D3) 125 MCG
2 CAPSULE ORAL DAILY
COMMUNITY

## 2024-05-12 RX ORDER — ZINC OXIDE 20 %
OINTMENT (GRAM) TOPICAL
Status: DISCONTINUED | OUTPATIENT
Start: 2024-05-12 | End: 2024-05-15 | Stop reason: HOSPADM

## 2024-05-12 RX ADMIN — METOPROLOL TARTRATE 50 MG: 25 TABLET, FILM COATED ORAL at 12:36

## 2024-05-12 RX ADMIN — BIMATOPROST 1 DROP: 0.1 SOLUTION/ DROPS OPHTHALMIC at 22:17

## 2024-05-12 RX ADMIN — ACETAMINOPHEN 650 MG: 325 TABLET ORAL at 18:20

## 2024-05-12 RX ADMIN — METOPROLOL TARTRATE 100 MG: 25 TABLET, FILM COATED ORAL at 19:53

## 2024-05-12 RX ADMIN — ATORVASTATIN CALCIUM 80 MG: 40 TABLET, FILM COATED ORAL at 19:53

## 2024-05-12 RX ADMIN — HYDROCHLOROTHIAZIDE 25 MG: 25 TABLET ORAL at 12:36

## 2024-05-12 RX ADMIN — IOPAMIDOL 75 ML: 755 INJECTION, SOLUTION INTRAVENOUS at 11:52

## 2024-05-12 RX ADMIN — FUROSEMIDE 40 MG: 10 INJECTION, SOLUTION INTRAMUSCULAR; INTRAVENOUS at 10:42

## 2024-05-12 RX ADMIN — METOPROLOL TARTRATE 5 MG: 5 INJECTION INTRAVENOUS at 10:52

## 2024-05-12 RX ADMIN — Medication 1 MG: at 22:14

## 2024-05-12 RX ADMIN — NITROGLYCERIN 15 MG: 20 OINTMENT TOPICAL at 11:06

## 2024-05-12 RX ADMIN — ZINC OXIDE: 200 OINTMENT TOPICAL at 12:10

## 2024-05-12 ASSESSMENT — ACTIVITIES OF DAILY LIVING (ADL)
ADLS_ACUITY_SCORE: 38
ADLS_ACUITY_SCORE: 38
DEPENDENT_IADLS:: INDEPENDENT
ADLS_ACUITY_SCORE: 30
ADLS_ACUITY_SCORE: 29
ADLS_ACUITY_SCORE: 30
ADLS_ACUITY_SCORE: 30
ADLS_ACUITY_SCORE: 38
ADLS_ACUITY_SCORE: 23
ADLS_ACUITY_SCORE: 38
ADLS_ACUITY_SCORE: 38
ADLS_ACUITY_SCORE: 27

## 2024-05-12 NOTE — ED TRIAGE NOTES
The patient presents feeling SOB and rates c/p 1/10. Pt states she started getting SOB on 5/11/2024 and has not felt any better since. Pt had a recent TIA, pt is hypoxic on room air, placed on 2 LNC sats now 92%.      Triage Assessment (Adult)       Row Name 05/12/24 1022          Triage Assessment    Airway WDL WDL        Respiratory WDL    Respiratory WDL X;cough     Cough Frequency infrequent        Skin Circulation/Temperature WDL    Skin Circulation/Temperature WDL X        Cardiac WDL    Cardiac WDL WDL        Peripheral/Neurovascular WDL    Peripheral Neurovascular WDL WDL        Cognitive/Neuro/Behavioral WDL    Cognitive/Neuro/Behavioral WDL WDL

## 2024-05-12 NOTE — H&P
[facility]    History and Physical - Hospitalist Service       Date of Admission:  5/12/2024    Assessment & Plan    Mirna Grijalva is a 74 year old female with PMHx of essential HTN, HLD, hypothyroidism, recurrent UTI, recent amaurosis fugax (4/27/24) who was admitted on 5/12/2024 for acute hypoxic respiratory failure. Presented to the ED with 2 days of progressive SOB, dry cough. Hypoxic in ED requiring 3 L NC O2. CT chest showing moderate pulmonary edema (BNP within normal limits).     Acute Hypoxic Respiratory Failure   ? Mild CHF Exacerbation   Presented to ED w 2 days of progressive SOB, dry cough. Suspect secondary to recent discontinuation of hydrochlorothiazide   -- Hypoxic in ED, on 3 L O2 (no home O2 use)  -- CT Chest: mod pulm edema, small b/l pleural effusions, no PE.   -- BNP within normal limits. Bilat LE edema but only trace pitting    -- Resp panel negative  -- Procal within normal limits   -- Echo 5/12: EF 60-65%, small pericardial effusion, no echocardiographic indications of tamponade   - Recent echo (4/27): EF at that time EF 60-65%, no WMA, grade 1 diastolic dysfunction. No pericardial effusion seen at that time  -- Consider pericarditis but without c/o chest pain less likely etiology  -- Received 40 mg IV Lasix and 25 mg hydrochlorothiazide in ED    -- Will hold on further diuresis for now, clinical monitoring  -- Cont PRN O2, continuous pulseox. Wean as tolerated   -- PT/OT. If unable to wean, may need home O2 eval     Recent Amaurosis Fugax (4/27)   -- Neuro recs from that admission; Continue aspirin and Plavix per stroke neurology (at least 3 months)  -- Was discontinued off losartan-hydrochlorothiazide 100-25 mg per neuro recs at discharge for permissive HTN   -- Cont PTA statin, ASA, Plavix   -- Continue with outpatient follow-up     Hypertensive Urgency   Essential HTN   in the ED  -- Given Lasix, hydrochlorothiazide, metoprolol IV 5 mg and metoprolol tab 50  mg in ED   -- Cont PTA metoprolol 100 mg BID with hold parameters  -- Would resume losartan in the AM to avoid significant BP drop   -- Will hold on further hydrochlorothiazide for now, patient may be better candidate for Lasix   -- Patient states she threw out her rx of her losartan-hydrochlorothiazide after most recent discharge, ** will need new rx ** after discharge from this stay if continuing  -- Close monitoring    Atrophic Vaginitis   Urinary Urgency   H/o Frequent / Recurrent UTI  -- UA in ED slight leuk esterase, 12 WBC but patient states chronic dysuria, urgency have been improved as of late   -- Will hold on further abx for now, await UC   -- follows w urology; recently started on Myretriq and estrogen cream  -- Cont PTA myretriq cream     Hypothyroidism   History of parathyroid nodules s/p parathyroidectomy  History of thyroid cancer  -- TSH within normal limits   -- Cont PTA levothyroxine   - Per PCP note, patient does have an upcoming appointment with endo in Julyto take over her care    ? Heavy Alcohol Use   -- recent PCP note (4/23) reports patient drinking 4 glasses of wine daily   -- CIWA, close monitoring while here     HLD - PTA statin    Hyperglycemia  Likely reactive  -- A1C 5.4 (4/2024)  -- Trend on BMP     Incidential Findings on Imaging - Nodular areas of consolidation within both lungs measuring up to 19 mm. Recommend follow-up chest CT in one month to evaluate for resolution    -- Patient does have remote 20 yr PPD smoking history    -- Discussed findings with patient and family, and recommended follow-up      Observation Goals: List all goals to be met before discharge home:  , - Return to baseline functional status, - Dyspnea improved and oxygen saturations greater than 88% on room air or prior home oxygen levels, - ECHO or other diagnostic tests and consults completed and resulted, - Vital signs normal or at patient baseline, - Safe disposition plan has been identified, - Nurse to  "notify provider when observation goals have been met and patient is ready for discharge  Diet: 2 Gram Sodium Diet  DVT Prophylaxis: Pneumatic Compression Devices  Yun Catheter: Not present  Lines: None     Cardiac Monitoring: ACTIVE order. Indication: Acute decompensated heart failure (48 hours)  Code Status: Full Code - discussed and confirmed with patient and family at bedside     Clinically Significant Risk Factors Present on Admission                # Drug Induced Platelet Defect: home medication list includes an antiplatelet medication   # Hypertension: Noted on problem list  # Acute heart failure with preserved ejection fraction: heart failure noted on problem list, last echo with EF >50%, and receiving IV diuretics     # Obesity: Estimated body mass index is 38.11 kg/m  as calculated from the following:    Height as of this encounter: 1.626 m (5' 4\").    Weight as of this encounter: 100.7 kg (222 lb).              Disposition Plan     Medically Ready for Discharge: Anticipated Tomorrow       The patient's care was discussed with the Attending Physician, Dr. Mary Kwok who independently met with and assessed the patient and is in agreement with the assessment and plan     Nia Tyler PA-C  Hospitalist Service  [facility]  Securely message with [employer] (more info)  Text page via [employer] Paging/Directory     ______________________________________________________________________    Chief Complaint   Shortness of Breath   Dry cough    History is obtained from the patient, patient's daughters     History of Present Illness   Mirna Grijalva is a 74 year old female with PMHx of HTN, HLD, hypothyroidism, recurrent UTI, recent amaurosis fugax (4/27/24) who presented to the ED with 2 days of progressive SOB, dry cough. Patient states that over the past 2 days she has been \"huffing and puffing\" and had progressive SOB. Associated dry cough but denies any chest pain, chest congestion/pressure. " "Does not have O2 at home, no home inhalers. Does have remote history of 20 year PPD smoking history. States that she thinks she was on a \"water pill\" years ago but does not report any known history of CHF. Has some lower extremity edema but patient and daughter states this is somewhat chronic, do not notice a significant acute change.     Recently admitted 4/27-4/28/2024 for amaurosis fugax. Was discontinued off her losartan-hydrochlorothiazide at discharge to allow for permissive HTN (states threw out her meds). Has not had PCP/neurology follow-up since, but is scheduled for neuro follow-up 5/14. No acute or progressive vision changes. States aside from the above has \"felt fine\" since discharge.     Hx of chronic dysuria and UTI symptoms but states she feels like these actually been improved in the past days-weeks.     Past Medical History    Past Medical History:   Diagnosis Date    Mumps        Past Surgical History   Past Surgical History:   Procedure Laterality Date    CYSTOSCOPY         Prior to Admission Medications   Prior to Admission Medications   Prescriptions Last Dose Informant Patient Reported? Taking?   Cranberry-Vitamin C (CRANBERRY CONCENTRATE/VITAMINC) 72096-981 MG CAPS 5/12/2024 at am  Yes Yes   Sig: Take 2 capsules by mouth daily   acetaminophen (TYLENOL) 500 MG tablet Unknown at prn  No Yes   Sig: Take 1 tablet (500 mg) by mouth every 6 hours as needed for mild pain   aspirin 81 MG EC tablet 5/12/2024 at am  No Yes   Sig: Take 1 tablet (81 mg) by mouth daily   atorvastatin (LIPITOR) 80 MG tablet 5/11/2024 at pm  No Yes   Sig: Take 1 tablet (80 mg) by mouth every evening   bimatoprost (LUMIGAN) 0.01 % SOLN 5/11/2024 at pm  Yes Yes   Sig: Place 1 drop into the right eye at bedtime   clopidogrel (PLAVIX) 75 MG tablet 5/12/2024 at am  No Yes   Sig: Take 1 tablet (75 mg) by mouth daily   diclofenac (VOLTAREN) 1 % topical gel 5/11/2024  No Yes   Sig: Apply 4 g topically 4 times daily   levothyroxine " (SYNTHROID/LEVOTHROID) 137 MCG tablet 5/12/2024 at am  No Yes   Sig: Take 1 tablet (137 mcg) by mouth daily   metoprolol tartrate (LOPRESSOR) 100 MG tablet 5/12/2024 at am  No Yes   Sig: Take 1 tablet (100 mg) by mouth 2 times daily   mirabegron (MYRBETRIQ) 25 MG 24 hr tablet 5/12/2024 at am  No Yes   Sig: Take 1 tablet (25 mg) by mouth daily      Facility-Administered Medications: None           Physical Exam   Vital Signs: Temp: 98.7  F (37.1  C) Temp src: Oral BP: (!) 150/83 Pulse: 79   Resp: 26 SpO2: 96 % O2 Device: Nasal cannula Oxygen Delivery: 3 LPM  Weight: 222 lbs 0 oz    Constitutional: awake, alert, no apparent distress, and appears stated age  Eyes: Lids and lashes normal, extra ocular muscles intact, sclera clear, conjunctiva normal  Respiratory: No increased work of breathing, no accessory muscle use, clear to auscultation bilaterally, no crackles or wheezing  Cardiovascular: Regular rate and rhythm, normal S1 and S2, no S3 or S4, and no murmur noted  GI: Soft, non-distended, normal bowel sounds  Skin: Normal skin color, texture, turgor. No rashes and no jaundice  Musculoskeletal: Bilateral lower extremity edema, only trace pitting. 2+ DP pulses  Neurologic: Awake, alert.   Neuropsychiatric: Appropriate mood, affect and eye contact. Cooperative.    Medical Decision Making             Data     I have personally reviewed the following data over the past 24 hrs:    7.7  \   14.1   / 212     136 105 7.9 (L) /  123 (H)   4.4 21 (L) 0.61 \     Trop: 14 BNP: 1,678     TSH: 2.46 T4: N/A A1C: N/A     Procal: 0.04 CRP: N/A Lactic Acid: 1.2         Imaging results reviewed over the past 24 hrs:   Recent Results (from the past 24 hour(s))   CT Chest Pulmonary Embolism w Contrast   Result Value    Radiologist flags Lung nodule    Narrative    EXAM: CT CHEST PULMONARY EMBOLISM W CONTRAST  LOCATION: [facility]  DATE: 5/12/2024    INDICATION: r o PE, recent admission, hypoxia  COMPARISON:  None.  TECHNIQUE: CT chest pulmonary angiogram during arterial phase injection of IV contrast. Multiplanar reformats and MIP reconstructions were performed. Dose reduction techniques were used.   CONTRAST: ISOVUE 370 75ML    FINDINGS:  ANGIOGRAM CHEST: Normal caliber pulmonary trunk. No pulmonary embolism to the subsegmental level.    LUNGS AND PLEURA: Central airways are patent. Small bilateral pleural effusions and moderate pulmonary edema. No pneumothorax. Scattered pulmonary nodules. For example:  - Right lower lobe, 19 mm (series 6 image 186)  - Right lower lobe, 8 mm (series 6 image 150)  - Left upper lobe, 11 mm (series 6 image 61).    MEDIASTINUM/AXILLAE: Right axillary lymph node dissection. Enlarged mediastinal and hilar lymph nodes (series 4 image 90, 145). Unremarkable thyroid. Normal caliber thoracic aorta. Cardiomegaly. No pericardial effusion.    CORONARY ARTERY CALCIFICATION: Mild.     UPPER ABDOMEN: Gastric band, with borderline phi angle of 57 degrees. 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    MUSCULOSKELETAL: Multilevel degenerative disc disease of the thoracolumbar spine. Chronic appearing compression fracture of T11. No definite acute fractures. Bilateral breast prostheses.      Impression    IMPRESSION:  1.  No pulmonary embolism to the subsegmental level.  2.  Small bilateral pleural effusions and moderate pulmonary edema.  3.  Nodular areas of consolidation within both lungs measuring up to 19 mm. Recommend follow-up chest CT in one month to evaluate for resolution.    [Recommend Follow Up: Lung nodule]    This report will be copied to the [facility] to ensure a provider acknowledges the finding.   Echocardiogram Limited   Result Value    LVEF  60-65%    [facility]    [id number]  [id number]  [id number]  [id number]^[address]ESMOND^[place] [address]     Name: LINDA YI  MRN: [id number]  : 1950  Study Date: 2024 02:03  PM  Age: 74 yrs  Gender: Female  Patient Location: [facility]  Reason For Study: CHF  Ordering Physician: AURORA LOGAN  Performed By: ARTHUR     BSA: 2.0 m2  Height: 64 in  Weight: 222 lb  BP: 156/88 mmHg  ______________________________________________________________________________  Procedure  Limited Portable Echo Adult.  ______________________________________________________________________________  Interpretation Summary     Left ventricular size, wall motion and function are normal. The ejection  fraction is 60-65%.  Normal right ventricle size and systolic function.  IVC diameter <2.1 cm collapsing >50% with sniff suggests a normal RA pressure  of 3 mmHg.  Small pericardial effusion  There are no echocardiographic indications of cardiac tamponade.  ______________________________________________________________________________  Left Ventricle  Left ventricular size, wall motion and function are normal. The ejection  fraction is 60-65%. No regional wall motion abnormalities noted.     Right Ventricle  Normal right ventricle size and systolic function.     Atria  Normal left atrial size. Right atrial size is normal. There is no color  Doppler evidence of an atrial shunt.     Mitral Valve  There is mild mitral annular calcification.     Aortic Valve  No hemodynamically significant valvular aortic stenosis.     Pulmonic Valve  The pulmonic valve is not well seen, but is grossly normal.     Vessels  IVC diameter <2.1 cm collapsing >50% with sniff suggests a normal RA pressure  of 3 mmHg.     Pericardium  Small pericardial effusion. There are no echocardiographic indications of  cardiac tamponade.     ______________________________________________________________________________  MMode/2D Measurements & Calculations  IVSd: 0.87 cm  LVIDd: 5.1 cm  LVIDs: 3.8 cm  LVPWd: 1.0 cm  FS: 24.9 %  LV mass(C)d: 174.4 grams  LV mass(C)dI: 85.3 grams/m2  EF Biplane: 53.2 %     RWT: 0.40     Time Measurements  MM HR: 70.0 BPM      ______________________________________________________________________________  Report approved by: Breanna Orozco 05/12/2024 03:52 PM

## 2024-05-12 NOTE — ED PROVIDER NOTES
EMERGENCY DEPARTMENT ENCOUNTER      NAME: Mirna Grijalva  AGE: 74 year old female  YOB: 1950  MRN: [id number]  EVALUATION DATE & TIME: 5/12/2024 10:13 AM    PCP: Ambar Hernandez    ED PROVIDER: Allie Perez M.D.      Chief Complaint   Patient presents with    Chest Pain    Shortness of Breath         FINAL IMPRESSION:  1. Acute on chronic congestive heart failure, unspecified heart failure type (H)    2. Acute pulmonary edema (H)    3. Acute respiratory failure with hypoxia (H)    4. Hypertensive emergency    5. Hypervolemia, unspecified hypervolemia type    6. Chest pain, unspecified type          ED COURSE & MEDICAL DECISION MAKING:    ED Course as of 05/12/24 1403   Sun May 12, 2024   1030 Pt with bilateral LE pitting edema worse than usual for her with bibasilar rales, hypoxia sat 85% RA and 93% 3L NC, with recent decrease in BP medications with known HTN and prior use of diuretic therapy when she moved to [place] from [place], denies h/o diagnosis of CHF or MI, 1/10 chest pressure with moderate risk for ACS with EKG without MI apparent, pending NTG paste with O2 and metoprolol which she uses at home along with reinstated HCTZ and supplemental O2, not in failure requiring bipap at this time, with likely HTN emergency with pulmonary edema. Will CTA r/o PE, BNP pending with troponin screening for ACS and will admit for echo, diuresis, BP medication titration. Viral PCR and cultures/lactic acid underway with CBC and chemistry in case HCAP after admission 2 weeks ago, patient and  ok with plan.   1343 I spoke with echo tech  who notes limited would be ideal as valve function was recently assessed in late April 2 weeks ago, echo order changed. CT chest with moderate pulmonary edema, fitting with clinical picture, no PE. Hospitalist paged for admission   1403 Patient endorsed to hospitalist PA to cardiac tele obs       Pertinent Labs & Imaging studies reviewed. (See chart for details)      At the  conclusion of the encounter I discussed the results of all of the tests and the disposition. The questions were answered. The patient or family acknowledged understanding and was agreeable with the care plan.     Critical Care time was 45 minutes for this patient excluding procedures.      MEDICATIONS GIVEN IN THE EMERGENCY:  Medications   nitroGLYcerin (NITRO-BID) 2 % ointment 15 mg (15 mg Transdermal $Patch/Med Applied 5/12/24 1106)   zinc oxide (DESITIN) 20 % ointment ( Topical $Given 5/12/24 1210)   metoprolol (LOPRESSOR) injection 5 mg (5 mg Intravenous $Given 5/12/24 1052)   metoprolol tartrate (LOPRESSOR) tablet 50 mg (50 mg Oral $Given 5/12/24 1236)   furosemide (LASIX) injection 40 mg (40 mg Intravenous $Given 5/12/24 1042)   hydrochlorothiazide (HYDRODIURIL) tablet 25 mg (25 mg Oral $Given 5/12/24 1236)   iopamidol (ISOVUE-370) solution 75 mL (75 mLs Intravenous $Given 5/12/24 1152)       NEW PRESCRIPTIONS STARTED AT TODAY'S ER VISIT  New Prescriptions    No medications on file          =================================================================    HPI      Mirna Grijalva is a 74 year old female with PMHx of obesity and HTN, recent amaeurosis fugax admission now on plavix who presents to the ED today via private vehicle with shortness of breath.     Patient has been experiencing constant shortness of breath that onset gradually since last evening. She also endorses diffuse anterior chest pressure, rated 1/10 and increased bilateral leg swelling from baseline. Notes a slight cough. Patient denies sick contacts. She has not taken a home COVID test.     She was remotely on a water pill, but it was discontinued when she moved from [place] to [place] about 1 year ago. She is not sure why it was discontinued. Patient reports her antihypertensive therapy was reduced during a recent hospital admission. She does not check her blood pressure at home, and she is not sure what her pressures have been like over  the past week. She also does not routinely weight herself, so she is not sure her weight gain, but patient reports she feels she weighs more than usual.     The patient denies a history of blood clots, heart failure, or heart stents. She does not follow with a cardiologist. No recent antibiotic use or diet changes. No other complaints at this time.       Per my chart review, patient was admitted to [facility] 4/27-4/28/2024 (2 weeks ago) with right eye vision loss, diagnosed with amaurosis fugax, started on aspirin and plavix with planned treatment for 3 months, statin therapy, and plan to stop estradiol therapy.        REVIEW OF SYSTEMS   All other systems reviewed and are negative except as noted above in HPI.    PAST MEDICAL HISTORY:  Past Medical History:   Diagnosis Date    Mumps        PAST SURGICAL HISTORY:  Past Surgical History:   Procedure Laterality Date    CYSTOSCOPY         CURRENT MEDICATIONS:    acetaminophen (TYLENOL) 500 MG tablet  aspirin 81 MG EC tablet  atorvastatin (LIPITOR) 80 MG tablet  bimatoprost (LUMIGAN) 0.01 % SOLN  clopidogrel (PLAVIX) 75 MG tablet  Cranberry-Vitamin C (CRANBERRY CONCENTRATE/VITAMINC) 25815-760 MG CAPS  diclofenac (VOLTAREN) 1 % topical gel  levothyroxine (SYNTHROID/LEVOTHROID) 137 MCG tablet  metoprolol tartrate (LOPRESSOR) 100 MG tablet  mirabegron (MYRBETRIQ) 25 MG 24 hr tablet        ALLERGIES:  Allergies   Allergen Reactions    Atenolol      Rash and Insomnia    Ciprofloxacin      Severe tendon pain and swelling    Eliquis [Apixaban]      Wanting to faint    Nitrofurantoin      'Bad, itchy, feverish, rash on arms and legs'    Sulfa Antibiotics      Rash on feet    Penicillins Rash       FAMILY HISTORY:  History reviewed. No pertinent family history.    SOCIAL HISTORY:   Social History     Socioeconomic History    Marital status:      Spouse name: None    Number of children: None    Years of education: None    Highest education level: None   Tobacco Use     Smoking status: Former     Types: Cigarettes     Passive exposure: Never    Smokeless tobacco: Never   Vaping Use    Vaping status: Never Used   Substance and Sexual Activity    Alcohol use: Yes    Drug use: Never     Social Determinants of Health     Financial Resource Strain: Low Risk  (1/23/2024)    Financial Resource Strain     Within the past 12 months, have you or your family members you live with been unable to get utilities (heat, electricity) when it was really needed?: No   Food Insecurity: Low Risk  (1/23/2024)    Food Insecurity     Within the past 12 months, did you worry that your food would run out before you got money to buy more?: No     Within the past 12 months, did the food you bought just not last and you didn t have money to get more?: No   Transportation Needs: Low Risk  (1/23/2024)    Transportation Needs     Within the past 12 months, has lack of transportation kept you from medical appointments, getting your medicines, non-medical meetings or appointments, work, or from getting things that you need?: No   Interpersonal Safety: Low Risk  (4/23/2024)    Interpersonal Safety     Do you feel physically and emotionally safe where you currently live?: Yes     Within the past 12 months, have you been hit, slapped, kicked or otherwise physically hurt by someone?: No     Within the past 12 months, have you been humiliated or emotionally abused in other ways by your partner or ex-partner?: No   Housing Stability: Low Risk  (1/23/2024)    Housing Stability     Do you have housing? : Yes     Are you worried about losing your housing?: No       VITALS:  Patient Vitals for the past 24 hrs:   BP Temp Temp src Pulse Resp SpO2 Height Weight   05/12/24 1330 (!) 158/87 -- -- 74 (!) 35 94 % -- --   05/12/24 1315 -- -- -- 73 25 95 % -- --   05/12/24 1300 (!) 161/87 -- -- 74 28 95 % -- --   05/12/24 1236 (!) 171/89 -- -- 91 (!) 34 94 % -- --   05/12/24 1215 (!) 177/92 -- -- 80 (!) 36 93 % -- --   05/12/24  "1130 (!) 188/92 -- -- 80 (!) 33 93 % -- --   05/12/24 1115 (!) 199/115 -- -- 84 15 92 % -- --   05/12/24 1102 (!) 194/93 -- -- 79 30 93 % -- --   05/12/24 1100 -- -- -- 80 (!) 34 93 % -- --   05/12/24 1047 (!) 184/94 -- -- 81 30 93 % -- --   05/12/24 1045 (!) 166/85 -- -- 79 (!) 33 94 % -- --   05/12/24 1031 (!) 181/84 -- -- 79 (!) 36 93 % -- --   05/12/24 1024 (!) 193/107 -- -- 83 (!) 38 93 % -- --   05/12/24 1022 -- 98.8  F (37.1  C) Oral -- -- -- -- --   05/12/24 1020 (!) 209/100 -- -- 83 (!) 41 93 % 1.626 m (5' 4\") 100.7 kg (222 lb)   05/12/24 1018 -- -- -- 81 28 -- -- --   05/12/24 1017 -- -- -- -- -- (!) 86 % -- --       PHYSICAL EXAM    GENERAL: Awake, alert.  In no acute distress.   HEENT: Normocephalic, atraumatic.  Pupils equal, round and reactive.  Conjunctiva normal.  EOMI.  NECK: No stridor or apparent deformity.  PULMONARY: Symmetrical breath sounds. Coarse bibasilar rales, tachypnea, speaking in half sentences.   CARDIO: Regular rate and rhythm.  No significant murmur, rub or gallop.  Radial pulses strong and symmetrical.  ABDOMINAL: Abdomen soft, non-distended and non-tender to palpation.  No CVAT, no palpable hepatosplenomegaly.  EXTREMITIES: 2+ bilateral lower extremity pitting edema.   NEURO: Alert and oriented to person, place and time.  Cranial nerves grossly intact.  No focal motor deficit.  PSYCH: Normal mood and affect  SKIN: No rashes      LAB:  All pertinent labs reviewed and interpreted.  Results for orders placed or performed during the hospital encounter of 05/12/24   CT Chest Pulmonary Embolism w Contrast   Result Value Ref Range    Radiologist flags Lung nodule     Impression    IMPRESSION:  1.  No pulmonary embolism to the subsegmental level.  2.  Small bilateral pleural effusions and moderate pulmonary edema.  3.  Nodular areas of consolidation within both lungs measuring up to 19 mm. Recommend follow-up chest CT in one month to evaluate for resolution.    [Recommend Follow Up: Lung " nodule]    This report will be copied to the [facility] to ensure a provider acknowledges the finding.   Extra Blue Top Tube   Result Value Ref Range    Hold Specimen JIC    Extra Red Top Tube   Result Value Ref Range    Hold Specimen JIC    Extra Green Top (Lithium Heparin) Tube   Result Value Ref Range    Hold Specimen JIC    Extra Purple Top Tube   Result Value Ref Range    Hold Specimen JIC    Blood gas venous   Result Value Ref Range    pH Venous 7.42 7.32 - 7.43    pCO2 Venous 37 (L) 40 - 50 mm Hg    pO2 Venous 37 25 - 47 mm Hg    Bicarbonate Venous 24 21 - 28 mmol/L    Base Excess/Deficit Venous -0.7 -3.0 - 3.0 mmol/L    FIO2 28     Oxyhemoglobin Venous 66 (L) 70 - 75 %    O2 Sat, Venous 67.7 (L) 70.0 - 75.0 %   Basic metabolic panel   Result Value Ref Range    Sodium 136 135 - 145 mmol/L    Potassium 4.4 3.4 - 5.3 mmol/L    Chloride 105 98 - 107 mmol/L    Carbon Dioxide (CO2) 21 (L) 22 - 29 mmol/L    Anion Gap 10 7 - 15 mmol/L    Urea Nitrogen 7.9 (L) 8.0 - 23.0 mg/dL    Creatinine 0.61 0.51 - 0.95 mg/dL    GFR Estimate >90 >60 mL/min/1.73m2    Calcium 8.8 8.8 - 10.2 mg/dL    Glucose 123 (H) 70 - 99 mg/dL   Result Value Ref Range    Troponin T, High Sensitivity 14 <=14 ng/L   Result Value Ref Range    Magnesium 2.0 1.7 - 2.3 mg/dL   TSH with free T4 reflex   Result Value Ref Range    TSH 2.46 0.30 - 4.20 uIU/mL   Nt probnp inpatient (BNP)   Result Value Ref Range    N terminal Pro BNP Inpatient 1,678 0 - 1,800 pg/mL   UA with Microscopic reflex to Culture    Specimen: Urine, Clean Catch   Result Value Ref Range    Color Urine Colorless Colorless, Straw, Light Yellow, Yellow    Appearance Urine Clear Clear    Glucose Urine Negative Negative mg/dL    Bilirubin Urine Negative Negative    Ketones Urine Negative Negative mg/dL    Specific Gravity Urine 1.009 1.001 - 1.030    Blood Urine Negative Negative    pH Urine 6.5 5.0 - 7.0    Protein Albumin Urine Negative Negative mg/dL    Urobilinogen Urine  <2.0 <2.0 mg/dL    Nitrite Urine Negative Negative    Leukocyte Esterase Urine 75 Caitlin/uL (A) Negative    Bacteria Urine Few (A) None Seen /HPF    RBC Urine 1 <=2 /HPF    WBC Urine 12 (H) <=5 /HPF    Squamous Epithelials Urine 4 (H) <=1 /HPF    Hyaline Casts Urine 1 <=2 /LPF   Symptomatic Influenza A/B, RSV, & SARS-CoV2 PCR (COVID-19) Nose    Specimen: Nose; Swab   Result Value Ref Range    Influenza A PCR Negative Negative    Influenza B PCR Negative Negative    RSV PCR Negative Negative    SARS CoV2 PCR Negative Negative   Lactic acid whole blood   Result Value Ref Range    Lactic Acid 1.2 0.7 - 2.0 mmol/L   Blood gas venous   Result Value Ref Range    pH Venous 7.43 7.32 - 7.43    pCO2 Venous 39 (L) 40 - 50 mm Hg    pO2 Venous 37 25 - 47 mm Hg    Bicarbonate Venous 25 21 - 28 mmol/L    Base Excess/Deficit Venous 0.9 -3.0 - 3.0 mmol/L    FIO2 32     Oxyhemoglobin Venous 69 (L) 70 - 75 %    O2 Sat, Venous 69.8 (L) 70.0 - 75.0 %   CBC with platelets and differential   Result Value Ref Range    WBC Count 7.7 4.0 - 11.0 10e3/uL    RBC Count 3.93 3.80 - 5.20 10e6/uL    Hemoglobin 14.1 11.7 - 15.7 g/dL    Hematocrit 40.9 35.0 - 47.0 %     (H) 78 - 100 fL    MCH 35.9 (H) 26.5 - 33.0 pg    MCHC 34.5 31.5 - 36.5 g/dL    RDW 13.3 10.0 - 15.0 %    Platelet Count 212 150 - 450 10e3/uL    % Neutrophils 64 %    % Lymphocytes 21 %    % Monocytes 10 %    % Eosinophils 3 %    % Basophils 1 %    % Immature Granulocytes 1 %    NRBCs per 100 WBC 0 <1 /100    Absolute Neutrophils 5.0 1.6 - 8.3 10e3/uL    Absolute Lymphocytes 1.6 0.8 - 5.3 10e3/uL    Absolute Monocytes 0.8 0.0 - 1.3 10e3/uL    Absolute Eosinophils 0.2 0.0 - 0.7 10e3/uL    Absolute Basophils 0.1 0.0 - 0.2 10e3/uL    Absolute Immature Granulocytes 0.0 <=0.4 10e3/uL    Absolute NRBCs 0.0 10e3/uL       RADIOLOGY:  Reviewed all pertinent imaging. Please see official radiology report.  CT Chest Pulmonary Embolism w Contrast   Final Result   IMPRESSION:   1.  No  pulmonary embolism to the subsegmental level.   2.  Small bilateral pleural effusions and moderate pulmonary edema.   3.  Nodular areas of consolidation within both lungs measuring up to 19 mm. Recommend follow-up chest CT in one month to evaluate for resolution.      [Recommend Follow Up: Lung nodule]      This report will be copied to the [facility] to ensure a provider acknowledges the finding.      Echocardiogram Limited    (Results Pending)         EKG:    Reviewed and interpreted as: Performed at 1020. 79 BPM. Sinus rhythm with premature supraventricular complexes. Similar compared to previous from 4/27/24.      I have independently reviewed and interpreted the EKG(s) documented above.        I, Christine James, am serving as a scribe to document services personally performed by Dr. Allie Perez based on my observation and the provider's statements to me. I, Allie Perez MD attest that Christine James is acting in a scribe capacity, has observed my performance of the services and has documented them in accordance with my direction.       Allie Perez MD  05/12/24 0430

## 2024-05-12 NOTE — PHARMACY-ADMISSION MEDICATION HISTORY
Pharmacist Admission Medication History    Admission medication history is complete. The information provided in this note is only as accurate as the sources available at the time of the update.    Information Source(s): Patient, Family member, and CareEverywhere/SureScripts via in-person    Pertinent Information: N/A    Changes made to PTA medication list:  Added: None  Deleted: None  Changed: None    Allergies reviewed with patient and updates made in EHR: yes    Medication History Completed By: MIKAYLA MORENO [facility] 5/12/2024 11:36 AM    PTA Med List   Medication Sig Last Dose    acetaminophen (TYLENOL) 500 MG tablet Take 1 tablet (500 mg) by mouth every 6 hours as needed for mild pain Unknown at prn    aspirin 81 MG EC tablet Take 1 tablet (81 mg) by mouth daily 5/12/2024 at am    atorvastatin (LIPITOR) 80 MG tablet Take 1 tablet (80 mg) by mouth every evening 5/11/2024 at pm    bimatoprost (LUMIGAN) 0.01 % SOLN Place 1 drop into the right eye at bedtime 5/11/2024 at pm    clopidogrel (PLAVIX) 75 MG tablet Take 1 tablet (75 mg) by mouth daily 5/12/2024 at am    Cranberry-Vitamin C (CRANBERRY CONCENTRATE/VITAMINC) 53396-688 MG CAPS Take 2 capsules by mouth daily 5/12/2024 at am    diclofenac (VOLTAREN) 1 % topical gel Apply 4 g topically 4 times daily 5/11/2024    levothyroxine (SYNTHROID/LEVOTHROID) 137 MCG tablet Take 1 tablet (137 mcg) by mouth daily 5/12/2024 at am    metoprolol tartrate (LOPRESSOR) 100 MG tablet Take 1 tablet (100 mg) by mouth 2 times daily 5/12/2024 at am    mirabegron (MYRBETRIQ) 25 MG 24 hr tablet Take 1 tablet (25 mg) by mouth daily 5/12/2024 at am

## 2024-05-12 NOTE — CONSULTS
"Care Management Initial Consult    General Information  Assessment completed with: Patient, Spouse or significant other, Children, Mirna and  Gene and daughters Jada and Theresea  Type of CM/SW Visit: Initial Assessment    Primary Care Provider verified and updated as needed: Yes   Readmission within the last 30 days: previous discharge plan unsuccessful (4/27/24 - 4/28/24)   Return Category: Exacerbation of disease  Reason for Consult: discharge planning  Advance Care Planning: Advance Care Planning Reviewed: no concerns identified; (\"My  has the document right now. We will have to bring it in next time\".)        Communication Assessment  Patient's communication style: spoken language (English or Bilingual)             Cognitive  Cognitive/Neuro/Behavioral: WDL                      Living Environment:   People in home: spouse  Mirna and  Serg  Current living Arrangements: house      Able to return to prior arrangements: yes       Family/Social Support:  Care provided by: self  Provides care for: no one  Marital Status:   , Children  Serg       Description of Support System: Involved, Supportive    Support Assessment: Adequate family and caregiver support, Adequate social supports, Patient communicates needs well met    Current Resources:   Patient receiving home care services: No     Community Resources: None  Equipment currently used at home: walker, rolling, cane, straight, wheelchair, manual (\"I mostly use my FWW, but I also have a cane an wheelchair I can use if needed\".)  Supplies currently used at home: Other (\"glasses and I am getting a left ear hearing aid, but it isn't ready yet.\")    Employment/Financial:  Employment Status: retired     Employment/ Comments: \"no  history\"  Financial Concerns:     Referral to Financial Worker: No       Does the patient's insurance plan have a 3 day qualifying hospital stay waiver?  No      Functional Status:  Prior to " "admission patient needed assistance:   Dependent ADLs:: Ambulation-walker, Ambulation-cane, Independent  Dependent IADLs:: Independent       Mental Health Status:          Chemical Dependency Status:                Values/Beliefs:  Spiritual, Cultural Beliefs, Moravian Practices, Values that affect care:                 Additional Information:  Mirna lives in a house with her  Serg. She is independent with ADLs and IADLs. Both she and her  drive.    \"I mostly use my FWW, but I also have a cane an wheelchair I can use if needed\".    Unknown discharge needs at this time.    Family to transport at discharge.    [place] was given and discussed. All questions were answered.    CM to follow for medical progression of care, discharge recommendations, and final discharge plan.    Geraldine Silvestre RN    "

## 2024-05-12 NOTE — PLAN OF CARE
"PRIMARY DIAGNOSIS: \"GENERIC\" NURSING  OUTPATIENT/OBSERVATION GOALS TO BE MET BEFORE DISCHARGE:  ADLs back to baseline: No    Activity and level of assistance: Up with maximum assistance. Consider SW and/or PT evaluation.     Pain status: Pain free.    Return to near baseline physical activity: No     Discharge Planner Nurse   Safe discharge environment identified: Yes  Barriers to discharge: No       Entered by: Andreina Tubbs RN 05/12/2024 4:21 PM     Please review provider order for any additional goals.   Nurse to notify provider when observation goals have been met and patient is ready for discharge.Goal Outcome Evaluation:                        "

## 2024-05-13 ENCOUNTER — APPOINTMENT (OUTPATIENT)
Dept: OCCUPATIONAL THERAPY | Facility: HOSPITAL | Age: 74
DRG: 291 | End: 2024-05-13
Payer: COMMERCIAL

## 2024-05-13 ENCOUNTER — APPOINTMENT (OUTPATIENT)
Dept: PHYSICAL THERAPY | Facility: HOSPITAL | Age: 74
DRG: 291 | End: 2024-05-13
Payer: COMMERCIAL

## 2024-05-13 ENCOUNTER — TELEPHONE (OUTPATIENT)
Dept: CARDIOLOGY | Facility: CLINIC | Age: 74
End: 2024-05-13
Payer: COMMERCIAL

## 2024-05-13 DIAGNOSIS — I50.9 ACUTE DECOMPENSATED HEART FAILURE (H): Primary | ICD-10-CM

## 2024-05-13 LAB
ANION GAP SERPL CALCULATED.3IONS-SCNC: 12 MMOL/L (ref 7–15)
BACTERIA UR CULT: NORMAL
BUN SERPL-MCNC: 11.4 MG/DL (ref 8–23)
CALCIUM SERPL-MCNC: 8.9 MG/DL (ref 8.8–10.2)
CHLORIDE SERPL-SCNC: 101 MMOL/L (ref 98–107)
CREAT SERPL-MCNC: 0.73 MG/DL (ref 0.51–0.95)
DEPRECATED HCO3 PLAS-SCNC: 25 MMOL/L (ref 22–29)
EGFRCR SERPLBLD CKD-EPI 2021: 85 ML/MIN/1.73M2
ERYTHROCYTE [DISTWIDTH] IN BLOOD BY AUTOMATED COUNT: 13.7 % (ref 10–15)
GLUCOSE SERPL-MCNC: 130 MG/DL (ref 70–99)
HCT VFR BLD AUTO: 41.8 % (ref 35–47)
HGB BLD-MCNC: 14.2 G/DL (ref 11.7–15.7)
MCH RBC QN AUTO: 36.2 PG (ref 26.5–33)
MCHC RBC AUTO-ENTMCNC: 34 G/DL (ref 31.5–36.5)
MCV RBC AUTO: 107 FL (ref 78–100)
PLATELET # BLD AUTO: 194 10E3/UL (ref 150–450)
POTASSIUM SERPL-SCNC: 3.7 MMOL/L (ref 3.4–5.3)
RBC # BLD AUTO: 3.92 10E6/UL (ref 3.8–5.2)
SODIUM SERPL-SCNC: 138 MMOL/L (ref 135–145)
WBC # BLD AUTO: 10.1 10E3/UL (ref 4–11)

## 2024-05-13 PROCEDURE — G0378 HOSPITAL OBSERVATION PER HR: HCPCS

## 2024-05-13 PROCEDURE — 85027 COMPLETE CBC AUTOMATED: CPT

## 2024-05-13 PROCEDURE — 250N000013 HC RX MED GY IP 250 OP 250 PS 637: Performed by: INTERNAL MEDICINE

## 2024-05-13 PROCEDURE — 97110 THERAPEUTIC EXERCISES: CPT | Mod: GO

## 2024-05-13 PROCEDURE — 96376 TX/PRO/DX INJ SAME DRUG ADON: CPT

## 2024-05-13 PROCEDURE — 80048 BASIC METABOLIC PNL TOTAL CA: CPT

## 2024-05-13 PROCEDURE — 250N000011 HC RX IP 250 OP 636: Performed by: EMERGENCY MEDICINE

## 2024-05-13 PROCEDURE — 36415 COLL VENOUS BLD VENIPUNCTURE: CPT

## 2024-05-13 PROCEDURE — 250N000013 HC RX MED GY IP 250 OP 250 PS 637

## 2024-05-13 PROCEDURE — 99223 1ST HOSP IP/OBS HIGH 75: CPT | Performed by: INTERNAL MEDICINE

## 2024-05-13 PROCEDURE — 120N000001 HC R&B MED SURG/OB

## 2024-05-13 PROCEDURE — 97161 PT EVAL LOW COMPLEX 20 MIN: CPT | Mod: GP

## 2024-05-13 PROCEDURE — 99233 SBSQ HOSP IP/OBS HIGH 50: CPT | Performed by: EMERGENCY MEDICINE

## 2024-05-13 PROCEDURE — 97165 OT EVAL LOW COMPLEX 30 MIN: CPT | Mod: GO

## 2024-05-13 RX ORDER — FUROSEMIDE 10 MG/ML
40 INJECTION INTRAMUSCULAR; INTRAVENOUS EVERY 12 HOURS
Status: DISCONTINUED | OUTPATIENT
Start: 2024-05-13 | End: 2024-05-14

## 2024-05-13 RX ADMIN — MIRABEGRON 25 MG: 25 TABLET, FILM COATED, EXTENDED RELEASE ORAL at 08:56

## 2024-05-13 RX ADMIN — FUROSEMIDE 40 MG: 10 INJECTION, SOLUTION INTRAMUSCULAR; INTRAVENOUS at 21:23

## 2024-05-13 RX ADMIN — SALINE NASAL SPRAY 1 SPRAY: 1.5 SOLUTION NASAL at 03:24

## 2024-05-13 RX ADMIN — ASPIRIN 81 MG: 81 TABLET, COATED ORAL at 08:56

## 2024-05-13 RX ADMIN — CLOPIDOGREL BISULFATE 75 MG: 75 TABLET ORAL at 08:56

## 2024-05-13 RX ADMIN — LEVOTHYROXINE SODIUM 137 MCG: 0.11 TABLET ORAL at 06:14

## 2024-05-13 RX ADMIN — FUROSEMIDE 40 MG: 10 INJECTION, SOLUTION INTRAMUSCULAR; INTRAVENOUS at 08:57

## 2024-05-13 RX ADMIN — METOPROLOL TARTRATE 100 MG: 25 TABLET, FILM COATED ORAL at 08:56

## 2024-05-13 RX ADMIN — LOSARTAN POTASSIUM 100 MG: 50 TABLET, FILM COATED ORAL at 08:56

## 2024-05-13 RX ADMIN — BIMATOPROST 1 DROP: 0.1 SOLUTION/ DROPS OPHTHALMIC at 21:26

## 2024-05-13 RX ADMIN — ATORVASTATIN CALCIUM 80 MG: 40 TABLET, FILM COATED ORAL at 21:26

## 2024-05-13 ASSESSMENT — ACTIVITIES OF DAILY LIVING (ADL)
ADLS_ACUITY_SCORE: 27
ADLS_ACUITY_SCORE: 25
ADLS_ACUITY_SCORE: 27
ADLS_ACUITY_SCORE: 25
ADLS_ACUITY_SCORE: 27
ADLS_ACUITY_SCORE: 27
ADLS_ACUITY_SCORE: 26
ADLS_ACUITY_SCORE: 27
ADLS_ACUITY_SCORE: 25
ADLS_ACUITY_SCORE: 26
ADLS_ACUITY_SCORE: 29
ADLS_ACUITY_SCORE: 25
ADLS_ACUITY_SCORE: 27
ADLS_ACUITY_SCORE: 29
ADLS_ACUITY_SCORE: 29
ADLS_ACUITY_SCORE: 27
ADLS_ACUITY_SCORE: 29
ADLS_ACUITY_SCORE: 29
ADLS_ACUITY_SCORE: 27
ADLS_ACUITY_SCORE: 27
ADLS_ACUITY_SCORE: 25
ADLS_ACUITY_SCORE: 25
ADLS_ACUITY_SCORE: 29

## 2024-05-13 NOTE — PLAN OF CARE
"PRIMARY DIAGNOSIS: \"GENERIC\" NURSING  OUTPATIENT/OBSERVATION GOALS TO BE MET BEFORE DISCHARGE:  ADLs back to baseline: No    Activity and level of assistance: Assist of 1    Pain status: Pain free.    Return to near baseline physical activity: No     Discharge Planner Nurse   Safe discharge environment identified: Yes  Barriers to discharge: Yes       Entered by: Marcell Cleaning RN 05/13/2024 7:12 AM     Please review provider order for any additional goals.   Nurse to notify provider when observation goals have been met and patient is ready for discharge.Goal Outcome Evaluation: Pt remains alert and oriented. C/o nasal congestion. Prn saline spray given with relief. Pt remains on 3L oxygen via NC.                         "

## 2024-05-13 NOTE — PROGRESS NOTES
Physical Therapy        05/13/24 1100   Appointment Info   Signing Clinician's Name / Credentials (PT) Dotty Wick DPT   Living Environment   People in Home spouse   Current Living Arrangements house   Home Accessibility no concerns   Transportation Anticipated family or friend will provide   Self-Care   Equipment Currently Used at Home walker, rolling;cane, straight;wheelchair, manual   Fall history within last six months yes   Number of times patient has fallen within last six months 1   Activity/Exercise/Self-Care Comment mobile with FWW, independent with ADLs/IADLs, spouse does the driving   General Information   Onset of Illness/Injury or Date of Surgery 05/12/24   Referring Physician Nia Tyler PA-C   Patient/Family Therapy Goals Statement (PT) go home   Pertinent History of Current Problem (include personal factors and/or comorbidities that impact the POC) 74 year old female with PMHx of essential HTN, HLD, hypothyroidism, recurrent UTI, recent amaurosis fugax (4/27/24) who was admitted on 5/12/2024 for acute hypoxic respiratory failure. Presented to the ED with 2 days of progressive SOB, dry cough. Hypoxic in ED requiring 3 L NC O2. CT chest showing moderate pulmonary edema (BNP within normal limits).   Existing Precautions/Restrictions fall   Pain Assessment   Patient Currently in Pain No   Integumentary/Edema   Integumentary/Edema Comments bruises on BLE   Posture    Posture Not impaired   Range of Motion (ROM)   Range of Motion ROM is WFL   Strength (Manual Muscle Testing)   Strength (Manual Muscle Testing) strength is WFL   Transfers   Comment, (Transfers) SBA sit<>stand with FWW   Gait/Stairs (Locomotion)   Comment, (Gait/Stairs) 150', 100' with FWW, modified independent. O2 95% on 1L O2 before and after walking   Sensory Examination   Sensory Perception patient reports no sensory changes   Clinical Impression   Criteria for Skilled Therapeutic Intervention Evaluation only   Clinical Presentation  (PT Evaluation Complexity) stable   Clinical Presentation Rationale presents as medically diagnosed   Clinical Decision Making (Complexity) low complexity   Risk & Benefits of therapy have been explained evaluation/treatment results reviewed   PT Total Evaluation Time   PT Brenden Low Complexity Minutes ([id number]) 16   PT Discharge Planning   PT Discharge Recommendation (DC Rec) home with assist   PT Rationale for DC Rec Patient is at her baseline for mobility. No new balance or strength deficits noted.   PT Brief overview of current status MI with FWW, O2 saturation stable   PT Equipment Needed at Discharge walker, rolling   Total Session Time   Total Session Time (sum of timed and untimed services) 16         Dotty Wick, DPT 5/13/2024

## 2024-05-13 NOTE — PLAN OF CARE
"PRIMARY DIAGNOSIS: \"GENERIC\" NURSING  OUTPATIENT/OBSERVATION GOALS TO BE MET BEFORE DISCHARGE:  ADLs back to baseline: No    Activity and level of assistance: assist of 1    Pain status: Pain free.    Return to near baseline physical activity: No     Discharge Planner Nurse   Safe discharge environment identified: Yes  Barriers to discharge: Yes       Entered by: Marcell Cleaning RN 05/13/2024 7:06 AM     Please review provider order for any additional goals.   Nurse to notify provider when observation goals have been met and patient is ready for discharge.Goal Outcome Evaluation: pt is alert and oriented. Denies pain or discomfort. Voiding without difficulty. No complaints of SOB but rather congestion that happens nightly even when at home. 3L O2 via NC. Telemetry reading NSR with PVC's.                         "

## 2024-05-13 NOTE — PLAN OF CARE
Goal Outcome Evaluation:      Plan of Care Reviewed With: patient    Overall Patient Progress: no change     Patient alert and oriented x4. Tele is NSR. Patient has a purewick in place. Patient has scattered bruising. Patient is on a 2 gr NA diet. Last CIWA was 0. Patient is an assist of 1 for mobility.

## 2024-05-13 NOTE — PLAN OF CARE
"Patient is alert and oriented times 4. Patient is on 3 L NC at 95%. Patient is on lasix and is voiding with purewick external catheter.       Andreina Tubbs RN    Problem: Adult Inpatient Plan of Care  Goal: Plan of Care Review  Description: The Plan of Care Review/Shift note should be completed every shift.  The Outcome Evaluation is a brief statement about your assessment that the patient is improving, declining, or no change.  This information will be displayed automatically on your shift  note.  5/12/2024 1934 by Andreina Tubbs RN  Outcome: Progressing  5/12/2024 1619 by Andreina Tubbs RN  Outcome: Progressing  Goal: Patient-Specific Goal (Individualized)  Description: You can add care plan individualizations to a care plan. Examples of Individualization might be:  \"Parent requests to be called daily at 9am for status\", \"I have a hard time hearing out of my right ear\", or \"Do not touch me to wake me up as it startles  me\".  5/12/2024 1934 by Andreina Tubbs RN  Outcome: Progressing  5/12/2024 1619 by Andreina Tubbs RN  Outcome: Progressing  Goal: Absence of Hospital-Acquired Illness or Injury  5/12/2024 1934 by Andreina Tubbs RN  Outcome: Progressing  5/12/2024 1619 by Andreina Tubbs RN  Outcome: Progressing  Intervention: Identify and Manage Fall Risk  Recent Flowsheet Documentation  Taken 5/12/2024 1659 by Andreina Tubbs RN  Safety Promotion/Fall Prevention:   activity supervised   clutter free environment maintained   nonskid shoes/slippers when out of bed   patient and family education   safety round/check completed   supervised activity  Intervention: Prevent Skin Injury  Recent Flowsheet Documentation  Taken 5/12/2024 1700 by Andreina Tubbs RN  Body Position: position maintained  Taken 5/12/2024 1659 by Andreina Tubbs RN  Body Position: position maintained  Goal: Optimal Comfort and Wellbeing  5/12/2024 1934 by Andreina Tubbs RN  Outcome: Progressing  5/12/2024 1619 by Andreina Tubbs RN  Outcome: Progressing  Intervention: Monitor " Pain and Promote Comfort  Recent Flowsheet Documentation  Taken 5/12/2024 1700 by Andreina Tubbs RN  Pain Management Interventions:   medication (see MAR)   emotional support  Goal: Readiness for Transition of Care  5/12/2024 1934 by Andreina Tubbs RN  Outcome: Progressing  5/12/2024 1619 by Andreina Tubbs RN  Outcome: Progressing  Intervention: Mutually Develop Transition Plan  Recent Flowsheet Documentation  Taken 5/12/2024 1600 by Andreina Tubbs RN  Equipment Currently Used at Home:   walker, rolling   cane, straight   wheelchair, manual   Goal Outcome Evaluation:

## 2024-05-13 NOTE — PROGRESS NOTES
Occupational Therapy       05/13/24 0730   Appointment Info   Signing Clinician's Name / Credentials (OT) Mary Dorado OTR/L   Quick Adds   Quick Adds Certification   Living Environment   People in Home spouse   Current Living Arrangements house   Home Accessibility no concerns   Self-Care   Equipment Currently Used at Home walker, rolling;cane, straight;wheelchair, manual   Fall history within last six months yes   Number of times patient has fallen within last six months 1   Activity/Exercise/Self-Care Comment Patient independent with ADLs at baseline   General Information   Onset of Illness/Injury or Date of Surgery 05/12/24   Referring Physician Nia Tyler PA-C   Patient/Family Therapy Goal Statement (OT) get home today   Additional Occupational Profile Info/Pertinent History of Current Problem Mirna Grijalva is a 74 year old female with PMHx of essential HTN, HLD, hypothyroidism, recurrent UTI, recent amaurosis fugax (4/27/24) who was admitted on 5/12/2024 for acute hypoxic respiratory failure. Presented to the ED with 2 days of progressive SOB, dry cough. Hypoxic in ED requiring 3 L NC O2. CT chest showing moderate pulmonary edema (BNP within normal limits).   Existing Precautions/Restrictions no known precautions/restrictions   Cognitive Status Examination   Orientation Status orientation to person, place and time   Range of Motion Comprehensive   General Range of Motion no range of motion deficits identified   Strength Comprehensive (MMT)   General Manual Muscle Testing (MMT) Assessment no strength deficits identified   Bed Mobility   Bed Mobility supine-sit   Supine-Sit [place] (Bed Mobility) independent   Transfers   Transfers sit-stand transfer   Sit-Stand Transfer   Sit-Stand [place] (Transfers) independent   Activities of Daily Living   BADL Assessment/Intervention lower body dressing   Lower Body Dressing Assessment/Training   Comment, (Lower Body Dressing) Patient ambulated around room  with SBA.   [place] Level (Lower Body Dressing) supervision   Clinical Impression   Criteria for Skilled Therapeutic Interventions Met (OT) Yes, treatment indicated   OT Diagnosis Decreased ADL independence   OT Problem List-Impairments impacting ADL problems related to;activity tolerance impaired   Assessment of Occupational Performance 1-3 Performance Deficits   Identified Performance Deficits endurance for ADLs   Planned Therapy Interventions (OT) ADL retraining;home program guidelines;progressive activity/exercise   Clinical Decision Making Complexity (OT) problem focused assessment/low complexity   Risk & Benefits of therapy have been explained evaluation/treatment results reviewed;care plan/treatment goals reviewed   OT Total Evaluation Time   OT Eval, Low Complexity Minutes (45522) 15   Therapy Certification   Medical Diagnosis respiratory failure   Start of Care Date 05/13/24   Certification date from 05/13/24   Certification date to 05/20/24   OT Goals   Therapy Frequency (OT) Daily   OT Predicted Duration/Target Date for Goal Attainment 05/20/24   OT Goals Cardiac Phase 1   OT: Understanding of cardiac education to maximize quality of life, condition management, and health outcomes Patient;Verbalize   OT: Functional/aerobic ambulation tolerance with stable cardiovascular response in order to return to home and community environment Independent;Greater than 300 feet   Interventions   Interventions Quick Adds Therapeutic Procedures/Exercise   Therapeutic Procedures/Exercise   Therapeutic Procedure: strength, endurance, ROM, flexibillity minutes (99056) 10   Symptoms Noted During/After Treatment fatigue   Treatment Detail/Skilled Intervention Patient ambulated 125 feet with SBA and FWW, ambulates on RA, oxgyen decreasd to 88%.   OT Discharge Planning   OT Plan Increase ambulation   OT Discharge Recommendation (DC Rec) home   OT Rationale for DC Rec SBA for ADLs, decreased ADL independence   OT Brief  overview of current status SBA , low endurance   Total Session Time   Timed Code Treatment Minutes 10   Total Session Time (sum of timed and untimed services) 25      [facility]  OUTPATIENT OCCUPATIONAL THERAPY  EVALUATION  PLAN OF TREATMENT FOR OUTPATIENT REHABILITATION  (COMPLETE FOR INITIAL CLAIMS ONLY)  Patient's Last Name, First Name, M.I.  YOB: 1950  Mirna Grijalva                          Provider's Name  [facility] Medical Record No.  [id number]                             Onset Date:  05/12/24   Start of Care Date:  05/13/24   Type:     ___PT   _X_OT   ___SLP Medical Diagnosis:  respiratory failure                    OT Diagnosis:  Decreased ADL independence Visits from SOC:  1     See note for plan of treatment, functional goals and certification details    I CERTIFY THE NEED FOR THESE SERVICES FURNISHED UNDER        THIS PLAN OF TREATMENT AND WHILE UNDER MY CARE     (Physician co-signature of this document indicates review and certification of the therapy plan).

## 2024-05-13 NOTE — CONSULTS
"HEART CARE NOTE        Thank you, Dr. Rosas, for asking the [facility] Heart Care team to see Mirna Grijalva to evaluate ADHF.    Assessment/Recommendations     1. HFpEF c/b severe ADHF  Assessment / Plan  Hypervolemic on physical exam; diuresing well on current regimen - no changes at this time; continue to monitor UOP and renal function closely  Patient is high risk for adverse cardiac events 2/2 advanced age, frailty, HTN  GDMT as detailed below; mainstay of treatment for HFpEF includes diuretics and adequate BP control (class I) and SGLT2-I (class 2a); additional medical therapy (ARNI, MRA, ARB) demonstrated less robust evidence for indication but may be considered per guideline recommendations (2b); no indication for BBlockers   Hold Bblocker as no cardiac indication at this time    Current Pharmacotherapy AHA Guideline-Directed Medical Therapy   Losartan 100 mg daily ARNI/ARB   Spironolactone not started  MRA   SGLT2 inhibitor: not started SGLT2-I    Furosemide IV Loop diuretic      2. Pericardial effusion  Assessment / Plan  Small; no imaging or clinical signs consistent with cardiac tamponade - diuresis as above    3. Hypertensive urgency  Assessment / Plan  Improving; Titrate oral afterload reduction as tolerated    4. Acute hypoxic respiratory failure  Assessment / Plan  In the setting of acute cardiogenic pulmonary edema likely precipitated by uncontrolled HTN/elevated SVR - diuresis as above    Clinically Significant Risk Factors Present on Admission                # Drug Induced Platelet Defect: home medication list includes an antiplatelet medication   # Hypertension: Noted on problem list  # Acute heart failure with preserved ejection fraction: heart failure noted on problem list, last echo with EF >50%, and receiving IV diuretics     # Obesity: Estimated body mass index is 38.11 kg/m  as calculated from the following:    Height as of this encounter: 1.626 m (5' 4\").    Weight as of this " encounter: 100.7 kg (222 lb).             Cardiomyopathy  Diastolic acute    Fluid overload, unspecified, Other fluid overload, and Other disorders of electrolyte and fluid balance, not elsewhere classified      85 minutes spent reviewing prior records (including documentation, laboratory studies, cardiac testing/imaging), history and physical exam, planning, and subsequent documentation.    History of Present Illness/Subjective    Ms. Mirna Grijalva is a 74 year old female with a PMHx significant for (per Epic notation) essential HTN, HLD, hypothyroidism, recurrent UTI, recent amaurosis fugax (4/27/24) who was admitted on 5/12/2024 for acute hypoxic respiratory failure. Presented to the ED with 2 days of progressive SOB, dry cough. Hypoxic in ED requiring 3 L NC O2. CT chest showing moderate pulmonary edema (BNP within normal limits).     Today, Mrs. Grijalva endorses severe respiratory distress as the precipitating event leading to her current hospitalization; she reports significant improvement in symptoms since IV diuresis was initiated; she reports resolving dyspnea at rest, orthopnea, edema; Management plan as detailed above    ECG: Personally reviewed. normal sinus rhythm, occasional PVC noted, unifocal.    ECHO (personnaly Reviewed on 5/13/24):   Left ventricular size, wall motion and function are normal. The ejection  fraction is 60-65%.  Normal right ventricle size and systolic function.  IVC diameter <2.1 cm collapsing >50% with sniff suggests a normal RA pressure  of 3 mmHg.  Small pericardial effusion  There are no echocardiographic indications of cardiac tamponade.    Telemetry: personally reviewed May 13, 2024; notable for sinus rhythm     Lab results: personally reviewed May 13, 2024; notable for stable renal function and electrolytes wnl    Medical history and pertinent documents reviewed in Care Everywhere please where applicable see details above          Physical Examination Review of Systems   BP  "132/81 (BP Location: Left arm)   Pulse 72   Temp 98.9  F (37.2  C) (Oral)   Resp 22   Ht 1.626 m (5' 4\")   Wt 100.7 kg (222 lb)   LMP  (LMP Unknown)   SpO2 96%   BMI 38.11 kg/m    Body mass index is 38.11 kg/m .  Wt Readings from Last 3 Encounters:   05/12/24 100.7 kg (222 lb)   04/27/24 101.6 kg (224 lb)   04/23/24 101.6 kg (224 lb)     General Appearance:   no distress   ENT/Mouth: membranes moist, no oral lesions or bleeding gums.      EYES:  no scleral icterus, normal conjunctivae   Neck: no carotid bruits or thyromegaly   Chest/Lungs:   lungs are clear to auscultation, no rales or wheezing, equal chest wall expansion    Cardiovascular:   Regular. Normal first and second heart sounds with no murmurs, rubs, or gallops; the carotid, radial and posterior tibial pulses are intact, + JVD and trace-mild LE edema bilaterally    Abdomen:  no organomegaly, masses, bruits, or tenderness; bowel sounds are present   Extremities: no cyanosis or clubbing   Skin: no xanthelasma, warm.    Neurologic: NAD     Psychiatric: alert and oriented x3, calm     A complete 10 systems ROS was reviewed  And is negative except what is listed in the HPI.          Medical History  Surgical History Family History Social History   Past Medical History:   Diagnosis Date    Mumps     Past Surgical History:   Procedure Laterality Date    CYSTOSCOPY      no family history of premature coronary artery disease Social History     Socioeconomic History    Marital status:      Spouse name: Not on file    Number of children: Not on file    Years of education: Not on file    Highest education level: Not on file   Occupational History    Not on file   Tobacco Use    Smoking status: Former     Types: Cigarettes     Passive exposure: Never    Smokeless tobacco: Never   Vaping Use    Vaping status: Never Used   Substance and Sexual Activity    Alcohol use: Yes    Drug use: Never    Sexual activity: Not on file   Other Topics Concern    Not on " file   Social History Narrative    Not on file     Social Determinants of Health     Financial Resource Strain: Low Risk  (1/23/2024)    Financial Resource Strain     Within the past 12 months, have you or your family members you live with been unable to get utilities (heat, electricity) when it was really needed?: No   Food Insecurity: Low Risk  (1/23/2024)    Food Insecurity     Within the past 12 months, did you worry that your food would run out before you got money to buy more?: No     Within the past 12 months, did the food you bought just not last and you didn t have money to get more?: No   Transportation Needs: Low Risk  (1/23/2024)    Transportation Needs     Within the past 12 months, has lack of transportation kept you from medical appointments, getting your medicines, non-medical meetings or appointments, work, or from getting things that you need?: No   Physical Activity: Not on file   Stress: Not on file   Social Connections: Not on file   Interpersonal Safety: Low Risk  (4/23/2024)    Interpersonal Safety     Do you feel physically and emotionally safe where you currently live?: Yes     Within the past 12 months, have you been hit, slapped, kicked or otherwise physically hurt by someone?: No     Within the past 12 months, have you been humiliated or emotionally abused in other ways by your partner or ex-partner?: No   Housing Stability: Low Risk  (1/23/2024)    Housing Stability     Do you have housing? : Yes     Are you worried about losing your housing?: No           Lab Results    Chemistry/lipid CBC Cardiac Enzymes/BNP/TSH/INR   Lab Results   Component Value Date    CHOL 259 (H) 04/27/2024    HDL 75 04/27/2024    TRIG 118 04/27/2024    BUN 7.9 (L) 05/12/2024     05/12/2024    CO2 21 (L) 05/12/2024    Lab Results   Component Value Date    WBC 7.7 05/12/2024    HGB 14.1 05/12/2024    HCT 40.9 05/12/2024     (H) 05/12/2024     05/12/2024    Lab Results   Component Value Date     "TSH 2.46 05/12/2024    INR 0.94 04/27/2024     No results found for: \"CKTOTAL\", \"CKMB\", \"TROPONINI\"       Weight:    Wt Readings from Last 3 Encounters:   05/12/24 100.7 kg (222 lb)   04/27/24 101.6 kg (224 lb)   04/23/24 101.6 kg (224 lb)       Allergies  Allergies   Allergen Reactions    Atenolol      Rash and Insomnia    Ciprofloxacin      Severe tendon pain and swelling    Eliquis [Apixaban]      Wanting to faint    Nitrofurantoin      'Bad, itchy, feverish, rash on arms and legs'    Sulfa Antibiotics      Rash on feet    Penicillins Rash         Surgical History  Past Surgical History:   Procedure Laterality Date    CYSTOSCOPY         Social History  Tobacco:   History   Smoking Status    Former    Types: Cigarettes   Smokeless Tobacco    Never    Alcohol:   Social History    Substance and Sexual Activity      Alcohol use: Yes   Illicit Drugs:   History   Drug Use Unknown       Family History  History reviewed. No pertinent family history.       Gelacio Molina MD on 5/13/2024      cc: Ambar Hernandez,     "

## 2024-05-13 NOTE — PLAN OF CARE
PRIMARY DIAGNOSIS: ACUTE PULMONARY EDEMA   OUTPATIENT/OBSERVATION GOALS TO BE MET BEFORE DISCHARGE:  ADLs back to baseline: No    Activity and level of assistance: SBA    Pain status: Pain free.    Return to near baseline physical activity: No     Discharge Planner Nurse   Safe discharge environment identified: Yes  Barriers to discharge: Yes       Entered by: Abdias Redd RN 05/13/2024 10:51 AM     Please review provider order for any additional goals.   Nurse to notify provider when observation goals have been met and patient is ready for discharge.    Assumed cares: [id number]. Pt A&Ox4. VSS on RA. Pt denies any pain. Pt reports shortness of breath with ambulation, able to wean down to room air for a short period of time, O2 dropped down to 86-88%, pt put back on 1.5L, O2 up to 94%. SBA. L PIV- occluded, replaced with other PIV in L AC- SL.

## 2024-05-13 NOTE — PROGRESS NOTES
Care Management Follow Up    Length of Stay (days): 0    Expected Discharge Date: 05/13/2024     Concerns to be Addressed: discharge planning     Patient plan of care discussed at interdisciplinary rounds: Yes    Anticipated Discharge Disposition:  home no needs   Anticipated Discharge Services: n/a   Anticipated Discharge DME:  n/a    Patient/family educated on Medicare website which has current facility and service quality ratings:  n/a  Education Provided on the Discharge Plan:  yes  Patient/Family in Agreement with the Plan:  yes    Referrals Placed by CM/SW:  n/a  Private pay costs discussed: Not applicable    Additional Information:  No needs anticipated from CM at discharge per pt; independent at baseline. Care management to follow for discharge recommendations (therapy recommending home at this time) and progression of care through hospitalization. Family to transport home.  9:34 AM    Brenna Kjellberg, BSW LSW  5/13/2024

## 2024-05-13 NOTE — UTILIZATION REVIEW
Inpatient appropriate    Admission Status; Secondary Review Determination       Under the authority of the Utilization Management Committee, the utilization review process indicated a secondary review on the above patient. The review outcome is based on review of the medical records, discussions with staff, and applying clinical experience noted on the date of the review.     (x) Inpatient Status Appropriate - This patient's medical care is consistent with medical management for inpatient care and reasonable inpatient medical practice.     RATIONALE FOR DETERMINATION   74 year old female with PMHx of essential HTN, HLD, hypothyroidism, recurrent UTI, recent amaurosis fugax (4/27/24) who was admitted on 5/12/2024 for acute hypoxic respiratory failure due to Acute on chronic heart failure. Pt continue to be hypervolemic on exam. Cardiology recommends continue with IV diuretic. Pt hypoxic and on supplemental oxygen.   At the time of admission with the information available to the attending physician more than 2 nights Hospital complex care was anticipated, based on patient risk of adverse outcome if treated as outpatient and complex care required. Inpatient admission is appropriate based on the Medicare guidelines.     The information on this document is developed by the utilization review team in order for the business office to ensure compliance. This only denotes the appropriateness of proper admission status and does not reflect the quality of care rendered.   The definitions of Inpatient Status and Observation Status used in making the determination above are those provided in the CMS Coverage Manual, Chapter 1 and Chapter 6, section 70.4.   Sincerely,   Fredi Byrne MD  Utilization Review  Physician Advisor  [facility].

## 2024-05-13 NOTE — PROGRESS NOTES
Daily Progress Note    Assessment/Plan:  74 year old female with PMHx of essential HTN, HLD, hypothyroidism, recurrent UTI, recent amaurosis fugax (4/27/24) who was admitted on 5/12/2024 for acute hypoxic respiratory failure. Presented to the ED with 2 days of progressive SOB, dry cough. Hypoxic in ED requiring 3 L NC O2. CT chest showing moderate pulmonary edema (BNP within normal limits).  Echo shows normal systolic function and small pericardial effusion.  Given 1 dose of IV Lasix yesterday.  Still hypoxic, will start Lasix IV every 12 hours and consult cardiology.     Acute Hypoxic Respiratory Failure -continues requiring 3 L of O2  Likely acute diastolic heart failure, will try weaning O2 as tolerated.    HCTZ recently discontinued  -- CT Chest: mod pulm edema, small b/l pleural effusions, no PE.   -- BNP within normal limits. Bilat LE edema but only trace pitting    -- Resp panel negative  -- Procal within normal limits   -- Echo 5/12: EF 60-65%, small pericardial effusion, no echocardiographic indications of tamponade, no further treatment or monitoring per cardiology other than continued IV diuresis.  - Recent echo (4/27): EF at that time EF 60-65%, no WMA, grade 1 diastolic dysfunction. No pericardial effusion seen at that time  -- Consider pericarditis but without c/o chest pain less likely etiology  -- Received 40 mg IV Lasix and 25 mg hydrochlorothiazide in ED    --Seen by cardiology, will continue IV Lasix 40 mg every 12 hours  -- PT/OT. If unable to wean, may need home O2 eval      Recent Amaurosis Fugax (4/27)   -- Neuro recs from that admission; Continue aspirin and Plavix per stroke neurology (at least 3 months)  -- Was discontinued off losartan-hydrochlorothiazide 100-25 mg per neuro recs at discharge for permissive HTN   -- Cont PTA statin, ASA, Plavix   -- Continue with outpatient follow-up      Hypertensive Urgency   Essential HTN   in the ED  -- Given Lasix, hydrochlorothiazide,  metoprolol IV 5 mg and metoprolol tab 50 mg in ED   -- Cont PTA metoprolol 100 mg BID with hold parameters  -- Will resume losartan today  -- Will hold on further hydrochlorothiazide for now, she may be better candidate for Lasix   -- Patient states she threw out her rx of her losartan-hydrochlorothiazide after most recent discharge, with a new prescription at discharge if continuing.    -- Close monitoring     Atrophic Vaginitis   Urinary Urgency   H/o Frequent / Recurrent UTI  -- UA in ED slight leuk esterase, 12 WBC but patient states chronic dysuria, urgency have been improved as of late   -- Will hold on further abx for now, await UC   -- follows w urology; recently started on Myretriq and estrogen cream  -- Cont PTA myretriq cream      Hypothyroidism   History of parathyroid nodules s/p parathyroidectomy  History of thyroid cancer  -- TSH within normal limits   -- Cont PTA levothyroxine   - Per PCP note, patient does have an upcoming appointment with endo in Julyto take over her care     ? Heavy Alcohol Use   -- recent PCP note (4/23) reports patient drinking 4 glasses of wine daily   -- CIWA, close monitoring while here   -No current evidence of withdrawal     HLD - PTA statin     Hyperglycemia  Likely reactive  -- A1C 5.4 (4/2024)  -- Trend on BMP      Incidential Findings on Imaging - Nodular areas of consolidation within both lungs measuring up to 19 mm. Recommend follow-up chest CT in one month to evaluate for resolution               -- Patient does have remote 20 yr PPD smoking history               -- Discussed findings with patient and family, and recommended follow-up     Clinically Significant Risk Factors Present on Admission                # Drug Induced Platelet Defect: home medication list includes an antiplatelet medication   # Hypertension: Noted on problem list  # Acute heart failure with preserved ejection fraction: heart failure noted on problem list, last echo with EF >50%, and receiving  "IV diuretics     # Obesity: Estimated body mass index is 38.11 kg/m  as calculated from the following:    Height as of this encounter: 1.626 m (5' 4\").    Weight as of this encounter: 100.7 kg (222 lb).               Code status:Full Code        Barriers to Discharge: Hypoxia, IV diuresis    Disposition: Anticipate discharge in 1 to 2 days    Subjective:  Linda feels better today, less dyspnea.  Still requiring O2 at rest.  No chest pain, no fevers or chills or nausea or vomiting.  Reports her appetite has been good, no abdominal pain.        Current Medications Reviewed via EHR List    Objective:  Vital signs in last 24 hours:  [unfilled]  .prog  Weight:   @THISENCWEIGHTS(1)@  Weight change:   Body mass index is 38.11 kg/m .    Intake/Output last 3 shifts:  I/O last 3 completed shifts:  In: -   Out:  [Urine:]  Intake/Output this shift:  I/O this shift:  In: -   Out: 500 [Urine:500]    Physical Exam:  General: No apparent distress  CV: Regular in rhythm, mild bilateral lower extremity peripheral edema which she states is chronic  Lungs: Decreased breath sounds with scattered rales  Abdomen: Soft, nontender        Imaging:  Personally Reviewed.  Echocardiogram Limited    Result Date: 2024  [id number]^DESMOND^AURORA^[place] [address]  Name: LINDA YI MRN: [id number] : 1950 Study Date: 2024 02:03 PM Age: 74 yrs Gender: Female Patient Location: [facility] Reason For Study: CHF Ordering Physician: AURORA LOGAN Performed By: ARTHUR  BSA: 2.0 m2 Height: 64 in Weight: 222 lb BP: 156/88 mmHg ______________________________________________________________________________ Procedure Limited Portable Echo Adult. ______________________________________________________________________________ Interpretation Summary  Left ventricular size, wall motion and function are normal. The ejection fraction is 60-65%. Normal right ventricle size and " systolic function. IVC diameter <2.1 cm collapsing >50% with sniff suggests a normal RA pressure of 3 mmHg. Small pericardial effusion There are no echocardiographic indications of cardiac tamponade. ______________________________________________________________________________ Left Ventricle Left ventricular size, wall motion and function are normal. The ejection fraction is 60-65%. No regional wall motion abnormalities noted.  Right Ventricle Normal right ventricle size and systolic function.  Atria Normal left atrial size. Right atrial size is normal. There is no color Doppler evidence of an atrial shunt.  Mitral Valve There is mild mitral annular calcification.  Aortic Valve No hemodynamically significant valvular aortic stenosis.  Pulmonic Valve The pulmonic valve is not well seen, but is grossly normal.  Vessels IVC diameter <2.1 cm collapsing >50% with sniff suggests a normal RA pressure of 3 mmHg.  Pericardium Small pericardial effusion. There are no echocardiographic indications of cardiac tamponade.  ______________________________________________________________________________ MMode/2D Measurements & Calculations IVSd: 0.87 cm LVIDd: 5.1 cm LVIDs: 3.8 cm LVPWd: 1.0 cm FS: 24.9 % LV mass(C)d: 174.4 grams LV mass(C)dI: 85.3 grams/m2 EF Biplane: 53.2 %  RWT: 0.40  Time Measurements MM HR: 70.0 BPM  ______________________________________________________________________________ Report approved by: Breanna Orozco 05/12/2024 03:52 PM       CT Chest Pulmonary Embolism w Contrast    Result Date: 5/12/2024  EXAM: CT CHEST PULMONARY EMBOLISM W CONTRAST LOCATION: [facility] DATE: 5/12/2024 INDICATION: r o PE, recent admission, hypoxia COMPARISON: None. TECHNIQUE: CT chest pulmonary angiogram during arterial phase injection of IV contrast. Multiplanar reformats and MIP reconstructions were performed. Dose reduction techniques were used. CONTRAST: ISOVUE 370 75ML FINDINGS: ANGIOGRAM  CHEST: Normal caliber pulmonary trunk. No pulmonary embolism to the subsegmental level. LUNGS AND PLEURA: Central airways are patent. Small bilateral pleural effusions and moderate pulmonary edema. No pneumothorax. Scattered pulmonary nodules. For example: - Right lower lobe, 19 mm (series 6 image 186) - Right lower lobe, 8 mm (series 6 image 150) - Left upper lobe, 11 mm (series 6 image 61). MEDIASTINUM/AXILLAE: Right axillary lymph node dissection. Enlarged mediastinal and hilar lymph nodes (series 4 image 90, 145). Unremarkable thyroid. Normal caliber thoracic aorta. Cardiomegaly. No pericardial effusion. CORONARY ARTERY CALCIFICATION: Mild. UPPER ABDOMEN: Gastric band, with borderline phi angle of 57 degrees. 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 MUSCULOSKELETAL: Multilevel degenerative disc disease of the thoracolumbar spine. Chronic appearing compression fracture of T11. No definite acute fractures. Bilateral breast prostheses.     IMPRESSION: 1.  No pulmonary embolism to the subsegmental level. 2.  Small bilateral pleural effusions and moderate pulmonary edema. 3.  Nodular areas of consolidation within both lungs measuring up to 19 mm. Recommend follow-up chest CT in one month to evaluate for resolution. [Recommend Follow Up: Lung nodule] This report will be copied to the [facility] to ensure a provider acknowledges the finding.    XR Lumbar Spine 2/3 Views    Result Date: 4/28/2024  EXAM: XR LUMBAR SPINE 2/3 VIEWS LOCATION: [facility] DATE: 4/28/2024 INDICATION: back pain COMPARISON: 03/19/2024 MRI lumbar spine     IMPRESSION:  Redemonstrated severe degenerative changes throughout the lumbar spine with facet arthropathy, disc height loss, vertebral body osteophyte formation, and sclerotic endplate changes, better characterized on recent MRI. Redemonstrated moderate  stepwise retrolisthesis from L1-L3. Grade 1 anterolisthesis at L1-L2. Vertebral body heights  unchanged. No findings to suggest acute fracture.    Echocardiogram Complete - For age > 60 yrs    Result Date: 2024  [id number]^GONDAL^RENAE^[place] [address]  Name: LINDA YI MRN: [id number] : 1950 Study Date: 2024 09:12 AM Age: 74 yrs Gender: Female Patient Location: [facility] Reason For Study: Cerebrovascular Incident Ordering Physician: GONDAL, SAAD J Performed By: MAREN  BSA: 2.1 m2 Height: 64 in Weight: 224 lb BP: 134/86 mmHg ______________________________________________________________________________ Procedure Adequate quality two-dimensional was performed and interpreted. There is no comparison study available. ______________________________________________________________________________ Interpretation Summary  The left ventricle is normal in size. There is mild concentric left ventricular hypertrophy. Left ventricular systolic function is normal. The visual ejection fraction is 60-65%. No regional wall motion abnormalities noted. Grade I or early diastolic dysfunction.  The right ventricle is normal in size and function. The left atrium is mildly dilated. Right atrium not well visualized. IVC diameter <2.1 cm collapsing >50% with sniff suggests a normal RA pressure of 3 mmHg. There is no comparison study available. ______________________________________________________________________________ Left Ventricle The left ventricle is normal in size. There is mild concentric left ventricular hypertrophy. Left ventricular systolic function is normal. The visual ejection fraction is 60-65%. Grade I or early diastolic dysfunction. No regional wall motion abnormalities noted.  Right Ventricle The right ventricle is normal in size and function.  Atria The left atrium is mildly dilated. Right atrium not well visualized.  Mitral Valve Mitral valve leaflets appear normal. There is mild to moderate mitral annular calcification. There is  trace mitral regurgitation. There is no mitral valve stenosis.  Tricuspid Valve The tricuspid valve is not well visualized. There is trace tricuspid regurgitation. Right ventricular systolic pressure could not be approximated due to inadequate tricuspid regurgitation.  Aortic Valve The aortic valve is trileaflet. Mild aortic valve calcification is present. No aortic regurgitation is present. No aortic stenosis is present.  Pulmonic Valve The pulmonic valve is not well visualized. There is trace pulmonic valvular regurgitation.  Vessels The aorta root is normal. IVC diameter <2.1 cm collapsing >50% with sniff suggests a normal RA pressure of 3 mmHg.  Pericardium There is no pericardial effusion.  Rhythm Sinus rhythm was noted. ______________________________________________________________________________ MMode/2D Measurements & Calculations IVSd: 1.3 cm LVIDd: 4.8 cm LVIDs: 3.6 cm LVPWd: 1.3 cm FS: 25.6 % LV mass(C)d: 233.0 grams LV mass(C)dI: 113.5 grams/m2 Ao root diam: 3.8 cm asc Aorta Diam: 3.6 cm LVOT diam: 2.2 cm LVOT area: 3.8 cm2 Ao root diam index Ht(cm/m): 2.3 Ao root diam index BSA (cm/m2): 1.9 Asc Ao diam index BSA (cm/m2): 1.8 Asc Ao diam index Ht(cm/m): 2.2 EF Biplane: 70.5 %  LA Volume Indexed (AL/bp): 42.3 ml/m2 RWT: 0.52 TAPSE: 2.7 cm  Doppler Measurements & Calculations MV E max kwan: 95.6 cm/sec MV A max kwan: 140.0 cm/sec MV E/A: 0.68 MV dec slope: 392.0 cm/sec2 MV dec time: 0.24 sec Ao V2 max: 146.0 cm/sec Ao max P.0 mmHg Ao V2 mean: 95.9 cm/sec Ao mean P.0 mmHg Ao V2 VTI: 23.8 cm DAILY(I,D): 3.9 cm2 DAILY(V,D): 3.5 cm2 LV V1 max P.3 mmHg LV V1 max: 135.0 cm/sec LV V1 VTI: 24.5 cm SV(LVOT): 93.1 ml SI(LVOT): 45.4 ml/m2 PA V2 max: 92.4 cm/sec PA max PG: 3.4 mmHg PA acc time: 0.11 sec AV Kwan Ratio (DI): 0.92 DAILY Index (cm2/m2): 1.9  E/E' av.7 Lateral E/e': 12.9 Medial E/e': 24.4 RV S Kwan: 20.8 cm/sec  ______________________________________________________________________________ Report  approved by: Breanna Kwan 04/28/2024 03:55 PM       CTA Head Neck with Contrast    Result Date: 4/27/2024  EXAM: CTA HEAD NECK W CONTRAST LOCATION: [facility] DATE: 4/27/2024 INDICATION: Right-sided visual loss. COMPARISON: Brain MRI 04/27/2024 head CT 03/01/2024 CONTRAST: Isovue 370 75ml TECHNIQUE: Head and neck CT angiogram with IV contrast. Noncontrast head CT followed by axial helical CT images of the head and neck vessels obtained during the arterial phase of intravenous contrast administration. Axial 2D reconstructed images and multiplanar 3D MIP reconstructed images of the head and neck vessels were performed by the technologist. Dose reduction techniques were used. All stenosis measurements made according to NASCET criteria unless otherwise specified. FINDINGS: NONCONTRAST HEAD CT: INTRACRANIAL CONTENTS: No intracranial hemorrhage, extraaxial collection, or mass effect.  No CT evidence of acute infarct. Mild to moderate presumed chronic small vessel ischemic changes. Moderate generalized volume loss. No hydrocephalus. Corpus callosum is normal. Cerebellar tonsillar position is within normal limits. Sella and supersellar structures are normal. Vascular calcification. VISUALIZED ORBITS/SINUSES/MASTOIDS: Prior left cataract surgery. Visualized portions of the orbits are otherwise unremarkable. No retrobulbar edema. There is partial opacification of the right sphenoid sinus. No middle ear or mastoid effusion. BONES/SOFT TISSUES: No scalp hematoma. No skull fracture. Demineralization of skull base. Degenerative changes both TMJs. Nasopharynx is patent. HEAD CTA: Prior MRA reviewed from 04/27/2024 suggested possible short segment occlusion proximal right ophthalmic artery. This corresponds to apparent loss of intravascular enhancement which may represent a short segment occlusion or small area of high-grade stenosis at the level of the right orbital apex series 11 images 397 - 401,  with the area of interest suggested series 11 image 399. The remainder of the intraorbital portion of the right ophthalmic artery appears well perfused with no additional areas of stenosis or distal thrombosis. Left ophthalmic artery appears satisfactory. ANTERIOR CIRCULATION: No additional anterior circulation stenosis/occlusion, aneurysm, or high flow vascular malformation. Bilateral posterior communicating arteries larger on the left augment the posterior circulation. Calcific plaque and tortuosity of the cavernous ICA segments bilaterally left more than right with mild to moderate stenosis 30-45%. Satisfactory branch arborization pattern and caliber of the ALEKSEY and MCA vascular territory bilaterally. POSTERIOR CIRCULATION: No stenosis/occlusion, aneurysm, or high flow vascular malformation. Normal caliber basilar artery supplied from the right V4 segment. Augmentation of the posterior circulation with small - moderate bilateral posterior communicating arteries larger on the left. Satisfactory and symmetric branch arborization pattern and caliber of the PCA vascular territory overall. DURAL VENOUS SINUSES: Expected enhancement of the major dural venous sinuses. NECK CTA: RIGHT CAROTID: Coarse eccentric calcific plaque at the distal CCA, bifurcation and proximal right ICA. There is moderate stenosis of the distal right CCA level probably 40% in degree. No ulcerated plaque or endoluminal hypodense thrombosis noted. No tandem right ICA stenosis or dissection. No high-grade right ICA stenosis. Mild origin stenosis right ECA under 15%. LEFT CAROTID: Calcific and fibrofatty plaque at the distal CCA and bifurcation/proximal left ICA with very mild 5-10% hemodynamic stenosis. No tandem stenosis, high-grade stenosis or dissection of left ICA within the neck. VERTEBRAL ARTERIES: No focal stenosis or dissection. Dominant right and smaller left vertebral arteries. The right V4 segment is congenitally dominant and supplies the  majority of the basilar artery with left diminutive V3 and V4 segment terminating predominantly in a left PICA. AORTIC ARCH: Classic aortic arch anatomy with no significant stenosis at the origin of the great vessels. Tortuosity at the great vessel origin level noted including medial deviation of the proximal common carotid arteries with retroesophageal course. NONVASCULAR STRUCTURES: Unremarkable.     IMPRESSION: HEAD CT: 1.  No CT evidence for acute intracranial process. 2.  Brain atrophy and presumed chronic microvascular ischemic changes as above. 3.  Partial opacification right sphenoid sinus. 4.  Stable intracranial appearance from 03/01/2024. HEAD CTA: 1.  Possible short segment high-grade stenosis at the right ophthalmic arterial origin at the level of the right orbital apex corresponds to MRA abnormality suggested on earlier examination today 04/27/2024. Right-sided visual loss is reported. The remainder of the intraorbital portion of the right ophthalmic artery appears well-perfused. Left ophthalmic arterial appearance is satisfactory. There is mild to moderate stenosis of the cavernous ICA segments bilaterally with calcific plaque. There is overall satisfactory branch arborization pattern and caliber otherwise of the ALEKSEY, MCA, and PCA vascular territory with congenital dominance of the right vertebral artery supplying the normal caliber basilar artery. Vascular variation anatomy at the level of the [place] with small - moderate bilateral posterior communicating arteries. No additional evidence for thromboembolic occlusion, aneurysm, dissection or hemodynamic high-grade stenosis. No high flow vascular malformation. Dural venous  sinus enhancement is normal. No additional pathologic enhancement intracranially. NECK CTA: 1.  No high-grade stenosis of the major neck arteries or at the great vessel origin level. Calcific plaque at the distal CCA, bifurcation proximal ICA level noted bilaterally more  prominent than right with mild to moderate right ICA stenosis 30-45% proximally. No high-grade stenosis of the internal carotid arteries within the neck. No dissection. No evidence for hemodynamically significant great vessel origin stenosis. 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 2.  No additional pathologic enhancement noted intracranially, within the neck or in the mediastinum.    MR Brain w/o Contrast    Addendum Date: 4/27/2024    COMMUNICATION ADDENDUM: Findings were discussed with Dr. Peters on 4/27/2024 3:08 PM CDT. END ADDENDUM    Result Date: 4/27/2024  EXAM: MR BRAIN W/O CONTRAST, MRA BRAIN ([place] [facility] [place]) W/O CONTRAST LOCATION: [facility] DATE: 4/27/2024 INDICATION: right vision loss COMPARISON: Noncontrast head CT 03/01/2024. TECHNIQUE: 1) Routine multiplanar multisequence head MRI without intravenous contrast. 2) 3D time-of-flight head MRA without intravenous contrast. FINDINGS: HEAD MRI: INTRACRANIAL CONTENTS: No acute or subacute infarct. No mass, acute hemorrhage, or extra-axial fluid collections. Patchy nonspecific T2/FLAIR hyperintensities within the cerebral white matter most consistent with mild to moderate chronic microvascular ischemic change. Moderate generalized cerebral atrophy. No hydrocephalus. Normal position of the cerebellar tonsils. SELLA: No abnormality accounting for technique. OSSEOUS STRUCTURES/SOFT TISSUES: Normal marrow signal. Possible high left frontal scalp lesion, measuring up to 18 mm in dimension. ORBITS: No abnormality accounting for technique. SINUSES/MASTOIDS: No paranasal sinus mucosal disease. No middle ear or mastoid effusion. HEAD MRA: ANTERIOR CIRCULATION: Possible occlusion within the proximal right ophthalmic artery (series 2, image 80). Otherwise patent anterior circulation. Anterior communicating artery fenestration. No aneurysm. POSTERIOR CIRCULATION: Dominant right vertebral artery. Patent posterior circulation  with robust posterior communicating arteries. Balanced vertebral arteries supply a normal basilar artery. No aneurysm.     IMPRESSION: HEAD MRI: 1.  No acute intracranial process. 2.  Generalized brain atrophy and presumed microvascular ischemic changes as detailed above. 3.  Possible high left frontal scalp lesion or foreign body; suggest correlation with physical exam. HEAD MRA: 1.  Possible occlusion within the proximal right ophthalmic artery. 2.  Otherwise patent intracranial vasculature. Report finalized to expedite patient care. Communication pending.    MRA Brain ([place] [facility] [place]) wo Contrast    Addendum Date: 4/27/2024    COMMUNICATION ADDENDUM: Findings were discussed with Dr. Peters on 4/27/2024 3:08 PM CDT. END ADDENDUM    Result Date: 4/27/2024  EXAM: MR BRAIN W/O CONTRAST, MRA BRAIN ([place] [facility] [place]) W/O CONTRAST LOCATION: [facility] DATE: 4/27/2024 INDICATION: right vision loss COMPARISON: Noncontrast head CT 03/01/2024. TECHNIQUE: 1) Routine multiplanar multisequence head MRI without intravenous contrast. 2) 3D time-of-flight head MRA without intravenous contrast. FINDINGS: HEAD MRI: INTRACRANIAL CONTENTS: No acute or subacute infarct. No mass, acute hemorrhage, or extra-axial fluid collections. Patchy nonspecific T2/FLAIR hyperintensities within the cerebral white matter most consistent with mild to moderate chronic microvascular ischemic change. Moderate generalized cerebral atrophy. No hydrocephalus. Normal position of the cerebellar tonsils. SELLA: No abnormality accounting for technique. OSSEOUS STRUCTURES/SOFT TISSUES: Normal marrow signal. Possible high left frontal scalp lesion, measuring up to 18 mm in dimension. ORBITS: No abnormality accounting for technique. SINUSES/MASTOIDS: No paranasal sinus mucosal disease. No middle ear or mastoid effusion. HEAD MRA: ANTERIOR CIRCULATION: Possible occlusion within the proximal right ophthalmic artery (series 2, image 80).  "Otherwise patent anterior circulation. Anterior communicating artery fenestration. No aneurysm. POSTERIOR CIRCULATION: Dominant right vertebral artery. Patent posterior circulation with robust posterior communicating arteries. Balanced vertebral arteries supply a normal basilar artery. No aneurysm.     IMPRESSION: HEAD MRI: 1.  No acute intracranial process. 2.  Generalized brain atrophy and presumed microvascular ischemic changes as detailed above. 3.  Possible high left frontal scalp lesion or foreign body; suggest correlation with physical exam. HEAD MRA: 1.  Possible occlusion within the proximal right ophthalmic artery. 2.  Otherwise patent intracranial vasculature. Report finalized to expedite patient care. Communication pending.    XR Hand Bilateral G/E 3 Views    Result Date: 4/24/2024  EXAM: XR HAND BILATERAL G/E 3 VIEWS LOCATION: [facility] DATE: 4/23/2024 INDICATION:  Pain in fingers of both hands. COMPARISON: None.     IMPRESSION: Degenerative change at the STT joint and first MCP joint bilaterally. Severe degenerative change at multiple IP joints of both hands. There is some irregularity along the PIP joint margins of the index and long fingers bilaterally but this is  most likely secondary to severe osteoarthritis rather than an inflammatory etiology. Recommend correlation.      Lab Results:  Personally Reviewed.   Fingerstick Blood Glucose: @[id number](POCGLUFGR:10)@    Last Hbg A1C: No results found for: \"HGBA1C\"   Lab Results   Component Value Date    INR 0.94 04/27/2024     Recent Results (from the past 24 hour(s))   Extra Blue Top Tube    Collection Time: 05/12/24 10:25 AM   Result Value Ref Range    Hold Specimen JIC    Extra Red Top Tube    Collection Time: 05/12/24 10:25 AM   Result Value Ref Range    Hold Specimen JIC    Extra Green Top (Lithium Heparin) Tube    Collection Time: 05/12/24 10:25 AM   Result Value Ref Range    Hold Specimen JIC    Extra Purple Top Tube "    Collection Time: 05/12/24 10:25 AM   Result Value Ref Range    Hold Specimen [facility]    Blood gas venous    Collection Time: 05/12/24 10:25 AM   Result Value Ref Range    pH Venous 7.42 7.32 - 7.43    pCO2 Venous 37 (L) 40 - 50 mm Hg    pO2 Venous 37 25 - 47 mm Hg    Bicarbonate Venous 24 21 - 28 mmol/L    Base Excess/Deficit Venous -0.7 -3.0 - 3.0 mmol/L    FIO2 28     Oxyhemoglobin Venous 66 (L) 70 - 75 %    O2 Sat, Venous 67.7 (L) 70.0 - 75.0 %   Basic metabolic panel    Collection Time: 05/12/24 10:25 AM   Result Value Ref Range    Sodium 136 135 - 145 mmol/L    Potassium 4.4 3.4 - 5.3 mmol/L    Chloride 105 98 - 107 mmol/L    Carbon Dioxide (CO2) 21 (L) 22 - 29 mmol/L    Anion Gap 10 7 - 15 mmol/L    Urea Nitrogen 7.9 (L) 8.0 - 23.0 mg/dL    Creatinine 0.61 0.51 - 0.95 mg/dL    GFR Estimate >90 >60 mL/min/1.73m2    Calcium 8.8 8.8 - 10.2 mg/dL    Glucose 123 (H) 70 - 99 mg/dL   Troponin T, High Sensitivity    Collection Time: 05/12/24 10:25 AM   Result Value Ref Range    Troponin T, High Sensitivity 14 <=14 ng/L   Magnesium    Collection Time: 05/12/24 10:25 AM   Result Value Ref Range    Magnesium 2.0 1.7 - 2.3 mg/dL   TSH with free T4 reflex    Collection Time: 05/12/24 10:25 AM   Result Value Ref Range    TSH 2.46 0.30 - 4.20 uIU/mL   Nt probnp inpatient (BNP)    Collection Time: 05/12/24 10:25 AM   Result Value Ref Range    N terminal Pro BNP Inpatient 1,678 0 - 1,800 pg/mL   Lactic acid whole blood    Collection Time: 05/12/24 10:25 AM   Result Value Ref Range    Lactic Acid 1.2 0.7 - 2.0 mmol/L   CBC with platelets and differential    Collection Time: 05/12/24 10:25 AM   Result Value Ref Range    WBC Count 7.7 4.0 - 11.0 10e3/uL    RBC Count 3.93 3.80 - 5.20 10e6/uL    Hemoglobin 14.1 11.7 - 15.7 g/dL    Hematocrit 40.9 35.0 - 47.0 %     (H) 78 - 100 fL    MCH 35.9 (H) 26.5 - 33.0 pg    MCHC 34.5 31.5 - 36.5 g/dL    RDW 13.3 10.0 - 15.0 %    Platelet Count 212 150 - 450 10e3/uL    % Neutrophils 64 %     % Lymphocytes 21 %    % Monocytes 10 %    % Eosinophils 3 %    % Basophils 1 %    % Immature Granulocytes 1 %    NRBCs per 100 WBC 0 <1 /100    Absolute Neutrophils 5.0 1.6 - 8.3 10e3/uL    Absolute Lymphocytes 1.6 0.8 - 5.3 10e3/uL    Absolute Monocytes 0.8 0.0 - 1.3 10e3/uL    Absolute Eosinophils 0.2 0.0 - 0.7 10e3/uL    Absolute Basophils 0.1 0.0 - 0.2 10e3/uL    Absolute Immature Granulocytes 0.0 <=0.4 10e3/uL    Absolute NRBCs 0.0 10e3/uL   Procalcitonin    Collection Time: 05/12/24 10:25 AM   Result Value Ref Range    Procalcitonin 0.04 <0.50 ng/mL   Symptomatic Influenza A/B, RSV, & SARS-CoV2 PCR (COVID-19) Nose    Collection Time: 05/12/24 10:34 AM    Specimen: Nose; Swab   Result Value Ref Range    Influenza A PCR Negative Negative    Influenza B PCR Negative Negative    RSV PCR Negative Negative    SARS CoV2 PCR Negative Negative   Blood Culture Peripheral Blood    Collection Time: 05/12/24 10:38 AM    Specimen: Peripheral Blood   Result Value Ref Range    Culture No growth after 12 hours    UA with Microscopic reflex to Culture    Collection Time: 05/12/24 11:51 AM    Specimen: Urine, Clean Catch   Result Value Ref Range    Color Urine Colorless Colorless, Straw, Light Yellow, Yellow    Appearance Urine Clear Clear    Glucose Urine Negative Negative mg/dL    Bilirubin Urine Negative Negative    Ketones Urine Negative Negative mg/dL    Specific Gravity Urine 1.009 1.001 - 1.030    Blood Urine Negative Negative    pH Urine 6.5 5.0 - 7.0    Protein Albumin Urine Negative Negative mg/dL    Urobilinogen Urine <2.0 <2.0 mg/dL    Nitrite Urine Negative Negative    Leukocyte Esterase Urine 75 Caitlin/uL (A) Negative    Bacteria Urine Few (A) None Seen /HPF    RBC Urine 1 <=2 /HPF    WBC Urine 12 (H) <=5 /HPF    Squamous Epithelials Urine 4 (H) <=1 /HPF    Hyaline Casts Urine 1 <=2 /LPF   Urine Culture    Collection Time: 05/12/24 11:51 AM    Specimen: Urine, Clean Catch   Result Value Ref Range    Culture Culture  "in progress    CT Chest Pulmonary Embolism w Contrast    Collection Time: 05/12/24 11:57 AM   Result Value Ref Range    Radiologist flags Lung nodule    Blood Culture Peripheral Blood    Collection Time: 05/12/24 12:09 PM    Specimen: Peripheral Blood   Result Value Ref Range    Culture No growth after 12 hours    Blood gas venous    Collection Time: 05/12/24 12:09 PM   Result Value Ref Range    pH Venous 7.43 7.32 - 7.43    pCO2 Venous 39 (L) 40 - 50 mm Hg    pO2 Venous 37 25 - 47 mm Hg    Bicarbonate Venous 25 21 - 28 mmol/L    Base Excess/Deficit Venous 0.9 -3.0 - 3.0 mmol/L    FIO2 32     Oxyhemoglobin Venous 69 (L) 70 - 75 %    O2 Sat, Venous 69.8 (L) 70.0 - 75.0 %   Echocardiogram Limited    Collection Time: 05/12/24  3:00 PM   Result Value Ref Range    LVEF  60-65%        Clinically Significant Risk Factors Present on Admission                # Drug Induced Platelet Defect: home medication list includes an antiplatelet medication   # Hypertension: Noted on problem list  # Acute heart failure with preserved ejection fraction: heart failure noted on problem list, last echo with EF >50%, and receiving IV diuretics     # Obesity: Estimated body mass index is 38.11 kg/m  as calculated from the following:    Height as of this encounter: 1.626 m (5' 4\").    Weight as of this encounter: 100.7 kg (222 lb).               Advanced Care Planning    Medical Decision Making       50 MINUTES SPENT BY ME on the date of service doing chart review, history, exam, documentation & further activities per the note.      Gilmer Rosas MD  Date: 5/13/2024  Time: 7:46 AM  [facility]  Family Medicine   "

## 2024-05-14 ENCOUNTER — APPOINTMENT (OUTPATIENT)
Dept: OCCUPATIONAL THERAPY | Facility: HOSPITAL | Age: 74
DRG: 291 | End: 2024-05-14
Payer: COMMERCIAL

## 2024-05-14 LAB
ANION GAP SERPL CALCULATED.3IONS-SCNC: 12 MMOL/L (ref 7–15)
BUN SERPL-MCNC: 11.7 MG/DL (ref 8–23)
CALCIUM SERPL-MCNC: 9 MG/DL (ref 8.8–10.2)
CHLORIDE SERPL-SCNC: 98 MMOL/L (ref 98–107)
CREAT SERPL-MCNC: 0.61 MG/DL (ref 0.51–0.95)
DEPRECATED HCO3 PLAS-SCNC: 27 MMOL/L (ref 22–29)
EGFRCR SERPLBLD CKD-EPI 2021: >90 ML/MIN/1.73M2
GLUCOSE SERPL-MCNC: 120 MG/DL (ref 70–99)
POTASSIUM SERPL-SCNC: 3.2 MMOL/L (ref 3.4–5.3)
POTASSIUM SERPL-SCNC: 3.7 MMOL/L (ref 3.4–5.3)
SODIUM SERPL-SCNC: 137 MMOL/L (ref 135–145)

## 2024-05-14 PROCEDURE — 250N000013 HC RX MED GY IP 250 OP 250 PS 637

## 2024-05-14 PROCEDURE — 120N000001 HC R&B MED SURG/OB

## 2024-05-14 PROCEDURE — 250N000013 HC RX MED GY IP 250 OP 250 PS 637: Performed by: INTERNAL MEDICINE

## 2024-05-14 PROCEDURE — 80048 BASIC METABOLIC PNL TOTAL CA: CPT | Performed by: EMERGENCY MEDICINE

## 2024-05-14 PROCEDURE — 84132 ASSAY OF SERUM POTASSIUM: CPT | Performed by: EMERGENCY MEDICINE

## 2024-05-14 PROCEDURE — 250N000013 HC RX MED GY IP 250 OP 250 PS 637: Performed by: EMERGENCY MEDICINE

## 2024-05-14 PROCEDURE — 97110 THERAPEUTIC EXERCISES: CPT | Mod: GO

## 2024-05-14 PROCEDURE — 99233 SBSQ HOSP IP/OBS HIGH 50: CPT | Performed by: INTERNAL MEDICINE

## 2024-05-14 PROCEDURE — 99233 SBSQ HOSP IP/OBS HIGH 50: CPT | Performed by: EMERGENCY MEDICINE

## 2024-05-14 PROCEDURE — 97535 SELF CARE MNGMENT TRAINING: CPT | Mod: GO

## 2024-05-14 PROCEDURE — 36415 COLL VENOUS BLD VENIPUNCTURE: CPT | Performed by: EMERGENCY MEDICINE

## 2024-05-14 RX ORDER — POTASSIUM CHLORIDE 1500 MG/1
40 TABLET, EXTENDED RELEASE ORAL ONCE
Qty: 2 TABLET | Refills: 0 | Status: COMPLETED | OUTPATIENT
Start: 2024-05-14 | End: 2024-05-14

## 2024-05-14 RX ORDER — BUMETANIDE 2 MG/1
2 TABLET ORAL DAILY
Status: DISCONTINUED | OUTPATIENT
Start: 2024-05-14 | End: 2024-05-15 | Stop reason: HOSPADM

## 2024-05-14 RX ADMIN — NITROGLYCERIN 15 MG: 20 OINTMENT TOPICAL at 09:08

## 2024-05-14 RX ADMIN — BUMETANIDE 2 MG: 2 TABLET ORAL at 09:08

## 2024-05-14 RX ADMIN — ASPIRIN 81 MG: 81 TABLET, COATED ORAL at 09:08

## 2024-05-14 RX ADMIN — BIMATOPROST 1 DROP: 0.1 SOLUTION/ DROPS OPHTHALMIC at 21:43

## 2024-05-14 RX ADMIN — LOSARTAN POTASSIUM 100 MG: 50 TABLET, FILM COATED ORAL at 09:08

## 2024-05-14 RX ADMIN — ACETAMINOPHEN 650 MG: 325 TABLET ORAL at 18:29

## 2024-05-14 RX ADMIN — CLOPIDOGREL BISULFATE 75 MG: 75 TABLET ORAL at 09:08

## 2024-05-14 RX ADMIN — Medication 1 MG: at 23:04

## 2024-05-14 RX ADMIN — POTASSIUM CHLORIDE 40 MEQ: 1500 TABLET, EXTENDED RELEASE ORAL at 09:38

## 2024-05-14 RX ADMIN — LEVOTHYROXINE SODIUM 137 MCG: 0.11 TABLET ORAL at 06:17

## 2024-05-14 RX ADMIN — MIRABEGRON 25 MG: 25 TABLET, FILM COATED, EXTENDED RELEASE ORAL at 09:26

## 2024-05-14 RX ADMIN — ATORVASTATIN CALCIUM 80 MG: 40 TABLET, FILM COATED ORAL at 21:43

## 2024-05-14 ASSESSMENT — ACTIVITIES OF DAILY LIVING (ADL)
ADLS_ACUITY_SCORE: 31
ADLS_ACUITY_SCORE: 31
ADLS_ACUITY_SCORE: 26
ADLS_ACUITY_SCORE: 35
ADLS_ACUITY_SCORE: 35
ADLS_ACUITY_SCORE: 26
ADLS_ACUITY_SCORE: 35
ADLS_ACUITY_SCORE: 35
ADLS_ACUITY_SCORE: 26
ADLS_ACUITY_SCORE: 35
ADLS_ACUITY_SCORE: 26
ADLS_ACUITY_SCORE: 31
ADLS_ACUITY_SCORE: 31
ADLS_ACUITY_SCORE: 35
ADLS_ACUITY_SCORE: 26
ADLS_ACUITY_SCORE: 26
ADLS_ACUITY_SCORE: 35
ADLS_ACUITY_SCORE: 26

## 2024-05-14 NOTE — PROGRESS NOTES
HEART CARE NOTE          Assessment/Recommendations     1. HFpEF c/b severe ADHF  Assessment / Plan  Transition to oral diuretic regimen and continue to monitor UOP and renal function closely  Patient is high risk for adverse cardiac events 2/2 advanced age, frailty, HTN  GDMT as detailed below; mainstay of treatment for HFpEF includes diuretics and adequate BP control (class I) and SGLT2-I (class 2a); additional medical therapy (ARNI, MRA, ARB) demonstrated less robust evidence for indication but may be considered per guideline recommendations (2b); no indication for BBlockers   Hold Bblocker as no cardiac indication at this time     Current Pharmacotherapy AHA Guideline-Directed Medical Therapy   Losartan 100 mg daily ARNI/ARB   Spironolactone not started  MRA   SGLT2 inhibitor: not started SGLT2-I    Bumex 2 mg daily Loop diuretic       2. Pericardial effusion  Assessment / Plan  Small; no imaging or clinical signs consistent with cardiac tamponade - diuresis as above     3. Hypertensive urgency  Assessment / Plan  Improving; Titrate oral afterload reduction as tolerated     4. Acute hypoxic respiratory failure  Assessment / Plan  In the setting of acute cardiogenic pulmonary edema likely precipitated by uncontrolled HTN/elevated SVR - diuresis as above    Plan of care discussed on May 14, 2024 with patient at bedside, and primary team overseeing patient's care      History of Present Illness/Subjective      Ms. Mirna Grijalva is a 74 year old female with a PMHx significant for (per Epic notation) essential HTN, HLD, hypothyroidism, recurrent UTI, recent amaurosis fugax (4/27/24) who was admitted on 5/12/2024 for acute hypoxic respiratory failure. Presented to the ED with 2 days of progressive SOB, dry cough. Hypoxic in ED requiring 3 L NC O2. CT chest showing moderate pulmonary edema (BNP within normal limits).      Today, Mrs. Grijalva denies acute cardiac events or complaints; Management plan as detailed above    "  ECG: Personally reviewed. normal sinus rhythm, occasional PVC noted, unifocal.     ECHO (personnaly Reviewed on 5/13/24):   Left ventricular size, wall motion and function are normal. The ejection  fraction is 60-65%.  Normal right ventricle size and systolic function.  IVC diameter <2.1 cm collapsing >50% with sniff suggests a normal RA pressure  of 3 mmHg.  Small pericardial effusion  There are no echocardiographic indications of cardiac tamponade.    Telemetry: personally reviewed May 14, 2024; notable for sinus rhythm    Lab results: personally reviewed May 14, 2024; notable for hypokalemia    Medical history and pertinent documents reviewed in Care Everywhere please where applicable see details above        Physical Examination Review of Systems   /70 (BP Location: Left arm)   Pulse 75   Temp 97.7  F (36.5  C) (Oral)   Resp 18   Ht 1.626 m (5' 4\")   Wt 100.7 kg (222 lb)   LMP  (LMP Unknown)   SpO2 97%   BMI 38.11 kg/m    Body mass index is 38.11 kg/m .  Wt Readings from Last 3 Encounters:   05/12/24 100.7 kg (222 lb)   04/27/24 101.6 kg (224 lb)   04/23/24 101.6 kg (224 lb)     General Appearance:   no distress, normal body habitus   ENT/Mouth: membranes moist, no oral lesions or bleeding gums.      EYES:  no scleral icterus, normal conjunctivae   Neck: no carotid bruits or thyromegaly   Chest/Lungs:   lungs are clear to auscultation, no rales or wheezing, equal chest wall expansion    Cardiovascular:   Regular. Normal first and second heart sounds with no murmurs, rubs, or gallops; the carotid, radial and posterior tibial pulses are intact, no JVD or LE edema edema bilaterally    Abdomen:  no organomegaly, masses, bruits, or tenderness; bowel sounds are present   Extremities: no cyanosis or clubbing   Skin: no xanthelasma, warm.    Neurologic: NAD     Psychiatric: alert and oriented x3, calm     A complete 10 systems ROS was reviewed  And is negative except what is listed in the HPI.    "       Medical History  Surgical History Family History Social History   Past Medical History:   Diagnosis Date    Mumps     Past Surgical History:   Procedure Laterality Date    CYSTOSCOPY      no family history of premature coronary artery disease Social History     Socioeconomic History    Marital status:      Spouse name: Not on file    Number of children: Not on file    Years of education: Not on file    Highest education level: Not on file   Occupational History    Not on file   Tobacco Use    Smoking status: Former     Types: Cigarettes     Passive exposure: Never    Smokeless tobacco: Never   Vaping Use    Vaping status: Never Used   Substance and Sexual Activity    Alcohol use: Yes    Drug use: Never    Sexual activity: Not on file   Other Topics Concern    Not on file   Social History Narrative    Not on file     Social Determinants of Health     Financial Resource Strain: Low Risk  (1/23/2024)    Financial Resource Strain     Within the past 12 months, have you or your family members you live with been unable to get utilities (heat, electricity) when it was really needed?: No   Food Insecurity: Low Risk  (1/23/2024)    Food Insecurity     Within the past 12 months, did you worry that your food would run out before you got money to buy more?: No     Within the past 12 months, did the food you bought just not last and you didn t have money to get more?: No   Transportation Needs: Low Risk  (1/23/2024)    Transportation Needs     Within the past 12 months, has lack of transportation kept you from medical appointments, getting your medicines, non-medical meetings or appointments, work, or from getting things that you need?: No   Physical Activity: Not on file   Stress: Not on file   Social Connections: Not on file   Interpersonal Safety: Low Risk  (4/23/2024)    Interpersonal Safety     Do you feel physically and emotionally safe where you currently live?: Yes     Within the past 12 months, have you  "been hit, slapped, kicked or otherwise physically hurt by someone?: No     Within the past 12 months, have you been humiliated or emotionally abused in other ways by your partner or ex-partner?: No   Housing Stability: Low Risk  (1/23/2024)    Housing Stability     Do you have housing? : Yes     Are you worried about losing your housing?: No           Lab Results    Chemistry/lipid CBC Cardiac Enzymes/BNP/TSH/INR   Lab Results   Component Value Date    CHOL 259 (H) 04/27/2024    HDL 75 04/27/2024    TRIG 118 04/27/2024    BUN 11.4 05/13/2024     05/13/2024    CO2 25 05/13/2024    Lab Results   Component Value Date    WBC 10.1 05/13/2024    HGB 14.2 05/13/2024    HCT 41.8 05/13/2024     (H) 05/13/2024     05/13/2024    Lab Results   Component Value Date    TSH 2.46 05/12/2024    INR 0.94 04/27/2024     No results found for: \"CKTOTAL\", \"CKMB\", \"TROPONINI\"       Weight:    Wt Readings from Last 3 Encounters:   05/12/24 100.7 kg (222 lb)   04/27/24 101.6 kg (224 lb)   04/23/24 101.6 kg (224 lb)       Allergies  Allergies   Allergen Reactions    Atenolol      Rash and Insomnia    Ciprofloxacin      Severe tendon pain and swelling    Eliquis [Apixaban]      Wanting to faint    Nitrofurantoin      'Bad, itchy, feverish, rash on arms and legs'    Sulfa Antibiotics      Rash on feet    Penicillins Rash         Surgical History  Past Surgical History:   Procedure Laterality Date    CYSTOSCOPY         Social History  Tobacco:   History   Smoking Status    Former    Types: Cigarettes   Smokeless Tobacco    Never    Alcohol:   Social History    Substance and Sexual Activity      Alcohol use: Yes   Illicit Drugs:   History   Drug Use Unknown       Family History  History reviewed. No pertinent family history.       Gelacio Molina MD on 5/14/2024      cc: Ambar Hernandez    "

## 2024-05-14 NOTE — PLAN OF CARE
Goal Outcome Evaluation:      Plan of Care Reviewed With: patient    Overall Patient Progress: no change    Patient alert and oriented x4. Patient uses the purewick for urination. Tele is NSR. Patient has scattered bruising throughout. Patient is an assist of 1 for mobility. Last CIWA was 0. Patient is on a 2 gr NA diet. Patient is now inpatient.

## 2024-05-14 NOTE — PLAN OF CARE
"PRIMARY DIAGNOSIS: \"GENERIC\" NURSING  OUTPATIENT/OBSERVATION GOALS TO BE MET BEFORE DISCHARGE:  ADLs back to baseline: No    Activity and level of assistance: Up with standby assistance.    Pain status: Pain free.    Return to near baseline physical activity: Yes     Discharge Planner Nurse   Safe discharge environment identified: Yes  Barriers to discharge: Yes       Entered by: Emily Jasso RN 05/14/2024 2:44 AM     Please review provider order for any additional goals.   Nurse to notify provider when observation goals have been met and patient is ready for discharge.Goal Outcome Evaluation:                        "

## 2024-05-14 NOTE — PLAN OF CARE
Problem: Pain Acute  Goal: Optimal Pain Control and Function  Outcome: Progressing  Intervention: Develop Pain Management Plan  Recent Flowsheet Documentation  Taken 5/14/2024 0418 by Emily Jasso RN  Pain Management Interventions: care clustered  Taken 5/14/2024 0114 by Emily Jasso RN  Pain Management Interventions: care clustered  Intervention: Prevent or Manage Pain  Recent Flowsheet Documentation  Taken 5/14/2024 0419 by Emily Jasso RN  Medication Review/Management: medications reviewed  Taken 5/14/2024 0116 by Emily Jasso RN  Medication Review/Management: medications reviewed     Problem: Risk for Delirium  Goal: Improved Sleep  Outcome: Progressing     Problem: Adult Inpatient Plan of Care  Goal: Absence of Hospital-Acquired Illness or Injury  Intervention: Prevent Skin Injury  Recent Flowsheet Documentation  Taken 5/14/2024 0419 by Emily Jasso RN  Body Position: position changed independently  Taken 5/14/2024 0116 by Emily Jasso RN  Body Position: position changed independently   Goal Outcome Evaluation:       Patient admitted for cough and SOB. CT chest shows lung nodule, pulmonary edema and small bilateral pleural effusion. On tele with NSR, On 2L 02. Vitally stable. On CIWA for heavy alcohol use with the score of 0. Left PIV saline locked. Purewick in place. 2g sodium diet. Core clinic evaluation, nutrition services and OT consult. Denies pain, and slept well throughout the night.

## 2024-05-14 NOTE — PLAN OF CARE
"  Problem: Adult Inpatient Plan of Care  Goal: Plan of Care Review  Description: The Plan of Care Review/Shift note should be completed every shift.  The Outcome Evaluation is a brief statement about your assessment that the patient is improving, declining, or no change.  This information will be displayed automatically on your shift  note.  Outcome: Progressing  Flowsheets (Taken 5/14/2024 1802)  Plan of Care Reviewed With: patient  Overall Patient Progress: improving  Goal: Patient-Specific Goal (Individualized)  Description: You can add care plan individualizations to a care plan. Examples of Individualization might be:  \"Parent requests to be called daily at 9am for status\", \"I have a hard time hearing out of my right ear\", or \"Do not touch me to wake me up as it startles  me\".  Outcome: Progressing     Problem: Adult Inpatient Plan of Care  Goal: Absence of Hospital-Acquired Illness or Injury  Intervention: Identify and Manage Fall Risk  Recent Flowsheet Documentation  Taken 5/14/2024 1551 by Laura Shultz RN  Safety Promotion/Fall Prevention:   activity supervised   clutter free environment maintained   room near nurse's station   safety round/check completed  Taken 5/14/2024 1200 by Laura Shultz RN  Safety Promotion/Fall Prevention:   activity supervised   clutter free environment maintained   room near nurse's station   safety round/check completed  Taken 5/14/2024 0800 by Laura Shultz RN  Safety Promotion/Fall Prevention:   activity supervised   clutter free environment maintained   room near nurse's station   safety round/check completed  Intervention: Prevent Skin Injury  Recent Flowsheet Documentation  Taken 5/14/2024 1551 by Laura Shultz RN  Body Position: position changed independently  Taken 5/14/2024 1200 by Laura Shultz RN  Body Position: position changed independently  Taken 5/14/2024 0800 by Laura Shultz RN  Body Position: position changed " independently  Intervention: Prevent and Manage VTE (Venous Thromboembolism) Risk  Recent Flowsheet Documentation  Taken 5/14/2024 1551 by Laura Shultz RN  VTE Prevention/Management: SCDs (sequential compression devices) off  Taken 5/14/2024 1200 by Laura Shultz RN  VTE Prevention/Management: SCDs (sequential compression devices) off  Taken 5/14/2024 0800 by Laura Shultz RN  VTE Prevention/Management: SCDs (sequential compression devices) off  Intervention: Prevent Infection  Recent Flowsheet Documentation  Taken 5/14/2024 1551 by Laura Shultz RN  Infection Prevention:   hand hygiene promoted   rest/sleep promoted   single patient room provided         Goal Outcome Evaluation:      Plan of Care Reviewed With: patient    Overall Patient Progress: improvingOverall Patient Progress: improving     Pt A&Ox4, VSS. On Room air. CIWA score 0.

## 2024-05-14 NOTE — PROGRESS NOTES
Daily Progress Note    Assessment/Plan:  74 year old female with PMHx of essential HTN, HLD, hypothyroidism, recurrent UTI, recent amaurosis fugax (4/27/24) who was admitted on 5/12/2024 for acute hypoxic respiratory failure. Presented to the ED with 2 days of progressive SOB, dry cough. Hypoxic in ED requiring 3 L NC O2. CT chest showing moderate pulmonary edema (BNP within normal limits).       Diastolic heart failure with acute Hypoxic Respiratory Failure -continues requiring 3 L of O2 initially  Was still on half liter of O2 this morning, still trying to wean today    HCTZ recently discontinued  -- CT Chest: mod pulm edema, small b/l pleural effusions, no PE.   -- BNP within normal limits. Bilat LE edema but only trace pitting    -- Resp panel negative  -- Procal within normal limits   -- Echo 5/12: EF 60-65%, small pericardial effusion, no echocardiographic indications of tamponade, no further treatment or monitoring per cardiology other than continued IV diuresis.  - Recent echo (4/27): EF at that time EF 60-65%, no WMA, grade 1 diastolic dysfunction. No pericardial effusion seen at that time  -- Consider pericarditis but without c/o chest pain less likely etiology  -- Today transition to oral Bumex, will need continuing monitoring and recheck BMP in the morning     Recent Amaurosis Fugax (4/27)   -- Neuro recs from that admission; Continue aspirin and Plavix per stroke neurology (at least 3 months)  -- Was discontinued off losartan-hydrochlorothiazide 100-25 mg per neuro recs at discharge for permissive HTN   -- Cont PTA statin, ASA, Plavix   -- Continue with outpatient follow-up      Hypertensive Urgency   Essential HTN   in the ED  -- Given Lasix, hydrochlorothiazide, metoprolol IV 5 mg and metoprolol tab 50 mg in ED   -- Cont PTA metoprolol 100 mg BID with hold parameters  -- Losartan resumed  -- Will hold on further hydrochlorothiazide for now, she may be better candidate for Lasix   -- Patient  "states she threw out her rx of her losartan-hydrochlorothiazide after most recent discharge, with a new prescription at discharge if continuing.    -- Close monitoring     Atrophic Vaginitis   Urinary Urgency   H/o Frequent / Recurrent UTI  -- UA in ED slight leuk esterase, 12 WBC but patient states chronic dysuria, urgency have been improved as of late   -- Will hold on further abx for now, await UC   -- follows w urology; recently started on Myretriq and estrogen cream  -- Cont PTA myretriq cream      Hypothyroidism   History of parathyroid nodules s/p parathyroidectomy  History of thyroid cancer  -- TSH within normal limits   -- Cont PTA levothyroxine   - Per PCP note, patient does have an upcoming appointment with endo in Julyto take over her care     ? Heavy Alcohol Use   -- recent PCP note (4/23) reports patient drinking 4 glasses of wine daily   -- GABE, close monitoring while here   -No current evidence of withdrawal       Hyperglycemia  Likely reactive  -- A1C 5.4 (4/2024)  -- Trend on BMP      Incidential Findings on Imaging - Nodular areas of consolidation within both lungs measuring up to 19 mm. Recommend follow-up chest CT in one month to evaluate for resolution               -- Patient does have remote 20 yr PPD smoking history               -- Discussed findings with patient and family, and recommended follow-up   Clinically Significant Risk Factors Present on Admission        # Hypokalemia: Lowest K = 3.2 mmol/L in last 2 days, will replace as needed         # Drug Induced Platelet Defect: home medication list includes an antiplatelet medication   # Hypertension: Noted on problem list  # Acute heart failure with preserved ejection fraction: heart failure noted on problem list, last echo with EF >50%, and receiving IV diuretics     # Obesity: Estimated body mass index is 38.11 kg/m  as calculated from the following:    Height as of this encounter: 1.626 m (5' 4\").    Weight as of this encounter: 100.7 " kg (222 lb).                 Code status:Full Code        Barriers to Discharge: Hypoxia, continued adjustment in diuretic regimen    Disposition: Anticipate discharge tomorrow    Subjective:  Linda reports feeling better today.  Less short of breath but she has not been ambulating much.  No chest pain, no fevers or chills or abdominal pain.  I left messages with both her daughters.  Also discussed with her  present in the room.        Current Medications Reviewed via EHR List    Objective:  Vital signs in last 24 hours:  [unfilled]  .prog  Weight:   @THISENCWEIGHTS(1)@  Weight change:   Body mass index is 38.11 kg/m .    Intake/Output last 3 shifts:  I/O last 3 completed shifts:  In: 880 [P.O.:880]  Out: 3550 [Urine:3550]  Intake/Output this shift:  I/O this shift:  In: 480 [P.O.:480]  Out: 600 [Urine:600]    Physical Exam:  General: No apparent distress, lying in bed  CV: Regular, rate in the 90s, mild bilateral peripheral edema  Lungs: Improved aeration  Abdomen: Soft, nontender        Imaging:  Personally Reviewed.  Echocardiogram Limited    Result Date: 2024  [id number]^DESMOND^AURORA^[place] [address]  Name: LINDA YI MRN: [id number] : 1950 Study Date: 2024 02:03 PM Age: 74 yrs Gender: Female Patient Location: [facility] Reason For Study: CHF Ordering Physician: AURORA LOGAN Performed By: ARTHUR  BSA: 2.0 m2 Height: 64 in Weight: 222 lb BP: 156/88 mmHg ______________________________________________________________________________ Procedure Limited Portable Echo Adult. ______________________________________________________________________________ Interpretation Summary  Left ventricular size, wall motion and function are normal. The ejection fraction is 60-65%. Normal right ventricle size and systolic function. IVC diameter <2.1 cm collapsing >50% with sniff suggests a normal RA pressure of 3 mmHg. Small pericardial  effusion There are no echocardiographic indications of cardiac tamponade. ______________________________________________________________________________ Left Ventricle Left ventricular size, wall motion and function are normal. The ejection fraction is 60-65%. No regional wall motion abnormalities noted.  Right Ventricle Normal right ventricle size and systolic function.  Atria Normal left atrial size. Right atrial size is normal. There is no color Doppler evidence of an atrial shunt.  Mitral Valve There is mild mitral annular calcification.  Aortic Valve No hemodynamically significant valvular aortic stenosis.  Pulmonic Valve The pulmonic valve is not well seen, but is grossly normal.  Vessels IVC diameter <2.1 cm collapsing >50% with sniff suggests a normal RA pressure of 3 mmHg.  Pericardium Small pericardial effusion. There are no echocardiographic indications of cardiac tamponade.  ______________________________________________________________________________ MMode/2D Measurements & Calculations IVSd: 0.87 cm LVIDd: 5.1 cm LVIDs: 3.8 cm LVPWd: 1.0 cm FS: 24.9 % LV mass(C)d: 174.4 grams LV mass(C)dI: 85.3 grams/m2 EF Biplane: 53.2 %  RWT: 0.40  Time Measurements MM HR: 70.0 BPM  ______________________________________________________________________________ Report approved by: Breanna Orozco 05/12/2024 03:52 PM       CT Chest Pulmonary Embolism w Contrast    Result Date: 5/12/2024  EXAM: CT CHEST PULMONARY EMBOLISM W CONTRAST LOCATION: [facility] DATE: 5/12/2024 INDICATION: r o PE, recent admission, hypoxia COMPARISON: None. TECHNIQUE: CT chest pulmonary angiogram during arterial phase injection of IV contrast. Multiplanar reformats and MIP reconstructions were performed. Dose reduction techniques were used. CONTRAST: ISOVUE 370 75ML FINDINGS: ANGIOGRAM CHEST: Normal caliber pulmonary trunk. No pulmonary embolism to the subsegmental level. LUNGS AND PLEURA: Central airways are  patent. Small bilateral pleural effusions and moderate pulmonary edema. No pneumothorax. Scattered pulmonary nodules. For example: - Right lower lobe, 19 mm (series 6 image 186) - Right lower lobe, 8 mm (series 6 image 150) - Left upper lobe, 11 mm (series 6 image 61). MEDIASTINUM/AXILLAE: Right axillary lymph node dissection. Enlarged mediastinal and hilar lymph nodes (series 4 image 90, 145). Unremarkable thyroid. Normal caliber thoracic aorta. Cardiomegaly. No pericardial effusion. CORONARY ARTERY CALCIFICATION: Mild. UPPER ABDOMEN: Gastric band, with borderline phi angle of 57 degrees. 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 MUSCULOSKELETAL: Multilevel degenerative disc disease of the thoracolumbar spine. Chronic appearing compression fracture of T11. No definite acute fractures. Bilateral breast prostheses.     IMPRESSION: 1.  No pulmonary embolism to the subsegmental level. 2.  Small bilateral pleural effusions and moderate pulmonary edema. 3.  Nodular areas of consolidation within both lungs measuring up to 19 mm. Recommend follow-up chest CT in one month to evaluate for resolution. [Recommend Follow Up: Lung nodule] This report will be copied to the [facility] to ensure a provider acknowledges the finding.    XR Lumbar Spine 2/3 Views    Result Date: 4/28/2024  EXAM: XR LUMBAR SPINE 2/3 VIEWS LOCATION: [facility] DATE: 4/28/2024 INDICATION: back pain COMPARISON: 03/19/2024 MRI lumbar spine     IMPRESSION:  Redemonstrated severe degenerative changes throughout the lumbar spine with facet arthropathy, disc height loss, vertebral body osteophyte formation, and sclerotic endplate changes, better characterized on recent MRI. Redemonstrated moderate  stepwise retrolisthesis from L1-L3. Grade 1 anterolisthesis at L1-L2. Vertebral body heights unchanged. No findings to suggest acute fracture.    Echocardiogram Complete - For age > 60 yrs    Result Date:  2024  [id number]^GONDAL^[place] [address]  Name: LINDA YI MRN: [id number] : 1950 Study Date: 2024 09:12 AM Age: 74 yrs Gender: Female Patient Location: [facility] Reason For Study: Cerebrovascular Incident Ordering Physician: GONDAL, SAAD J Performed By: MAREN  BSA: 2.1 m2 Height: 64 in Weight: 224 lb BP: 134/86 mmHg ______________________________________________________________________________ Procedure Adequate quality two-dimensional was performed and interpreted. There is no comparison study available. ______________________________________________________________________________ Interpretation Summary  The left ventricle is normal in size. There is mild concentric left ventricular hypertrophy. Left ventricular systolic function is normal. The visual ejection fraction is 60-65%. No regional wall motion abnormalities noted. Grade I or early diastolic dysfunction.  The right ventricle is normal in size and function. The left atrium is mildly dilated. Right atrium not well visualized. IVC diameter <2.1 cm collapsing >50% with sniff suggests a normal RA pressure of 3 mmHg. There is no comparison study available. ______________________________________________________________________________ Left Ventricle The left ventricle is normal in size. There is mild concentric left ventricular hypertrophy. Left ventricular systolic function is normal. The visual ejection fraction is 60-65%. Grade I or early diastolic dysfunction. No regional wall motion abnormalities noted.  Right Ventricle The right ventricle is normal in size and function.  Atria The left atrium is mildly dilated. Right atrium not well visualized.  Mitral Valve Mitral valve leaflets appear normal. There is mild to moderate mitral annular calcification. There is trace mitral regurgitation. There is no mitral valve stenosis.  Tricuspid Valve The tricuspid valve is not well  visualized. There is trace tricuspid regurgitation. Right ventricular systolic pressure could not be approximated due to inadequate tricuspid regurgitation.  Aortic Valve The aortic valve is trileaflet. Mild aortic valve calcification is present. No aortic regurgitation is present. No aortic stenosis is present.  Pulmonic Valve The pulmonic valve is not well visualized. There is trace pulmonic valvular regurgitation.  Vessels The aorta root is normal. IVC diameter <2.1 cm collapsing >50% with sniff suggests a normal RA pressure of 3 mmHg.  Pericardium There is no pericardial effusion.  Rhythm Sinus rhythm was noted. ______________________________________________________________________________ MMode/2D Measurements & Calculations IVSd: 1.3 cm LVIDd: 4.8 cm LVIDs: 3.6 cm LVPWd: 1.3 cm FS: 25.6 % LV mass(C)d: 233.0 grams LV mass(C)dI: 113.5 grams/m2 Ao root diam: 3.8 cm asc Aorta Diam: 3.6 cm LVOT diam: 2.2 cm LVOT area: 3.8 cm2 Ao root diam index Ht(cm/m): 2.3 Ao root diam index BSA (cm/m2): 1.9 Asc Ao diam index BSA (cm/m2): 1.8 Asc Ao diam index Ht(cm/m): 2.2 EF Biplane: 70.5 %  LA Volume Indexed (AL/bp): 42.3 ml/m2 RWT: 0.52 TAPSE: 2.7 cm  Doppler Measurements & Calculations MV E max kwan: 95.6 cm/sec MV A max kwan: 140.0 cm/sec MV E/A: 0.68 MV dec slope: 392.0 cm/sec2 MV dec time: 0.24 sec Ao V2 max: 146.0 cm/sec Ao max P.0 mmHg Ao V2 mean: 95.9 cm/sec Ao mean P.0 mmHg Ao V2 VTI: 23.8 cm DAILY(I,D): 3.9 cm2 DAILY(V,D): 3.5 cm2 LV V1 max P.3 mmHg LV V1 max: 135.0 cm/sec LV V1 VTI: 24.5 cm SV(LVOT): 93.1 ml SI(LVOT): 45.4 ml/m2 PA V2 max: 92.4 cm/sec PA max PG: 3.4 mmHg PA acc time: 0.11 sec AV Kwan Ratio (DI): 0.92 DAILY Index (cm2/m2): 1.9  E/E' av.7 Lateral E/e': 12.9 Medial E/e': 24.4 RV S Kwan: 20.8 cm/sec  ______________________________________________________________________________ Report approved by: Breanna Kwan 2024 03:55 PM       CTA Head Neck with Contrast    Result Date:  4/27/2024  EXAM: CTA HEAD NECK W CONTRAST LOCATION: [facility] DATE: 4/27/2024 INDICATION: Right-sided visual loss. COMPARISON: Brain MRI 04/27/2024 head CT 03/01/2024 CONTRAST: Isovue 370 75ml TECHNIQUE: Head and neck CT angiogram with IV contrast. Noncontrast head CT followed by axial helical CT images of the head and neck vessels obtained during the arterial phase of intravenous contrast administration. Axial 2D reconstructed images and multiplanar 3D MIP reconstructed images of the head and neck vessels were performed by the technologist. Dose reduction techniques were used. All stenosis measurements made according to NASCET criteria unless otherwise specified. FINDINGS: NONCONTRAST HEAD CT: INTRACRANIAL CONTENTS: No intracranial hemorrhage, extraaxial collection, or mass effect.  No CT evidence of acute infarct. Mild to moderate presumed chronic small vessel ischemic changes. Moderate generalized volume loss. No hydrocephalus. Corpus callosum is normal. Cerebellar tonsillar position is within normal limits. Sella and supersellar structures are normal. Vascular calcification. VISUALIZED ORBITS/SINUSES/MASTOIDS: Prior left cataract surgery. Visualized portions of the orbits are otherwise unremarkable. No retrobulbar edema. There is partial opacification of the right sphenoid sinus. No middle ear or mastoid effusion. BONES/SOFT TISSUES: No scalp hematoma. No skull fracture. Demineralization of skull base. Degenerative changes both TMJs. Nasopharynx is patent. HEAD CTA: Prior MRA reviewed from 04/27/2024 suggested possible short segment occlusion proximal right ophthalmic artery. This corresponds to apparent loss of intravascular enhancement which may represent a short segment occlusion or small area of high-grade stenosis at the level of the right orbital apex series 11 images 397 - 401, with the area of interest suggested series 11 image 399. The remainder of the intraorbital portion of  the right ophthalmic artery appears well perfused with no additional areas of stenosis or distal thrombosis. Left ophthalmic artery appears satisfactory. ANTERIOR CIRCULATION: No additional anterior circulation stenosis/occlusion, aneurysm, or high flow vascular malformation. Bilateral posterior communicating arteries larger on the left augment the posterior circulation. Calcific plaque and tortuosity of the cavernous ICA segments bilaterally left more than right with mild to moderate stenosis 30-45%. Satisfactory branch arborization pattern and caliber of the ALEKSEY and MCA vascular territory bilaterally. POSTERIOR CIRCULATION: No stenosis/occlusion, aneurysm, or high flow vascular malformation. Normal caliber basilar artery supplied from the right V4 segment. Augmentation of the posterior circulation with small - moderate bilateral posterior communicating arteries larger on the left. Satisfactory and symmetric branch arborization pattern and caliber of the PCA vascular territory overall. DURAL VENOUS SINUSES: Expected enhancement of the major dural venous sinuses. NECK CTA: RIGHT CAROTID: Coarse eccentric calcific plaque at the distal CCA, bifurcation and proximal right ICA. There is moderate stenosis of the distal right CCA level probably 40% in degree. No ulcerated plaque or endoluminal hypodense thrombosis noted. No tandem right ICA stenosis or dissection. No high-grade right ICA stenosis. Mild origin stenosis right ECA under 15%. LEFT CAROTID: Calcific and fibrofatty plaque at the distal CCA and bifurcation/proximal left ICA with very mild 5-10% hemodynamic stenosis. No tandem stenosis, high-grade stenosis or dissection of left ICA within the neck. VERTEBRAL ARTERIES: No focal stenosis or dissection. Dominant right and smaller left vertebral arteries. The right V4 segment is congenitally dominant and supplies the majority of the basilar artery with left diminutive V3 and V4 segment terminating predominantly in a  left PICA. AORTIC ARCH: Classic aortic arch anatomy with no significant stenosis at the origin of the great vessels. Tortuosity at the great vessel origin level noted including medial deviation of the proximal common carotid arteries with retroesophageal course. NONVASCULAR STRUCTURES: Unremarkable.     IMPRESSION: HEAD CT: 1.  No CT evidence for acute intracranial process. 2.  Brain atrophy and presumed chronic microvascular ischemic changes as above. 3.  Partial opacification right sphenoid sinus. 4.  Stable intracranial appearance from 03/01/2024. HEAD CTA: 1.  Possible short segment high-grade stenosis at the right ophthalmic arterial origin at the level of the right orbital apex corresponds to MRA abnormality suggested on earlier examination today 04/27/2024. Right-sided visual loss is reported. The remainder of the intraorbital portion of the right ophthalmic artery appears well-perfused. Left ophthalmic arterial appearance is satisfactory. There is mild to moderate stenosis of the cavernous ICA segments bilaterally with calcific plaque. There is overall satisfactory branch arborization pattern and caliber otherwise of the ALEKSEY, MCA, and PCA vascular territory with congenital dominance of the right vertebral artery supplying the normal caliber basilar artery. Vascular variation anatomy at the level of the [place] with small - moderate bilateral posterior communicating arteries. No additional evidence for thromboembolic occlusion, aneurysm, dissection or hemodynamic high-grade stenosis. No high flow vascular malformation. Dural venous  sinus enhancement is normal. No additional pathologic enhancement intracranially. NECK CTA: 1.  No high-grade stenosis of the major neck arteries or at the great vessel origin level. Calcific plaque at the distal CCA, bifurcation proximal ICA level noted bilaterally more prominent than right with mild to moderate right ICA stenosis 30-45% proximally. No high-grade  stenosis of the internal carotid arteries within the neck. No dissection. No evidence for hemodynamically significant great vessel origin stenosis. 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 2.  No additional pathologic enhancement noted intracranially, within the neck or in the mediastinum.    MR Brain w/o Contrast    Addendum Date: 4/27/2024    COMMUNICATION ADDENDUM: Findings were discussed with Dr. Peters on 4/27/2024 3:08 PM CDT. END ADDENDUM    Result Date: 4/27/2024  EXAM: MR BRAIN W/O CONTRAST, MRA BRAIN ([place]) W/O CONTRAST LOCATION: [facility] DATE: 4/27/2024 INDICATION: right vision loss COMPARISON: Noncontrast head CT 03/01/2024. TECHNIQUE: 1) Routine multiplanar multisequence head MRI without intravenous contrast. 2) 3D time-of-flight head MRA without intravenous contrast. FINDINGS: HEAD MRI: INTRACRANIAL CONTENTS: No acute or subacute infarct. No mass, acute hemorrhage, or extra-axial fluid collections. Patchy nonspecific T2/FLAIR hyperintensities within the cerebral white matter most consistent with mild to moderate chronic microvascular ischemic change. Moderate generalized cerebral atrophy. No hydrocephalus. Normal position of the cerebellar tonsils. SELLA: No abnormality accounting for technique. OSSEOUS STRUCTURES/SOFT TISSUES: Normal marrow signal. Possible high left frontal scalp lesion, measuring up to 18 mm in dimension. ORBITS: No abnormality accounting for technique. SINUSES/MASTOIDS: No paranasal sinus mucosal disease. No middle ear or mastoid effusion. HEAD MRA: ANTERIOR CIRCULATION: Possible occlusion within the proximal right ophthalmic artery (series 2, image 80). Otherwise patent anterior circulation. Anterior communicating artery fenestration. No aneurysm. POSTERIOR CIRCULATION: Dominant right vertebral artery. Patent posterior circulation with robust posterior communicating arteries. Balanced vertebral arteries supply a normal  basilar artery. No aneurysm.     IMPRESSION: HEAD MRI: 1.  No acute intracranial process. 2.  Generalized brain atrophy and presumed microvascular ischemic changes as detailed above. 3.  Possible high left frontal scalp lesion or foreign body; suggest correlation with physical exam. HEAD MRA: 1.  Possible occlusion within the proximal right ophthalmic artery. 2.  Otherwise patent intracranial vasculature. Report finalized to expedite patient care. Communication pending.    MRA Brain ([place]) wo Contrast    Addendum Date: 4/27/2024    COMMUNICATION ADDENDUM: Findings were discussed with Dr. Peters on 4/27/2024 3:08 PM CDT. END ADDENDUM    Result Date: 4/27/2024  EXAM: MR BRAIN W/O CONTRAST, MRA BRAIN ([place]) W/O CONTRAST LOCATION: [facility] DATE: 4/27/2024 INDICATION: right vision loss COMPARISON: Noncontrast head CT 03/01/2024. TECHNIQUE: 1) Routine multiplanar multisequence head MRI without intravenous contrast. 2) 3D time-of-flight head MRA without intravenous contrast. FINDINGS: HEAD MRI: INTRACRANIAL CONTENTS: No acute or subacute infarct. No mass, acute hemorrhage, or extra-axial fluid collections. Patchy nonspecific T2/FLAIR hyperintensities within the cerebral white matter most consistent with mild to moderate chronic microvascular ischemic change. Moderate generalized cerebral atrophy. No hydrocephalus. Normal position of the cerebellar tonsils. SELLA: No abnormality accounting for technique. OSSEOUS STRUCTURES/SOFT TISSUES: Normal marrow signal. Possible high left frontal scalp lesion, measuring up to 18 mm in dimension. ORBITS: No abnormality accounting for technique. SINUSES/MASTOIDS: No paranasal sinus mucosal disease. No middle ear or mastoid effusion. HEAD MRA: ANTERIOR CIRCULATION: Possible occlusion within the proximal right ophthalmic artery (series 2, image 80). Otherwise patent anterior circulation. Anterior communicating artery fenestration. No  "aneurysm. POSTERIOR CIRCULATION: Dominant right vertebral artery. Patent posterior circulation with robust posterior communicating arteries. Balanced vertebral arteries supply a normal basilar artery. No aneurysm.     IMPRESSION: HEAD MRI: 1.  No acute intracranial process. 2.  Generalized brain atrophy and presumed microvascular ischemic changes as detailed above. 3.  Possible high left frontal scalp lesion or foreign body; suggest correlation with physical exam. HEAD MRA: 1.  Possible occlusion within the proximal right ophthalmic artery. 2.  Otherwise patent intracranial vasculature. Report finalized to expedite patient care. Communication pending.    XR Hand Bilateral G/E 3 Views    Result Date: 4/24/2024  EXAM: XR HAND BILATERAL G/E 3 VIEWS LOCATION: [facility] DATE: 4/23/2024 INDICATION:  Pain in fingers of both hands. COMPARISON: None.     IMPRESSION: Degenerative change at the STT joint and first MCP joint bilaterally. Severe degenerative change at multiple IP joints of both hands. There is some irregularity along the PIP joint margins of the index and long fingers bilaterally but this is  most likely secondary to severe osteoarthritis rather than an inflammatory etiology. Recommend correlation.      Lab Results:  Personally Reviewed.   Fingerstick Blood Glucose: @YMOOPNY93OGR(POCGLUFGR:10)@    Last Hbg A1C: No results found for: \"HGBA1C\"   Lab Results   Component Value Date    INR 0.94 04/27/2024     Recent Results (from the past 24 hour(s))   Basic metabolic panel    Collection Time: 05/14/24  7:43 AM   Result Value Ref Range    Sodium 137 135 - 145 mmol/L    Potassium 3.2 (L) 3.4 - 5.3 mmol/L    Chloride 98 98 - 107 mmol/L    Carbon Dioxide (CO2) 27 22 - 29 mmol/L    Anion Gap 12 7 - 15 mmol/L    Urea Nitrogen 11.7 8.0 - 23.0 mg/dL    Creatinine 0.61 0.51 - 0.95 mg/dL    GFR Estimate >90 >60 mL/min/1.73m2    Calcium 9.0 8.8 - 10.2 mg/dL    Glucose 120 (H) 70 - 99 mg/dL "           Advanced Care Planning    Medical Decision Making       50 MINUTES SPENT BY ME on the date of service doing chart review, history, exam, documentation & further activities per the note.      Gilmer Rosas MD  Date: 5/14/2024  Time: 12:59 PM  [facility]  Family Medicine

## 2024-05-14 NOTE — PROGRESS NOTES
"PRIMARY DIAGNOSIS: \"GENERIC\" NURSING  OUTPATIENT/OBSERVATION GOALS TO BE MET BEFORE DISCHARGE:  ADLs back to baseline: No    Activity and level of assistance: Up with standby assistance.    Pain status: Pain free.    Return to near baseline physical activity: No     Discharge Planner Nurse   Safe discharge environment identified: Yes  Barriers to discharge: Yes       Entered by: aLura Shultz RN 05/14/2024 9:49 AM   Pt A&Ox4. On Room air. Denies pain.       Please review provider order for any additional goals.   Nurse to notify provider when observation goals have been met and patient is ready for discharge.  "

## 2024-05-14 NOTE — PROGRESS NOTES
[facility] via Reggie Evangelista RN was introduced to the patient and their  today including our role in the home management of their heart failure. Heart Failure Education Materials were shared today with the patient and . This includes a daily weight log, low sodium diet recommendations, clinic information, and important guidelines of care.     Expectations discussed for heart failure management included but were not limited to; daily weight tracking,  B/P monitoring, a low sodium diet of 2000mg or less daily, Daily Fluid restriction of 50-60oz per 24 hours, exercise as tolerated and advised, the monitoring of heart failure symptoms and how to report them to the [facility] clinic or emergency resources if applicable.     Patient reported drinking approximately 80 oz of fluids per day. Patient receptive during teach however, she was often distracted when she diverted conversation to her frustration with a recent history of pt reported HTN mismanagement. I assured her when she left the hospital the last time (end of April) they had her on Metoprolol. She was convinced she was taken off all HTN medications. It was difficult to navigate a HF teach while pt was focused on this. She may benefit from another teach at her follow up.     Follow up expectations were established, including the continued guidance and assessments as needed from [facility] clinic providers and nurses.     Reggie Evangelista RN [facility]       [facility]   [phone] [facility]   [facility]   [address] [place] [address]

## 2024-05-15 VITALS
HEART RATE: 115 BPM | SYSTOLIC BLOOD PRESSURE: 106 MMHG | BODY MASS INDEX: 37.9 KG/M2 | OXYGEN SATURATION: 95 % | WEIGHT: 222 LBS | HEIGHT: 64 IN | RESPIRATION RATE: 20 BRPM | DIASTOLIC BLOOD PRESSURE: 68 MMHG | TEMPERATURE: 98 F

## 2024-05-15 LAB
ANION GAP SERPL CALCULATED.3IONS-SCNC: 12 MMOL/L (ref 7–15)
BUN SERPL-MCNC: 11.4 MG/DL (ref 8–23)
CALCIUM SERPL-MCNC: 9.2 MG/DL (ref 8.8–10.2)
CHLORIDE SERPL-SCNC: 101 MMOL/L (ref 98–107)
CREAT SERPL-MCNC: 0.54 MG/DL (ref 0.51–0.95)
DEPRECATED HCO3 PLAS-SCNC: 26 MMOL/L (ref 22–29)
EGFRCR SERPLBLD CKD-EPI 2021: >90 ML/MIN/1.73M2
GLUCOSE SERPL-MCNC: 119 MG/DL (ref 70–99)
POTASSIUM SERPL-SCNC: 3.5 MMOL/L (ref 3.4–5.3)
SODIUM SERPL-SCNC: 139 MMOL/L (ref 135–145)

## 2024-05-15 PROCEDURE — 36415 COLL VENOUS BLD VENIPUNCTURE: CPT | Performed by: EMERGENCY MEDICINE

## 2024-05-15 PROCEDURE — 99233 SBSQ HOSP IP/OBS HIGH 50: CPT | Performed by: INTERNAL MEDICINE

## 2024-05-15 PROCEDURE — 99239 HOSP IP/OBS DSCHRG MGMT >30: CPT | Performed by: EMERGENCY MEDICINE

## 2024-05-15 PROCEDURE — 250N000013 HC RX MED GY IP 250 OP 250 PS 637

## 2024-05-15 PROCEDURE — 250N000013 HC RX MED GY IP 250 OP 250 PS 637: Performed by: INTERNAL MEDICINE

## 2024-05-15 PROCEDURE — 80048 BASIC METABOLIC PNL TOTAL CA: CPT | Performed by: EMERGENCY MEDICINE

## 2024-05-15 PROCEDURE — 250N000013 HC RX MED GY IP 250 OP 250 PS 637: Performed by: EMERGENCY MEDICINE

## 2024-05-15 RX ORDER — BUMETANIDE 2 MG/1
2 TABLET ORAL DAILY
Qty: 30 TABLET | Refills: 0 | Status: SHIPPED | OUTPATIENT
Start: 2024-05-16 | End: 2024-05-20

## 2024-05-15 RX ORDER — LOSARTAN POTASSIUM 100 MG/1
100 TABLET ORAL DAILY
Qty: 30 TABLET | Refills: 0 | Status: SHIPPED | OUTPATIENT
Start: 2024-05-16 | End: 2024-05-20

## 2024-05-15 RX ADMIN — MIRABEGRON 25 MG: 25 TABLET, FILM COATED, EXTENDED RELEASE ORAL at 08:52

## 2024-05-15 RX ADMIN — NITROGLYCERIN 15 MG: 20 OINTMENT TOPICAL at 08:54

## 2024-05-15 RX ADMIN — CLOPIDOGREL BISULFATE 75 MG: 75 TABLET ORAL at 08:52

## 2024-05-15 RX ADMIN — LOSARTAN POTASSIUM 100 MG: 50 TABLET, FILM COATED ORAL at 08:53

## 2024-05-15 RX ADMIN — ASPIRIN 81 MG: 81 TABLET, COATED ORAL at 08:52

## 2024-05-15 RX ADMIN — LEVOTHYROXINE SODIUM 137 MCG: 0.11 TABLET ORAL at 06:13

## 2024-05-15 RX ADMIN — DICLOFENAC 4 G: 10 GEL TOPICAL at 10:16

## 2024-05-15 RX ADMIN — BUMETANIDE 2 MG: 2 TABLET ORAL at 08:53

## 2024-05-15 ASSESSMENT — ACTIVITIES OF DAILY LIVING (ADL)
ADLS_ACUITY_SCORE: 31
ADLS_ACUITY_SCORE: 32
ADLS_ACUITY_SCORE: 31
ADLS_ACUITY_SCORE: 32
ADLS_ACUITY_SCORE: 31

## 2024-05-15 NOTE — PROGRESS NOTES
HEART CARE NOTE          Assessment/Recommendations   1. HFpEF c/b severe ADHF  Assessment / Plan  Tolerating oral diuretic regimen - no changes at this time; continue to monitor UOP and renal function closely  Patient is high risk for adverse cardiac events 2/2 advanced age, frailty, HTN  GDMT as detailed below; mainstay of treatment for HFpEF includes diuretics and adequate BP control (class I) and SGLT2-I (class 2a); additional medical therapy (ARNI, MRA, ARB) demonstrated less robust evidence for indication but may be considered per guideline recommendations (2b); no indication for BBlockers   Hold Bblocker as no cardiac indication at this time     Current Pharmacotherapy AHA Guideline-Directed Medical Therapy   Losartan 100 mg daily ARNI/ARB   Spironolactone not started  MRA   SGLT2 inhibitor: not started SGLT2-I    Bumex 2 mg daily Loop diuretic       2. Pericardial effusion  Assessment / Plan  Small; no imaging or clinical signs consistent with cardiac tamponade - diuresis as above     3. Hypertensive urgency  Assessment / Plan  Improving; Titrate oral afterload reduction as tolerated     4. Acute hypoxic respiratory failure  Assessment / Plan  In the setting of acute cardiogenic pulmonary edema likely precipitated by uncontrolled HTN/elevated SVR - diuresis as above       Plan of care discussed on May 15, 2024 with patient at bedside all questions pertaining to cardiology care provided by her daughter were also addressed, and primary team overseeing patient's care    Cardiology team will sign-off for now. Please do not hesitate to consult us again if new questions or concerns arise. Follow-up appointment will be arranged by [facility] clinic.       History of Present Illness/Subjective    Ms. Mirna Grijalva is a 74 year old female with a PMHx significant for (per Epic notation) essential HTN, HLD, hypothyroidism, recurrent UTI, recent amaurosis fugax (4/27/24) who was admitted on 5/12/2024 for acute hypoxic  "respiratory failure. Presented to the ED with 2 days of progressive SOB, dry cough. Hypoxic in ED requiring 3 L NC O2. CT chest showing moderate pulmonary edema (BNP within normal limits).      Today, Mrs. Grijalva denies acute cardiac events or complaints; Management plan as detailed above     ECG: Personally reviewed. normal sinus rhythm, occasional PVC noted, unifocal.     ECHO (personnaly Reviewed on 5/13/24):   Left ventricular size, wall motion and function are normal. The ejection  fraction is 60-65%.  Normal right ventricle size and systolic function.  IVC diameter <2.1 cm collapsing >50% with sniff suggests a normal RA pressure  of 3 mmHg.  Small pericardial effusion  There are no echocardiographic indications of cardiac tamponade.    Lab results: personally reviewed May 15, 2024; notable for stable renal function wnl    Medical history and pertinent documents reviewed in Care Everywhere please where applicable see details above        Physical Examination Review of Systems   /75 (BP Location: Left arm)   Pulse 94   Temp 98.3  F (36.8  C) (Oral)   Resp 20   Ht 1.626 m (5' 4\")   Wt 100.7 kg (222 lb)   LMP  (LMP Unknown)   SpO2 94%   BMI 38.11 kg/m    Body mass index is 38.11 kg/m .  Wt Readings from Last 3 Encounters:   05/12/24 100.7 kg (222 lb)   04/27/24 101.6 kg (224 lb)   04/23/24 101.6 kg (224 lb)     General Appearance:   no distress, normal body habitus   ENT/Mouth: membranes moist, no oral lesions or bleeding gums.      EYES:  no scleral icterus, normal conjunctivae   Neck: no carotid bruits or thyromegaly   Chest/Lungs:   lungs are clear to auscultation, no rales or wheezing, equal chest wall expansion    Cardiovascular:   Regular. Normal first and second heart sounds with no murmurs, rubs, or gallops; the carotid, radial and posterior tibial pulses are intact, no JVD or LE edema bilaterally    Abdomen:  no organomegaly, masses, bruits, or tenderness; bowel sounds are present "   Extremities: no cyanosis or clubbing   Skin: no xanthelasma, warm.    Neurologic: NAD     Psychiatric: alert and oriented x3, calm     A complete 10 systems ROS was reviewed  And is negative except what is listed in the HPI.          Medical History  Surgical History Family History Social History   Past Medical History:   Diagnosis Date    Mumps     Past Surgical History:   Procedure Laterality Date    CYSTOSCOPY      no family history of premature coronary artery disease Social History     Socioeconomic History    Marital status:      Spouse name: Not on file    Number of children: Not on file    Years of education: Not on file    Highest education level: Not on file   Occupational History    Not on file   Tobacco Use    Smoking status: Former     Types: Cigarettes     Passive exposure: Never    Smokeless tobacco: Never   Vaping Use    Vaping status: Never Used   Substance and Sexual Activity    Alcohol use: Yes    Drug use: Never    Sexual activity: Not on file   Other Topics Concern    Not on file   Social History Narrative    Not on file     Social Determinants of Health     Financial Resource Strain: Low Risk  (1/23/2024)    Financial Resource Strain     Within the past 12 months, have you or your family members you live with been unable to get utilities (heat, electricity) when it was really needed?: No   Food Insecurity: Low Risk  (1/23/2024)    Food Insecurity     Within the past 12 months, did you worry that your food would run out before you got money to buy more?: No     Within the past 12 months, did the food you bought just not last and you didn t have money to get more?: No   Transportation Needs: Low Risk  (1/23/2024)    Transportation Needs     Within the past 12 months, has lack of transportation kept you from medical appointments, getting your medicines, non-medical meetings or appointments, work, or from getting things that you need?: No   Physical Activity: Not on file   Stress: Not on  "file   Social Connections: Not on file   Interpersonal Safety: Low Risk  (4/23/2024)    Interpersonal Safety     Do you feel physically and emotionally safe where you currently live?: Yes     Within the past 12 months, have you been hit, slapped, kicked or otherwise physically hurt by someone?: No     Within the past 12 months, have you been humiliated or emotionally abused in other ways by your partner or ex-partner?: No   Housing Stability: Low Risk  (1/23/2024)    Housing Stability     Do you have housing? : Yes     Are you worried about losing your housing?: No           Lab Results    Chemistry/lipid CBC Cardiac Enzymes/BNP/TSH/INR   Lab Results   Component Value Date    CHOL 259 (H) 04/27/2024    HDL 75 04/27/2024    TRIG 118 04/27/2024    BUN 11.7 05/14/2024     05/14/2024    CO2 27 05/14/2024    Lab Results   Component Value Date    WBC 10.1 05/13/2024    HGB 14.2 05/13/2024    HCT 41.8 05/13/2024     (H) 05/13/2024     05/13/2024    Lab Results   Component Value Date    TSH 2.46 05/12/2024    INR 0.94 04/27/2024     No results found for: \"CKTOTAL\", \"CKMB\", \"TROPONINI\"       Weight:    Wt Readings from Last 3 Encounters:   05/12/24 100.7 kg (222 lb)   04/27/24 101.6 kg (224 lb)   04/23/24 101.6 kg (224 lb)       Allergies  Allergies   Allergen Reactions    Atenolol      Rash and Insomnia    Ciprofloxacin      Severe tendon pain and swelling    Eliquis [Apixaban]      Wanting to faint    Nitrofurantoin      'Bad, itchy, feverish, rash on arms and legs'    Sulfa Antibiotics      Rash on feet    Penicillins Rash         Surgical History  Past Surgical History:   Procedure Laterality Date    CYSTOSCOPY         Social History  Tobacco:   History   Smoking Status    Former    Types: Cigarettes   Smokeless Tobacco    Never    Alcohol:   Social History    Substance and Sexual Activity      Alcohol use: Yes   Illicit Drugs:   History   Drug Use Unknown       Family History  History reviewed. No " pertinent family history.       Gelacio Molina MD on 5/15/2024      cc: Ambar Hernandez

## 2024-05-15 NOTE — PLAN OF CARE
Occupational Therapy Discharge Summary    Reason for therapy discharge:    Discharged to home.    Progress towards therapy goal(s). See goals on Care Plan in [facility] electronic health record for goal details.  Goals partially met.  Barriers to achieving goals:   discharge from facility.    Therapy recommendation(s):    Home with assist

## 2024-05-15 NOTE — PLAN OF CARE
Goal Outcome Evaluation:      Plan of Care Reviewed With: patient, spouse      Patient discharged to { :301023} at *** via {Transportation Mode:240749}.  Accompanied by {Family Members:274182} and staff.  Discharge instructions were reviewed with {[facility] FAMILY MEMBERS:[id number]}, opportunity offered to ask questions.    Prescriptions {Prescriptions:937335}.  Access discontinued: {Yes and No:089772}  Care plan and education discontinued: {Yes and No:894233}  Belongings were sent home with patient/family:  {belongings inventory list:167192}.

## 2024-05-15 NOTE — PROGRESS NOTES
"[facility] reached out to me from [facility] for orders for Home Care RN, PT, OT. Asked me to again reach out to Dr. Rosas. I messaged Dr. Rosas who stated, \"I forgot to enter those orders; will do it\".  "

## 2024-05-15 NOTE — PLAN OF CARE
Problem: Risk for Delirium  Goal: Improved Sleep  Outcome: Progressing   Goal Outcome Evaluation:             Neuro: A&Ox4.   Cardiac: is in tele NSR, VSS.   Respiratory: RA  GI/: Voiding pure wick. No BM this shift.  Diet/appetite: Tolerating 2gm Na diet. Denies nausea.  Activity: Up with assist  x1, not OOB  Pain: Denies   Skin: No new deficits noted.  Lines: PIV SL  Drains: no  Replacements: K protocol

## 2024-05-15 NOTE — PROGRESS NOTES
Care Management Discharge Note    Discharge Date: 05/15/2024       Discharge Disposition:  home with home care  Discharge Services:  RN PT OT  Discharge DME:      Discharge Transportation: family or friend will provide    Private pay costs discussed: Not applicable    Education Provided on the Discharge Plan:  yes  Persons Notified of Discharge Plans: yes  Patient/Family in Agreement with the Plan: yes      Handoff Referral Completed: Yes    Additional Information:  Pt and family requesting home care at discharge; no preference on agency. Referral sent and pending to [facility] for RN PT OT. Care management following.  9:15 AM    [facility] accepted.  met with pt to discuss, and confirm discharge plan to home with home care services; RN PT OT. All parties aware, and agreeable to discharge plan. [facility] intake notified of discharge. No other needs from  at this time. Family to transport.  9:18 AM    Brenna Kjellberg, BSW LSW  5/15/2024

## 2024-05-15 NOTE — DISCHARGE SUMMARY
[facility] MEDICINE  DISCHARGE SUMMARY     Primary Care Physician: Ambar Hernandez  Admission Date: 5/12/2024   Discharge Provider: James Rosas MD Discharge Date: 5/15/2024   Diet:   Active Diet and Nourishment Order   Procedures    Fluid restriction 1800 ML FLUID    2 Gram Sodium Diet    Diet       Code Status: Full Code   Activity: DCACTIVITY: Activity as tolerated        Condition at Discharge: Good     REASON FOR PRESENTATION(See Admission Note for Details)   Heart failure, pulmonary edema    PRINCIPAL & ACTIVE DISCHARGE DIAGNOSES     Active Problems:    Acute pulmonary edema (H)    Acute respiratory failure with hypoxia (H)    Hypertensive emergency    Hypervolemia, unspecified hypervolemia type    Chest pain, unspecified type    Acute on chronic congestive heart failure, unspecified heart failure type (H)      PENDING LABS     Unresulted Labs Ordered in the Past 30 Days of this Admission       Date and Time Order Name Status Description    5/12/2024 10:30 AM Blood Culture Peripheral Blood Preliminary     5/12/2024 10:30 AM Blood Culture Peripheral Blood Preliminary               PROCEDURES ( this hospitalization only)          RECOMMENDATIONS TO OUTPATIENT PROVIDER FOR F/U VISIT     Follow-up Appointments     Follow-up and recommended labs and tests       1.  Follow-up in primary clinic in approximately 5 to 7 days.  Will need   blood pressure reassessed along with heart rate and basic metabolic   profile.  In approximately 1 month will need repeat chest CT.  2.  Follow-up in [facility] cardiology clinic                DISPOSITION     Home    SUMMARY OF HOSPITAL COURSE:      74 year old female with PMHx of essential HTN, HLD, hypothyroidism, recurrent UTI, recent amaurosis fugax (4/27/24) who was admitted on 5/12/2024 for acute hypoxic respiratory failure. Presented to the ED with 2 days of progressive SOB, dry cough. Hypoxic in ED requiring 3 L NC O2. CT chest showing  moderate pulmonary edema (BNP within normal limits).       Diastolic heart failure with acute Hypoxic Respiratory Failure -hypoxia has resolved.  -- CT Chest: mod pulm edema, small b/l pleural effusions, no PE.   -- BNP within normal limits. Bilat LE edema but only trace pitting    -- Echo 5/12: EF 60-65%, small pericardial effusion, no echocardiographic indications of tamponade, no further treatment or monitoring per cardiology other than continued IV diuresis.  - Recent echo (4/27): EF at that time EF 60-65%, no WMA, grade 1 diastolic dysfunction. No pericardial effusion seen at that time  -Good diuresis on Bumex, also on losartan     Recent Amaurosis Fugax (4/27)   -- Neuro recs from that admission; Continue aspirin and Plavix per stroke neurology (at least 3 months)  -- Was discontinued off losartan-hydrochlorothiazide 100-25 mg per neuro recs at discharge for permissive HTN   -- Cont PTA statin, ASA, Plavix   -- Continue with outpatient follow-up      Hypertensive Urgency   Essential HTN  -Back on losartan, will not go back on HCTZ for now.  Is on Bumex.  Metoprolol stopped by cardiology and blood pressures acceptable.  Had some very mild tachycardia since the metoprolol discontinuation but this has been asymptomatic  Will resolve with time.  No indication for resuming beta-blocker as would not want to get her blood pressure lower.  This will need continued reassessment in the clinic.     Atrophic Vaginitis   Urinary Urgency   H/o Frequent / Recurrent UTI  -- UA in ED slight leuk esterase, 12 WBC but patient states chronic dysuria, urgency have been improved as of late   -- Will hold on further abx for now, await    -- follows w urology; recently started on Myretriq and estrogen cream  -- Cont PTA myretriq cream      Hypothyroidism   History of parathyroid nodules s/p parathyroidectomy  History of thyroid cancer  -- TSH within normal limits   -- Cont PTA levothyroxine   - Per PCP note, patient does have an  upcoming appointment with kanchan in Julyto take over her care     ? Heavy Alcohol Use per prior chart  -- recent PCP note (4/23) reports patient drinking 4 glasses of wine daily   -- CIWA, close monitoring while here   -No evidence of hospital withdrawal        Hyperglycemia  Likely reactive  -- A1C 5.4 (4/2024)  -- Trend on BMP      Incidential Findings on Imaging - Nodular areas of consolidation within both lungs measuring up to 19 mm. Recommend follow-up chest CT in one month to evaluate for resolution               -- Patient does have remote 20 yr PPD smoking history               -- Discussed findings with patient and family, and recommended follow-up     Discharge Medications with Med changes:     Current Discharge Medication List        START taking these medications    Details   bumetanide (BUMEX) 2 MG tablet Take 1 tablet (2 mg) by mouth daily  Qty: 30 tablet, Refills: 0    Associated Diagnoses: Acute on chronic congestive heart failure, unspecified heart failure type (H)      losartan (COZAAR) 100 MG tablet Take 1 tablet (100 mg) by mouth daily  Qty: 30 tablet, Refills: 0    Associated Diagnoses: Acute on chronic congestive heart failure, unspecified heart failure type (H)           CONTINUE these medications which have NOT CHANGED    Details   acetaminophen (TYLENOL) 500 MG tablet Take 1 tablet (500 mg) by mouth every 6 hours as needed for mild pain  Qty: 60 tablet, Refills: 1    Associated Diagnoses: Chronic bilateral low back pain with bilateral sciatica      aspirin 81 MG EC tablet Take 1 tablet (81 mg) by mouth daily  Qty: 90 tablet, Refills: 0    Associated Diagnoses: AFX (amaurosis fugax)      atorvastatin (LIPITOR) 80 MG tablet Take 1 tablet (80 mg) by mouth every evening  Qty: 30 tablet, Refills: 0    Associated Diagnoses: Vision loss of right eye      bimatoprost (LUMIGAN) 0.01 % SOLN Place 1 drop into the right eye at bedtime      clopidogrel (PLAVIX) 75 MG tablet Take 1 tablet (75 mg) by mouth  "daily  Qty: 90 tablet, Refills: 0    Associated Diagnoses: AFX (amaurosis fugax)      Cranberry-Vitamin C (CRANBERRY CONCENTRATE/VITAMINC) 16400-002 MG CAPS Take 2 capsules by mouth daily      diclofenac (VOLTAREN) 1 % topical gel Apply 4 g topically 4 times daily  Qty: 350 g, Refills: 1    Associated Diagnoses: Pain in finger of both hands      levothyroxine (SYNTHROID/LEVOTHROID) 137 MCG tablet Take 1 tablet (137 mcg) by mouth daily  Qty: 90 tablet, Refills: 0    Associated Diagnoses: History of thyroid cancer; Hypothyroidism, unspecified type      mirabegron (MYRBETRIQ) 25 MG 24 hr tablet Take 1 tablet (25 mg) by mouth daily  Qty: 90 tablet, Refills: 3    Associated Diagnoses: Urge incontinence           STOP taking these medications       metoprolol tartrate (LOPRESSOR) 100 MG tablet Comments:   Reason for Stopping:                     Rationale for medication changes:              Consults       [facility] CLINIC EVALUATION IP CONSULT  CARE MANAGEMENT / SOCIAL WORK IP CONSULT  NUTRITION SERVICES ADULT IP CONSULT  PHYSICAL THERAPY ADULT IP CONSULT  OCCUPATIONAL THERAPY ADULT IP CONSULT  CARDIOLOGY IP CONSULT    Immunizations given this encounter     Most Recent Immunizations   Administered Date(s) Administered    COVID-19 Bivalent 12+ (Pfizer) 10/20/2023    Influenza (High Dose) 3 valent vaccine 09/15/2023    TD,PF 7+ (Tenivac) 03/29/2024    Zoster recombinant adjuvanted (SHINGRIX) 04/05/2024           Anticoagulation Information      Recent INR results: No results for input(s): \"INR\" in the last 168 hours.  Warfarin doses (if applicable) or name of other anticoagulant:       SIGNIFICANT IMAGING FINDINGS     Results for orders placed or performed during the hospital encounter of 05/12/24   CT Chest Pulmonary Embolism w Contrast   Result Value Ref Range    Radiologist flags Lung nodule     Impression    IMPRESSION:  1.  No pulmonary embolism to the subsegmental level.  2.  Small bilateral pleural effusions and moderate " pulmonary edema.  3.  Nodular areas of consolidation within both lungs measuring up to 19 mm. Recommend follow-up chest CT in one month to evaluate for resolution.    [Recommend Follow Up: Lung nodule]    This report will be copied to the [facility] to ensure a provider acknowledges the finding.   Echocardiogram Limited   Result Value Ref Range    LVEF  60-65%        SIGNIFICANT LABORATORY FINDINGS     Most Recent 3 BMP's:  Recent Labs   Lab Test 05/15/24  0656 05/14/24  1403 05/14/24  0743 05/13/24  0930     --  137 138   POTASSIUM 3.5 3.7 3.2* 3.7   CHLORIDE 101  --  98 101   CO2 26  --  27 25   BUN 11.4  --  11.7 11.4   CR 0.54  --  0.61 0.73   ANIONGAP 12  --  12 12   SAMANTHA 9.2  --  9.0 8.9   *  --  120* 130*           Discharge Orders        Primary Care - Care Coordination Referral      Reason for your hospital stay    Diastolic heart failure     Follow-up and recommended labs and tests     1.  Follow-up in primary clinic in approximately 5 to 7 days.  Will need blood pressure reassessed along with heart rate and basic metabolic profile.  In approximately 1 month will need repeat chest CT.  2.  Follow-up in [facility] cardiology clinic     Activity    Your activity upon discharge: activity as tolerated     Diet    Follow this diet upon discharge: Orders Placed This Encounter            2 Gram Sodium Diet       Examination   Physical Exam   Temp:  [97.9  F (36.6  C)-98.8  F (37.1  C)] 98  F (36.7  C)  Pulse:  [] 115  Resp:  [18-20] 20  BP: ()/(56-80) 106/68  SpO2:  [90 %-95 %] 95 %  Wt Readings from Last 1 Encounters:   05/12/24 100.7 kg (222 lb)       General: No apparent distress  Heart: Regular, rate in the 90s  Lungs: Clear      Please see EMR for more detailed significant labs, imaging, consultant notes etc.    I, James Rosas MD, personally saw the patient today and spent greater than 30 minutes discharging this patient.    James Rosas MD  [facility]  [facility]    CC:Ambar Hernandez

## 2024-05-15 NOTE — PLAN OF CARE
Goal Outcome Evaluation:      Plan of Care Reviewed With: patient, spouse      Patient discharged to home at 1030 via Private Car.  Accompanied by spouse and staff.  Discharge instructions were reviewed with patient and spouse, opportunity offered to ask questions.    Prescriptions faxed to pharmacy.  Access discontinued: Yes  Care plan and education discontinued: Yes  Belongings were sent home with patient/family:  Cell phone/electronics:  , Clothing: Shirt(s):  , Pants:  , and Underclothes:  , Hearing Aids:  , Purse/Wallet:  , and Toiletries/Hygiene: Shampoo, Conditioner, Deodorant, Hair Products:  , and Lotion.

## 2024-05-16 ENCOUNTER — NURSE TRIAGE (OUTPATIENT)
Dept: NURSING | Facility: CLINIC | Age: 74
End: 2024-05-16

## 2024-05-16 ENCOUNTER — PATIENT OUTREACH (OUTPATIENT)
Dept: CARE COORDINATION | Facility: CLINIC | Age: 74
End: 2024-05-16

## 2024-05-16 NOTE — TELEPHONE ENCOUNTER
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

## 2024-05-16 NOTE — PROGRESS NOTES
Clinic Care Coordination Contact  Transitions of Care Outreach  Chief Complaint   Patient presents with    Clinic Care Coordination - Initial    Clinic Care Coordination - Post Hospital       Most Recent Admission Date: 5/12/2024   Most Recent Admission Diagnosis: Acute pulmonary edema (H) - J81.0  Acute respiratory failure with hypoxia (H) - J96.01  Hypertensive emergency - I16.1  Hypervolemia, unspecified hypervolemia type - E87.70  Chest pain, unspecified type - R07.9  Acute on chronic congestive heart failure, unspecified heart failure type (H) - I50.9     Most Recent Discharge Date: 5/15/2024   Most Recent Discharge Diagnosis: Acute on chronic congestive heart failure, unspecified heart failure type (H) - I50.9  Acute pulmonary edema (H) - J81.0  Acute respiratory failure with hypoxia (H) - J96.01  Hypertensive emergency - I16.1  Hypervolemia, unspecified hypervolemia type - E87.70  Chest pain, unspecified type - R07.9     Transitions of Care Assessment    Discharge Assessment  How are you doing now that you are home?: I'm in so much pain.  Very upset  How are your symptoms? (Red Flag symptoms escalate to triage hotline per guidelines): Worsening  Do you know how to contact your clinic care team if you have future questions or changes to your health status? : Yes  Does the patient have their discharge instructions? : Yes  Do you have questions regarding any of your medications? : Yes (see comment)              Was not able to complete the assessment as patient was too upset.  Her back pain is very bad today.  Thinks she slept better in hospital bed. Her mattress at home is a good one. She missed ablation due to being in hospital.  Very worried that she has been using Voltaren and she now sees that this medication could have killed her and caused her to be in hospital.  She is not using it any more.  She wants to see PCP STAT.  Will route to pcp and scheduling team to assist.  Offered to have her also talk to  [facility] regarding her concerns and prefers to work with PCP for assessment.      Her  can drive her to the appts, but he has his own health problems due to neuropathy.    Has two daughter who work and are busy.  One is a dentist.      Follow up Plan     Discharge Follow-Up  Discharge follow up appointment scheduled in alignment with recommended follow up timeframe or Transitions of Risk Category? (Low = within 30 days; Moderate= within 14 days; High= within 7 days): No  Patient's follow up appointment not scheduled: Patient accepted scheduling support. Call transferred to scheduling team    Future Appointments   Date Time Provider Department Center   5/28/2024  8:30 AM Fred Potter, APRN CNP [facility]   5/28/2024  9:10 AM S[facility] HEART FAILURE RN [facility]   5/28/2024  1:40 PM Aurelio Salinas MD [facility]   5/31/2024  2:30 PM Ryan Koo MD [facility]   6/25/2024  2:00 PM Ambar Hernandez DO MDFMOB [facility]   7/9/2024  1:30 PM Lise Mendoza, PA-C [facility]   7/22/2024 10:30 AM Arlene Crabtree MD [facility]       Outpatient Plan as outlined on AVS reviewed with patient.    For any urgent concerns, please contact our 24 hour nurse triage line: [phone] ([phone])       NERY Aguirre

## 2024-05-16 NOTE — TELEPHONE ENCOUNTER
It is okay for her to use the voltaren gel. There is far less absorbed into the body than if she were taking this medication in pill form but the precautions listed for the medication pertain to the drug class as a whole rather than this particular formulation.

## 2024-05-16 NOTE — LETTER
[facility] CARE COORDINATION  [facility]    May 16, 2024    Mirna MALLORY Valentine  [address]  [place] [address]      Dear Mirna,    I am a clinic care coordinator who works with Ambar Hernandez DO with the [facility]. I wanted to thank you for spending the time to talk with me.  Below is a description of clinic care coordination and how I can further assist you.       The clinic care coordination team is made up of a registered nurse, , financial resource worker and community health worker who understand the health care system. The goal of clinic care coordination is to help you manage your health and improve access to the health care system. Our team works alongside your provider to assist you in determining your health and social needs. We can help you obtain health care and community resources, providing you with necessary information and education. We can work with you through any barriers and develop a care plan that helps coordinate and strengthen the communication between you and your care team.  Our services are voluntary and are offered without charge to you personally.    Please feel free to contact me with any questions or concerns regarding care coordination and what we can offer.      We are focused on providing you with the highest-quality healthcare experience possible.    Sincerely,     Aida Peterson,   [facility]  [phone]

## 2024-05-16 NOTE — TELEPHONE ENCOUNTER
"Jada Grijalva, daughter, is not listed on the consent to communicate form, she states \"WE filled out a form at [facility] and the pt. Advocate said is was adequate for us to get information\", Triager explained form is \"consent to communicate, gave medical records number and transferred daughter to [facility] , \"[facility]\" to discuss how to obtain correct consent for clinic. \"Another question, which providers at [facility] are Geriatric providers, Triager spoke to \"[facility]\" at [facility] and Dr. Saloni Wood is only Geriatric provider that is accepting new patients, however she is booking out 2-3 months, relayed that information to daughter.     Triager also provided my chart support number, for further information, on how daughter can obtain proxy access to her mother's my chart account    Daughter, Jada Grijalva, is wondering if someone can call her back at [phone], to discuss:   \"Does my mom have clearance to get her back ablation completed at [facility]. WE need to schedule that\"          Reason for Disposition   Follow-up information-only call to recent contact, no triage required    Protocols used: Information Only Call - No Triage-A-[place]  Anabella Arrieta RN, Triage Nurse Advisor, 5/16/2024 12:22 PM  "

## 2024-05-16 NOTE — TELEPHONE ENCOUNTER
"Nurse Triage SBAR    Situation:   -Medication question    Background:   -Patient calling  -It is okay to call back and leave a detailed message at this number:  [phone]     Assessment:   -discharged from the hospital yesterday for diastolic heart failure  -she is now calling to see if it is safe to use the diclofenac (VOLTAREN) gel, as she is having back pain (increased from the poor hospital bed)  -she was reading and one of the side effects of the volaren gel is an increased risk for heart attack and stroke  -she states that on April 27 had a TIA in her eye  -she is taking tylenol ever 6 hours for pain  -declined triage for back pain as this is an ongoing issue  -has hospital follow up visit on 5/20/24 with juan pablo    Recommendation:   -Call back with and questions, concerns, or any change in symptoms  -Care team: Please call patient back and advise on recommendations: Is is safe for her to use the Voltaren gel? (Sending to PCP per patient request - we reviewed that it was marked as \"continued taking\" by the discharging hospital provider\" but she would like confirmation form Dr. Hernandez).    LASHAWN RAMIREZ RN 5/16/2024 11:50 AM     Reason for Disposition   Caller has NON-URGENT medicine question about med that PCP or specialist prescribed and triager unable to answer question    Protocols used: Medication Question Call-A-[place]    "

## 2024-05-17 ENCOUNTER — TELEPHONE (OUTPATIENT)
Dept: FAMILY MEDICINE | Facility: CLINIC | Age: 74
End: 2024-05-17
Payer: COMMERCIAL

## 2024-05-17 ENCOUNTER — MEDICAL CORRESPONDENCE (OUTPATIENT)
Dept: HEALTH INFORMATION MANAGEMENT | Facility: CLINIC | Age: 74
End: 2024-05-17

## 2024-05-17 LAB
BACTERIA BLD CULT: NO GROWTH
BACTERIA BLD CULT: NO GROWTH

## 2024-05-17 NOTE — TELEPHONE ENCOUNTER
Home Care calling.      Home Care is calling regarding an established patient with [facility].       Requesting orders from: Ambar Hernandez  Provider is following patient: No       Orders Requested    Skilled Nursing  Request for initial certification (first set of orders)   Frequency: 1x/wk for 4 wks  then every other week for 2 visits      Physical Therapy  Request for initial evaluation and treatment (one time) (first set of orders)       Information was gathered and will be sent to provider for review.  RN will contact Home Care with information after provider review.  Information was gathered and will be sent to provider to confirm provider will be following patient.  RN will contact Home Care with information after provider review.

## 2024-05-20 ENCOUNTER — OFFICE VISIT (OUTPATIENT)
Dept: INTERNAL MEDICINE | Facility: CLINIC | Age: 74
End: 2024-05-20
Payer: COMMERCIAL

## 2024-05-20 ENCOUNTER — TELEPHONE (OUTPATIENT)
Dept: NEUROLOGY | Facility: CLINIC | Age: 74
End: 2024-05-20

## 2024-05-20 VITALS
BODY MASS INDEX: 37.46 KG/M2 | WEIGHT: 219.4 LBS | TEMPERATURE: 97.9 F | HEIGHT: 64 IN | DIASTOLIC BLOOD PRESSURE: 86 MMHG | HEART RATE: 112 BPM | OXYGEN SATURATION: 98 % | RESPIRATION RATE: 18 BRPM | SYSTOLIC BLOOD PRESSURE: 130 MMHG

## 2024-05-20 DIAGNOSIS — G89.29 CHRONIC BILATERAL LOW BACK PAIN WITH LEFT-SIDED SCIATICA: ICD-10-CM

## 2024-05-20 DIAGNOSIS — G45.3 AFX (AMAUROSIS FUGAX): ICD-10-CM

## 2024-05-20 DIAGNOSIS — R91.8 MULTIPLE PULMONARY NODULES: ICD-10-CM

## 2024-05-20 DIAGNOSIS — Z09 HOSPITAL DISCHARGE FOLLOW-UP: Primary | ICD-10-CM

## 2024-05-20 DIAGNOSIS — I50.32 CHRONIC HEART FAILURE WITH PRESERVED EJECTION FRACTION (H): ICD-10-CM

## 2024-05-20 DIAGNOSIS — R00.0 TACHYCARDIA: ICD-10-CM

## 2024-05-20 DIAGNOSIS — M54.42 CHRONIC BILATERAL LOW BACK PAIN WITH LEFT-SIDED SCIATICA: ICD-10-CM

## 2024-05-20 DIAGNOSIS — I10 ESSENTIAL (PRIMARY) HYPERTENSION: ICD-10-CM

## 2024-05-20 DIAGNOSIS — I50.9 ACUTE ON CHRONIC CONGESTIVE HEART FAILURE, UNSPECIFIED HEART FAILURE TYPE (H): ICD-10-CM

## 2024-05-20 PROCEDURE — 99495 TRANSJ CARE MGMT MOD F2F 14D: CPT | Performed by: NURSE PRACTITIONER

## 2024-05-20 PROCEDURE — 80048 BASIC METABOLIC PNL TOTAL CA: CPT | Performed by: NURSE PRACTITIONER

## 2024-05-20 PROCEDURE — 36415 COLL VENOUS BLD VENIPUNCTURE: CPT | Performed by: NURSE PRACTITIONER

## 2024-05-20 RX ORDER — BUMETANIDE 2 MG/1
2 TABLET ORAL DAILY
Qty: 90 TABLET | Refills: 0 | Status: SHIPPED | OUTPATIENT
Start: 2024-05-20 | End: 2024-08-14

## 2024-05-20 RX ORDER — ATORVASTATIN CALCIUM 80 MG/1
80 TABLET, FILM COATED ORAL EVERY EVENING
Qty: 90 TABLET | Refills: 0 | Status: SHIPPED | OUTPATIENT
Start: 2024-05-20 | End: 2024-08-20

## 2024-05-20 RX ORDER — RESPIRATORY SYNCYTIAL VIRUS VACCINE 120MCG/0.5
0.5 KIT INTRAMUSCULAR ONCE
Qty: 1 EACH | Refills: 0 | Status: CANCELLED | OUTPATIENT
Start: 2024-05-20 | End: 2024-05-20

## 2024-05-20 RX ORDER — LOSARTAN POTASSIUM 100 MG/1
100 TABLET ORAL DAILY
Qty: 90 TABLET | Refills: 0 | Status: SHIPPED | OUTPATIENT
Start: 2024-05-20 | End: 2024-05-31

## 2024-05-20 ASSESSMENT — PAIN SCALES - GENERAL: PAINLEVEL: NO PAIN (0)

## 2024-05-20 NOTE — TELEPHONE ENCOUNTER
Left Voicemail (1st Attempt) for the patient to call back and schedule the following:    Appointment type: New Stroke  Provider:    Return date: First available - virtual   Specialty phone number: [phone]  Additional appointment(s) needed: n/a  Additonal Notes: In basket appointment request.        Dora Kaufman on 5/20/2024 at 12:47 PM

## 2024-05-20 NOTE — PROGRESS NOTES
"  Assessment & Plan   Problem List Items Addressed This Visit       Essential (primary) hypertension     Borderline reading in the office but home readings have been below 120/80  - Continue current medications          Relevant Medications    losartan (COZAAR) 100 MG tablet    AFX (amaurosis fugax)     - Continue high potency statin, aspirin 81 mg  - Repeat lipid profile in 2 months          Relevant Medications    atorvastatin (LIPITOR) 80 MG tablet    Chronic heart failure with preserved ejection fraction (H)     New diagnosis of HFpEF  - Continue to monitor weight daily  - Continue bumetanide, repeat BMP today   - Follow up with cardiology as scheduled to closely monitor fluid volume status and heart rate          Relevant Medications    bumetanide (BUMEX) 2 MG tablet    losartan (COZAAR) 100 MG tablet    Tachycardia     She is tachycardic in the office today but this is consistent with her heart rate while she was hospitalized.  Metoprolol was discontinued during the hospitalization.  She is advised to continue to monitor her heart rate but home readings have been majority under 100 by her report  - Follow up if HR remains above 100 consistently after another 1 week          Chronic bilateral low back pain with left-sided sciatica     - Avoid oral NSAIDS but okay to utilize topical diclofenac for now until RFA is completed through [facility]  - Acetaminophen for pain also advised          Multiple pulmonary nodules, repeat chest CT 6/20/2024     Incidental finding on CT chest. Hx of tobacco use.   - Repeat chest CT in 1 month         Relevant Orders    CT Chest w Contrast     Other Visit Diagnoses       Hospital discharge follow-up    -  Primary             Post Medication Reconciliation Status:  Discharge medications reconciled, continue medications without change    BMI  Estimated body mass index is 37.66 kg/m  as calculated from the following:    Height as of this encounter: 1.626 m (5' 4\").    Weight as " of this encounter: 99.5 kg (219 lb 6.4 oz).         Tien Bradley is a 74 year old with a past medical history of essential hypertension, hyperlipidemia, hypothyroidism, and recurrent urinary tract infection, recent amaurosis fugax (4/2024), presenting for the following health issues:  Hospital F/U (Acute pulmonary edema, Heart failure, Respiratory failure)        5/20/2024     1:56 PM   Additional Questions   Roomed by JUAN A Joseph   Accompanied by , Serg     [facility] Follow-up Visit:  Hospital/Nursing Home/IP Rehab Facility: [facility]  Date of Admission: 5/12/2024  Date of Discharge: 5/15/2024  Reason(s) for Admission: Acute pulmonary edema, Heart failure, Respiratory failure  Was the patient in the ICU or did the patient experience delirium during hospitalization?  No  Do you have any other stressors you would like to discuss with your provider? OTHER: her change in weight since the hospital.   Problems taking medications regularly:  None  Medication changes since discharge: START: bumetanide (BUMEX) 2 MG tablet, losartan (COZAAR) 100 MG tablet   STOP:    metoprolol tartrate (LOPRESSOR) 100 MG tablet  Problems adhering to non-medication therapy:  None  She was seen in the emergency department with a complaint of progressive shortness of breath, dry cough.  She was hypoxic in the emergency department requiring 3 L by nasal cannula and chest CT showed moderate pulmonary edema. She has been weighing herself daily and her weight has been fluctuating by about 4 lbs but has not had sustained weight gain nor more than 3 pounds overnight. She denies shortness of breath.    She was also hospitalized for amaurosis fugax.  Her vision has remained stable since her hospitalization in April and she reports there isn't appreciable difference in her vision to before this incident.  Statin was changed to atorvastatin 80 mg.     She has low back pain and missed her lower back  "ablation. She is having a lot of low back pain.     HTN- readings have been below 120/80.     HR- most readings have been below 100 at home.  Metoprolol was discontinued during the hospitalization.        Objective    /86 (BP Location: Left arm, Patient Position: Sitting, Cuff Size: Adult Large)   Pulse 112   Temp 97.9  F (36.6  C) (Oral)   Resp 18   Ht 1.626 m (5' 4\")   Wt 99.5 kg (219 lb 6.4 oz)   LMP  (LMP Unknown)   SpO2 98%   BMI 37.66 kg/m    Body mass index is 37.66 kg/m .    Wt Readings from Last 5 Encounters:   05/20/24 99.5 kg (219 lb 6.4 oz)   05/12/24 100.7 kg (222 lb)   04/27/24 101.6 kg (224 lb)   04/23/24 101.6 kg (224 lb)   04/05/24 101.6 kg (224 lb)       Physical Exam   GENERAL: alert and no distress  RESP: lungs clear to auscultation - no rales, rhonchi or wheezes  CV: regular rate and rhythm, normal S1 S2, no peripheral edema  ABDOMEN: Non-distended, soft, non-tender            Signed Electronically by: Opal Sims NP    "

## 2024-05-21 PROBLEM — R00.0 TACHYCARDIA: Status: ACTIVE | Noted: 2024-05-21

## 2024-05-21 PROBLEM — I50.32 CHRONIC HEART FAILURE WITH PRESERVED EJECTION FRACTION (H): Status: ACTIVE | Noted: 2024-05-12

## 2024-05-21 LAB
ANION GAP SERPL CALCULATED.3IONS-SCNC: 16 MMOL/L (ref 7–15)
BUN SERPL-MCNC: 12.5 MG/DL (ref 8–23)
CALCIUM SERPL-MCNC: 9.6 MG/DL (ref 8.8–10.2)
CHLORIDE SERPL-SCNC: 94 MMOL/L (ref 98–107)
CREAT SERPL-MCNC: 0.63 MG/DL (ref 0.51–0.95)
DEPRECATED HCO3 PLAS-SCNC: 23 MMOL/L (ref 22–29)
EGFRCR SERPLBLD CKD-EPI 2021: >90 ML/MIN/1.73M2
GLUCOSE SERPL-MCNC: 121 MG/DL (ref 70–99)
POTASSIUM SERPL-SCNC: 3.9 MMOL/L (ref 3.4–5.3)
SODIUM SERPL-SCNC: 133 MMOL/L (ref 135–145)

## 2024-05-21 NOTE — ASSESSMENT & PLAN NOTE
Borderline reading in the office but home readings have been below 120/80  - Continue current medications

## 2024-05-21 NOTE — ASSESSMENT & PLAN NOTE
She is tachycardic in the office today but this is consistent with her heart rate while she was hospitalized.  Metoprolol was discontinued during the hospitalization.  She is advised to continue to monitor her heart rate but home readings have been majority under 100 by her report  - Follow up if HR remains above 100 consistently after another 1 week

## 2024-05-21 NOTE — ASSESSMENT & PLAN NOTE
New diagnosis of HFpEF  - Continue to monitor weight daily  - Continue bumetanide, repeat BMP today   - Follow up with cardiology as scheduled to closely monitor fluid volume status and heart rate

## 2024-05-22 PROBLEM — I16.1 HYPERTENSIVE EMERGENCY: Status: RESOLVED | Noted: 2024-05-12 | Resolved: 2024-05-22

## 2024-05-22 PROBLEM — G89.29 CHRONIC BILATERAL LOW BACK PAIN WITH LEFT-SIDED SCIATICA: Status: ACTIVE | Noted: 2024-05-22

## 2024-05-22 PROBLEM — M54.42 CHRONIC BILATERAL LOW BACK PAIN WITH LEFT-SIDED SCIATICA: Status: ACTIVE | Noted: 2024-05-22

## 2024-05-22 PROBLEM — R91.8 MULTIPLE PULMONARY NODULES: Status: ACTIVE | Noted: 2024-05-22

## 2024-05-22 NOTE — TELEPHONE ENCOUNTER
2x attempts made to schedule- LVM x2, sent MyC. Encounter in chart.    Dora Kaufman on 5/22/2024 at 8:35 AM

## 2024-05-22 NOTE — TELEPHONE ENCOUNTER
Left Voicemail (2nd Attempt) for the patient to call back and schedule the following:    Appointment type: New Stroke  Provider:    Return date: First available - virtual   Specialty phone number: [phone]  Additional appointment(s) needed: n/a  Additonal Notes: In basket appointment request.      Dora Kaufman on 5/22/2024 at 8:33 AM

## 2024-05-22 NOTE — ASSESSMENT & PLAN NOTE
- Avoid oral NSAIDS but okay to utilize topical diclofenac for now until RFA is completed through [facility]  - Acetaminophen for pain also advised

## 2024-05-23 PROBLEM — I16.0 HYPERTENSIVE URGENCY: Status: ACTIVE | Noted: 2024-03-29

## 2024-05-23 PROBLEM — I50.31 ACUTE HEART FAILURE WITH PRESERVED EJECTION FRACTION (H): Status: ACTIVE | Noted: 2024-05-12

## 2024-05-23 PROBLEM — E87.70 HYPERVOLEMIA, UNSPECIFIED HYPERVOLEMIA TYPE: Status: RESOLVED | Noted: 2024-05-12 | Resolved: 2024-05-23

## 2024-05-23 NOTE — PROGRESS NOTES
Assessment/Recommendations   Assessment:    1.  Acute Heart Failure with Preserved Ejection Fraction, NYHA Class I: Patient was hospitalized from May 12-15 with acute hypoxic respiratory failure (resolved) with 2 days of progressive shortness of breath and dry cough and found to have acute pulmonary edema and acute heart failure with preserved ejection fraction.    Echocardiogram on 5/12/2024 showed EF of 60 to 65% with small pericardial effusion with no indication of cardiac tamponade.    Previous echo on 4/27/2024 showed EF of 60 to 65% with no wall motion abnormalities with grade 1 diastolic dysfunction and no pericardial effusion seen at that time.    Patient was treated with IV Bumex and was discharged on oral Bumex.  Current home weight is 213-215 lbs  Patient is well compensated on exam.    We discussed and reviewed about heart failure, medication management, and lifestyle management including low sodium diet <2 g/day, daily weight, and staying physically active as tolerated. Patient met with the HF CORE nurse clinician for heart failure education on 5/14/24.    2.  Hypertension:Hypertensive urgency with recent hospitalization. Blood pressure today in clinic is 146/72.  Patient was taken off of hydrochlorothiazide and metoprolol in the hospital.    Patient was discharged on losartan 100 mg daily.  Per patient she is currently not taking it.    3.  Tachycardia: Patient is unclear if she feels heart palpitation and heart racing.  She states she is under a lot of stress having to come to clinic today.  She states this is not an uncommon for her to feel this way for any provider visits.    EKG in clinic today showed sinus tachycardia with premature atrial complexes with ventricular rate 123 bpm,  MS, and QT/QTc 332/475 MS.  EKG also showed incomplete right bundle branch block and ST and T wave abnormality but patient denies chest pain.  Per Dr. Boyce, PVCs no longer present and ventricular rate  "increased by 44 bpm, criteria for septal infarct are no longer present.  Nonspecific T wave abnormality no longer evident in lateral leads.    EKG on 8/25/2023 showed sinus tachycardia with PVCs heart rate in 120s.  Per patient saw cardiology in [place] at [facility] and was started on metoprolol at that time.  Note is not available for review.    4.  Recent amaurosis fugax: On aspirin and Plavix for at least 3 months per neurology.  Patient was recommended to follow-up as an outpatient.    4.  Recurrent UTI: She is asymptomatic.  She denies fever or chills.    6.  Hypothyroidism: History of parathyroid nodules status post parathyroidectomy.  Most recent TSH within normal limit in the hospital.  On levothyroxine.  Patient has follow-up appointment with endocrinology in July.    7.  EtOH use: She reports drinking 2 glasses of wine daily.  She reports drinking less compared to before.    Plan/Recommendation:  - BMP/CBC/Mg+  -Resume Metropol tartrate on a lower dose with 25 mg twice a day.  Encouraged to call with any adverse effect.  -Continue on low-sodium diet <2000 mg per day, daily weight monitoring, and maintain fluid intake at 50 to 60 ounces per day  -Schedule 24 hours Holter study  -Will request medical records to obtain cardiology note from [facility] (contact number [phone]).    Follow up with Dr. Molina in 4 weeks . Follow up with me in 3 months      The longitudinal plan of care for acute heart failure with preserved ejection fraction, tachycardia, hypertension was addressed during this visit.?Due to the added complexity in care, I will continue to support Mirna Grijalva in the subsequent management of this condition(s) and with the ongoing continuity of care of this condition(s)\".     History of Present Illness/Subjective    Ms. Mirna Grijalva is a 74 year old female with a past medical history of hypertension, hyperlipidemia, hypothyroidism, history of " parathyroid nodules with status post parathyroidectomy, recurrent UTI, recent diagnosis of amaurosis fugax, sinus tachycardia with PVCs per EKG in August 2023, and recent hospitalization with acute hypoxic respiratory failure (resolved), and acute heart failure with preserved ejection fraction who is seen at [facility] for post hospitalization heart failure follow up.     Today, Mirna presented to heart failure clinic accompanied by her spouse.  She denies fatigue, lightheadedness, shortness of breath, dyspnea on exertion, orthopnea, PND, chest pain, abdominal fullness/bloating, and lower extremity edema.  She reports that she is feeling stress due to having to come in to see me in clinic today.  She states that this is not an unusual for her to feel this way for any provider visits.  She also reports that her heart rate tends to run high in 120s when she is under stress at doctor's visit.  She reports her home blood pressure and heart rates been stable but she does not recall the readings.    She reports following low-sodium diet.  She reports adequate fluid intake.    She reports that she is drinking less alcohol.    Limited ECHO from 5/12/24-Reviewed:   Interpretation Summary   Left ventricular size, wall motion and function are normal. The ejection  fraction is 60-65%.  Normal right ventricle size and systolic function.  IVC diameter <2.1 cm collapsing >50% with sniff suggests a normal RA pressure  of 3 mmHg.  Small pericardial effusion  There are no echocardiographic indications of cardiac tamponade.  ______________________________________________________________________________  Left Ventricle  Left ventricular size, wall motion and function are normal. The ejection  fraction is 60-65%. No regional wall motion abnormalities noted.    ECHO from 4/27/24: Reviewed  Interpretation Summary     The left ventricle is normal in size. There is mild concentric left  ventricular  "hypertrophy.  Left ventricular systolic function is normal. The visual ejection fraction is  60-65%. No regional wall motion abnormalities noted.  Grade I or early diastolic dysfunction.     The right ventricle is normal in size and function.  The left atrium is mildly dilated. Right atrium not well visualized.  IVC diameter <2.1 cm collapsing >50% with sniff suggests a normal RA pressure  of 3 mmHg.  There is no comparison study available.     Physical Examination Review of Systems   BP (!) 146/72 (BP Location: Left arm, Patient Position: Sitting, Cuff Size: Adult Large)   Pulse 120   Resp 16   Ht 1.626 m (5' 4\")   Wt 98.2 kg (216 lb 9.6 oz)   LMP  (LMP Unknown)   SpO2 98%   BMI 37.18 kg/m    Body mass index is 37.18 kg/m .  Wt Readings from Last 3 Encounters:   05/28/24 98.2 kg (216 lb 9.6 oz)   05/20/24 99.5 kg (219 lb 6.4 oz)   05/12/24 100.7 kg (222 lb)     General Appearance:   no distress, normal body habitus   ENT/Mouth: membranes moist, no oral lesions or bleeding gums.      EYES:  no scleral icterus, normal conjunctivae   Neck: no  thyromegaly   Chest/Lungs:   lungs are clear to auscultation, no rales or wheezing, equal chest wall expansion    Cardiovascular:   Tachycardic . Normal first and second heart sounds with no murmurs, rubs, or gallops; Jugular venous pressure flat with no edema bilaterally    Abdomen:  no organomegaly, masses, bruits, or tenderness; bowel sounds are present   Extremities   no cyanosis or clubbing    CMS intact.   Skin: no xanthelasma, warm.    Neurologic: alert and oriented; no tremors   Psychiatric: Appears anxious                                                   Negative unless noted in HPI     Medical History  Surgical History Family History Social History   Past Medical History:   Diagnosis Date    Mumps     Past Surgical History:   Procedure Laterality Date    CYSTOSCOPY      No family history on file. Social History     Socioeconomic History    Marital status: "      Spouse name: Not on file    Number of children: Not on file    Years of education: Not on file    Highest education level: Not on file   Occupational History    Not on file   Tobacco Use    Smoking status: Former     Types: Cigarettes     Passive exposure: Never    Smokeless tobacco: Never   Vaping Use    Vaping status: Never Used   Substance and Sexual Activity    Alcohol use: Yes    Drug use: Never    Sexual activity: Not on file   Other Topics Concern    Not on file   Social History Narrative    Not on file     Social Determinants of Health     Financial Resource Strain: Low Risk  (1/23/2024)    Financial Resource Strain     Within the past 12 months, have you or your family members you live with been unable to get utilities (heat, electricity) when it was really needed?: No   Food Insecurity: Low Risk  (1/23/2024)    Food Insecurity     Within the past 12 months, did you worry that your food would run out before you got money to buy more?: No     Within the past 12 months, did the food you bought just not last and you didn t have money to get more?: No   Transportation Needs: Low Risk  (1/23/2024)    Transportation Needs     Within the past 12 months, has lack of transportation kept you from medical appointments, getting your medicines, non-medical meetings or appointments, work, or from getting things that you need?: No   Physical Activity: Not on file   Stress: Not on file   Social Connections: Not on file   Interpersonal Safety: Low Risk  (4/23/2024)    Interpersonal Safety     Do you feel physically and emotionally safe where you currently live?: Yes     Within the past 12 months, have you been hit, slapped, kicked or otherwise physically hurt by someone?: No     Within the past 12 months, have you been humiliated or emotionally abused in other ways by your partner or ex-partner?: No   Housing Stability: Low Risk  (1/23/2024)    Housing Stability     Do you have housing? : Yes     Are you worried  about losing your housing?: No          Medications  Allergies   Current Outpatient Medications   Medication Sig Dispense Refill    acetaminophen (TYLENOL) 500 MG tablet Take 1 tablet (500 mg) by mouth every 6 hours as needed for mild pain 60 tablet 1    aspirin 81 MG EC tablet Take 1 tablet (81 mg) by mouth daily 90 tablet 0    atorvastatin (LIPITOR) 80 MG tablet Take 1 tablet (80 mg) by mouth every evening 90 tablet 0    bimatoprost (LUMIGAN) 0.01 % SOLN Place 1 drop into the right eye at bedtime      bumetanide (BUMEX) 2 MG tablet Take 1 tablet (2 mg) by mouth daily 90 tablet 0    clopidogrel (PLAVIX) 75 MG tablet Take 1 tablet (75 mg) by mouth daily 90 tablet 0    Cranberry-Vitamin C (CRANBERRY CONCENTRATE/VITAMINC) 42320-551 MG CAPS Take 2 capsules by mouth daily      diclofenac (VOLTAREN) 1 % topical gel Apply 4 g topically 4 times daily 350 g 1    levothyroxine (SYNTHROID/LEVOTHROID) 137 MCG tablet Take 1 tablet (137 mcg) by mouth daily 90 tablet 0    metoprolol tartrate (LOPRESSOR) 25 MG tablet Take 1 tablet (25 mg) by mouth 2 times daily 180 tablet 1    mirabegron (MYRBETRIQ) 25 MG 24 hr tablet Take 1 tablet (25 mg) by mouth daily 90 tablet 3    losartan (COZAAR) 100 MG tablet Take 1 tablet (100 mg) by mouth daily (Patient not taking: Reported on 5/28/2024) 90 tablet 0    Allergies   Allergen Reactions    Atenolol      Rash and Insomnia    Ciprofloxacin      Severe tendon pain and swelling    Eliquis [Apixaban]      Wanting to faint    Nitrofurantoin      'Bad, itchy, feverish, rash on arms and legs'    Sulfa Antibiotics      Rash on feet    Penicillins Rash         Lab Results    Chemistry/lipid CBC Cardiac Enzymes/BNP/TSH/INR   Lab Results   Component Value Date    CHOL 259 (H) 04/27/2024    HDL 75 04/27/2024    TRIG 118 04/27/2024    BUN 12.5 05/20/2024     (L) 05/20/2024    CO2 23 05/20/2024    Lab Results   Component Value Date    WBC 8.5 05/28/2024    HGB 15.3 05/28/2024    HCT 43.1 05/28/2024      05/28/2024     05/28/2024    Lab Results   Component Value Date    TSH 2.46 05/12/2024    INR 0.94 04/27/2024        60  minutes spent on the date of encounter doing chart review, review of outside records, review of test results, interpretation with above tests, patient visit, documentation, and discussion with family.        This note has been dictated using voice recognition software. Any grammatical, typographical, or context distortions are unintentional and inherent to the software

## 2024-05-24 NOTE — ED PROVIDER NOTES
eMERGENCY dEPARTMENT PROGRESS NOTE         ED COURSE AND MEDICAL DECISION MAKING  Patient was signed out to me by Dr. Peters at 4:30 PM      Mirna Grijalva is a 74 year old female who presents with vision.  ED Course as of 05/24/24 0849   Sat Apr 27, 2024   1627 Patient was signed out to me at change of shift pending CTA of the head and neck.  Patient had presented with right vision loss and no other symptoms that started yesterday.  Stroke neurology was consulted and recommended MRI/MRA imaging.  The MRA imaging was done without contrast and there was an area of question so CTA head and neck were ordered.  Patient was given aspirin and stroke neurology once a call back once the imaging returns.   1745 CTA shows narrowing of the right ophthalmic artery similar to what they saw on MRA.  I have put a call out to stroke neurology again and waiting for them to respond.   1834 I went and checked that time with patient.  Today she says that her vision is nearly back to normal and that right eye.  She is sitting or eating dinner.   1841 I spoke with Stroke Neurology who recommended admission here.   1850 I spoke with Dr. Gondal with hospitalist who agrees with a neuro tele obs admission.           LAB  Pertinent labs results reviewed   Labs Ordered and Resulted from Time of ED Arrival to Time of ED Departure   GLUCOSE BY METER - Abnormal       Result Value    GLUCOSE BY METER POCT 138 (*)    COMPREHENSIVE METABOLIC PANEL - Abnormal    Sodium 132 (*)     Potassium 4.2      Carbon Dioxide (CO2) 26      Anion Gap 11      Urea Nitrogen 11.9      Creatinine 0.55      GFR Estimate >90      Calcium 9.4      Chloride 95 (*)     Glucose 110 (*)     Alkaline Phosphatase 93      AST 26      ALT 32      Protein Total 6.7      Albumin 3.9      Bilirubin Total 0.8     CBC WITH PLATELETS AND DIFFERENTIAL - Abnormal    WBC Count 6.2      RBC Count 3.91      Hemoglobin 14.3      Hematocrit 40.3       (*)     MCH 36.6 (*)     MCHC 35.5       RDW 12.9      Platelet Count 170      % Neutrophils 54      % Lymphocytes 29      % Monocytes 12      % Eosinophils 3      % Basophils 1      % Immature Granulocytes 1      NRBCs per 100 WBC 0      Absolute Neutrophils 3.4      Absolute Lymphocytes 1.8      Absolute Monocytes 0.7      Absolute Eosinophils 0.2      Absolute Basophils 0.1      Absolute Immature Granulocytes 0.0      Absolute NRBCs 0.0     LIPID PROFILE - Abnormal    Cholesterol 259 (*)     Triglycerides 118      Direct Measure HDL 75      LDL Cholesterol Calculated 160 (*)     Non HDL Cholesterol 184 (*)    INR - Normal    INR 0.94     PARTIAL THROMBOPLASTIN TIME - Normal    aPTT 29     TROPONIN T, HIGH SENSITIVITY - Normal    Troponin T, High Sensitivity 14     HEMOGLOBIN A1C - Normal    Hemoglobin A1C 5.4     MAGNESIUM - Normal    Magnesium 2.1     PHOSPHORUS - Normal    Phosphorus 3.3             RADIOLOGY    Pertinent imaging reviewed   Please see official radiology report.  Echocardiogram Complete - For age > 60 yrs   Final Result      XR Lumbar Spine 2/3 Views   Final Result   IMPRESSION:  Redemonstrated severe degenerative changes throughout the lumbar spine with facet arthropathy, disc height loss, vertebral body osteophyte formation, and sclerotic endplate changes, better characterized on recent MRI. Redemonstrated moderate    stepwise retrolisthesis from L1-L3. Grade 1 anterolisthesis at L1-L2. Vertebral body heights unchanged. No findings to suggest acute fracture.      CTA Head Neck with Contrast   Final Result   IMPRESSION:    HEAD CT:   1.  No CT evidence for acute intracranial process.      2.  Brain atrophy and presumed chronic microvascular ischemic changes as above.      3.  Partial opacification right sphenoid sinus.      4.  Stable intracranial appearance from 03/01/2024.      HEAD CTA:    1.  Possible short segment high-grade stenosis at the right ophthalmic arterial origin at the level of the right orbital apex corresponds to  MRA abnormality suggested on earlier examination today 04/27/2024. Right-sided visual loss is reported. The    remainder of the intraorbital portion of the right ophthalmic artery appears well-perfused. Left ophthalmic arterial appearance is satisfactory. There is mild to moderate stenosis of the cavernous ICA segments bilaterally with calcific plaque. 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 Vascular variation anatomy at the    level of the [place] with small - moderate bilateral posterior communicating arteries. No additional evidence for thromboembolic occlusion, aneurysm, dissection or hemodynamic high-grade stenosis. No high flow vascular malformation. Dural venous    sinus enhancement is normal. No additional pathologic enhancement intracranially.      NECK CTA:   1.  No high-grade stenosis of the major neck arteries or at the great vessel origin level. Calcific plaque at the distal CCA, bifurcation proximal ICA level noted bilaterally more prominent than right with mild to moderate right ICA stenosis 30-45%    proximally. No high-grade stenosis of the internal carotid arteries within the neck. No dissection. No evidence for hemodynamically significant great vessel origin stenosis. 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      2.  No additional pathologic enhancement noted intracranially, within the neck or in the mediastinum.      MRA Brain ([place]) wo Contrast   Final Result   Addendum (preliminary) 1 of 1   COMMUNICATION ADDENDUM:   Findings were discussed with Dr. Peters on 4/27/2024 3:08 PM CDT.      END ADDENDUM      Final   IMPRESSION:   HEAD MRI:    1.  No acute intracranial process.   2.  Generalized brain atrophy and presumed microvascular ischemic changes as detailed above.   3.   Possible high left frontal scalp lesion or foreign body; suggest correlation with physical exam.      HEAD MRA:    1.  Possible occlusion within the proximal right ophthalmic artery.   2.  Otherwise patent intracranial vasculature.      Report finalized to expedite patient care. Communication pending.      MR Brain w/o Contrast   Final Result   Addendum (preliminary) 1 of 1   COMMUNICATION ADDENDUM:   Findings were discussed with Dr. Peters on 4/27/2024 3:08 PM CDT.      END ADDENDUM      Final   IMPRESSION:   HEAD MRI:    1.  No acute intracranial process.   2.  Generalized brain atrophy and presumed microvascular ischemic changes as detailed above.   3.  Possible high left frontal scalp lesion or foreign body; suggest correlation with physical exam.      HEAD MRA:    1.  Possible occlusion within the proximal right ophthalmic artery.   2.  Otherwise patent intracranial vasculature.      Report finalized to expedite patient care. Communication pending.          FINAL IMPRESSION    1. AFX (amaurosis fugax)    2. Vision loss of right eye         Mónica Patel MD  05/24/24 3722

## 2024-05-26 LAB
ATRIAL RATE - MUSE: 79 BPM
DIASTOLIC BLOOD PRESSURE - MUSE: 100 MMHG
INTERPRETATION ECG - MUSE: NORMAL
P AXIS - MUSE: 59 DEGREES
PR INTERVAL - MUSE: 146 MS
QRS DURATION - MUSE: 108 MS
QT - MUSE: 424 MS
QTC - MUSE: 486 MS
R AXIS - MUSE: 84 DEGREES
SYSTOLIC BLOOD PRESSURE - MUSE: 209 MMHG
T AXIS - MUSE: 35 DEGREES
VENTRICULAR RATE- MUSE: 79 BPM

## 2024-05-28 ENCOUNTER — OFFICE VISIT (OUTPATIENT)
Dept: CARDIOLOGY | Facility: CLINIC | Age: 74
End: 2024-05-28
Payer: COMMERCIAL

## 2024-05-28 ENCOUNTER — APPOINTMENT (OUTPATIENT)
Dept: CARDIOLOGY | Facility: CLINIC | Age: 74
End: 2024-05-28
Payer: COMMERCIAL

## 2024-05-28 VITALS
OXYGEN SATURATION: 98 % | DIASTOLIC BLOOD PRESSURE: 72 MMHG | SYSTOLIC BLOOD PRESSURE: 146 MMHG | RESPIRATION RATE: 16 BRPM | BODY MASS INDEX: 36.98 KG/M2 | HEIGHT: 64 IN | HEART RATE: 120 BPM | WEIGHT: 216.6 LBS

## 2024-05-28 DIAGNOSIS — J96.01 ACUTE RESPIRATORY FAILURE WITH HYPOXIA (H): ICD-10-CM

## 2024-05-28 DIAGNOSIS — I50.31 ACUTE HEART FAILURE WITH PRESERVED EJECTION FRACTION (H): Primary | ICD-10-CM

## 2024-05-28 DIAGNOSIS — E66.812 CLASS 2 OBESITY: ICD-10-CM

## 2024-05-28 DIAGNOSIS — I10 PRIMARY HYPERTENSION: ICD-10-CM

## 2024-05-28 DIAGNOSIS — J81.0 ACUTE PULMONARY EDEMA (H): ICD-10-CM

## 2024-05-28 DIAGNOSIS — R00.0 TACHYCARDIA: ICD-10-CM

## 2024-05-28 DIAGNOSIS — G45.3 AFX (AMAUROSIS FUGAX): ICD-10-CM

## 2024-05-28 DIAGNOSIS — I50.9 ACUTE DECOMPENSATED HEART FAILURE (H): ICD-10-CM

## 2024-05-28 PROBLEM — R07.9 CHEST PAIN, UNSPECIFIED TYPE: Status: RESOLVED | Noted: 2024-05-12 | Resolved: 2024-05-28

## 2024-05-28 LAB
ANION GAP SERPL CALCULATED.3IONS-SCNC: 16 MMOL/L (ref 7–15)
BUN SERPL-MCNC: 11.7 MG/DL (ref 8–23)
CALCIUM SERPL-MCNC: 9.6 MG/DL (ref 8.8–10.2)
CHLORIDE SERPL-SCNC: 91 MMOL/L (ref 98–107)
CREAT SERPL-MCNC: 0.64 MG/DL (ref 0.51–0.95)
DEPRECATED HCO3 PLAS-SCNC: 27 MMOL/L (ref 22–29)
EGFRCR SERPLBLD CKD-EPI 2021: >90 ML/MIN/1.73M2
ERYTHROCYTE [DISTWIDTH] IN BLOOD BY AUTOMATED COUNT: 12.8 % (ref 10–15)
GLUCOSE SERPL-MCNC: 117 MG/DL (ref 70–99)
HCT VFR BLD AUTO: 43.1 % (ref 35–47)
HGB BLD-MCNC: 15.3 G/DL (ref 11.7–15.7)
MAGNESIUM SERPL-MCNC: 1.9 MG/DL (ref 1.7–2.3)
MCH RBC QN AUTO: 35.5 PG (ref 26.5–33)
MCHC RBC AUTO-ENTMCNC: 35.5 G/DL (ref 31.5–36.5)
MCV RBC AUTO: 100 FL (ref 78–100)
PLATELET # BLD AUTO: 229 10E3/UL (ref 150–450)
POTASSIUM SERPL-SCNC: 3.6 MMOL/L (ref 3.4–5.3)
RBC # BLD AUTO: 4.31 10E6/UL (ref 3.8–5.2)
SODIUM SERPL-SCNC: 134 MMOL/L (ref 135–145)
WBC # BLD AUTO: 8.5 10E3/UL (ref 4–11)

## 2024-05-28 PROCEDURE — 36415 COLL VENOUS BLD VENIPUNCTURE: CPT | Performed by: NURSE PRACTITIONER

## 2024-05-28 PROCEDURE — 85027 COMPLETE CBC AUTOMATED: CPT | Performed by: NURSE PRACTITIONER

## 2024-05-28 PROCEDURE — 83735 ASSAY OF MAGNESIUM: CPT | Performed by: NURSE PRACTITIONER

## 2024-05-28 PROCEDURE — 99417 PROLNG OP E/M EACH 15 MIN: CPT | Performed by: NURSE PRACTITIONER

## 2024-05-28 PROCEDURE — 80048 BASIC METABOLIC PNL TOTAL CA: CPT | Performed by: NURSE PRACTITIONER

## 2024-05-28 PROCEDURE — G2211 COMPLEX E/M VISIT ADD ON: HCPCS | Performed by: NURSE PRACTITIONER

## 2024-05-28 PROCEDURE — 93000 ELECTROCARDIOGRAM COMPLETE: CPT | Performed by: STUDENT IN AN ORGANIZED HEALTH CARE EDUCATION/TRAINING PROGRAM

## 2024-05-28 PROCEDURE — 99215 OFFICE O/P EST HI 40 MIN: CPT | Performed by: NURSE PRACTITIONER

## 2024-05-28 RX ORDER — METOPROLOL TARTRATE 25 MG/1
25 TABLET, FILM COATED ORAL 2 TIMES DAILY
Qty: 180 TABLET | Refills: 1 | Status: SHIPPED | OUTPATIENT
Start: 2024-05-28

## 2024-05-28 NOTE — PATIENT INSTRUCTIONS
Mirna Grijalva,    It was a pleasure to see you today at the [facility].     My recommendations after this visit include:    - Start metoprolol tatrate 25 mg 1 tablet twice a day    -Please call if you experience lightheadedness, dizziness, excessive fatigue or any new side effects    -Schedule 24 hours Holter study    - We will follow up with your lab result    - Follow up with Dr. Molina in 4 weeks    - Follow up with Fred in 3 months     - Please call Heart Failure Nurse Line at [phone], if you have any questions or concerns    Fred Potter CNP       What is the Health system Heart Failure Program?     The Health system Heart Failure Program is aheart failure specialty clinic within [facility].  You will work with your cardiologist, nurse practitioner, and nurses to carefully adjust medications and learn how to live with heart failure.  The Heart FailureProgram will help you:    Better understand your chronic heart condition  Feel better and avoid hospital stays    Monitoring for Symptoms      Call the Heart Failure Phone Line ([phone]) if you have any of these symptoms:   Increased shortness of breath/shortness ofbreath at rest  Waking up at night with difficulty breathing  Unable to lie down for sleep due to symptoms or needing to sit uprightfor sleep  Weight gain of 2 pounds a day for 2 days in a row OR 5 pounds in 1 week  Increased swelling in your ankles or legs  Dizziness or lightheadedness    Medications     Take your medications as prescribed  Bring all your medications in their original bottles to every appointment  Avoid non-steroidal anti-inflammatory medications (Advil,Aleve, Ibuprofen, Naprosyn, Naproxen, Celebrex)  Do not stop taking your medications or begin taking over-the-counter or herbal medications without first talking to your doctor ornurse practitioner    Diet and Lifestyle     Limit sodium/salt to 2000 mg daily   Read food labels for sodium  content  Do not add salt when cooking or add salt at the table  Weigh yourself every day and record in your daily weight log   Call if you gain 2 pounds a day for 2 days in a row OR 5 pounds in 1 week  Bring daily weight log to every appointment  Stay active, pace yourself, listen to yourbody, and rest when tired  Elevate your legs if they are swollen. Ask about using compression/support stockings  Stop smoking  Lose weight if you are overweight  Avoid drinking alcohol or limit amount  Stay updated on your immunizations including flu and pneumonia vaccines

## 2024-05-28 NOTE — LETTER
5/28/2024    Ambar Lugoanne-marieurban, DO  [address] [address]    RE: Mirna Grijalva       Dear Colleague,     I had the pleasure of seeing Mirna Grijalva in the [facility].          Assessment/Recommendations   Assessment:    1.  Acute Heart Failure with Preserved Ejection Fraction, NYHA Class I: Patient was hospitalized from May 2000 2015 to that acute hypoxic respiratory failure (resolved) with 2 days of progressive shortness of breath and dry cough and found to have acute pulmonary edema and acute heart failure with preserved ejection fraction.    Echocardiogram on 5/12/2024 showed EF of 60 to 65% with small pericardial effusion with no indication of cardiac tamponade.    Previous echo on 4/27/2024 showed EF of 60 to 65% with no wall motion abnormalities with grade 1 diastolic dysfunction and no pericardial effusion seen at that time.    Patient was treated with IV Bumex and was discharged on oral Bumex.  Current home weight is 213-215 lbs  Patient is well compensated on exam.    We discussed and reviewed about heart failure, medication management, and lifestyle management including low sodium diet <2 g/day, daily weight, and staying physically active as tolerated. Patient met with the HF CORE nurse clinician for heart failure education on 5/14/24.    2.  Hypertension:Hypertensive urgency with recent hospitalization. Blood pressure today in clinic is 146/72.  Patient was taken off of hydrochlorothiazide and metoprolol in the hospital.    Patient was discharged on losartan 100 mg daily.  Per patient she is currently not taking it.    3.  Tachycardia: Patient is unclear if she feels heart palpitation and heart racing.  She states she is under a lot of stress having to come to clinic today.  She states this is not an uncommon for her to feel this way for any provider visits.    EKG in clinic today showed sinus tachycardia with premature atrial complexes with ventricular rate 123 bpm,   "MS, and QT/QTc 332/475 MS.  EKG also showed incomplete right bundle branch block and ST and T wave abnormality but patient denies chest pain.    EKG on 8/25/2023 showed sinus tachycardia with PVCs heart rate in 120s.  Per patient saw cardiology in [place] at [facility] and was started on metoprolol at that time.  Note is not available for review.    4.  Recent amaurosis fugax: On aspirin and Plavix for at least 3 months per neurology.  Patient was recommended to follow-up as an outpatient.    4.  Recurrent UTI: She is asymptomatic.  She denies fever or chills.    6.  Hypothyroidism: History of parathyroid nodules status post parathyroidectomy.  Most recent TSH within normal limit in the hospital.  On levothyroxine.  Patient has follow-up appointment with endocrinology in July.    7.  EtOH use: She reports drinking 2 glasses of wine daily.  She reports drinking less compared to before.    Plan/Recommendation:  - BMP/CBC/Mg+  -Resume Metropol tartrate on a lower dose with 25 mg twice a day.  Encouraged to call with any adverse effect.  -Continue on low-sodium diet <2000 mg per day, daily weight monitoring, and maintain fluid intake at 50 to 60 ounces per day  -Schedule 24 hours Holter study  -Will request medical records to obtain cardiology note from [facility] (contact number [phone]).    Follow up with Dr. Molina in 4 weeks . Follow up with me in 3 months      The longitudinal plan of care for acute heart failure with preserved ejection fraction, tachycardia, hypertension was addressed during this visit.?Due to the added complexity in care, I will continue to support Mirna Grijalva in the subsequent management of this condition(s) and with the ongoing continuity of care of this condition(s)\".     History of Present Illness/Subjective    Ms. Mirna Grijalva is a 74 year old female with a past medical history of hypertension, hyperlipidemia, hypothyroidism, history " of parathyroid nodules with status post parathyroidectomy, recurrent UTI, recent diagnosis of amaurosis fugax, sinus tachycardia with PVCs per EKG in August 2023, and recent hospitalization with acute hypoxic respiratory failure (resolved), and acute heart failure with preserved ejection fraction who is seen at [facility] for post hospitalization heart failure follow up.     Today, Mirna presented to heart failure clinic accompanied by her spouse.  She denies fatigue, lightheadedness, shortness of breath, dyspnea on exertion, orthopnea, PND, chest pain, abdominal fullness/bloating, and lower extremity edema.  She reports that she is feeling stress due to having to come in to see me in clinic today.  She states that this is not an unusual for her to feel this way for any provider visits.  She also reports that her heart rate tends to run high in 120s when she is under stress at doctor's visit.  She reports her home blood pressure and heart rates been stable but she does not recall the readings.    She reports following low-sodium diet.  She reports adequate fluid intake.    She reports that she is drinking less alcohol.    Limited ECHO from 5/12/24-Reviewed:   Interpretation Summary   Left ventricular size, wall motion and function are normal. The ejection  fraction is 60-65%.  Normal right ventricle size and systolic function.  IVC diameter <2.1 cm collapsing >50% with sniff suggests a normal RA pressure  of 3 mmHg.  Small pericardial effusion  There are no echocardiographic indications of cardiac tamponade.  ______________________________________________________________________________  Left Ventricle  Left ventricular size, wall motion and function are normal. The ejection  fraction is 60-65%. No regional wall motion abnormalities noted.    ECHO from 4/27/24: Reviewed  Interpretation Summary     The left ventricle is normal in size. There is mild concentric left  ventricular  "hypertrophy.  Left ventricular systolic function is normal. The visual ejection fraction is  60-65%. No regional wall motion abnormalities noted.  Grade I or early diastolic dysfunction.     The right ventricle is normal in size and function.  The left atrium is mildly dilated. Right atrium not well visualized.  IVC diameter <2.1 cm collapsing >50% with sniff suggests a normal RA pressure  of 3 mmHg.  There is no comparison study available.     Physical Examination Review of Systems   BP (!) 146/72 (BP Location: Left arm, Patient Position: Sitting, Cuff Size: Adult Large)   Pulse 120   Resp 16   Ht 1.626 m (5' 4\")   Wt 98.2 kg (216 lb 9.6 oz)   LMP  (LMP Unknown)   SpO2 98%   BMI 37.18 kg/m    Body mass index is 37.18 kg/m .  Wt Readings from Last 3 Encounters:   05/28/24 98.2 kg (216 lb 9.6 oz)   05/20/24 99.5 kg (219 lb 6.4 oz)   05/12/24 100.7 kg (222 lb)     General Appearance:   no distress, normal body habitus   ENT/Mouth: membranes moist, no oral lesions or bleeding gums.      EYES:  no scleral icterus, normal conjunctivae   Neck: no  thyromegaly   Chest/Lungs:   lungs are clear to auscultation, no rales or wheezing, equal chest wall expansion    Cardiovascular:   Tachycardic . Normal first and second heart sounds with no murmurs, rubs, or gallops; Jugular venous pressure flat with no edema bilaterally    Abdomen:  no organomegaly, masses, bruits, or tenderness; bowel sounds are present   Extremities   no cyanosis or clubbing    CMS intact.   Skin: no xanthelasma, warm.    Neurologic: alert and oriented; no tremors   Psychiatric: Appears anxious                                                   Negative unless noted in HPI     Medical History  Surgical History Family History Social History   Past Medical History:   Diagnosis Date    Mumps     Past Surgical History:   Procedure Laterality Date    CYSTOSCOPY      No family history on file. Social History     Socioeconomic History    Marital status: "      Spouse name: Not on file    Number of children: Not on file    Years of education: Not on file    Highest education level: Not on file   Occupational History    Not on file   Tobacco Use    Smoking status: Former     Types: Cigarettes     Passive exposure: Never    Smokeless tobacco: Never   Vaping Use    Vaping status: Never Used   Substance and Sexual Activity    Alcohol use: Yes    Drug use: Never    Sexual activity: Not on file   Other Topics Concern    Not on file   Social History Narrative    Not on file     Social Determinants of Health     Financial Resource Strain: Low Risk  (1/23/2024)    Financial Resource Strain     Within the past 12 months, have you or your family members you live with been unable to get utilities (heat, electricity) when it was really needed?: No   Food Insecurity: Low Risk  (1/23/2024)    Food Insecurity     Within the past 12 months, did you worry that your food would run out before you got money to buy more?: No     Within the past 12 months, did the food you bought just not last and you didn t have money to get more?: No   Transportation Needs: Low Risk  (1/23/2024)    Transportation Needs     Within the past 12 months, has lack of transportation kept you from medical appointments, getting your medicines, non-medical meetings or appointments, work, or from getting things that you need?: No   Physical Activity: Not on file   Stress: Not on file   Social Connections: Not on file   Interpersonal Safety: Low Risk  (4/23/2024)    Interpersonal Safety     Do you feel physically and emotionally safe where you currently live?: Yes     Within the past 12 months, have you been hit, slapped, kicked or otherwise physically hurt by someone?: No     Within the past 12 months, have you been humiliated or emotionally abused in other ways by your partner or ex-partner?: No   Housing Stability: Low Risk  (1/23/2024)    Housing Stability     Do you have housing? : Yes     Are you worried  about losing your housing?: No          Medications  Allergies   Current Outpatient Medications   Medication Sig Dispense Refill    acetaminophen (TYLENOL) 500 MG tablet Take 1 tablet (500 mg) by mouth every 6 hours as needed for mild pain 60 tablet 1    aspirin 81 MG EC tablet Take 1 tablet (81 mg) by mouth daily 90 tablet 0    atorvastatin (LIPITOR) 80 MG tablet Take 1 tablet (80 mg) by mouth every evening 90 tablet 0    bimatoprost (LUMIGAN) 0.01 % SOLN Place 1 drop into the right eye at bedtime      bumetanide (BUMEX) 2 MG tablet Take 1 tablet (2 mg) by mouth daily 90 tablet 0    clopidogrel (PLAVIX) 75 MG tablet Take 1 tablet (75 mg) by mouth daily 90 tablet 0    Cranberry-Vitamin C (CRANBERRY CONCENTRATE/VITAMINC) 10971-767 MG CAPS Take 2 capsules by mouth daily      diclofenac (VOLTAREN) 1 % topical gel Apply 4 g topically 4 times daily 350 g 1    levothyroxine (SYNTHROID/LEVOTHROID) 137 MCG tablet Take 1 tablet (137 mcg) by mouth daily 90 tablet 0    metoprolol tartrate (LOPRESSOR) 25 MG tablet Take 1 tablet (25 mg) by mouth 2 times daily 180 tablet 1    mirabegron (MYRBETRIQ) 25 MG 24 hr tablet Take 1 tablet (25 mg) by mouth daily 90 tablet 3    losartan (COZAAR) 100 MG tablet Take 1 tablet (100 mg) by mouth daily (Patient not taking: Reported on 5/28/2024) 90 tablet 0    Allergies   Allergen Reactions    Atenolol      Rash and Insomnia    Ciprofloxacin      Severe tendon pain and swelling    Eliquis [Apixaban]      Wanting to faint    Nitrofurantoin      'Bad, itchy, feverish, rash on arms and legs'    Sulfa Antibiotics      Rash on feet    Penicillins Rash         Lab Results    Chemistry/lipid CBC Cardiac Enzymes/BNP/TSH/INR   Lab Results   Component Value Date    CHOL 259 (H) 04/27/2024    HDL 75 04/27/2024    TRIG 118 04/27/2024    BUN 12.5 05/20/2024     (L) 05/20/2024    CO2 23 05/20/2024    Lab Results   Component Value Date    WBC 8.5 05/28/2024    HGB 15.3 05/28/2024    HCT 43.1 05/28/2024      05/28/2024     05/28/2024    Lab Results   Component Value Date    TSH 2.46 05/12/2024    INR 0.94 04/27/2024        60  minutes spent on the date of encounter doing chart review, review of outside records, review of test results, interpretation with above tests, patient visit, documentation, and discussion with family.        This note has been dictated using voice recognition software. Any grammatical, typographical, or context distortions are unintentional and inherent to the software        Thank you for allowing me to participate in the care of your patient.      Sincerely,   [facility]ERA [facility]  cc:   Gelacio Molina MD  [address]  [place] [address]

## 2024-05-29 LAB
ATRIAL RATE - MUSE: 123 BPM
DIASTOLIC BLOOD PRESSURE - MUSE: NORMAL MMHG
INTERPRETATION ECG - MUSE: NORMAL
P AXIS - MUSE: 50 DEGREES
PR INTERVAL - MUSE: 148 MS
QRS DURATION - MUSE: 104 MS
QT - MUSE: 332 MS
QTC - MUSE: 475 MS
R AXIS - MUSE: 77 DEGREES
SYSTOLIC BLOOD PRESSURE - MUSE: NORMAL MMHG
T AXIS - MUSE: 36 DEGREES
VENTRICULAR RATE- MUSE: 123 BPM

## 2024-05-30 ENCOUNTER — HOSPITAL ENCOUNTER (OUTPATIENT)
Dept: CARDIOLOGY | Facility: HOSPITAL | Age: 74
Discharge: HOME | End: 2024-05-30
Attending: NURSE PRACTITIONER | Admitting: NURSE PRACTITIONER
Payer: COMMERCIAL

## 2024-05-30 DIAGNOSIS — R00.0 TACHYCARDIA: ICD-10-CM

## 2024-05-30 PROCEDURE — 93225 XTRNL ECG REC<48 HRS REC: CPT

## 2024-05-31 ENCOUNTER — OFFICE VISIT (OUTPATIENT)
Dept: OPHTHALMOLOGY | Facility: CLINIC | Age: 74
End: 2024-05-31
Attending: STUDENT IN AN ORGANIZED HEALTH CARE EDUCATION/TRAINING PROGRAM
Payer: COMMERCIAL

## 2024-05-31 DIAGNOSIS — D31.31 NEVUS OF CHOROID OF RIGHT EYE: ICD-10-CM

## 2024-05-31 DIAGNOSIS — H52.4 PRESBYOPIA: ICD-10-CM

## 2024-05-31 DIAGNOSIS — H25.811 COMBINED FORMS OF AGE-RELATED CATARACT OF RIGHT EYE: ICD-10-CM

## 2024-05-31 DIAGNOSIS — Z01.01 ENCOUNTER FOR EXAMINATION OF EYES AND VISION WITH ABNORMAL FINDINGS: ICD-10-CM

## 2024-05-31 DIAGNOSIS — Z96.1 PSEUDOPHAKIA: Primary | ICD-10-CM

## 2024-05-31 DIAGNOSIS — H43.813 POSTERIOR VITREOUS DETACHMENT OF BOTH EYES: ICD-10-CM

## 2024-05-31 DIAGNOSIS — H40.1131 PRIMARY OPEN ANGLE GLAUCOMA (POAG) OF BOTH EYES, MILD STAGE: ICD-10-CM

## 2024-05-31 PROCEDURE — 92015 DETERMINE REFRACTIVE STATE: CPT | Performed by: OPHTHALMOLOGY

## 2024-05-31 PROCEDURE — 92004 COMPRE OPH EXAM NEW PT 1/>: CPT | Performed by: OPHTHALMOLOGY

## 2024-05-31 ASSESSMENT — VISUAL ACUITY
OS_CC: 20/20
OD_PH_CC: 20/30
OD_CC: 20/40
METHOD: SNELLEN - LINEAR
OS_CC+: -1
CORRECTION_TYPE: GLASSES
OD_PH_CC+: -1

## 2024-05-31 ASSESSMENT — REFRACTION_MANIFEST
OD_CYLINDER: +1.25
OD_AXIS: 024
OS_CYLINDER: +2.00
OS_ADD: +2.50
OD_ADD: +2.50
OD_SPHERE: -0.75
OS_SPHERE: -0.25
OS_AXIS: 157

## 2024-05-31 ASSESSMENT — CONF VISUAL FIELD
OD_SUPERIOR_TEMPORAL_RESTRICTION: 0
OD_NORMAL: 1
OD_INFERIOR_NASAL_RESTRICTION: 0
OS_SUPERIOR_TEMPORAL_RESTRICTION: 0
OS_INFERIOR_NASAL_RESTRICTION: 0
OS_SUPERIOR_NASAL_RESTRICTION: 0
METHOD: COUNTING FINGERS
OD_SUPERIOR_NASAL_RESTRICTION: 0
OD_INFERIOR_TEMPORAL_RESTRICTION: 0
OS_INFERIOR_TEMPORAL_RESTRICTION: 0
OS_NORMAL: 1

## 2024-05-31 ASSESSMENT — REFRACTION_WEARINGRX
OS_ADD: +2.50
OS_AXIS: 167
OD_SPHERE: -0.25
OD_CYLINDER: +1.75
OS_SPHERE: PLANO
OS_CYLINDER: +1.75
SPECS_TYPE: PAL
OD_AXIS: 033
OD_ADD: +2.50

## 2024-05-31 ASSESSMENT — TONOMETRY
OD_IOP_MMHG: 13
OS_IOP_MMHG: 13
IOP_METHOD: APPLANATION

## 2024-05-31 ASSESSMENT — EXTERNAL EXAM - LEFT EYE: OS_EXAM: NORMAL

## 2024-05-31 ASSESSMENT — SLIT LAMP EXAM - LIDS
COMMENTS: 1+ DERMATOCHALASIS
COMMENTS: 1+ DERMATOCHALASIS

## 2024-05-31 ASSESSMENT — CUP TO DISC RATIO
OS_RATIO: 0.6
OD_RATIO: 0.7

## 2024-05-31 ASSESSMENT — EXTERNAL EXAM - RIGHT EYE: OD_EXAM: NORMAL

## 2024-05-31 NOTE — PROGRESS NOTES
Current Eye Medications:  Lumigan at bedtime right eye     Subjective:  here for complete eye exam     Objective:  See Ophthalmology Exam.       Assessment:      Plan:   See Patient Instructions.

## 2024-05-31 NOTE — PATIENT INSTRUCTIONS
"Continue:   Lumigan once daily in the right eye.      Wait at least 5 minutes between drops if using more than one at a time.     Glasses prescription given - optional    May use artificial tears up to four times a day (like Refresh Optive, Systane Balance, or TheraTears. Avoid \"get the red out\" drops and generic artifical tears).     Return visit 6 months for an intraocular pressure check, glaucoma OCT, retinal OCT, and Witt Visual Field.     Ryan Koo M.D.  [phone]    "

## 2024-05-31 NOTE — PROGRESS NOTES
" Current Eye Medications:  Last Lumigan every morning right eye only, last took at 6 am.      Subjective:  Eye exam, history of glaucoma and cataract surgery left eye with eye stent 1-2 years ago. TIA in right eye 5/27/24. Woke with vision loss in right eye, was like looking through tissue paper. Vision started improving right eye at end of day. Vision is fine left eye. No eye pain or discomfort in either eye.   Relocated from [place] in December.      Objective:  See Ophthalmology Exam.       Assessment:  Baseline eye exam in patient with hx of glaucoma.      ICD-10-CM    1. Pseudophakia, iStent, os  Z96.1       2. Combined forms of age-related cataract, mild-mod, of right eye  H25.811       3. Nevus of choroid of right eye  D31.31       4. Primary open angle glaucoma (POAG) of both eyes, mild stage  H40.1131       5. Posterior vitreous detachment of both eyes  H43.813       6. Encounter for examination of eyes and vision with abnormal findings  Z01.01       7. Presbyopia  H52.4            Plan:  Continue:   Lumigan once daily in the right eye.      Wait at least 5 minutes between drops if using more than one at a time.     Glasses prescription given - optional    May use artificial tears up to four times a day (like Refresh Optive, Systane Balance, or TheraTears. Avoid \"get the red out\" drops and generic artifical tears).     Return visit 6 months for an intraocular pressure check, glaucoma OCT, retinal OCT, and Witt Visual Field.     Ryan Koo M.D.  [phone]         "

## 2024-05-31 NOTE — LETTER
"    5/31/2024         RE: Mirna Grijalva  [address]  [place] [facility] [address]        Dear Colleague,    Thank you for referring your patient, Mirna Grijalva, to the [facility]. Please see a copy of my visit note below.     Current Eye Medications:  Lumigan at bedtime right eye     Subjective:  here for complete eye exam     Objective:  See Ophthalmology Exam.       Assessment:      Plan:   See Patient Instructions.         Current Eye Medications:  Last Lumigan every morning right eye only, last took at 6 am.      Subjective:  Eye exam, history of glaucoma and cataract surgery left eye with eye stent 1-2 years ago. TIA in right eye 5/27/24. Woke with vision loss in right eye, was like looking through tissue paper. Vision started improving right eye at end of day. Vision is fine left eye. No eye pain or discomfort in either eye.   Relocated from [place] in December.      Objective:  See Ophthalmology Exam.       Assessment:  Baseline eye exam in patient with hx of glaucoma.      ICD-10-CM    1. Pseudophakia, iStent, os  Z96.1       2. Combined forms of age-related cataract, mild-mod, of right eye  H25.811       3. Nevus of choroid of right eye  D31.31       4. Primary open angle glaucoma (POAG) of both eyes, mild stage  H40.1131       5. Posterior vitreous detachment of both eyes  H43.813       6. Encounter for examination of eyes and vision with abnormal findings  Z01.01       7. Presbyopia  H52.4            Plan:  Continue:   Lumigan once daily in the right eye.      Wait at least 5 minutes between drops if using more than one at a time.     Glasses prescription given - optional    May use artificial tears up to four times a day (like Refresh Optive, Systane Balance, or TheraTears. Avoid \"get the red out\" drops and generic artifical tears).     Return visit 6 months for an intraocular pressure check, glaucoma OCT, retinal OCT, and Witt Visual Field.     Ryan Koo M.D.  [phone]  "          Again, thank you for allowing me to participate in the care of your patient.        Sincerely,        Ryan Koo MD

## 2024-06-01 PROBLEM — H40.1131 PRIMARY OPEN ANGLE GLAUCOMA (POAG) OF BOTH EYES, MILD STAGE: Status: ACTIVE | Noted: 2019-09-17

## 2024-06-01 PROBLEM — Z96.1 PSEUDOPHAKIA: Status: ACTIVE | Noted: 2024-06-01

## 2024-06-01 PROBLEM — D31.31 NEVUS OF CHOROID OF RIGHT EYE: Status: ACTIVE | Noted: 2024-06-01

## 2024-06-01 PROBLEM — H25.811 COMBINED FORMS OF AGE-RELATED CATARACT OF RIGHT EYE: Status: ACTIVE | Noted: 2024-06-01

## 2024-06-03 ENCOUNTER — TELEPHONE (OUTPATIENT)
Dept: FAMILY MEDICINE | Facility: CLINIC | Age: 74
End: 2024-06-03
Payer: COMMERCIAL

## 2024-06-03 ENCOUNTER — TELEPHONE (OUTPATIENT)
Dept: CARDIOLOGY | Facility: CLINIC | Age: 74
End: 2024-06-03
Payer: COMMERCIAL

## 2024-06-03 NOTE — TELEPHONE ENCOUNTER
Health Call Center    Phone Message    May a detailed message be left on voicemail: yes     Reason for Call: Other: [facility] care called requesting to speak with a member of her care team in regards to the patients bp & medication changes. Please call back to further discuss.      Action Taken: Message routed to:  Other: Cardiology    Travel Screening: Not Applicable     Thank you!  [facility]

## 2024-06-03 NOTE — TELEPHONE ENCOUNTER
Home Health Care    Reason for call:  Verbal Orders      Orders are needed for this patient.  PT: 1 visit per week for 5 weeks     Pt Provider: Dr. Ambar Hernandez    Phone Number Homecare Nurse can be reached at: [phone]

## 2024-06-03 NOTE — TELEPHONE ENCOUNTER
Home Care is calling regarding an established patient with [facility].       Requesting orders from: Ambar Hernandez  Provider is following patient: Yes  Is this a 60-day recertification request?  No    Orders Requested    Physical Therapy  Request for initial certification (first set of orders)   Frequency:  1 visit per week for 5 weeks      Information was gathered and will be sent to provider for review.  RN will contact Home Care with information after provider review.    Eleanor Arechiga RN

## 2024-06-03 NOTE — TELEPHONE ENCOUNTER
[facility] Care Form  Order #[id number]    Placed into PCP inbox for review.    Anabelle Pelayo CMA ([facility]

## 2024-06-04 NOTE — TELEPHONE ENCOUNTER
Patient is contacted to discuss her b/p she reports 137/95 today, she let us know that she has not taken the Metoprolol yet today, but will in the future.   Shira CHAVEZ RN BSN, CHFN, PCCN-K

## 2024-06-04 NOTE — TELEPHONE ENCOUNTER
Faxed, copy placed into [employer] bin and original sent to scanning.    Anabelle Pelayo CMA (AAMA)  [facility]

## 2024-06-05 ENCOUNTER — DOCUMENTATION ONLY (OUTPATIENT)
Dept: OTHER | Facility: CLINIC | Age: 74
End: 2024-06-05
Payer: COMMERCIAL

## 2024-06-05 ENCOUNTER — TELEPHONE (OUTPATIENT)
Dept: FAMILY MEDICINE | Facility: CLINIC | Age: 74
End: 2024-06-05
Payer: COMMERCIAL

## 2024-06-05 NOTE — TELEPHONE ENCOUNTER
[facility], needs PCP review.  Placed into PCP inbox for review.    Order # [id number]

## 2024-06-07 DIAGNOSIS — I50.9 ACUTE ON CHRONIC CONGESTIVE HEART FAILURE, UNSPECIFIED HEART FAILURE TYPE (H): ICD-10-CM

## 2024-06-07 DIAGNOSIS — G45.3 AFX (AMAUROSIS FUGAX): ICD-10-CM

## 2024-06-07 RX ORDER — ATORVASTATIN CALCIUM 80 MG/1
80 TABLET, FILM COATED ORAL EVERY EVENING
Qty: 90 TABLET | Refills: 0 | OUTPATIENT
Start: 2024-06-07

## 2024-06-07 RX ORDER — ASPIRIN 81 MG/1
81 TABLET ORAL DAILY
Qty: 90 TABLET | Refills: 0 | OUTPATIENT
Start: 2024-06-07

## 2024-06-07 RX ORDER — BUMETANIDE 2 MG/1
2 TABLET ORAL DAILY
Qty: 90 TABLET | Refills: 0 | OUTPATIENT
Start: 2024-06-07

## 2024-06-07 NOTE — TELEPHONE ENCOUNTER
June 7, 2024    Home health orders #[id number] was picked up from outbox of  Dr. Hernandez.  sent via fax to [facility].    SUMI Campbell

## 2024-06-07 NOTE — TELEPHONE ENCOUNTER
Patient calling to get a medication refill on medication(s) attached      Patient looking to get ASAP as the previous medication was discontinued as she went to ED     Will be done with clopidogrel (PLAVIX) 75 MG tablet in 90 days and will then have to take Asprin     Patient has an appt at the end of the month     If need to talk to her call her at her phone number with any questions or concerns      Contact Information  [phone] (Mobile)

## 2024-06-07 NOTE — TELEPHONE ENCOUNTER
LMTCB. Need to know if patient is taking losartan or not. Per cardiology's last note she was not taking.

## 2024-06-11 ENCOUNTER — TELEPHONE (OUTPATIENT)
Dept: FAMILY MEDICINE | Facility: CLINIC | Age: 74
End: 2024-06-11
Payer: COMMERCIAL

## 2024-06-11 NOTE — TELEPHONE ENCOUNTER
Reason for Call:  Medication refill:    Do you use a [facility]? [place] of the pharmacy and phone number for the current request:      [id number] - [place], [place] - [address]       Name of the medication requested: losartan     Other request: Opal with [facility] calling for refill for patient. Forwarding to Care Team as medication appears discontinued.         Call taken on 6/11/2024 at 3:53 PM by Juliana Tinoco

## 2024-06-12 NOTE — TELEPHONE ENCOUNTER
Fred Potter, MEERA CNP  P [facility]; Reggie Evangelista, RN  Caller: Unspecified (Yesterday,  3:52 PM)  Thanks Reggie for catching!  You are right. Per my note, pt reported not taking currently.  Please do not refill losartan until she sees PCP or Dr. Molina can consider resume as indicated.  CY

## 2024-06-12 NOTE — TELEPHONE ENCOUNTER
Losartan is not on this patient's med list anymore and during your visit I see it was still on her home meds. Is she supposed to be taking Losartan or not? Reggie Hunter              Reason for Call:  Medication refill:     Do you use a [facility]? [place] of the pharmacy and phone number for the current request:       [id number] - [place], [place] - [facility]         Name of the medication requested: losartan      Other request: Opal with [facility] calling for refill for patient. Forwarding to Care Team as medication appears discontinued.            Call taken on 6/11/2024 at 3:53 PM by Juliana Tinoco

## 2024-06-13 PROCEDURE — 93227 XTRNL ECG REC<48 HR R&I: CPT | Performed by: INTERNAL MEDICINE

## 2024-06-14 ENCOUNTER — TELEPHONE (OUTPATIENT)
Dept: FAMILY MEDICINE | Facility: CLINIC | Age: 74
End: 2024-06-14
Payer: COMMERCIAL

## 2024-06-14 NOTE — TELEPHONE ENCOUNTER
June 14, 2024    Home health orders was received via fax for Dr. Hernandez. Patient label was attached to paperwork and placed in provider's inbox to be signed.    SUMI Campbell

## 2024-06-17 DIAGNOSIS — E03.9 HYPOTHYROIDISM, UNSPECIFIED TYPE: ICD-10-CM

## 2024-06-17 DIAGNOSIS — Z85.850 HISTORY OF THYROID CANCER: ICD-10-CM

## 2024-06-17 RX ORDER — LEVOTHYROXINE SODIUM 137 UG/1
137 TABLET ORAL DAILY
Qty: 90 TABLET | Refills: 0 | Status: SHIPPED | OUTPATIENT
Start: 2024-06-17 | End: 2024-08-30

## 2024-06-17 NOTE — TELEPHONE ENCOUNTER
FAX [facility] Refill Request     levothyroxine (SYNTHROID/LEVOTHROID) 137 MCG tablet

## 2024-06-18 ENCOUNTER — TELEPHONE (OUTPATIENT)
Dept: CARDIOLOGY | Facility: CLINIC | Age: 74
End: 2024-06-18
Payer: COMMERCIAL

## 2024-06-19 ENCOUNTER — TELEPHONE (OUTPATIENT)
Dept: FAMILY MEDICINE | Facility: CLINIC | Age: 74
End: 2024-06-19
Payer: COMMERCIAL

## 2024-06-19 NOTE — TELEPHONE ENCOUNTER
FYI - Status Update    Who is Calling: Patient    Update: Patient recently has had heart problems and will be getting checked with [facility]     Patient recently had heart failure     Does caller want a call/response back: Yes     Could we send this information to you in [employer] or would you prefer to receive a phone call?:   Patient would prefer a phone call   Okay to leave a detailed message?: Yes at Cell number on file:    Telephone Information:   Mobile [phone]

## 2024-06-20 ENCOUNTER — HOSPITAL ENCOUNTER (OUTPATIENT)
Dept: CT IMAGING | Facility: HOSPITAL | Age: 74
Discharge: HOME | End: 2024-06-20
Attending: NURSE PRACTITIONER | Admitting: NURSE PRACTITIONER
Payer: COMMERCIAL

## 2024-06-20 DIAGNOSIS — R91.8 MULTIPLE PULMONARY NODULES: ICD-10-CM

## 2024-06-20 PROCEDURE — 250N000009 HC RX 250: Performed by: NURSE PRACTITIONER

## 2024-06-20 PROCEDURE — 250N000011 HC RX IP 250 OP 636: Performed by: NURSE PRACTITIONER

## 2024-06-20 PROCEDURE — 71260 CT THORAX DX C+: CPT

## 2024-06-20 RX ORDER — IOPAMIDOL 755 MG/ML
90 INJECTION, SOLUTION INTRAVASCULAR ONCE
Status: COMPLETED | OUTPATIENT
Start: 2024-06-20 | End: 2024-06-20

## 2024-06-20 RX ADMIN — SODIUM CHLORIDE 90 ML: 9 INJECTION, SOLUTION INTRAVENOUS at 13:08

## 2024-06-20 RX ADMIN — IOPAMIDOL 90 ML: 755 INJECTION, SOLUTION INTRAVENOUS at 13:07

## 2024-06-25 ENCOUNTER — OFFICE VISIT (OUTPATIENT)
Dept: CARDIOLOGY | Facility: CLINIC | Age: 74
End: 2024-06-25
Payer: COMMERCIAL

## 2024-06-25 VITALS
SYSTOLIC BLOOD PRESSURE: 122 MMHG | DIASTOLIC BLOOD PRESSURE: 72 MMHG | HEART RATE: 96 BPM | BODY MASS INDEX: 36.37 KG/M2 | WEIGHT: 213 LBS | RESPIRATION RATE: 16 BRPM | HEIGHT: 64 IN

## 2024-06-25 DIAGNOSIS — J96.01 ACUTE RESPIRATORY FAILURE WITH HYPOXIA (H): ICD-10-CM

## 2024-06-25 DIAGNOSIS — J81.0 ACUTE PULMONARY EDEMA (H): ICD-10-CM

## 2024-06-25 DIAGNOSIS — I50.31 ACUTE HEART FAILURE WITH PRESERVED EJECTION FRACTION (H): ICD-10-CM

## 2024-06-25 DIAGNOSIS — R00.0 TACHYCARDIA: ICD-10-CM

## 2024-06-25 PROCEDURE — 99214 OFFICE O/P EST MOD 30 MIN: CPT | Performed by: INTERNAL MEDICINE

## 2024-06-25 PROCEDURE — G2211 COMPLEX E/M VISIT ADD ON: HCPCS | Performed by: INTERNAL MEDICINE

## 2024-06-25 NOTE — PROGRESS NOTES
HEART CARE NOTE          Assessment/Recommendations     1. HFpEF c/b severe ADHF  Assessment / Plan  Near euvolemia and denies HF symptoms of orthopnea, PND, edema - no changes to regimen at this time  Patient is high risk for adverse cardiac events 2/2 advanced age, frailty, HTN  GDMT as detailed below; mainstay of treatment for HFpEF includes diuretics and adequate BP control (class I) and SGLT2-I (class 2a); additional medical therapy (ARNI, MRA, ARB) demonstrated less robust evidence for indication but may be considered per guideline recommendations (2b); no indication for BBlockers      Current Pharmacotherapy AHA Guideline-Directed Medical Therapy   Losartan 100 mg daily - held ARNI/ARB   Spironolactone not started  MRA   SGLT2 inhibitor: not started SGLT2-I    Bumex 2 mg daily Loop diuretic       3. HTN  Assessment / Plan  Adequately controlled - no changes at this time; continue to hold oral afterload reduction for now    30 minutes spent reviewing prior records (including documentation, laboratory studies, cardiac testing/imaging), history and physical exam, planning, and subsequent documentation.    The longitudinal plan of care for HFpEF was addressed during this visit. Due to the added complexity in care, I will continue to support Ms. Mirna Grijalva in the subsequent management of this condition(s) and with the ongoing continuity of care of this condition(s).      History of Present Illness/Subjective    Ms. Mirna Grijalva is a 74 year old female with a PMHx significant for (per Epic notation) essential HTN, HLD, hypothyroidism, recurrent UTI, recent amaurosis fugax (4/27/24) who was admitted on 5/12/2024 for acute hypoxic respiratory failure. Presented to the ED with 2 days of progressive SOB, dry cough. Hypoxic in ED requiring 3 L NC O2. CT chest showing moderate pulmonary edema (BNP within normal limits).      Today, Mrs. Grijalva denies acute cardiac events or complaints; Management plan as detailed  "above     ECG: Personally reviewed. normal sinus rhythm, occasional PVC noted, unifocal.     ECHO (personnaly Reviewed on 5/13/24):   Left ventricular size, wall motion and function are normal. The ejection  fraction is 60-65%.  Normal right ventricle size and systolic function.  IVC diameter <2.1 cm collapsing >50% with sniff suggests a normal RA pressure  of 3 mmHg.  Small pericardial effusion  There are no echocardiographic indications of cardiac tamponade.    Lab results: personally reviewed June 25, 2024; notable for stable renal function wnl    Medical history and pertinent documents reviewed in Care Everywhere please where applicable see details above        Physical Examination Review of Systems   /72 (BP Location: Left arm, Patient Position: Sitting, Cuff Size: Adult Large)   Pulse 96   Resp 16   Ht 1.626 m (5' 4\")   Wt 96.6 kg (213 lb)   LMP  (LMP Unknown)   BMI 36.56 kg/m    Body mass index is 36.56 kg/m .  Wt Readings from Last 3 Encounters:   06/25/24 96.6 kg (213 lb)   05/28/24 98.2 kg (216 lb 9.6 oz)   05/20/24 99.5 kg (219 lb 6.4 oz)     General Appearance:   no distress, normal body habitus   ENT/Mouth: membranes moist, no oral lesions or bleeding gums.      EYES:  no scleral icterus, normal conjunctivae   Neck: no carotid bruits or thyromegaly   Chest/Lungs:   lungs are clear to auscultation, no rales or wheezing, equal chest wall expansion    Cardiovascular:   Regular. Normal first and second heart sounds with no murmurs, rubs, or gallops; the carotid, radial and posterior tibial pulses are intact, no JVD or LE edema bilaterally    Abdomen:  no organomegaly, masses, bruits, or tenderness; bowel sounds are present   Extremities: no cyanosis or clubbing   Skin: no xanthelasma, warm.    Neurologic: NAD     Psychiatric: alert and oriented x3, calm     A complete 10 systems ROS was reviewed  And is negative except what is listed in the HPI.          Medical History  Surgical History Family " History Social History   Past Medical History:   Diagnosis Date    Arthritis years ago?    Cancer (H) 1988  & 2016?    Glaucoma (increased eye pressure) About 11 years    Hypertension years ago?    Mumps     Nonsenile cataract About 11 years    TIA (transient ischemic attack) 04/27/2024    right eye    Past Surgical History:   Procedure Laterality Date    CATARACT IOL, RT/LT Left 2022?    With Istent    CYSTOSCOPY      GLAUCOMA SURGERY  2022?    no family history of premature coronary artery disease Social History     Socioeconomic History    Marital status:      Spouse name: Not on file    Number of children: Not on file    Years of education: Not on file    Highest education level: Not on file   Occupational History    Not on file   Tobacco Use    Smoking status: Former     Current packs/day: 1.00     Average packs/day: 1 pack/day for 20.0 years (20.0 ttl pk-yrs)     Types: Cigarettes     Passive exposure: Never    Smokeless tobacco: Never   Vaping Use    Vaping status: Never Used   Substance and Sexual Activity    Alcohol use: Yes    Drug use: Never    Sexual activity: Not Currently     Partners: Female     Birth control/protection: Post-menopausal   Other Topics Concern    Not on file   Social History Narrative    Not on file     Social Determinants of Health     Financial Resource Strain: Low Risk  (1/23/2024)    Financial Resource Strain     Within the past 12 months, have you or your family members you live with been unable to get utilities (heat, electricity) when it was really needed?: No   Food Insecurity: Low Risk  (1/23/2024)    Food Insecurity     Within the past 12 months, did you worry that your food would run out before you got money to buy more?: No     Within the past 12 months, did the food you bought just not last and you didn t have money to get more?: No   Transportation Needs: Low Risk  (1/23/2024)    Transportation Needs     Within the past 12 months, has lack of transportation kept  "you from medical appointments, getting your medicines, non-medical meetings or appointments, work, or from getting things that you need?: No   Physical Activity: Not on file   Stress: Not on file   Social Connections: Not on file   Interpersonal Safety: Low Risk  (4/23/2024)    Interpersonal Safety     Do you feel physically and emotionally safe where you currently live?: Yes     Within the past 12 months, have you been hit, slapped, kicked or otherwise physically hurt by someone?: No     Within the past 12 months, have you been humiliated or emotionally abused in other ways by your partner or ex-partner?: No   Housing Stability: Low Risk  (1/23/2024)    Housing Stability     Do you have housing? : Yes     Are you worried about losing your housing?: No           Lab Results    Chemistry/lipid CBC Cardiac Enzymes/BNP/TSH/INR   Lab Results   Component Value Date    CHOL 259 (H) 04/27/2024    HDL 75 04/27/2024    TRIG 118 04/27/2024    BUN 11.7 05/28/2024     (L) 05/28/2024    CO2 27 05/28/2024    Lab Results   Component Value Date    WBC 8.5 05/28/2024    HGB 15.3 05/28/2024    HCT 43.1 05/28/2024     05/28/2024     05/28/2024    Lab Results   Component Value Date    TSH 2.46 05/12/2024    INR 0.94 04/27/2024     No results found for: \"CKTOTAL\", \"CKMB\", \"TROPONINI\"       Weight:    Wt Readings from Last 3 Encounters:   05/28/24 98.2 kg (216 lb 9.6 oz)   05/20/24 99.5 kg (219 lb 6.4 oz)   05/12/24 100.7 kg (222 lb)       Allergies  Allergies   Allergen Reactions    Ciprofloxacin Hcl Swelling    Atenolol      Rash and Insomnia    Ciprofloxacin      Severe tendon pain and swelling    Eliquis [Apixaban]      Wanting to faint    Nitrofurantoin      'Bad, itchy, feverish, rash on arms and legs'    Sulfa Antibiotics      Rash on feet    Penicillins Rash         Surgical History  Past Surgical History:   Procedure Laterality Date    CATARACT IOL, RT/LT Left 2022?    With Istent    CYSTOSCOPY      GLAUCOMA " SURGERY  2022?       Social History  Tobacco:   History   Smoking Status    Former    Types: Cigarettes   Smokeless Tobacco    Never    Alcohol:   Social History    Substance and Sexual Activity      Alcohol use: Yes   Illicit Drugs:   History   Drug Use Unknown       Family History  Family History   Problem Relation Age of Onset    Glaucoma Mother     Hypertension Mother     Thyroid Disease Mother     Eye Surgery Mother     Glasses (<9 y/o) Mother     Cancer Father     Diabetes Father     Glasses (<9 y/o) Father           Gelacio Molina MD on 6/25/2024      cc: Ambar Hernandez

## 2024-06-26 ENCOUNTER — TELEPHONE (OUTPATIENT)
Dept: FAMILY MEDICINE | Facility: CLINIC | Age: 74
End: 2024-06-26
Payer: COMMERCIAL

## 2024-06-26 NOTE — TELEPHONE ENCOUNTER
Pt calling clinic very tearful wanting to ask PCP about possibly trying a sedative.     Pt reports while tearful on phone- that her best friend recently passed.     Pt states she was seen with the heart care clinic for heart failure yesterday as well.     Writer relayed would relay message to PCP however an appt may be needed before starting a new medication as such.     Pt verbalized understanding and thanked for the call.

## 2024-06-27 ENCOUNTER — TELEPHONE (OUTPATIENT)
Dept: FAMILY MEDICINE | Facility: CLINIC | Age: 74
End: 2024-06-27
Payer: COMMERCIAL

## 2024-06-27 ASSESSMENT — ANXIETY QUESTIONNAIRES
7. FEELING AFRAID AS IF SOMETHING AWFUL MIGHT HAPPEN: SEVERAL DAYS
GAD7 TOTAL SCORE: 4
6. BECOMING EASILY ANNOYED OR IRRITABLE: NOT AT ALL
7. FEELING AFRAID AS IF SOMETHING AWFUL MIGHT HAPPEN: SEVERAL DAYS
IF YOU CHECKED OFF ANY PROBLEMS ON THIS QUESTIONNAIRE, HOW DIFFICULT HAVE THESE PROBLEMS MADE IT FOR YOU TO DO YOUR WORK, TAKE CARE OF THINGS AT HOME, OR GET ALONG WITH OTHER PEOPLE: NOT DIFFICULT AT ALL
2. NOT BEING ABLE TO STOP OR CONTROL WORRYING: SEVERAL DAYS
4. TROUBLE RELAXING: NOT AT ALL
8. IF YOU CHECKED OFF ANY PROBLEMS, HOW DIFFICULT HAVE THESE MADE IT FOR YOU TO DO YOUR WORK, TAKE CARE OF THINGS AT HOME, OR GET ALONG WITH OTHER PEOPLE?: NOT DIFFICULT AT ALL
5. BEING SO RESTLESS THAT IT IS HARD TO SIT STILL: NOT AT ALL
1. FEELING NERVOUS, ANXIOUS, OR ON EDGE: SEVERAL DAYS
3. WORRYING TOO MUCH ABOUT DIFFERENT THINGS: SEVERAL DAYS
GAD7 TOTAL SCORE: 4

## 2024-06-27 NOTE — TELEPHONE ENCOUNTER
LMTCB for the pt. See PCP's message below and please assist in scheduling this if pt is agreeable.     Thank you    Ambar Hernandez, DO  You17 hours ago (5:07 PM)     SR  Maybe a VV on my Lunch break this Friday

## 2024-06-27 NOTE — TELEPHONE ENCOUNTER
Patient called back    Patient will need a telephone call     Doesn't have access for VV, stated she has done one in the past though

## 2024-06-27 NOTE — TELEPHONE ENCOUNTER
FYI - Status Update    [facility]    Update:     FYI     Elevated heart rate     Resting 104    -Fine with activity just not going below 100 usually in the 70's     increase in anxiety     appt tomorrow      [facility]   [phone]       
Has The Cancer Been Biopsied Before?: has been previously biopsied
Who Is Your Referring Provider?: Dr. Paez
When Was Your Biopsy?: 12/12/2017
Year Removed: 1900
Body Location Override (Optional): scalp

## 2024-06-27 NOTE — CONFIDENTIAL NOTE
RECORDS RECEIVED FROM: internal    DATE RECEIVED: 7.22.24    NOTES (FOR ALL VISITS) STATUS DETAILS   OFFICE NOTES from referring provider internal  History of thyroid cancer  Referred by: ADIEL GRIJALVA     OFFICE NOTES from other specialist     ED NOTES     OPERATIVE REPORT  (thyroid, pituitary, adrenal, parathyroid)     MEDICATION LIST internal     IMAGING      DEXASCAN internal  2.13.24   MRI (BRAIN) internal  4.27.24,    XR (Chest) internal  2.1.24   CT (HEAD/NECK/CHEST/ABDOMEN) internal  6.20.24, 5.12.24, 4.27.24, 3.1.24   LABS     DIABETES: HBGA1C, CREATININE, FASTING LIPIDS, MICROALBUMIN URINE, POTASSIUM, TSH, T4    THYROID: TSH, T4, CBC, THYRODLONULIN, TOTAL T3, FREE T4, CALCITONIN, CEA internal       Action 6.27.24 sv    Action Taken Called pt and LVM about gathering medical records   Per appt notes- Daughter is working on getting records from previous clinic in       Action 7/10/24 sv    Action Taken Called pt and LVM with clinic number about gathering medical records   Per appt notes- Daughter is working on getting records from previous clinic in       Action 7/18/24 sv    Action Taken Called pt 3x and LVM about gathering medical records, left clinic call back number

## 2024-06-28 ENCOUNTER — VIRTUAL VISIT (OUTPATIENT)
Dept: FAMILY MEDICINE | Facility: CLINIC | Age: 74
End: 2024-06-28
Payer: COMMERCIAL

## 2024-06-28 DIAGNOSIS — I50.32 CHRONIC HEART FAILURE WITH PRESERVED EJECTION FRACTION (H): ICD-10-CM

## 2024-06-28 DIAGNOSIS — Z78.9 ALCOHOL USE: ICD-10-CM

## 2024-06-28 DIAGNOSIS — F41.1 GAD (GENERALIZED ANXIETY DISORDER): Primary | ICD-10-CM

## 2024-06-28 PROCEDURE — 99442 PR PHYSICIAN TELEPHONE EVALUATION 11-20 MIN: CPT | Mod: 93 | Performed by: STUDENT IN AN ORGANIZED HEALTH CARE EDUCATION/TRAINING PROGRAM

## 2024-06-28 RX ORDER — SERTRALINE HYDROCHLORIDE 25 MG/1
25 TABLET, FILM COATED ORAL DAILY
Qty: 90 TABLET | Refills: 0 | Status: SHIPPED | OUTPATIENT
Start: 2024-06-28 | End: 2024-08-02

## 2024-06-28 ASSESSMENT — PATIENT HEALTH QUESTIONNAIRE - PHQ9: SUM OF ALL RESPONSES TO PHQ QUESTIONS 1-9: 0

## 2024-06-28 NOTE — PROGRESS NOTES
"Mirna is a 74 year old who is being evaluated via a billable telephone visit.    What phone number would you like to be contacted at? [phone]  How would you like to obtain your AVS? Alannah  Originating Location (pt. Location): Home    Distant Location (provider location):  On-site    Assessment & Plan      Diagnosis Comments   1. CARINE (generalized anxiety disorder)  MA BEHAV ASSMT W/SCORE & DOCD/STAND INSTRUMENT, sertraline (ZOLOFT) 25 MG tablet       2. Alcohol use        3. Chronic heart failure with preserved ejection fraction (H)              CARINE  Alcohol use  Grief  PHQ-9 score 0  CARINE-7 score 4  No active SI/HI  Reports increased anxiety symptoms in the setting of: Recent CHF exacerbation/diagnosis, ongoing health issues with her , increased healthcare burden and recent passing of her friend (had cancer and lived in another state)  Patient feels she is \"on edge\" and very \"jittery\"  She feels like she needs \"something to calm down\"  She is very worried about her heart as well and what it means for how overall health  Is interested in starting controller medication  Is continuing to drink about 3 drinks a night  Plan:  - Will start on low-dose Zoloft  - Reviewed initially activating side effects of the medication including worsened anxiety, depression, potential SI, headache, dizziness and stomach upset.  Symptoms should subside the longer she is on the medication.  If patient has persistent SI, she should discontinue the medication immediately.  She verbalized understanding.  - We will follow-up in 6 weeks      HFpEF:  Was recently hospitalized with and diagnosed with HFpEF  Is following with cardiology  Recently saw cardiology 6/25 and felt to be stable.  She is continuing on her Bumex and metoprolol.  She feels like she has no respiratory symptoms currently and weight is stable.  Advised patient to cut down on alcohol intake to prevent progression of the heart failure.  She seemed receptive to this " information          3/29/2024     2:40 PM 6/28/2024    12:07 PM   PHQ   PHQ-9 Total Score 0 0   Q9: Thoughts of better off dead/self-harm past 2 weeks Not at all Not at all           3/29/2024     2:42 PM 6/27/2024     1:01 PM   CARINE-7 SCORE   Total Score 0 (minimal anxiety) 4 (minimal anxiety)   Total Score 0 4           Tien Bradley is a 74 year old, presenting for the following health issues:  Follow Up        6/28/2024    12:02 PM   Additional Questions   Roomed by Anabelle DIAZ CMA           Review of Systems  As per HPI       Objective           Vitals:  No vitals were obtained today due to virtual visit.    Physical Exam   General: Alert and no distress //Respiratory: No audible wheeze, cough, or shortness of breath // Psychiatric:  Appropriate affect, tone, and pace of words. Anxious, slightly circular reasoning, poor insight          Phone call duration: 13 minutes  Signed Electronically by: Ambar Hernandez DO

## 2024-07-03 DIAGNOSIS — R91.8 PULMONARY NODULES: Primary | ICD-10-CM

## 2024-07-08 ENCOUNTER — TELEPHONE (OUTPATIENT)
Dept: FAMILY MEDICINE | Facility: CLINIC | Age: 74
End: 2024-07-08
Payer: COMMERCIAL

## 2024-07-08 NOTE — TELEPHONE ENCOUNTER
Order/Referral Request    Who is requesting: [facility]    Orders being requested:     Verbal Orders    discharged from home care Physical Therapy today    -met her goals     Does patient have a preference on a Group/Provider/Facility? [facility]    Does patient have an appointment scheduled?: Yes: 7/15/2024    Where to send orders:  Verbal Orders      [facility] [place]   [phone]

## 2024-07-08 NOTE — TELEPHONE ENCOUNTER
Home Care is calling regarding an established patient with [facility].       Requesting orders from: Ambar Hernandez  Provider is following patient: Yes  Is this a 60-day recertification request?  No    Orders Requested    Physical Therapy  Request for discontinuation of care   Goals have been met/progressing.  Frequency:       Verbal orders given.  Home Care will send orders for provider to sign.

## 2024-07-12 ENCOUNTER — TRANSFERRED RECORDS (OUTPATIENT)
Dept: HEALTH INFORMATION MANAGEMENT | Facility: CLINIC | Age: 74
End: 2024-07-12
Payer: COMMERCIAL

## 2024-07-15 ENCOUNTER — OFFICE VISIT (OUTPATIENT)
Dept: FAMILY MEDICINE | Facility: CLINIC | Age: 74
End: 2024-07-15
Payer: COMMERCIAL

## 2024-07-15 VITALS
BODY MASS INDEX: 35.42 KG/M2 | WEIGHT: 207.5 LBS | RESPIRATION RATE: 20 BRPM | HEART RATE: 93 BPM | TEMPERATURE: 98.3 F | OXYGEN SATURATION: 96 % | SYSTOLIC BLOOD PRESSURE: 110 MMHG | DIASTOLIC BLOOD PRESSURE: 70 MMHG | HEIGHT: 64 IN

## 2024-07-15 DIAGNOSIS — E87.1 HYPONATREMIA: ICD-10-CM

## 2024-07-15 DIAGNOSIS — F33.42 RECURRENT MAJOR DEPRESSIVE DISORDER, IN FULL REMISSION (H): ICD-10-CM

## 2024-07-15 DIAGNOSIS — I49.9 IRREGULAR HEART RATE: ICD-10-CM

## 2024-07-15 DIAGNOSIS — G45.3 AFX (AMAUROSIS FUGAX): ICD-10-CM

## 2024-07-15 DIAGNOSIS — Z78.9 ALCOHOL USE: ICD-10-CM

## 2024-07-15 DIAGNOSIS — F41.1 GAD (GENERALIZED ANXIETY DISORDER): Primary | ICD-10-CM

## 2024-07-15 DIAGNOSIS — D75.89 MACROCYTOSIS: ICD-10-CM

## 2024-07-15 DIAGNOSIS — I50.30 HEART FAILURE WITH PRESERVED EJECTION FRACTION, NYHA CLASS I (H): ICD-10-CM

## 2024-07-15 DIAGNOSIS — G45.9 TIA (TRANSIENT ISCHEMIC ATTACK): ICD-10-CM

## 2024-07-15 LAB
ANION GAP SERPL CALCULATED.3IONS-SCNC: 17 MMOL/L (ref 7–15)
BUN SERPL-MCNC: 17.3 MG/DL (ref 8–23)
CALCIUM SERPL-MCNC: 9.4 MG/DL (ref 8.8–10.2)
CHLORIDE SERPL-SCNC: 87 MMOL/L (ref 98–107)
CREAT SERPL-MCNC: 0.68 MG/DL (ref 0.51–0.95)
EGFRCR SERPLBLD CKD-EPI 2021: 90 ML/MIN/1.73M2
ERYTHROCYTE [DISTWIDTH] IN BLOOD BY AUTOMATED COUNT: 13.5 % (ref 10–15)
GLUCOSE SERPL-MCNC: 116 MG/DL (ref 70–99)
HCO3 SERPL-SCNC: 32 MMOL/L (ref 22–29)
HCT VFR BLD AUTO: 42.3 % (ref 35–47)
HGB BLD-MCNC: 15.1 G/DL (ref 11.7–15.7)
MCH RBC QN AUTO: 36.6 PG (ref 26.5–33)
MCHC RBC AUTO-ENTMCNC: 35.7 G/DL (ref 31.5–36.5)
MCV RBC AUTO: 102 FL (ref 78–100)
PLATELET # BLD AUTO: 206 10E3/UL (ref 150–450)
POTASSIUM SERPL-SCNC: 2.7 MMOL/L (ref 3.4–5.3)
RBC # BLD AUTO: 4.13 10E6/UL (ref 3.8–5.2)
SODIUM SERPL-SCNC: 136 MMOL/L (ref 135–145)
WBC # BLD AUTO: 9.5 10E3/UL (ref 4–11)

## 2024-07-15 PROCEDURE — 93010 ELECTROCARDIOGRAM REPORT: CPT | Performed by: INTERNAL MEDICINE

## 2024-07-15 PROCEDURE — 96127 BRIEF EMOTIONAL/BEHAV ASSMT: CPT | Performed by: STUDENT IN AN ORGANIZED HEALTH CARE EDUCATION/TRAINING PROGRAM

## 2024-07-15 PROCEDURE — 36415 COLL VENOUS BLD VENIPUNCTURE: CPT | Performed by: STUDENT IN AN ORGANIZED HEALTH CARE EDUCATION/TRAINING PROGRAM

## 2024-07-15 PROCEDURE — G2211 COMPLEX E/M VISIT ADD ON: HCPCS | Performed by: STUDENT IN AN ORGANIZED HEALTH CARE EDUCATION/TRAINING PROGRAM

## 2024-07-15 PROCEDURE — 93005 ELECTROCARDIOGRAM TRACING: CPT | Performed by: STUDENT IN AN ORGANIZED HEALTH CARE EDUCATION/TRAINING PROGRAM

## 2024-07-15 PROCEDURE — 85027 COMPLETE CBC AUTOMATED: CPT | Performed by: STUDENT IN AN ORGANIZED HEALTH CARE EDUCATION/TRAINING PROGRAM

## 2024-07-15 PROCEDURE — 99215 OFFICE O/P EST HI 40 MIN: CPT | Performed by: STUDENT IN AN ORGANIZED HEALTH CARE EDUCATION/TRAINING PROGRAM

## 2024-07-15 PROCEDURE — 80048 BASIC METABOLIC PNL TOTAL CA: CPT | Performed by: STUDENT IN AN ORGANIZED HEALTH CARE EDUCATION/TRAINING PROGRAM

## 2024-07-15 RX ORDER — RESPIRATORY SYNCYTIAL VIRUS VACCINE 120MCG/0.5
0.5 KIT INTRAMUSCULAR ONCE
Qty: 1 EACH | Refills: 0 | Status: CANCELLED | OUTPATIENT
Start: 2024-07-15 | End: 2024-07-15

## 2024-07-15 RX ORDER — MULTIVIT WITH MINERALS/LUTEIN
1 TABLET ORAL DAILY
COMMUNITY
End: 2024-07-24

## 2024-07-15 ASSESSMENT — ANXIETY QUESTIONNAIRES
1. FEELING NERVOUS, ANXIOUS, OR ON EDGE: NOT AT ALL
5. BEING SO RESTLESS THAT IT IS HARD TO SIT STILL: NOT AT ALL
2. NOT BEING ABLE TO STOP OR CONTROL WORRYING: SEVERAL DAYS
IF YOU CHECKED OFF ANY PROBLEMS ON THIS QUESTIONNAIRE, HOW DIFFICULT HAVE THESE PROBLEMS MADE IT FOR YOU TO DO YOUR WORK, TAKE CARE OF THINGS AT HOME, OR GET ALONG WITH OTHER PEOPLE: NOT DIFFICULT AT ALL
6. BECOMING EASILY ANNOYED OR IRRITABLE: NOT AT ALL
GAD7 TOTAL SCORE: 2
GAD7 TOTAL SCORE: 2
3. WORRYING TOO MUCH ABOUT DIFFERENT THINGS: SEVERAL DAYS
7. FEELING AFRAID AS IF SOMETHING AWFUL MIGHT HAPPEN: NOT AT ALL

## 2024-07-15 ASSESSMENT — PATIENT HEALTH QUESTIONNAIRE - PHQ9
SUM OF ALL RESPONSES TO PHQ QUESTIONS 1-9: 0
5. POOR APPETITE OR OVEREATING: NOT AT ALL

## 2024-07-15 NOTE — PROGRESS NOTES
Assessment & Plan      Diagnosis Comments   1. CARINE (generalized anxiety disorder)  KS BEHAV ASSMT W/SCORE & DOCD/STAND INSTRUMENT       2. Irregular heart rate  EKG 12-lead, tracing only       3. Macrocytosis        4. Alcohol use  CBC with platelets       5. Hyponatremia  Basic metabolic panel  (Ca, Cl, CO2, Creat, Gluc, K, Na, BUN)       6. Heart failure with preserved ejection fraction, NYHA class I (H)  empagliflozin (JARDIANCE) 10 MG TABS tablet       7. AFX (amaurosis fugax)        8. TIA (transient ischemic attack)        9. Recurrent major depressive disorder, in full remission (H24)  KS BEHAV ASSMT W/SCORE & DOCD/STAND INSTRUMENT          CARINE  Grief  Alcohol use  PHQ-9 score 0   CARINE-7 score 2  No active SI/HI  Was recently seen via virtual visit for persistent depression/anxiety symptoms after the passing of one of her best friends.  She was started on low-dose Zoloft and that has been very helpful.  Patient feels like her symptoms are under control and she is tolerating the medication well.  She is not interested in augmenting therapy.  She notes that her drinking has also decreased.  Plan:  - Continue the medication for now    Irregular heartbeat:  Noted on physical exam.  EKG showed some PACs and nonspecific ST and T wave abnormalities.  Largely unchanged from prior.  Patient is asymptomatic.    Hyponatremia:  Noted on last set of blood work.  Patient is on Bumex after recent HFpEF diagnosis.  Recheck BMP today.    Macrocytosis:  Most likely 2/2 alcohol use  Was resolved last time  Will recheck today    HFpEF:  Patient is trying to stick to a low-salt diet  Weighs himself daily and has not gained more than 2 pounds within 24 hours.  He is currently on Bumex 2 mg, Plavix and aspirin.  Plan:  - Patient should be on SGLT2.  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  Did go see  "neurology at the time of her discharge and they wanted her to continue on DAPT for 3 months and then discontinue the Plavix.  She continues to be on Plavix.  Plan:  - Will discontinue Plavix  - Neurology wanted to repeat CTA to assess for progression on right ophthalmic artery stenosis.  She has an appointment coming up with them in August.          3/29/2024     2:40 PM 6/28/2024    12:07 PM 7/15/2024     4:46 PM   PHQ   PHQ-9 Total Score 0 0 0   Q9: Thoughts of better off dead/self-harm past 2 weeks Not at all Not at all Not at all           3/29/2024     2:42 PM 6/27/2024     1:01 PM 7/15/2024     4:46 PM   CARINE-7 SCORE   Total Score 0 (minimal anxiety) 4 (minimal anxiety)    Total Score 0 4 2     The longitudinal plan of care for the diagnosis(es)/condition(s) as documented were addressed during this visit. Due to the added complexity in care, I will continue to support Mirna in the subsequent management and with ongoing continuity of care.      43 minutes spent by me on the date of the encounter doing chart review, history and exam, documentation and further activities per the note        Subjective   Mirna is a 74 year old, presenting for the following health issues:  Follow Up        7/15/2024     3:36 PM   Additional Questions   Roomed by Anabelle DIAZ CMA   Accompanied by Spouse         Objective    /70 (BP Location: Left arm, Patient Position: Sitting, Cuff Size: Adult Large)   Pulse 93   Temp 98.3  F (36.8  C) (Oral)   Resp 20   Ht 5' 4\" (1.626 m)   Wt 207 lb 8 oz (94.1 kg)   LMP  (LMP Unknown)   SpO2 96%   Breastfeeding No   BMI 35.62 kg/m    Body mass index is 35.62 kg/m .  Physical Exam   GENERAL: alert and no distress  NECK: no adenopathy, no asymmetry, masses, or scars  RESP: lungs clear to auscultation - no rales, rhonchi or wheezes  CV: regular rate and rhythm with occasional skipped beats,  no murmur, click or rub, no peripheral edema  MS: no gross musculoskeletal defects noted, no " edema  PSYCH: mentation appears normal, affect  anxious, poor insight    Signed Electronically by: Ambar Hernandez,

## 2024-07-15 NOTE — LETTER
July 16, 2024      Mirna Grijalva  [address] [place] [address]        Dear ,    We are writing to inform you of your test results.    Hi Mirna,     Your potassium is quite low.  Most likely due to the water pill that you are on.  I would like you to take potassium supplementation for the next 3 days and repeat potassium levels on Friday.  Low potassium levels can be quite dangerous.  Please start eating high potassium foods like bananas as well.     Resulted Orders   Basic metabolic panel  (Ca, Cl, CO2, Creat, Gluc, K, Na, BUN)   Result Value Ref Range    Sodium 136 135 - 145 mmol/L    Potassium 2.7 (L) 3.4 - 5.3 mmol/L    Chloride 87 (L) 98 - 107 mmol/L    Carbon Dioxide (CO2) 32 (H) 22 - 29 mmol/L    Anion Gap 17 (H) 7 - 15 mmol/L    Urea Nitrogen 17.3 8.0 - 23.0 mg/dL    Creatinine 0.68 0.51 - 0.95 mg/dL    GFR Estimate 90 >60 mL/min/1.73m2      Comment:      eGFR calculated using 2021 CKD-EPI equation.    Calcium 9.4 8.8 - 10.2 mg/dL    Glucose 116 (H) 70 - 99 mg/dL   CBC with platelets   Result Value Ref Range    WBC Count 9.5 4.0 - 11.0 10e3/uL    RBC Count 4.13 3.80 - 5.20 10e6/uL    Hemoglobin 15.1 11.7 - 15.7 g/dL    Hematocrit 42.3 35.0 - 47.0 %     (H) 78 - 100 fL    MCH 36.6 (H) 26.5 - 33.0 pg    MCHC 35.7 31.5 - 36.5 g/dL    RDW 13.5 10.0 - 15.0 %    Platelet Count 206 150 - 450 10e3/uL       If you have any questions or concerns, please call the clinic at the number listed above.       Sincerely,      Ambar Hernandez, DO

## 2024-07-16 ENCOUNTER — TELEPHONE (OUTPATIENT)
Dept: FAMILY MEDICINE | Facility: CLINIC | Age: 74
End: 2024-07-16
Payer: COMMERCIAL

## 2024-07-16 DIAGNOSIS — E87.6 HYPOKALEMIA: Primary | ICD-10-CM

## 2024-07-16 LAB
ATRIAL RATE - MUSE: 87 BPM
DIASTOLIC BLOOD PRESSURE - MUSE: NORMAL MMHG
INTERPRETATION ECG - MUSE: NORMAL
P AXIS - MUSE: NORMAL DEGREES
PR INTERVAL - MUSE: NORMAL MS
QRS DURATION - MUSE: 112 MS
QT - MUSE: 442 MS
QTC - MUSE: 531 MS
R AXIS - MUSE: 62 DEGREES
SYSTOLIC BLOOD PRESSURE - MUSE: NORMAL MMHG
T AXIS - MUSE: 44 DEGREES
VENTRICULAR RATE- MUSE: 87 BPM

## 2024-07-16 RX ORDER — POTASSIUM CHLORIDE 1.5 G/1.58G
20 POWDER, FOR SOLUTION ORAL 2 TIMES DAILY
Qty: 6 PACKET | Refills: 0 | Status: SHIPPED | OUTPATIENT
Start: 2024-07-16 | End: 2024-07-19

## 2024-07-16 NOTE — TELEPHONE ENCOUNTER
Called and left message for patient to return call, please relay below message to patient. Letter was created and sent via [employer].    ----- Message from Ambar Hernandez sent at 7/16/2024  7:45 AM CDT -----  Please call to make sure patient receives this information    Hi Mirna,    Your potassium is quite low.  Most likely due to the water pill that you are on.  I would like you to take potassium supplementation for the next 3 days and repeat potassium levels on Friday.  Low potassium levels can be quite dangerous.  Please start eating high potassium foods like bananas as well.      Anabelle Pelayo CMA (AAMA)  [facility]

## 2024-07-16 NOTE — TELEPHONE ENCOUNTER
Patient Returning Call    -Patient will  medication and follow instructions     Reason for call:  Missed call from MA     Information relayed to patient:      Called and left message for patient to return call, please relay below message to patient. Letter was created and sent via [employer].     ----- Message from Ambar Hernandez sent at 7/16/2024  7:45 AM CDT -----  Please call to make sure patient receives this information     Hi Mirna,     Your potassium is quite low.  Most likely due to the water pill that you are on.  I would like you to take potassium supplementation for the next 3 days and repeat potassium levels on Friday.  Low potassium levels can be quite dangerous.  Please start eating high potassium foods like bananas as well.        Anabelle Pelayo CMA ([facility]       Patient has additional questions:  No      Could we send this information to you in [employer] or would you prefer to receive a phone call?:   Patient would like to be contacted via [employer]

## 2024-07-19 ENCOUNTER — LAB (OUTPATIENT)
Dept: LAB | Facility: CLINIC | Age: 74
End: 2024-07-19
Payer: COMMERCIAL

## 2024-07-19 DIAGNOSIS — E87.6 HYPOKALEMIA: ICD-10-CM

## 2024-07-19 LAB
ANION GAP SERPL CALCULATED.3IONS-SCNC: 11 MMOL/L (ref 7–15)
BUN SERPL-MCNC: 15.6 MG/DL (ref 8–23)
CALCIUM SERPL-MCNC: 11.1 MG/DL (ref 8.8–10.4)
CHLORIDE SERPL-SCNC: 96 MMOL/L (ref 98–107)
CREAT SERPL-MCNC: 0.67 MG/DL (ref 0.51–0.95)
EGFRCR SERPLBLD CKD-EPI 2021: 90 ML/MIN/1.73M2
GLUCOSE SERPL-MCNC: 134 MG/DL (ref 70–99)
HCO3 SERPL-SCNC: 33 MMOL/L (ref 22–29)
POTASSIUM SERPL-SCNC: 3.7 MMOL/L (ref 3.4–5.3)
SODIUM SERPL-SCNC: 140 MMOL/L (ref 135–145)

## 2024-07-19 PROCEDURE — 80048 BASIC METABOLIC PNL TOTAL CA: CPT

## 2024-07-19 PROCEDURE — 36415 COLL VENOUS BLD VENIPUNCTURE: CPT

## 2024-07-22 ENCOUNTER — PRE VISIT (OUTPATIENT)
Dept: ENDOCRINOLOGY | Facility: CLINIC | Age: 74
End: 2024-07-22

## 2024-07-22 ENCOUNTER — VIRTUAL VISIT (OUTPATIENT)
Dept: ENDOCRINOLOGY | Facility: CLINIC | Age: 74
End: 2024-07-22
Payer: COMMERCIAL

## 2024-07-22 DIAGNOSIS — E89.0 POSTOPERATIVE HYPOTHYROIDISM: ICD-10-CM

## 2024-07-22 DIAGNOSIS — C73 MALIGNANT NEOPLASM OF THYROID GLAND (H): ICD-10-CM

## 2024-07-22 DIAGNOSIS — E83.52 HYPERCALCEMIA: Primary | ICD-10-CM

## 2024-07-22 PROCEDURE — 99204 OFFICE O/P NEW MOD 45 MIN: CPT | Mod: 95 | Performed by: INTERNAL MEDICINE

## 2024-07-22 NOTE — LETTER
"7/22/2024       RE: iMrna Grijalva  [address]  [place] [facility] [address]     Dear Colleague,    Thank you for referring your patient, Mirna Grijalva, to the [facility] ENDOCR[facility]OLOGY [facility]INIC [place] [facility]. Please see a copy of my visit note below.      ENDOCRINOLOGY VIDEO VISIT NEW        HISTORY OF PRESENT ILLNESS    Mirna Grijalva is a 74 year old female who is being evaluated via a billable video visit. The patient is seen in consultation at the request of Dr. Hernandez for thyroid cancer.      The patient is accompanied by her  and also her daughter, Jada.    We discussed the following issues.    The patient notes that she was diagnosed with hyperparathyroidism and ultimately noted to have \"parathyroid nodules\" for which she underwent parathyroidectomy.  During parathyroid surgery, she was discovered to have thyroid nodule and thyroidectomy was performed.    1.  Thyroid cancer.  Unknown type, discovered during parathyroid surgery.  Underwent thyroidectomy.  Patient is unsure of the year of surgery.    She does not recall receiving radioiodine therapy.    No history of excessive head or neck radiation exposure.  Mother with hypothyroidism, no family history of thyroid cancer.    2.  Hypothyroidism.  Post thyroidectomy.  Has been on levothyroxine 137 mcg daily.  Endorses fatigue.  No tremors or palpitations.    3.  History of primary hyperparathyroidism, status post parathyroidectomy.  Recalls being told that her surgeon \"left 1\" parathyroid intact.    Was previously taking multivitamin (Centrum Silver) but discontinued this after recent discovery of hypercalcemia.    Does not take vitamin D3, does not take calcium.    Has history of arm fracture in childhood.  No other fracture history.    DXA scan performed in our system on 2/13/2024 (study included one third radius) showed low bone density.  Lowest T-score was -2.2 at the one third radius.    The " patient's medical history is also notable for the following:  -Heart failure with preserved ejection fraction  -Hypertension  -Episode of amaurosis fugax    Pertinent Social History: , moved back to [place] (was living in [place]).  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    PAST MEDICAL HISTORY  Past Medical History:   Diagnosis Date    Arthritis years ago?    Cancer (H) 1988  & 2016?    Coronary artery disease May 2024    Glaucoma (increased eye pressure) About 11 years    Hypertension years ago?    Mumps     Nonsenile cataract About 11 years    Thyroid cancer (H)     TIA (transient ischemic attack) 04/27/2024    right eye       MEDICATIONS  Current Outpatient Medications   Medication Sig Dispense Refill    acetaminophen (TYLENOL) 500 MG tablet Take 1 tablet (500 mg) by mouth every 6 hours as needed for mild pain 60 tablet 1    aspirin 81 MG EC tablet Take 1 tablet (81 mg) by mouth daily 90 tablet 0    atorvastatin (LIPITOR) 80 MG tablet Take 1 tablet (80 mg) by mouth every evening 90 tablet 0    bimatoprost (LUMIGAN) 0.01 % SOLN Place 1 drop into the right eye at bedtime      bumetanide (BUMEX) 2 MG tablet Take 1 tablet (2 mg) by mouth daily 90 tablet 0    Cranberry-Vitamin C (CRANBERRY CONCENTRATE/VITAMINC) 42221-479 MG CAPS Take 2 capsules by mouth daily      empagliflozin (JARDIANCE) 10 MG TABS tablet Take 1 tablet (10 mg) by mouth daily 90 tablet 1    levothyroxine (SYNTHROID/LEVOTHROID) 137 MCG tablet Take 1 tablet (137 mcg) by mouth daily 90 tablet 0    metoprolol tartrate (LOPRESSOR) 25 MG tablet Take 1 tablet (25 mg) by mouth 2 times daily 180 tablet 1    multivitamin (CENTRUM SILVER) tablet Take 1 tablet by mouth daily      sertraline (ZOLOFT) 25 MG tablet Take 1 tablet (25 mg) by mouth daily 90 tablet 0       Allergies, family, and social history were reviewed and documented as needed in EHR.     REVIEW OF SYSTEMS  A focused ROS was performed, with  pertinent positives and negatives as noted in the HPI.    PHYSICAL EXAM  LMP  (LMP Unknown)   There is no height or weight on file to calculate BMI.  Constitutional: Patient is alert, oriented and appears in no acute distress.  Eyes: Eyes grossly normal to inspection, EOMI, no stare, lid lag, or retraction; no conjunctival injection.  ENMT: Lips are without lesions.   Neck: No visible goiter or neck mass.  Respiratory: No audible wheeze or cough. No visible cyanosis. No visible increased work of breathing.  Neurological: Alert and oriented times 3.  Cranial nerves grossly intact.      DATA REVIEW  Each of the following laboratory and/or imaging studies were reviewed.          ASSESSMENT  1.  History of parathyroidectomy.  Based on limited history, may have been subtotal parathyroidectomy.  Unclear if she had primary hyperparathyroidism.  Was normocalcemic until most recent labs earlier this month: We will reevaluate calcium metabolism labs and await past records to guide our next steps.    2.  History of thyroid cancer.  Unknown type.  Status post thyroidectomy.  Limited history but does not appear to have had radioiodine therapy.  As above, await medical records before determining next best steps.    3.  Hypothyroidism.  Postsurgical.  Presently on levothyroxine.  Check thyroid function tests with next lab draw.  Based on history of thyroid cancer, would aim for TSH at least at 2 or less, may need to intensify TSH suppression if prior records indicate high risk disease (we will await records before making further recommendations).    4.  Low bone density.  For now, address hypercalcemia as above.  Request prior endocrine records.    PLAN  -Labs on 7/25/24 when in [facility] for appointment (may need to make a separate lab appointment, please check with lab)  -We will request records from Dr. Kevin Parikh (ENT, [facility], [facility]) and records from [facility] in [facility]  -For now, continue levothyroxine without changes  -Return for a follow-up visit in August to review records and discuss next steps of management  -We will communicate results via MyChart, or if needed by phone      Orders Placed This Encounter   Procedures    TSH with free T4 reflex    Comprehensive metabolic panel    Vitamin D Deficiency    Parathyroid Hormone Intact    Phosphorus         Video-Visit Details    Type of service:  Video Visit    Physician location: Off site    Video Start Time: 10:33 AM  Video End Time: 10:51 AM    I spent a total of 46 minutes on the date of encounter reviewing medical records, evaluating the patient, coordinating care and documenting in the EHR, as detailed above.      Platform used for Video Visit: Nuno Crabtree MD   Division of Diabetes, Endocrinology and Metabolism  Department of Medicine      cc: Ambar Hernandez DO

## 2024-07-22 NOTE — NURSING NOTE
Current patient location: daughters dental office - daughter is helping pt     Is the patient currently in the state of [place]? YES    Visit mode:VIDEO    If the visit is dropped, the patient can be reconnected by: VIDEO VISIT: Text to cell phone:   Telephone Information:   Mobile [phone]       Will anyone else be joining the visit? NO  (If patient encounters technical issues they should call [phone]    How would you like to obtain your AVS? MyChart    Are changes needed to the allergy or medication list? Pt stated no med changes    Are refills needed on medications prescribed by this physician? NO    Reason for visit: Consult (History of thyroid cancer )    Maura Mathews VVF    Pt is no longer taking the centrum multivitamin- pt requests removal    Sore shoulder towards shoulder blade not joint - pt thinks it is because of her cane- pt just got off using a walker. PT states it has been going on a very long time.      and daughter are with pt on video for appt.

## 2024-07-22 NOTE — PATIENT INSTRUCTIONS
-Labs on 7/25/24 when in [facility] for appointment (may need to make a separate lab appointment, please check with lab)  -We will request records from Dr. Kevin Parikh (ENT, [facility], [facility]) and records from [facility] in [facility]  -For now, continue levothyroxine without changes  -Return for a follow-up visit in August to review records and discuss next steps of management  -We will communicate results via [employer], or if needed by phone

## 2024-07-22 NOTE — PROGRESS NOTES
"  ENDOCRINOLOGY VIDEO VISIT NEW        HISTORY OF PRESENT ILLNESS    Mirna Grijalva is a 74 year old female who is being evaluated via a billable video visit. The patient is seen in consultation at the request of Dr. Hernandez for thyroid cancer.      The patient is accompanied by her  and also her daughter, Jada.    We discussed the following issues.    The patient notes that she was diagnosed with hyperparathyroidism and ultimately noted to have \"parathyroid nodules\" for which she underwent parathyroidectomy.  During parathyroid surgery, she was discovered to have thyroid nodule and thyroidectomy was performed.    1.  Thyroid cancer.  Unknown type, discovered during parathyroid surgery.  Underwent thyroidectomy.  Patient is unsure of the year of surgery.    She does not recall receiving radioiodine therapy.    No history of excessive head or neck radiation exposure.  Mother with hypothyroidism, no family history of thyroid cancer.    2.  Hypothyroidism.  Post thyroidectomy.  Has been on levothyroxine 137 mcg daily.  Endorses fatigue.  No tremors or palpitations.    3.  History of primary hyperparathyroidism, status post parathyroidectomy.  Recalls being told that her surgeon \"left 1\" parathyroid intact.    Was previously taking multivitamin (Centrum Silver) but discontinued this after recent discovery of hypercalcemia.    Does not take vitamin D3, does not take calcium.    Has history of arm fracture in childhood.  No other fracture history.    DXA scan performed in our system on 2/13/2024 (study included one third radius) showed low bone density.  Lowest T-score was -2.2 at the one third radius.    The patient's medical history is also notable for the following:  -Heart failure with preserved ejection fraction  -Hypertension  -Episode of amaurosis fugax    Pertinent Social History: , moved back to [place] (was living in [place]).  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    PAST MEDICAL HISTORY  Past Medical History:   Diagnosis Date    Arthritis years ago?    Cancer (H) 1988  & 2016?    Coronary artery disease May 2024    Glaucoma (increased eye pressure) About 11 years    Hypertension years ago?    Mumps     Nonsenile cataract About 11 years    Thyroid cancer (H)     TIA (transient ischemic attack) 04/27/2024    right eye       MEDICATIONS  Current Outpatient Medications   Medication Sig Dispense Refill    acetaminophen (TYLENOL) 500 MG tablet Take 1 tablet (500 mg) by mouth every 6 hours as needed for mild pain 60 tablet 1    aspirin 81 MG EC tablet Take 1 tablet (81 mg) by mouth daily 90 tablet 0    atorvastatin (LIPITOR) 80 MG tablet Take 1 tablet (80 mg) by mouth every evening 90 tablet 0    bimatoprost (LUMIGAN) 0.01 % SOLN Place 1 drop into the right eye at bedtime      bumetanide (BUMEX) 2 MG tablet Take 1 tablet (2 mg) by mouth daily 90 tablet 0    Cranberry-Vitamin C (CRANBERRY CONCENTRATE/VITAMINC) 89472-580 MG CAPS Take 2 capsules by mouth daily      empagliflozin (JARDIANCE) 10 MG TABS tablet Take 1 tablet (10 mg) by mouth daily 90 tablet 1    levothyroxine (SYNTHROID/LEVOTHROID) 137 MCG tablet Take 1 tablet (137 mcg) by mouth daily 90 tablet 0    metoprolol tartrate (LOPRESSOR) 25 MG tablet Take 1 tablet (25 mg) by mouth 2 times daily 180 tablet 1    multivitamin (CENTRUM SILVER) tablet Take 1 tablet by mouth daily      sertraline (ZOLOFT) 25 MG tablet Take 1 tablet (25 mg) by mouth daily 90 tablet 0       Allergies, family, and social history were reviewed and documented as needed in EHR.     REVIEW OF SYSTEMS  A focused ROS was performed, with pertinent positives and negatives as noted in the HPI.    PHYSICAL EXAM  LMP  (LMP Unknown)   There is no height or weight on file to calculate BMI.  Constitutional: Patient is alert, oriented and appears in no acute distress.  Eyes: Eyes grossly normal to inspection, EOMI, no stare, lid lag, or retraction;  no conjunctival injection.  ENMT: Lips are without lesions.   Neck: No visible goiter or neck mass.  Respiratory: No audible wheeze or cough. No visible cyanosis. No visible increased work of breathing.  Neurological: Alert and oriented times 3.  Cranial nerves grossly intact.      DATA REVIEW  Each of the following laboratory and/or imaging studies were reviewed.          ASSESSMENT  1.  History of parathyroidectomy.  Based on limited history, may have been subtotal parathyroidectomy.  Unclear if she had primary hyperparathyroidism.  Was normocalcemic until most recent labs earlier this month: We will reevaluate calcium metabolism labs and await past records to guide our next steps.    2.  History of thyroid cancer.  Unknown type.  Status post thyroidectomy.  Limited history but does not appear to have had radioiodine therapy.  As above, await medical records before determining next best steps.    3.  Hypothyroidism.  Postsurgical.  Presently on levothyroxine.  Check thyroid function tests with next lab draw.  Based on history of thyroid cancer, would aim for TSH at least at 2 or less, may need to intensify TSH suppression if prior records indicate high risk disease (we will await records before making further recommendations).    4.  Low bone density.  For now, address hypercalcemia as above.  Request prior endocrine records.    PLAN  -Labs on 7/25/24 when in [facility] for appointment (may need to make a separate lab appointment, please check with lab)  -We will request records from Dr. Kevin Parikh (ENT, [facility], [facility]) and records from [facility] in [facility]  -For now, continue levothyroxine without changes  -Return for a follow-up visit in August to review records and discuss next steps of management  -We will communicate results via [employer], or if needed by phone      Orders Placed This Encounter   Procedures    TSH with free T4 reflex     Comprehensive metabolic panel    Vitamin D Deficiency    Parathyroid Hormone Intact    Phosphorus         Video-Visit Details    Type of service:  Video Visit    Physician location: Off site    Video Start Time: 10:33 AM  Video End Time: 10:51 AM    I spent a total of 46 minutes on the date of encounter reviewing medical records, evaluating the patient, coordinating care and documenting in the EHR, as detailed above.      Platform used for Video Visit: Nuno Crabtree MD   Division of Diabetes, Endocrinology and Metabolism  Department of Medicine      cc: Ambar Hernandez DO

## 2024-07-23 ENCOUNTER — TELEPHONE (OUTPATIENT)
Dept: ENDOCRINOLOGY | Facility: CLINIC | Age: 74
End: 2024-07-23
Payer: COMMERCIAL

## 2024-07-23 NOTE — TELEPHONE ENCOUNTER
Left Voicemail (1st Attempt) and Sent Mychart (1st Attempt) for the patient to call back and schedule the following:    Appointment type: return endocrine   Provider: Bro   Return date: 8/30 ([facility] [[email or web address]   Specialty phone number: [phone]  Additional appointment(s) needed:   Additonal Notes: SCHEDULE APPT FOR 60 MINUTES PER Arlene Mike MD [id number]   this was a new patient sent for a consult but we did not have any records so I will need to see her in follow-up soon.  Would you please schedule her for a visit on 8/30/2024: Please use 2 return slots.  I would prefer to see her in person if that works for her at all.  If not, you can book her for virtual visit.    Please note that the above appointment(s) will require manual scheduling as they are marked as ANDRE and will not appear using auto search. Do not schedule the patient if another patient has already been scheduled in the requested appointment slot.       Eamon Gross on 7/23/2024 at 8:33 AM

## 2024-07-24 ENCOUNTER — TELEPHONE (OUTPATIENT)
Dept: OPHTHALMOLOGY | Facility: CLINIC | Age: 74
End: 2024-07-24
Payer: COMMERCIAL

## 2024-07-24 DIAGNOSIS — G45.3 AFX (AMAUROSIS FUGAX): ICD-10-CM

## 2024-07-24 DIAGNOSIS — H40.1131 PRIMARY OPEN ANGLE GLAUCOMA (POAG) OF BOTH EYES, MILD STAGE: Primary | ICD-10-CM

## 2024-07-24 RX ORDER — ASPIRIN 81 MG/1
81 TABLET ORAL DAILY
Qty: 90 TABLET | Refills: 2 | Status: SHIPPED | OUTPATIENT
Start: 2024-07-24

## 2024-07-24 RX ORDER — BIMATOPROST 0.1 MG/ML
1 SOLUTION/ DROPS OPHTHALMIC AT BEDTIME
Qty: 7.5 ML | Refills: 3 | Status: SHIPPED | OUTPATIENT
Start: 2024-07-24

## 2024-07-24 NOTE — TELEPHONE ENCOUNTER
[facility] Call Center    Phone Message    May a detailed message be left on voicemail: yes     Reason for Call: Medication Refill Request    Has the patient contacted the pharmacy for the refill? Yes   Name of medication being requested: bimatoprost (LUMIGAN) 0.01 % SOLN   Provider who prescribed the medication: Dr Koo  Pharmacy: [id number] [address]   Date medication is needed: ASAP     Patient is almost out of medication and would like refill asap. Thank you.    Action Taken: Message routed to:  Other:  Clinic    Travel Screening: Not Applicable     Date of Service:

## 2024-07-24 NOTE — TELEPHONE ENCOUNTER
Medication Question or Refill        What medication are you calling about (include dose and sig)?: Aspirin 81MG EC tablet    Preferred Pharmacy:   [id number] - [place], [place] - [address]  [place] [address]  Phone: [phone] Fax: [phone]      Controlled Substance Agreement on file:   CSA -- Patient Level:    CSA: None found at the patient level.       Who prescribed the medication?: David    Do you need a refill? Yes    When did you use the medication last? 7/23/24    Patient offered an appointment? No    Do you have any questions or concerns?  No      Could we send this information to you in [place] or would you prefer to receive a phone call?:   Patient would prefer a phone call   Okay to leave a detailed message?: Yes at Other phone number:  [phone]*

## 2024-07-25 ENCOUNTER — TELEPHONE (OUTPATIENT)
Dept: FAMILY MEDICINE | Facility: CLINIC | Age: 74
End: 2024-07-25

## 2024-07-25 ENCOUNTER — OFFICE VISIT (OUTPATIENT)
Dept: ORTHOPEDICS | Facility: CLINIC | Age: 74
End: 2024-07-25
Attending: STUDENT IN AN ORGANIZED HEALTH CARE EDUCATION/TRAINING PROGRAM
Payer: COMMERCIAL

## 2024-07-25 VITALS — BODY MASS INDEX: 35.34 KG/M2 | HEIGHT: 64 IN | WEIGHT: 207 LBS

## 2024-07-25 DIAGNOSIS — E83.110 HEREDITARY HEMOCHROMATOSIS (H): Primary | ICD-10-CM

## 2024-07-25 DIAGNOSIS — M19.049 HAND ARTHRITIS: ICD-10-CM

## 2024-07-25 PROCEDURE — 99204 OFFICE O/P NEW MOD 45 MIN: CPT | Mod: 25 | Performed by: FAMILY MEDICINE

## 2024-07-25 PROCEDURE — 20604 DRAIN/INJ JOINT/BURSA W/US: CPT | Mod: 50 | Performed by: FAMILY MEDICINE

## 2024-07-25 RX ORDER — BETAMETHASONE SODIUM PHOSPHATE AND BETAMETHASONE ACETATE 3; 3 MG/ML; MG/ML
6 INJECTION, SUSPENSION INTRA-ARTICULAR; INTRALESIONAL; INTRAMUSCULAR; SOFT TISSUE
Status: DISCONTINUED | OUTPATIENT
Start: 2024-07-25 | End: 2024-09-14

## 2024-07-25 RX ORDER — ROPIVACAINE HYDROCHLORIDE 5 MG/ML
0.5 INJECTION, SOLUTION EPIDURAL; INFILTRATION; PERINEURAL
Status: DISCONTINUED | OUTPATIENT
Start: 2024-07-25 | End: 2024-09-14

## 2024-07-25 RX ADMIN — ROPIVACAINE HYDROCHLORIDE 0.5 ML: 5 INJECTION, SOLUTION EPIDURAL; INFILTRATION; PERINEURAL at 14:12

## 2024-07-25 RX ADMIN — BETAMETHASONE SODIUM PHOSPHATE AND BETAMETHASONE ACETATE 6 MG: 3; 3 INJECTION, SUSPENSION INTRA-ARTICULAR; INTRALESIONAL; INTRAMUSCULAR; SOFT TISSUE at 14:12

## 2024-07-25 NOTE — PROGRESS NOTES
ASSESSMENT & PLAN    Mirna was seen today for pain and pain.    Diagnoses and all orders for this visit:    Hand arthritis  -     Orthopedic  Referral  -     Small Joint Injection/Arthrocentesis: bilateral long PIP      This issue is acute on chronic and Worsening.    # Bilateral Hand Arthritis: Mirna Grijalva  was seen today for bilateral hand arthritis, worse over the 3rd PIP joints. Symptoms had been going on for years, worse over the past few months. On examination there are positive findings of swelling and tenderness to palpation over the 3rd PIP joints. Imaging findings showed severe 3rd PIP joints. Likely cause of patient's condition due to flare of arthritis. She has had steroid injections in the past that have been helpful. Counseled patient on nature of condition and treatment options.  Given this plan as below, follow-up 1 mon as needed.     Image Findings: bilateral hand arthritis worse at the 3rd PIP joints  Treatment: Activities as tolerated   Medications/Injections: Limited tylenol for pain for 1-2 weeks, bilateral 3rd PIP joint steroid injections  Follow-up: In one month if symptoms do not improve, sooner if worsening  Can consider repeat steroid injections    Aurelio Salinas MD  [facility] SPORTS MEDICINE [facility]    -----  Chief Complaint   Patient presents with    Right Hand - Pain    Left Hand - Pain       SUBJECTIVE  Mirna Grijalva is a/an 74 year old female who is seen in consultation at the request of  Ambar Hernandez D.O. for evaluation of bilateral hand pain.     The patient is seen by themselves.  The patient is Right handed    Onset: Several years(s) ago. Reports insidious onset without acute precipitating event. Saw PCP 4/23/24 and xrays were performed.   Location of Pain: bilateral hand pain, right hand   Worsened by: cold weather   Better with: nothing   Treatments tried: Tylenol  Associated symptoms: swelling    Orthopedic/Surgical history: YES - chronic hand pain  "  Social History/Occupation: Retired     No family history pertinent to patient's problem today.      REVIEW OF SYSTEMS:  Review of Systems  Constitutional, HEENT, cardiovascular, pulmonary, gi and gu systems are negative, except as otherwise noted.    OBJECTIVE:  Ht 1.626 m (5' 4\")   Wt 93.9 kg (207 lb)   LMP  (LMP Unknown)   BMI 35.53 kg/m     General: healthy, alert and in no distress  HEENT: no scleral icterus or conjunctival erythema  Skin: no suspicious lesions or rash. No jaundice.  CV: distal perfusion intact    Resp: normal respiratory effort without conversational dyspnea   Psych: normal mood and affect  Gait: normal steady gait with appropriate coordination and balance    Neuro: Normal light sensory exam of extremity      Ortho Exam   BILATERAL HAND  Inspection:  Swelling over the fingers worse over the 3rd PIP joints  Palpation:   Carpals: normal   Metacarpals: normal   Thumb: normal   Fingers: TTP over 3rd PIP joints  Range of Motion:    Full active flexion and extension at MCP, PIP, and DIP joints; normal finger cascade without malrotation.  Wrist pronation, supination, and ulnar/radial deviation normal.  Strength:     full  Special Tests:    Positive: none    Negative: flexor digitorum superficialis testing, flexor digitorum profundus testing    RADIOLOGY:  I independently ordered, visualized and reviewed these images with the patient     EXAM: XR HAND BILATERAL G/E 3 VIEWS  LOCATION: [facility]  DATE: 4/23/2024     INDICATION:  Pain in fingers of both hands.   COMPARISON: None.                                                                      IMPRESSION: Degenerative change at the STT joint and first MCP joint bilaterally. Severe degenerative change at multiple IP joints of both hands. There is some irregularity along the PIP joint margins of the index and long fingers bilaterally but this is   most likely secondary to severe osteoarthritis rather than an " inflammatory etiology. Recommend correlation.    Review of external notes as documented elsewhere in note  Review of the result(s) of each unique test - bilateral hand x-rays       Disclaimer: This note consists of symbols derived from keyboarding, dictation and/or voice recognition software. As a result, there may be errors in the script that have gone undetected. Please consider this when interpreting information found in this chart.    Small Joint Injection/Arthrocentesis: bilateral long PIP    Date/Time: 7/25/2024 2:12 PM    Performed by: Aurelio Salinas MD  Authorized by: Aurelio Salinas MD  Indications:  Pain  Needle Size:  25 G  Guidance: ultrasound     Approach:  Dorsal  Location:  Long finger  Laterality:  Bilateral  Site:  Bilateral long PIP    Medications (Right):  6 mg betamethasone acet & sod phos 6 (3-3) MG/ML; 0.5 mL ROPivacaine 5 MG/ML   Medications (Right) comment:  Actual amount of celestone used 0.5 mL        Medications (Left):  6 mg betamethasone acet & sod phos 6 (3-3) MG/ML; 0.5 mL ROPivacaine 5 MG/ML   Medications (Left) comment:  Actual amount of celestone used 0.5 mL                Outcome:  Tolerated well, no immediate complications  Procedure discussed: discussed risks, benefits, and alternatives    Consent Given by:  Patient  Timeout: timeout called immediately prior to procedure    Prep: patient was prepped and draped in usual sterile fashion       Ultrasound images of procedure were permanently stored.     Patient reported tolerated bilateral 3rd PIP joint steroid injections.  Ultrasound guided images were permanently stored.   Aftercare instructions given to patient.  Plan to follow-up as discussed above.     Aurelio Salinas MD [facility]

## 2024-07-25 NOTE — TELEPHONE ENCOUNTER
Please call patient    I received some of her records from her prior providers    It looks like she carries a diagnosis of hereditary hemochromatosis    Was she following with hematology/oncology?  Did they ever need to do any intervention for elevated ferritin?

## 2024-07-25 NOTE — PATIENT INSTRUCTIONS
# Bilateral Hand Arthritis: Mirna Grijalva  was seen today for bilateral hand arthritis, worse over the 3rd PIP joints. Symptoms had been going on for years, worse over the past few months. On examination there are positive findings of swelling and tenderness to palpation over the 3rd PIP joints. Imaging findings showed severe 3rd PIP joints. Likely cause of patient's condition due to flare of arthritis. She has had steroid injections in the past that have been helpful. Counseled patient on nature of condition and treatment options.  Given this plan as below, follow-up 1 mon as needed.     Image Findings: bilateral hand arthritis worse at the 3rd PIP joints  Treatment: Activities as tolerated   Medications/Injections: Limited tylenol for pain for 1-2 weeks, bilateral 3rd PIP joint steroid injections  Follow-up: In one month if symptoms do not improve, sooner if worsening  Can consider repeat steroid injections    Please call [phone]   Ask for my team if you have any questions or concerns    If you have not yet received the influenza vaccine but would like to get one, please call  [phone] or you can schedule via [employer]    It was great seeing you today!    Aurelio Salinas MD, [facility] Injection Discharge Instructions    Procedure: bilateral 3rd PIP joint steroid injections    You may shower, however avoid swimming, tub baths or hot tubs for 24 hours following your procedure  You may have a mild to moderate increase in pain for several days following the injection.  It may take up to 14 days for the steroid medication to start working although you may feel the effect as early as a few days after the procedure.  You may use ice packs for 10-15 minutes, 3 to 4 times a day at the injection site for comfort  You may use anti-inflammatory medications (such as Ibuprofen or Aleve or Advil) or Tylenol for pain control if necessary  If you were fasting, you may resume your normal diet and medications after  the procedure  If you have diabetes, check your blood sugar more frequently than usual as your blood sugar may be higher than normal for 10-14 days following a steroid injection. Contact your doctor who manages your diabetes if your blood sugar is higher than usual    If you experience any of the following, call [facility] @ [phone] or [phone]  -Fever over 100 degree F  -Swelling, bleeding, redness, drainage, warmth at the injection site  - New or worsening pain

## 2024-07-25 NOTE — TELEPHONE ENCOUNTER
Spoke to patient- she is aware of diagnosis, has never followed heme/onc.  States that she had genetic testing as her dad & uncle had this.  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     Thanked Dr Hernandez for noticing & ordering Ferritin as she has lab appt Sat

## 2024-07-25 NOTE — LETTER
7/25/2024      Mirna Grijalva  [address]  [facility] [address]      Dear Colleague,    Thank you for referring your patient, Mirna Grijalva, to the [facility]. Please see a copy of my visit note below.    ASSESSMENT & PLAN    Mirna was seen today for pain and pain.    Diagnoses and all orders for this visit:    Hand arthritis  -     Orthopedic  Referral  -     Small Joint Injection/Arthrocentesis: bilateral long PIP      This issue is acute on chronic and Worsening.    # Bilateral Hand Arthritis: Mirna Grijalva  was seen today for bilateral hand arthritis, worse over the 3rd PIP joints. Symptoms had been going on for years, worse over the past few months. On examination there are positive findings of swelling and tenderness to palpation over the 3rd PIP joints. Imaging findings showed severe 3rd PIP joints. Likely cause of patient's condition due to flare of arthritis. She has had steroid injections in the past that have been helpful. Counseled patient on nature of condition and treatment options.  Given this plan as below, follow-up 1 mon as needed.     Image Findings: bilateral hand arthritis worse at the 3rd PIP joints  Treatment: Activities as tolerated   Medications/Injections: Limited tylenol for pain for 1-2 weeks, bilateral 3rd PIP joint steroid injections  Follow-up: In one month if symptoms do not improve, sooner if worsening  Can consider repeat steroid injections    Aurelio Salinas MD  [facility]    -----  Chief Complaint   Patient presents with     Right Hand - Pain     Left Hand - Pain       SUBJECTIVE  Mirna Grijalva is a/an 74 year old female who is seen in consultation at the request of  Ambar Hernandez D.O. for evaluation of bilateral hand pain.     The patient is seen by themselves.  The patient is Right handed    Onset: Several years(s) ago. Reports insidious onset without acute precipitating event. Saw PCP  "4/23/24 and xrays were performed.   Location of Pain: bilateral hand pain, right hand   Worsened by: cold weather   Better with: nothing   Treatments tried: Tylenol  Associated symptoms: swelling    Orthopedic/Surgical history: YES - chronic hand pain   Social History/Occupation: Retired     No family history pertinent to patient's problem today.      REVIEW OF SYSTEMS:  Review of Systems  Constitutional, HEENT, cardiovascular, pulmonary, gi and gu systems are negative, except as otherwise noted.    OBJECTIVE:  Ht 1.626 m (5' 4\")   Wt 93.9 kg (207 lb)   LMP  (LMP Unknown)   BMI 35.53 kg/m     General: healthy, alert and in no distress  HEENT: no scleral icterus or conjunctival erythema  Skin: no suspicious lesions or rash. No jaundice.  CV: distal perfusion intact    Resp: normal respiratory effort without conversational dyspnea   Psych: normal mood and affect  Gait: normal steady gait with appropriate coordination and balance    Neuro: Normal light sensory exam of extremity      Ortho Exam   BILATERAL HAND  Inspection:  Swelling over the fingers worse over the 3rd PIP joints  Palpation:   Carpals: normal   Metacarpals: normal   Thumb: normal   Fingers: TTP over 3rd PIP joints  Range of Motion:    Full active flexion and extension at MCP, PIP, and DIP joints; normal finger cascade without malrotation.  Wrist pronation, supination, and ulnar/radial deviation normal.  Strength:     full  Special Tests:    Positive: none    Negative: flexor digitorum superficialis testing, flexor digitorum profundus testing    RADIOLOGY:  I independently ordered, visualized and reviewed these images with the patient     EXAM: XR HAND BILATERAL G/E 3 VIEWS  LOCATION: [facility]  DATE: 4/23/2024     INDICATION:  Pain in fingers of both hands.   COMPARISON: None.                                                                      IMPRESSION: Degenerative change at the STT joint and first MCP joint " bilaterally. Severe degenerative change at multiple IP joints of both hands. There is some irregularity along the PIP joint margins of the index and long fingers bilaterally but this is   most likely secondary to severe osteoarthritis rather than an inflammatory etiology. Recommend correlation.    Review of external notes as documented elsewhere in note  Review of the result(s) of each unique test - bilateral hand x-rays       Disclaimer: This note consists of symbols derived from keyboarding, dictation and/or voice recognition software. As a result, there may be errors in the script that have gone undetected. Please consider this when interpreting information found in this chart.    Small Joint Injection/Arthrocentesis: bilateral long PIP    Date/Time: 7/25/2024 2:12 PM    Performed by: Aurelio Salinas MD  Authorized by: Aurelio Salinas MD  Indications:  Pain  Needle Size:  25 G  Guidance: ultrasound     Approach:  Dorsal  Location:  Long finger  Laterality:  Bilateral  Site:  Bilateral long PIP    Medications (Right):  6 mg betamethasone acet & sod phos 6 (3-3) MG/ML; 0.5 mL ROPivacaine 5 MG/ML   Medications (Right) comment:  Actual amount of celestone used 0.5 mL        Medications (Left):  6 mg betamethasone acet & sod phos 6 (3-3) MG/ML; 0.5 mL ROPivacaine 5 MG/ML   Medications (Left) comment:  Actual amount of celestone used 0.5 mL                Outcome:  Tolerated well, no immediate complications  Procedure discussed: discussed risks, benefits, and alternatives    Consent Given by:  Patient  Timeout: timeout called immediately prior to procedure    Prep: patient was prepped and draped in usual sterile fashion       Ultrasound images of procedure were permanently stored.     Patient reported tolerated bilateral 3rd PIP joint steroid injections.  Ultrasound guided images were permanently stored.   Aftercare instructions given to patient.  Plan to follow-up as discussed above.     Aurelio Salinas MD  CAM  [facility]              Again, thank you for allowing me to participate in the care of your patient.        Sincerely,        Aurelio Salinas MD

## 2024-07-27 ENCOUNTER — LAB (OUTPATIENT)
Dept: LAB | Facility: CLINIC | Age: 74
End: 2024-07-27
Payer: COMMERCIAL

## 2024-07-27 DIAGNOSIS — E89.0 POSTOPERATIVE HYPOTHYROIDISM: ICD-10-CM

## 2024-07-27 DIAGNOSIS — E83.52 HYPERCALCEMIA: ICD-10-CM

## 2024-07-27 DIAGNOSIS — E83.110 HEREDITARY HEMOCHROMATOSIS (H): ICD-10-CM

## 2024-07-27 LAB
ALBUMIN SERPL BCG-MCNC: 4.5 G/DL (ref 3.5–5.2)
ALP SERPL-CCNC: 113 U/L (ref 40–150)
ALT SERPL W P-5'-P-CCNC: 49 U/L (ref 0–50)
ANION GAP SERPL CALCULATED.3IONS-SCNC: 17 MMOL/L (ref 7–15)
AST SERPL W P-5'-P-CCNC: 40 U/L (ref 0–45)
BILIRUB SERPL-MCNC: 0.7 MG/DL
BUN SERPL-MCNC: 18.6 MG/DL (ref 8–23)
CALCIUM SERPL-MCNC: 9.5 MG/DL (ref 8.8–10.4)
CHLORIDE SERPL-SCNC: 94 MMOL/L (ref 98–107)
CREAT SERPL-MCNC: 0.67 MG/DL (ref 0.51–0.95)
EGFRCR SERPLBLD CKD-EPI 2021: 90 ML/MIN/1.73M2
FERRITIN SERPL-MCNC: 549 NG/ML (ref 11–328)
GLUCOSE SERPL-MCNC: 119 MG/DL (ref 70–99)
HCO3 SERPL-SCNC: 29 MMOL/L (ref 22–29)
PHOSPHATE SERPL-MCNC: 3.2 MG/DL (ref 2.5–4.5)
POTASSIUM SERPL-SCNC: 2.9 MMOL/L (ref 3.4–5.3)
PROT SERPL-MCNC: 7.5 G/DL (ref 6.4–8.3)
PTH-INTACT SERPL-MCNC: 73 PG/ML (ref 15–65)
SODIUM SERPL-SCNC: 140 MMOL/L (ref 135–145)
TSH SERPL DL<=0.005 MIU/L-ACNC: 1.99 UIU/ML (ref 0.3–4.2)
VIT D+METAB SERPL-MCNC: 52 NG/ML (ref 20–50)

## 2024-07-27 PROCEDURE — 82306 VITAMIN D 25 HYDROXY: CPT

## 2024-07-27 PROCEDURE — 83970 ASSAY OF PARATHORMONE: CPT

## 2024-07-27 PROCEDURE — 82728 ASSAY OF FERRITIN: CPT

## 2024-07-27 PROCEDURE — 84100 ASSAY OF PHOSPHORUS: CPT

## 2024-07-27 PROCEDURE — 36415 COLL VENOUS BLD VENIPUNCTURE: CPT

## 2024-07-27 PROCEDURE — 80053 COMPREHEN METABOLIC PANEL: CPT

## 2024-07-27 PROCEDURE — 84443 ASSAY THYROID STIM HORMONE: CPT

## 2024-07-30 ENCOUNTER — MYC MEDICAL ADVICE (OUTPATIENT)
Dept: FAMILY MEDICINE | Facility: CLINIC | Age: 74
End: 2024-07-30
Payer: COMMERCIAL

## 2024-07-30 DIAGNOSIS — E87.6 HYPOKALEMIA: Primary | ICD-10-CM

## 2024-07-31 ENCOUNTER — APPOINTMENT (OUTPATIENT)
Dept: CT IMAGING | Facility: HOSPITAL | Age: 74
DRG: 640 | End: 2024-07-31
Payer: COMMERCIAL

## 2024-07-31 ENCOUNTER — APPOINTMENT (OUTPATIENT)
Dept: RADIOLOGY | Facility: HOSPITAL | Age: 74
DRG: 640 | End: 2024-07-31
Payer: COMMERCIAL

## 2024-07-31 ENCOUNTER — HOSPITAL ENCOUNTER (INPATIENT)
Facility: HOSPITAL | Age: 74
LOS: 2 days | Discharge: HOME HEALTH | DRG: 640 | End: 2024-08-02
Admitting: INTERNAL MEDICINE
Payer: COMMERCIAL

## 2024-07-31 ENCOUNTER — APPOINTMENT (OUTPATIENT)
Dept: ULTRASOUND IMAGING | Facility: HOSPITAL | Age: 74
DRG: 640 | End: 2024-07-31
Payer: COMMERCIAL

## 2024-07-31 DIAGNOSIS — S09.90XA CLOSED HEAD INJURY, INITIAL ENCOUNTER: ICD-10-CM

## 2024-07-31 DIAGNOSIS — I25.10 CORONARY ARTERY CALCIFICATION SEEN ON CT SCAN: Primary | ICD-10-CM

## 2024-07-31 DIAGNOSIS — R94.31 PROLONGED Q-T INTERVAL ON ECG: ICD-10-CM

## 2024-07-31 DIAGNOSIS — S80.11XA CONTUSION OF RIGHT KNEE AND LOWER LEG, INITIAL ENCOUNTER: ICD-10-CM

## 2024-07-31 DIAGNOSIS — R55 SYNCOPE AND COLLAPSE: ICD-10-CM

## 2024-07-31 DIAGNOSIS — R07.1 CHEST PAIN ON BREATHING: ICD-10-CM

## 2024-07-31 DIAGNOSIS — F41.1 GAD (GENERALIZED ANXIETY DISORDER): ICD-10-CM

## 2024-07-31 DIAGNOSIS — S80.01XA CONTUSION OF RIGHT KNEE AND LOWER LEG, INITIAL ENCOUNTER: ICD-10-CM

## 2024-07-31 DIAGNOSIS — E87.6 HYPOKALEMIA: ICD-10-CM

## 2024-07-31 LAB
ANION GAP SERPL CALCULATED.3IONS-SCNC: 16 MMOL/L (ref 7–15)
BASOPHILS # BLD AUTO: 0.1 10E3/UL (ref 0–0.2)
BASOPHILS NFR BLD AUTO: 1 %
BUN SERPL-MCNC: 13.6 MG/DL (ref 8–23)
CALCIUM SERPL-MCNC: 9.2 MG/DL (ref 8.8–10.4)
CHLORIDE SERPL-SCNC: 91 MMOL/L (ref 98–107)
CREAT SERPL-MCNC: 0.65 MG/DL (ref 0.51–0.95)
EGFRCR SERPLBLD CKD-EPI 2021: >90 ML/MIN/1.73M2
EOSINOPHIL # BLD AUTO: 0.1 10E3/UL (ref 0–0.7)
EOSINOPHIL NFR BLD AUTO: 1 %
ERYTHROCYTE [DISTWIDTH] IN BLOOD BY AUTOMATED COUNT: 14.6 % (ref 10–15)
GLUCOSE SERPL-MCNC: 169 MG/DL (ref 70–99)
HCO3 SERPL-SCNC: 32 MMOL/L (ref 22–29)
HCT VFR BLD AUTO: 40.6 % (ref 35–47)
HGB BLD-MCNC: 14.6 G/DL (ref 11.7–15.7)
HOLD SPECIMEN: NORMAL
IMM GRANULOCYTES # BLD: 0.1 10E3/UL
IMM GRANULOCYTES NFR BLD: 1 %
LYMPHOCYTES # BLD AUTO: 1.3 10E3/UL (ref 0.8–5.3)
LYMPHOCYTES NFR BLD AUTO: 14 %
MAGNESIUM SERPL-MCNC: 1.9 MG/DL (ref 1.7–2.3)
MCH RBC QN AUTO: 36.4 PG (ref 26.5–33)
MCHC RBC AUTO-ENTMCNC: 36 G/DL (ref 31.5–36.5)
MCV RBC AUTO: 101 FL (ref 78–100)
MONOCYTES # BLD AUTO: 0.8 10E3/UL (ref 0–1.3)
MONOCYTES NFR BLD AUTO: 9 %
NEUTROPHILS # BLD AUTO: 7 10E3/UL (ref 1.6–8.3)
NEUTROPHILS NFR BLD AUTO: 76 %
NRBC # BLD AUTO: 0 10E3/UL
NRBC BLD AUTO-RTO: 0 /100
NT-PROBNP SERPL-MCNC: 339 PG/ML (ref 0–1800)
PLATELET # BLD AUTO: 183 10E3/UL (ref 150–450)
POTASSIUM SERPL-SCNC: 2.4 MMOL/L (ref 3.4–5.3)
POTASSIUM SERPL-SCNC: 2.6 MMOL/L (ref 3.4–5.3)
RBC # BLD AUTO: 4.01 10E6/UL (ref 3.8–5.2)
SODIUM SERPL-SCNC: 139 MMOL/L (ref 135–145)
TROPONIN T SERPL HS-MCNC: 25 NG/L
TROPONIN T SERPL HS-MCNC: 29 NG/L
WBC # BLD AUTO: 9.3 10E3/UL (ref 4–11)

## 2024-07-31 PROCEDURE — 80048 BASIC METABOLIC PNL TOTAL CA: CPT

## 2024-07-31 PROCEDURE — 999N000104 CT THORACIC SPINE RECONSTRUCTED

## 2024-07-31 PROCEDURE — 250N000013 HC RX MED GY IP 250 OP 250 PS 637

## 2024-07-31 PROCEDURE — 250N000013 HC RX MED GY IP 250 OP 250 PS 637: Performed by: INTERNAL MEDICINE

## 2024-07-31 PROCEDURE — 93005 ELECTROCARDIOGRAM TRACING: CPT | Performed by: STUDENT IN AN ORGANIZED HEALTH CARE EDUCATION/TRAINING PROGRAM

## 2024-07-31 PROCEDURE — 84484 ASSAY OF TROPONIN QUANT: CPT

## 2024-07-31 PROCEDURE — 83880 ASSAY OF NATRIURETIC PEPTIDE: CPT

## 2024-07-31 PROCEDURE — 93880 EXTRACRANIAL BILAT STUDY: CPT

## 2024-07-31 PROCEDURE — 120N000001 HC R&B MED SURG/OB

## 2024-07-31 PROCEDURE — 72131 CT LUMBAR SPINE W/O DYE: CPT

## 2024-07-31 PROCEDURE — 70450 CT HEAD/BRAIN W/O DYE: CPT

## 2024-07-31 PROCEDURE — 84132 ASSAY OF SERUM POTASSIUM: CPT | Performed by: INTERNAL MEDICINE

## 2024-07-31 PROCEDURE — 250N000011 HC RX IP 250 OP 636

## 2024-07-31 PROCEDURE — 93005 ELECTROCARDIOGRAM TRACING: CPT

## 2024-07-31 PROCEDURE — 99222 1ST HOSP IP/OBS MODERATE 55: CPT | Performed by: INTERNAL MEDICINE

## 2024-07-31 PROCEDURE — 85025 COMPLETE CBC W/AUTO DIFF WBC: CPT

## 2024-07-31 PROCEDURE — 71275 CT ANGIOGRAPHY CHEST: CPT

## 2024-07-31 PROCEDURE — 72125 CT NECK SPINE W/O DYE: CPT

## 2024-07-31 PROCEDURE — 73590 X-RAY EXAM OF LOWER LEG: CPT | Mod: RT

## 2024-07-31 PROCEDURE — 83735 ASSAY OF MAGNESIUM: CPT

## 2024-07-31 PROCEDURE — 99207 PR APP CREDIT; MD BILLING SHARED VISIT: CPT

## 2024-07-31 PROCEDURE — 73560 X-RAY EXAM OF KNEE 1 OR 2: CPT | Mod: RT

## 2024-07-31 PROCEDURE — 36415 COLL VENOUS BLD VENIPUNCTURE: CPT

## 2024-07-31 RX ORDER — SERTRALINE HYDROCHLORIDE 25 MG/1
25 TABLET, FILM COATED ORAL DAILY
Status: DISCONTINUED | OUTPATIENT
Start: 2024-07-31 | End: 2024-08-02 | Stop reason: HOSPADM

## 2024-07-31 RX ORDER — MELATONIN/PYRIDOXINE HCL (B6) 10 MG-10MG
1 TABLET,IMMED, EXTENDED RELEASE, BIPHASIC ORAL AT BEDTIME
COMMUNITY

## 2024-07-31 RX ORDER — POTASSIUM CHLORIDE 7.45 MG/ML
10 INJECTION INTRAVENOUS ONCE
Status: COMPLETED | OUTPATIENT
Start: 2024-07-31 | End: 2024-07-31

## 2024-07-31 RX ORDER — NALOXONE HYDROCHLORIDE 0.4 MG/ML
0.2 INJECTION, SOLUTION INTRAMUSCULAR; INTRAVENOUS; SUBCUTANEOUS
Status: DISCONTINUED | OUTPATIENT
Start: 2024-07-31 | End: 2024-08-02 | Stop reason: HOSPADM

## 2024-07-31 RX ORDER — CALCIUM CARBONATE 500 MG/1
500 TABLET, CHEWABLE ORAL DAILY PRN
Status: DISCONTINUED | OUTPATIENT
Start: 2024-07-31 | End: 2024-08-02 | Stop reason: HOSPADM

## 2024-07-31 RX ORDER — LIDOCAINE 4 G/G
2 PATCH TOPICAL
Status: DISCONTINUED | OUTPATIENT
Start: 2024-07-31 | End: 2024-08-02 | Stop reason: HOSPADM

## 2024-07-31 RX ORDER — ACETAMINOPHEN 650 MG/1
650 SUPPOSITORY RECTAL EVERY 4 HOURS PRN
Status: DISCONTINUED | OUTPATIENT
Start: 2024-07-31 | End: 2024-08-02

## 2024-07-31 RX ORDER — POTASSIUM CHLORIDE 7.45 MG/ML
10 INJECTION INTRAVENOUS
Status: DISPENSED | OUTPATIENT
Start: 2024-07-31 | End: 2024-07-31

## 2024-07-31 RX ORDER — POTASSIUM CHLORIDE 1500 MG/1
40 TABLET, EXTENDED RELEASE ORAL ONCE
Status: COMPLETED | OUTPATIENT
Start: 2024-07-31 | End: 2024-07-31

## 2024-07-31 RX ORDER — NALOXONE HYDROCHLORIDE 0.4 MG/ML
0.4 INJECTION, SOLUTION INTRAMUSCULAR; INTRAVENOUS; SUBCUTANEOUS
Status: DISCONTINUED | OUTPATIENT
Start: 2024-07-31 | End: 2024-08-02 | Stop reason: HOSPADM

## 2024-07-31 RX ORDER — ASPIRIN 81 MG/1
81 TABLET ORAL DAILY
Status: DISCONTINUED | OUTPATIENT
Start: 2024-08-01 | End: 2024-08-02 | Stop reason: HOSPADM

## 2024-07-31 RX ORDER — POTASSIUM CHLORIDE 1500 MG/1
20 TABLET, EXTENDED RELEASE ORAL ONCE
Status: COMPLETED | OUTPATIENT
Start: 2024-07-31 | End: 2024-07-31

## 2024-07-31 RX ORDER — LIDOCAINE 40 MG/G
CREAM TOPICAL
Status: DISCONTINUED | OUTPATIENT
Start: 2024-07-31 | End: 2024-08-02 | Stop reason: HOSPADM

## 2024-07-31 RX ORDER — LANOLIN ALCOHOL/MO/W.PET/CERES
3 CREAM (GRAM) TOPICAL
Status: DISCONTINUED | OUTPATIENT
Start: 2024-07-31 | End: 2024-08-02 | Stop reason: HOSPADM

## 2024-07-31 RX ORDER — LEVOTHYROXINE SODIUM 137 UG/1
137 TABLET ORAL DAILY
Status: DISCONTINUED | OUTPATIENT
Start: 2024-08-01 | End: 2024-08-02 | Stop reason: HOSPADM

## 2024-07-31 RX ORDER — ACETAMINOPHEN 325 MG/1
650 TABLET ORAL EVERY 4 HOURS PRN
Status: DISCONTINUED | OUTPATIENT
Start: 2024-07-31 | End: 2024-08-02

## 2024-07-31 RX ORDER — MAGNESIUM SULFATE HEPTAHYDRATE 40 MG/ML
2 INJECTION, SOLUTION INTRAVENOUS ONCE
Status: COMPLETED | OUTPATIENT
Start: 2024-07-31 | End: 2024-07-31

## 2024-07-31 RX ORDER — ATORVASTATIN CALCIUM 40 MG/1
80 TABLET, FILM COATED ORAL EVERY EVENING
Status: DISCONTINUED | OUTPATIENT
Start: 2024-07-31 | End: 2024-08-02 | Stop reason: HOSPADM

## 2024-07-31 RX ORDER — MORPHINE SULFATE 4 MG/ML
4 INJECTION, SOLUTION INTRAMUSCULAR; INTRAVENOUS ONCE
Status: COMPLETED | OUTPATIENT
Start: 2024-07-31 | End: 2024-07-31

## 2024-07-31 RX ORDER — BUMETANIDE 2 MG/1
2 TABLET ORAL DAILY
Status: DISCONTINUED | OUTPATIENT
Start: 2024-07-31 | End: 2024-08-01

## 2024-07-31 RX ORDER — METOPROLOL TARTRATE 25 MG/1
25 TABLET, FILM COATED ORAL 2 TIMES DAILY
Status: DISCONTINUED | OUTPATIENT
Start: 2024-07-31 | End: 2024-08-02 | Stop reason: HOSPADM

## 2024-07-31 RX ORDER — IOPAMIDOL 755 MG/ML
90 INJECTION, SOLUTION INTRAVASCULAR ONCE
Status: COMPLETED | OUTPATIENT
Start: 2024-07-31 | End: 2024-07-31

## 2024-07-31 RX ORDER — POTASSIUM CHLORIDE 750 MG/1
10 TABLET, EXTENDED RELEASE ORAL 2 TIMES DAILY
Qty: 90 TABLET | Refills: 0 | Status: SHIPPED | OUTPATIENT
Start: 2024-07-31 | End: 2024-07-31

## 2024-07-31 RX ADMIN — POTASSIUM CHLORIDE 40 MEQ: 1500 TABLET, EXTENDED RELEASE ORAL at 21:21

## 2024-07-31 RX ADMIN — BIMATOPROST 1 DROP: 0.1 SOLUTION/ DROPS OPHTHALMIC at 22:17

## 2024-07-31 RX ADMIN — POTASSIUM CHLORIDE 40 MEQ: 1500 TABLET, EXTENDED RELEASE ORAL at 15:00

## 2024-07-31 RX ADMIN — MORPHINE SULFATE 4 MG: 4 INJECTION, SOLUTION INTRAMUSCULAR; INTRAVENOUS at 15:00

## 2024-07-31 RX ADMIN — Medication 3 MG: at 21:23

## 2024-07-31 RX ADMIN — CALCIUM CARBONATE (ANTACID) CHEW TAB 500 MG 500 MG: 500 CHEW TAB at 16:42

## 2024-07-31 RX ADMIN — METOPROLOL TARTRATE 25 MG: 25 TABLET, FILM COATED ORAL at 21:22

## 2024-07-31 RX ADMIN — POTASSIUM CHLORIDE 10 MEQ: 7.46 INJECTION, SOLUTION INTRAVENOUS at 16:05

## 2024-07-31 RX ADMIN — ATORVASTATIN CALCIUM 80 MG: 40 TABLET, FILM COATED ORAL at 21:22

## 2024-07-31 RX ADMIN — OXYCODONE HYDROCHLORIDE 2.5 MG: 5 TABLET ORAL at 21:23

## 2024-07-31 RX ADMIN — IOPAMIDOL 90 ML: 755 INJECTION, SOLUTION INTRAVENOUS at 15:47

## 2024-07-31 RX ADMIN — POTASSIUM CHLORIDE 20 MEQ: 1500 TABLET, EXTENDED RELEASE ORAL at 22:17

## 2024-07-31 RX ADMIN — POTASSIUM CHLORIDE 10 MEQ: 7.46 INJECTION, SOLUTION INTRAVENOUS at 17:52

## 2024-07-31 RX ADMIN — MAGNESIUM SULFATE HEPTAHYDRATE 2 G: 40 INJECTION, SOLUTION INTRAVENOUS at 14:40

## 2024-07-31 ASSESSMENT — ACTIVITIES OF DAILY LIVING (ADL)
ADLS_ACUITY_SCORE: 40
ADLS_ACUITY_SCORE: 38
ADLS_ACUITY_SCORE: 40
ADLS_ACUITY_SCORE: 40

## 2024-07-31 NOTE — ED PROVIDER NOTES
EMERGENCY DEPARTMENT ENCOUNTER      NAME: Mirna Grijalva  AGE: 74 year old female  YOB: 1950  MRN: [id number]  EVALUATION DATE & TIME:  1:31 PM    PCP: Ambar Hernandez    ED PROVIDER: Parveen Ko MD      CHIEF COMPLAINT:  Fall and Syncope      FINAL IMPRESSION:  1. Hypokalemia    2. Prolonged Q-T interval on ECG    3. Syncope and collapse    4. Closed head injury, initial encounter    5. Chest pain on breathing    6. Contusion of right knee and lower leg, initial encounter        ED COURSE & MEDICAL DECISION MAKING:  Pertinent Labs & Imaging studies reviewed. (See chart for details)    ED Course as of 07/31/24 1658   Wed Jul 31, 2024   1355 ECG 12-LEAD WITH MUSE (LHE)   1418 Emergency physician individually interpreted: EKG ventricular 94, WI interval 148, , QTc 520.  Prolonged QT. chronic ST elevation in V1 approximately 1 mm as well as aVR.  T wave inversion in the inferior leads which appears similar to prior.  Incomplete right bundle branch block.  No STEMI criteria impression: Sinus rhythm, prolonged QTc, nonspecific ST changes, abnormal EKG.    Ordered 2 g of magnesium due to prolonged QTc and syncopal episode.   1446 Lab called regarding potassium 2.6, will replace   1631  CT chest: Lobulated pleural-based opacity laterally in the right lower lobe is decreasing in size consistent with post inflammatory change. As noted before, suggest a follow-up in end of September 2024 which would be three months since one was suggested.    Discussed with patient.   1632 CT Scan: There is a 10 x 7 mm cystic pancreatic lesion, likely an IUP. Follow-up recommendations given below.     Discussed with patient   1642 Dr. Titus hospitalist, cardiology with tele admit       The patient is a 74 year old-year-old female with a history of heart failure presenting to the emergency department for evaluation of syncopal episode.  Patient arrives afebrile, well-appearing and nontoxic.  Initially  tachycardic.  SpO2 was 93% and on chart review does have borderline lower SpO2.  Could be secondary to splinting given that she has chest pain from her fall.  Her EKG is abnormal and some chronic ST changes and with a prolonged QTc.  Gave 2 g of magnesium.  Potassium resulted critically low likely secondary to Bumex.  Supplemented with oral and IV.  Obtained trauma imaging and did not have acute traumatic imaging per radiology.  Did have 2 incidental findings of pleural decreasing opacity which is likely secondary to her pneumonia.  She was notified given The results.  Also had an incidental pancreatic lesion that requires 6-month monitoring.  This was discussed with patient at bedside given a copy of her results.  Did not scan the abdomen as she did have abdominal pain.  Her spine had chronic changes but nothing acute.  X-ray of the right knee and tib-fib did not reveal fracture or dislocation.  Her pain was treated with morphine and she did have improvement.  She also required Tums as she is having some dyspepsia.  Troponin was mildly elevated at 29.  No STEMI criteria.  Will admit to the hospital given patient's syncopal events that she could have had a dysrhythmia given her potassium at 2.6.  She will require inpatient admission.  Discussed with hospitalist agrees with plan.    Ddx: Anemia, hypoglycemia, cardiac arrhythmia, cardiac valvulopathy, AAA rupture, aortic dissection, pulmonary embolus, subarachnoid hemorrhage, intracranial hemorrhage, seizure, orthostatic, vasovagal      Discussed plan for admission. The patient verbalized understanding and is comfortable with this plan. Symptomatic home cares discussed. Emphasized importance of follow up with primary care clinician. Strict return precautions discussed.     At the conclusion of the encounter I discussed the results of all of the tests and the disposition. The questions were answered. The patient or family acknowledged understanding and was agreeable  with the care plan.       ED COURSE:   I met and introduced myself to the patient. I gathered initial history and performed an initial physical exam. We discussed options and plan for diagnostics and treatment here in the ED.   I discussed the patient with the hospitalist, Dr. Titus, who accepts the patient for admission.       CRITICAL CARE:  None      MEDICATIONS GIVEN IN THE EMERGENCY:  Medications   potassium chloride 10 mEq in 100 mL sterile water infusion (10 mEq Intravenous $New Bag 7/31/24 1605)   potassium chloride 10 mEq in 100 mL sterile water infusion (has no administration in time range)   calcium carbonate (TUMS) chewable tablet 500 mg (500 mg Oral $Given 7/31/24 1642)   magnesium sulfate 2 g in 50 mL sterile water intermittent infusion (0 g Intravenous Stopped 7/31/24 1621)   potassium chloride luis angel ER (KLOR-CON M20) CR tablet 40 mEq (40 mEq Oral $Given 7/31/24 1500)   morphine (PF) injection 4 mg (4 mg Intravenous $Given 7/31/24 1500)   iopamidol (ISOVUE-370) solution 90 mL (90 mLs Intravenous $Given 7/31/24 1547)       NEW PRESCRIPTIONS STARTED AT TODAY'S ER VISIT  New Prescriptions    No medications on file          =================================================================    HPI    Patient information was obtained from: patient    Use of Intrepreter: N/A      Mirna Grijalva is a 74 year old female with pertinent medical history of heart failure with preserved ejection fraction, amaurosis fugax, TIA, general anxiety disorder who presents to the emergency department for evaluation of CP and fall.  Last night patient was watching television, stood up from his position and had a bronc syncope falling forward landing on her face and chest.  She has bruising to her bilateral wrist, chest and right upper lip.  She did not have a postictal phase did not have abnormal movements.  She required help to get off the ground.  Since that time she has had right knee pain, anterior chest wall pain and  "fall this spine pain.  Denies saddle anesthesia, new paresthesia in her lower extremity, weakness.  She still able to ambulate with a cane.  She denies urinary retention.  She does have chest pain that is anterior and is associate with shortness of breath.  No pain at rest.  She does not wear oxygen.  She states her \"potassium is low\".  She is on Bumex.  Had stopped Plavix per cardiology on 7/29.  She denies chest pain, arrhythmias or shortness of breath prior to syncopal event.      PAST MEDICAL HISTORY:  Past Medical History:   Diagnosis Date    Arthritis years ago?    Cancer (H) 1988  & 2016?    Coronary artery disease May 2024    Glaucoma (increased eye pressure) About 11 years    Hypertension years ago?    Mumps     Nonsenile cataract About 11 years    Thyroid cancer (H)     TIA (transient ischemic attack) 04/27/2024    right eye       PAST SURGICAL HISTORY:  Past Surgical History:   Procedure Laterality Date    BIOPSY      BREAST SURGERY      CATARACT IOL, RT/LT Left 2022?    With Istent    CYSTOSCOPY      GLAUCOMA SURGERY  2022?    GYN SURGERY         CURRENT MEDICATIONS:    Prior to Admission Medications   Prescriptions Last Dose Informant Patient Reported? Taking?   Cranberry-Vitamin C (CRANBERRY CONCENTRATE/VITAMINC) 67936-997 MG CAPS 7/31/2024 at am  Yes Yes   Sig: Take 2 capsules by mouth daily   Melatonin 10-10 MG TBCR 7/30/2024 at pm  Yes Yes   Sig: Take 1 tablet by mouth at bedtime   acetaminophen (TYLENOL) 500 MG tablet Past Month at prn  No Yes   Sig: Take 1 tablet (500 mg) by mouth every 6 hours as needed for mild pain   aspirin 81 MG EC tablet 7/31/2024 at am  No Yes   Sig: Take 1 tablet (81 mg) by mouth daily   atorvastatin (LIPITOR) 80 MG tablet 7/30/2024 at pm  No Yes   Sig: Take 1 tablet (80 mg) by mouth every evening   bimatoprost (LUMIGAN) 0.01 % SOLN 7/30/2024 at pm  No Yes   Sig: Place 1 drop into the right eye at bedtime   bumetanide (BUMEX) 2 MG tablet 7/31/2024 at am  No Yes   Sig: Take " 1 tablet (2 mg) by mouth daily   empagliflozin (JARDIANCE) 10 MG TABS tablet 7/31/2024 at am  No Yes   Sig: Take 1 tablet (10 mg) by mouth daily   levothyroxine (SYNTHROID/LEVOTHROID) 137 MCG tablet 7/31/2024 at am  No Yes   Sig: Take 1 tablet (137 mcg) by mouth daily   metoprolol tartrate (LOPRESSOR) 25 MG tablet 7/31/2024 at am  No Yes   Sig: Take 1 tablet (25 mg) by mouth 2 times daily   sertraline (ZOLOFT) 25 MG tablet 7/31/2024 at am  No Yes   Sig: Take 1 tablet (25 mg) by mouth daily      Facility-Administered Medications Last Administration Doses Remaining   0.5 mL ropivacaine (NAROPIN) injection 5 mg/mL 7/25/2024  2:12 PM    0.5 mL ropivacaine (NAROPIN) injection 5 mg/mL 7/25/2024  2:12 PM    betamethasone acet & sod phos (CELESTONE) injection 6 mg 7/25/2024  2:12 PM    betamethasone acet & sod phos (CELESTONE) injection 6 mg 7/25/2024  2:12 PM           ALLERGIES:  Allergies   Allergen Reactions    Ciprofloxacin Hcl Swelling    Atenolol      Rash and Insomnia    Ciprofloxacin      Severe tendon pain and swelling    Eliquis [Apixaban]      Wanting to faint    Nitrofurantoin      'Bad, itchy, feverish, rash on arms and legs'    Sulfa Antibiotics      Rash on feet    Penicillins Rash       FAMILY HISTORY:  Family History   Problem Relation Age of Onset    Glaucoma Mother     Hypertension Mother         Alzheimers and arthritis    Thyroid Disease Mother     Eye Surgery Mother     Glasses (<7 y/o) Mother     Cancer Father     Diabetes Father         And Hemochromatosis and cancer    Glasses (<7 y/o) Father     Hypertension Brother         Alzheimers    Hypertension Sister         Heart failure       SOCIAL HISTORY:  Social History     Tobacco Use    Smoking status: Former     Current packs/day: 1.00     Average packs/day: 1 pack/day for 20.0 years (20.0 ttl pk-yrs)     Types: Cigarettes     Passive exposure: Never    Smokeless tobacco: Never   Vaping Use    Vaping status: Never Used   Substance Use Topics     "Alcohol use: Yes    Drug use: Never        VITALS:    First Vitals:  Patient Vitals for the past 24 hrs:   BP Temp Temp src Pulse Resp SpO2 Height Weight   07/31/24 1646 -- -- -- -- -- 94 % -- --   07/31/24 1645 139/74 -- -- 89 21 -- -- --   07/31/24 1637 (!) 159/82 -- -- 87 16 95 % -- --   07/31/24 1623 127/69 -- -- 85 -- -- -- --   07/31/24 1504 131/77 -- -- 91 29 92 % -- --   07/31/24 1446 126/78 -- -- 91 28 93 % -- --   07/31/24 1431 -- -- -- -- -- 92 % -- --   07/31/24 1430 129/87 -- -- 92 25 -- -- --   07/31/24 1421 -- -- -- 92 30 95 % -- --   07/31/24 1415 129/89 -- -- 91 23 90 % -- --   07/31/24 1355 139/84 -- -- 91 21 (!) 89 % -- --   07/31/24 1323 129/77 97.4  F (36.3  C) Temporal 101 16 93 % 1.626 m (5' 4\") 92.5 kg (204 lb)       Patient Vitals for the past 24 hrs:   BP Temp Temp src Pulse Resp SpO2 Height Weight   07/31/24 1646 -- -- -- -- -- 94 % -- --   07/31/24 1645 139/74 -- -- 89 21 -- -- --   07/31/24 1637 (!) 159/82 -- -- 87 16 95 % -- --   07/31/24 1623 127/69 -- -- 85 -- -- -- --   07/31/24 1504 131/77 -- -- 91 29 92 % -- --   07/31/24 1446 126/78 -- -- 91 28 93 % -- --   07/31/24 1431 -- -- -- -- -- 92 % -- --   07/31/24 1430 129/87 -- -- 92 25 -- -- --   07/31/24 1421 -- -- -- 92 30 95 % -- --   07/31/24 1415 129/89 -- -- 91 23 90 % -- --   07/31/24 1355 139/84 -- -- 91 21 (!) 89 % -- --   07/31/24 1323 129/77 97.4  F (36.3  C) Temporal 101 16 93 % 1.626 m (5' 4\") 92.5 kg (204 lb)         PHYSICAL EXAM  VITAL SIGNS: /74   Pulse 89   Temp 97.4  F (36.3  C) (Temporal)   Resp 21   Ht 1.626 m (5' 4\")   Wt 92.5 kg (204 lb)   LMP  (LMP Unknown)   SpO2 94%   BMI 35.02 kg/m     GENERAL: Awake, alert, answering questions appropriately.  HEENT: Moist mucous membranes. Posterior oropharynx clear with no erythema, no exudate. No tonsillar hypertrophy. Uvula midline. Tolerating secretions, no drooling.  Ecchymosis to right upper lip.  No laceration.  Midface is stable.  No chipped " teeth.  SPEECH:  Easy to understand speech, Normal volume and aileen. Normal phonation.  PULMONARY: Anterior chest wall pain, bilateral  CARDIOVASCULAR: Regular rate and rhythm, radial pulses present, symmetric, and normal.  ABDOMINAL: Soft, Nondistended, Nontender, No rebound or guarding, No palpable masses.  BACK: Midline spinal tenderness over the entire spine, no step-offs or obvious deformities.  EXTREMITIES: Extremities are warm and well perfused.  Ecchymosis to distal wrist, full range of motion and strength, intact sensation distally, strong palpable radial pulse bilaterally.  No lower extremity edema.  Right lower extremity: Right knee is ecchymotic, tenderness to palpation.  NEUROLOGIC: Moving all extremities spontaneously.   SKIN: Exposed areas of skin warm, dry, no rashes.  PSYCH: Normal mood and affect.       RADIOLOGY/LAB:  Reviewed all pertinent imaging. Please see official radiology report.  All pertinent labs reviewed and interpreted.  Results for orders placed or performed during the hospital encounter of 07/31/24   CT Head w/o Contrast    Impression    IMPRESSION:  1.  No CT evidence for acute intracranial process.  2.  Brain atrophy and presumed chronic microvascular ischemic changes as above.   CT Cervical Spine w/o Contrast    Impression    IMPRESSION:  1.  No fracture or posttraumatic subluxation.  2.  Multilevel cervical spondylosis.   XR Knee Right 1/2 Views    Impression    IMPRESSION:     There is diffuse osseous demineralization, which decreases radiographic sensitivity for nondisplaced fractures. No acute, displaced right knee or right tibia/fibular fracture is identified. There is moderate medial and mild patellofemoral compartment   knee degenerative arthrosis. No sizable knee joint effusion.   XR Tibia and Fibula Right 2 Views    Impression    IMPRESSION:     There is diffuse osseous demineralization, which decreases radiographic sensitivity for nondisplaced fractures. No acute,  displaced right knee or right tibia/fibular fracture is identified. There is moderate medial and mild patellofemoral compartment   knee degenerative arthrosis. No sizable knee joint effusion.   CT Lumbar Spine w/o Contrast    Impression    IMPRESSION:  1.  Negative for lumbar fractures.    2.  Moderate to severe, multilevel lumbar spondylosis.   CTA Chest with Contrast    Impression    IMPRESSION:  1.  No evidence of acute traumatic injury to the chest.  2.  Specifically, no mediastinal hematoma, aortic dissection or hydropneumothorax.  3.  Lobulated pleural-based opacity laterally in the right lower lobe is decreasing in size consistent with post inflammatory change. As noted before, suggest a follow-up in end of September 2024 which would be three months since one was suggested.  4.  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 Follow-up recommendations given below.   5.  Other numerous noncritical findings as noted above.    REFERENCE:  Revisions of international consensus [place] guidelines for the management of IPMN of the pancreas. Pancreatology 2017;17(5):738-753.    Largest cyst between 10-20 mm: CT or MRI/MRCP every 6 months for 1 year and then yearly for 2 years and then every 2 years if no change.      CT Thoracic Spine Reconstructed    Impression    IMPRESSION:  1.  Chronic thoracic fractures    2.  No acute fractures    3.  Thoracic scoliosis and multilevel spondylosis.     Extra Blue Top Tube   Result Value Ref Range    Hold Specimen JIC    Extra Red Top Tube   Result Value Ref Range    Hold Specimen JIC    Extra Green Top (Lithium Heparin) Tube   Result Value Ref Range    Hold Specimen JIC    Extra Purple Top Tube   Result Value Ref Range    Hold Specimen JIC    Basic metabolic panel   Result Value Ref Range    Sodium 139 135 - 145 mmol/L    Potassium 2.6 (LL) 3.4 - 5.3 mmol/L    Chloride 91 (L) 98 - 107 mmol/L    Carbon Dioxide (CO2) 32 (H) 22 - 29 mmol/L    Anion Gap 16 (H) 7 - 15  mmol/L    Urea Nitrogen 13.6 8.0 - 23.0 mg/dL    Creatinine 0.65 0.51 - 0.95 mg/dL    GFR Estimate >90 >60 mL/min/1.73m2    Calcium 9.2 8.8 - 10.4 mg/dL    Glucose 169 (H) 70 - 99 mg/dL   Result Value Ref Range    Troponin T, High Sensitivity 29 (H) <=14 ng/L   CBC with platelets and differential   Result Value Ref Range    WBC Count 9.3 4.0 - 11.0 10e3/uL    RBC Count 4.01 3.80 - 5.20 10e6/uL    Hemoglobin 14.6 11.7 - 15.7 g/dL    Hematocrit 40.6 35.0 - 47.0 %     (H) 78 - 100 fL    MCH 36.4 (H) 26.5 - 33.0 pg    MCHC 36.0 31.5 - 36.5 g/dL    RDW 14.6 10.0 - 15.0 %    Platelet Count 183 150 - 450 10e3/uL    % Neutrophils 76 %    % Lymphocytes 14 %    % Monocytes 9 %    % Eosinophils 1 %    % Basophils 1 %    % Immature Granulocytes 1 %    NRBCs per 100 WBC 0 <1 /100    Absolute Neutrophils 7.0 1.6 - 8.3 10e3/uL    Absolute Lymphocytes 1.3 0.8 - 5.3 10e3/uL    Absolute Monocytes 0.8 0.0 - 1.3 10e3/uL    Absolute Eosinophils 0.1 0.0 - 0.7 10e3/uL    Absolute Basophils 0.1 0.0 - 0.2 10e3/uL    Absolute Immature Granulocytes 0.1 <=0.4 10e3/uL    Absolute NRBCs 0.0 10e3/uL   Nt probnp inpatient   Result Value Ref Range    N terminal Pro BNP Inpatient 339 0 - 1,800 pg/mL   Result Value Ref Range    Magnesium 1.9 1.7 - 2.3 mg/dL         EKG:  Emergency physician individually interpreted as above.      PROCEDURES:      Billing  Data  Category 1  Non-ED record review, if applicable. External record reviewed:      Clinical information was obtained from an independent historian. History was obtained from:      The following testing was considered but ultimately not selected after discussion with patient/family:      Category 2  My independent interpretation of EKG, rhythm strip, radiology study: Head CT did not show evidence of large intracranial hemorrhage.     Category 3  Discussion of management with other physician/healthcare provider/other source: Discussed case with hospitalist agrees with admission      Risk  Prescription medication was considered, but ultimately not given after discussion with patient/family:      Chronic conditions affecting care: Diastolic heart failure     Care significantly affected by Social Determinants of Health:      Consideration of Admission/Observation: Admission for critically low hyperkalemia with EKG changes and syncopal episode        Parveen Ko MD  Emergency Medicine  [facility] EMERGENCY DEPARTMENT  [address] [place] [id number]  [phone]  Dept: [phone]     Parveen Ko MD  07/31/24 0301

## 2024-07-31 NOTE — ED NOTES
"[facility] ED Handoff Report    ED Chief Complaint: Fall     ED Diagnosis:  (E87.6) Hypokalemia  Comment: K+ 2.6, pt received PO K+ replacement & now receiving IV K+ replacement.   Plan: monitor K+ level     (R94.31) Prolonged Q-T interval on ECG  Comment: Related to K+ imbalance   Plan: cardiac monitor     (R55) Syncope and collapse  Comment: Pt had a fall, vitally stable now   Plan:     (S09.90XA) Closed head injury, initial encounter  Comment:   Plan:     (R07.1) Chest pain on breathing  Comment: Trop check, negative for STEMI.   Plan:     (S80.01XA,  S80.11XA) Contusion of right knee and lower leg, initial encounter  Comment: pt has bruises on knees from fall, on blood thinner.   Plan:        PMH:    Past Medical History:   Diagnosis Date    Arthritis years ago?    Cancer (H) 1988  & 2016?    Coronary artery disease May 2024    Glaucoma (increased eye pressure) About 11 years    Hypertension years ago?    Mumps     Nonsenile cataract About 11 years    Thyroid cancer (H)     TIA (transient ischemic attack) 04/27/2024    right eye        Code Status:  Prior     Falls Risk: Yes Band: Applied    Current Living Situation/Residence: lives with a significant other     Elimination Status: Continent: Yes     Activity Level: SBA w/ walker    Patients Preferred Language:  English     Needed: No    Vital Signs:  /74   Pulse 89   Temp 97.4  F (36.3  C) (Temporal)   Resp 21   Ht 1.626 m (5' 4\")   Wt 92.5 kg (204 lb)   LMP  (LMP Unknown)   SpO2 94%   BMI 35.02 kg/m       Cardiac Rhythm: NSR    Pain Score: 4/10    Is the Patient Confused:  No    Last Food or Drink: 07/31/24 at noon     Focused Assessment:  Pt c/o pain on lower back, pain med given. Low K+, replacement receiving.     Tests Performed: Done: Labs and Imaging    Treatments Provided:  K+ & Mg+ replacement, prn shelley, morphine for pain.     Family Dynamics/Concerns: No    Family Updated On Visitor Policy: Yes    Plan of Care " Communicated to Family: Yes    Who Was Updated about Plan of Care:  at bedside     Belongings Checklist Done and Signed by Patient: Yes    Belongings Sent with Patient: clothing & cell phone     Medications sent with patient: none     Covid: asymptomatic , n/a     Additional Information:     RN: Shanice Bajwa RN   7/31/2024 5:34 PM

## 2024-07-31 NOTE — H&P
[facility]    History and Physical - Hospitalist Service       Date of Admission:  7/31/2024    Assessment & Plan    Mirna Grijalva is a 74 year old female with PMHx of HFpEF, hypothyroidism, HTN, CARINE, AFX/TIA who was admitted on 7/31/2024 for syncope and collapse and hypokalemia.     Syncope and Collapse   -- CT head negative   -- EKG noted long QT (520); given Mag in the ED   -- Trop flat   -- Check orthostatics   -- Echo ordered   -- Carotid US   -- Telemonitoring   -- PT/OT     Hypokalemia   -- K 2.6 in the ED --> 2.4 (following 50 mEq in the ED)  -- HIGH replacement protocols   -- Telemonitoring   -- HOLDing PTA Bumex     Hypochloremia   -- Trend BMP     Right Knee Pain   Acute on Chronic Back Pain   Exacerbated after fall   -- Imaging negative for acute fractures   -- Pain control with Tylenol, lido patch, low dose oxycodone  -- PT/OT      HFpEF    Does not appear decompensated; BNP within normal limits   -- Cont PTA metoprolol, Jardiance   -- HOLDing PTA Bumex given hypokalemia for now   -- Follow-up echo     Mood -- HOLDing PTA sertraline for now . Would recheck QT in the AM following Mag replacement.     Hypothyroidism -- Cont PTA levothyroxine     Hyperlipidemia -- Cont PTA statin, ASA    Incidental Findings on Imaging   -- Lobulated pleural-based opacity laterally in the right lower lobe is decreasing in size consistent with post inflammatory change. Suggest a follow-up in end of September 2024 which would be three months since one was suggested.   -- There is a 10 x 7 mm cystic pancreatic lesion. Needs follow-up and monitoring            Diet: Combination Diet Low Saturated Fat Na <2400mg Diet    DVT Prophylaxis: Pneumatic Compression Devices  Yun Catheter: Not present  Lines: None     Cardiac Monitoring: ACTIVE order. Indication: Electrolyte Imbalance (24 hours)- Magnesium <1.3 mg/ml; Potassium < =2.8 or > 5.5 mg/ml  Code Status: Full Code    Clinically Significant Risk Factors  "Present on Admission        # Hypokalemia: Lowest K = 2.6 mmol/L in last 2 days, will replace as needed         # Drug Induced Platelet Defect: home medication list includes an antiplatelet medication   # Hypertension: Noted on problem list         # Obesity: Estimated body mass index is 35.02 kg/m  as calculated from the following:    Height as of this encounter: 1.626 m (5' 4\").    Weight as of this encounter: 92.5 kg (204 lb).              Disposition Plan     Medically Ready for Discharge: Anticipated Tomorrow       The patient's care was discussed with the Attending Physician, Dr. Suzanne Titus who independently met with and assessed the patient and is in agreement with the assessment and plan     Nia Tyler PA-C  Hospitalist Service  [facility]  Securely message with TwitJump (more info)  Text page via VCharge Paging/Directory     ______________________________________________________________________    Chief Complaint   Syncope and collapse   R knee pain   Acute on chronic back pain     History is obtained from the patient    History of Present Illness   Mirna Grijalva is a 74 year old female with PMHx of HFpEF, hypothyroidism, HTN, CARINE, AFX/TIA who was presented to the ED on 7/31/2024 for syncope and collapse. Patient states last night she stood up from her chair and had complete LOC and fell forwards. States  was unable to get her up so they called 911 and EMS helped patient up. Patient is unsure how long she was unconscious for, but believes it was several minutes and less than 1 hour. Felt fine proceeding syncopal event. Afterwards states she felt relatively fine and \"just wanted to get to bed.\" But states this morning she felt like she needed to come to the ED. Is complaining of pain to right knee and back. States she has chronic back pain but it does feel exacerbated after the fall. Patient states she has been speaking with her PCP regarding hypokalemia and has been taking " "a \"powder.\" Denies any palpitations. Is having some musculoskeletal sternal chest pain following fall since she landing on her \"face and front.\"       Past Medical History    Past Medical History:   Diagnosis Date    Arthritis years ago?    Cancer (H) 1988  & 2016?    Coronary artery disease May 2024    Glaucoma (increased eye pressure) About 11 years    Hypertension years ago?    Mumps     Nonsenile cataract About 11 years    Thyroid cancer (H)     TIA (transient ischemic attack) 04/27/2024    right eye       Past Surgical History   Past Surgical History:   Procedure Laterality Date    BIOPSY      BREAST SURGERY      CATARACT IOL, RT/LT Left 2022?    With Istent    CYSTOSCOPY      GLAUCOMA SURGERY  2022?    GYN SURGERY         Prior to Admission Medications   Prior to Admission Medications   Prescriptions Last Dose Informant Patient Reported? Taking?   Cranberry-Vitamin C (CRANBERRY CONCENTRATE/VITAMINC) 12578-072 MG CAPS 7/31/2024 at am  Yes Yes   Sig: Take 2 capsules by mouth daily   Melatonin 10-10 MG TBCR 7/30/2024 at pm  Yes Yes   Sig: Take 1 tablet by mouth at bedtime   acetaminophen (TYLENOL) 500 MG tablet Past Month at prn  No Yes   Sig: Take 1 tablet (500 mg) by mouth every 6 hours as needed for mild pain   aspirin 81 MG EC tablet 7/31/2024 at am  No Yes   Sig: Take 1 tablet (81 mg) by mouth daily   atorvastatin (LIPITOR) 80 MG tablet 7/30/2024 at pm  No Yes   Sig: Take 1 tablet (80 mg) by mouth every evening   bimatoprost (LUMIGAN) 0.01 % SOLN 7/30/2024 at pm  No Yes   Sig: Place 1 drop into the right eye at bedtime   bumetanide (BUMEX) 2 MG tablet 7/31/2024 at am  No Yes   Sig: Take 1 tablet (2 mg) by mouth daily   empagliflozin (JARDIANCE) 10 MG TABS tablet 7/31/2024 at am  No Yes   Sig: Take 1 tablet (10 mg) by mouth daily   levothyroxine (SYNTHROID/LEVOTHROID) 137 MCG tablet 7/31/2024 at am  No Yes   Sig: Take 1 tablet (137 mcg) by mouth daily   metoprolol tartrate (LOPRESSOR) 25 MG tablet 7/31/2024 " at am  No Yes   Sig: Take 1 tablet (25 mg) by mouth 2 times daily   sertraline (ZOLOFT) 25 MG tablet 7/31/2024 at am  No Yes   Sig: Take 1 tablet (25 mg) by mouth daily      Facility-Administered Medications Last Administration Doses Remaining   0.5 mL ropivacaine (NAROPIN) injection 5 mg/mL 7/25/2024  2:12 PM    0.5 mL ropivacaine (NAROPIN) injection 5 mg/mL 7/25/2024  2:12 PM    betamethasone acet & sod phos (CELESTONE) injection 6 mg 7/25/2024  2:12 PM    betamethasone acet & sod phos (CELESTONE) injection 6 mg 7/25/2024  2:12 PM               Physical Exam   Vital Signs: Temp: 97.4  F (36.3  C) Temp src: Temporal BP: 139/74 Pulse: 89   Resp: 21 SpO2: 94 % O2 Device: None (Room air)    Weight: 204 lbs 0 oz    Constitutional: awake, alert, no apparent distress, and appears stated age  Eyes: Lids and lashes normal, extra ocular muscles intact, sclera clear, conjunctiva normal  Respiratory: No increased work of breathing, no accessory muscle use, clear to auscultation bilaterally, no crackles or wheezing  Cardiovascular: Regular rate and rhythm, normal S1 and S2  Skin: Scattered ecchymosis and edema noted to right upper lateral lip, bilateral hands and forearms and right knee.   Musculoskeletal: no lower extremity pitting edema present. 2+ DP pulses  Neurologic: Awake, alert.   Neuropsychiatric: Appropriate mood, affect and eye contact. Cooperative.    Medical Decision Making             Data     I have personally reviewed the following data over the past 24 hrs:    9.3  \   14.6   / 183     139 91 (L) 13.6 /  169 (H)   2.4 (LL) 32 (H) 0.65 \     Trop: 25 (H) BNP: 339       Imaging results reviewed over the past 24 hrs:   Recent Results (from the past 24 hour(s))   XR Knee Right 1/2 Views    Narrative    EXAM: XR KNEE RIGHT 1/2 VIEWS, XR TIBIA AND FIBULA RIGHT 2 VIEWS  LOCATION: [facility]  DATE: 7/31/2024    INDICATION: Fall, knee pain  COMPARISON: None.      Impression    IMPRESSION:      There is diffuse osseous demineralization, which decreases radiographic sensitivity for nondisplaced fractures. No acute, displaced right knee or right tibia/fibular fracture is identified. There is moderate medial and mild patellofemoral compartment   knee degenerative arthrosis. No sizable knee joint effusion.   XR Tibia and Fibula Right 2 Views    Narrative    EXAM: XR KNEE RIGHT 1/2 VIEWS, XR TIBIA AND FIBULA RIGHT 2 VIEWS  LOCATION: [facility]  DATE: 7/31/2024    INDICATION: Fall, knee pain  COMPARISON: None.      Impression    IMPRESSION:     There is diffuse osseous demineralization, which decreases radiographic sensitivity for nondisplaced fractures. No acute, displaced right knee or right tibia/fibular fracture is identified. There is moderate medial and mild patellofemoral compartment   knee degenerative arthrosis. No sizable knee joint effusion.   CT Head w/o Contrast    Narrative    EXAM: CT HEAD W/O CONTRAST  LOCATION: [facility]  DATE: 7/31/2024    INDICATION: trauma on plavix eval bleed. Injury. Pain.  COMPARISON: CT brain March 1, 2024. MRI brain 27 April 2024.  TECHNIQUE: Routine CT Head without IV contrast. Multiplanar reformats. Dose reduction techniques were used.    FINDINGS:  INTRACRANIAL CONTENTS: No intracranial hemorrhage, extraaxial collection, or mass effect.  No CT evidence of acute infarct. Moderate presumed chronic small vessel ischemic changes. Moderate generalized volume loss. No hydrocephalus. Dystrophic   calcification in the subependymal area of the posterior left lateral ventricle. No change.     VISUALIZED ORBITS/SINUSES/MASTOIDS: Prior left cataract surgery. Visualized portions of the orbits are otherwise unremarkable. No paranasal sinus mucosal disease. No middle ear or mastoid effusion.    BONES/SOFT TISSUES: No acute abnormality.      Impression    IMPRESSION:  1.  No CT evidence for acute intracranial process.  2.  Brain  atrophy and presumed chronic microvascular ischemic changes as above.   CT Cervical Spine w/o Contrast    Narrative    EXAM: CT CERVICAL SPINE W/O CONTRAST  LOCATION: [facility]  DATE: 7/31/2024    INDICATION: trauma, extension mechanism, eval for fracture. Pain.  COMPARISON: None.  TECHNIQUE: Routine CT Cervical Spine without IV contrast. Multiplanar reformats. Dose reduction techniques were used.    FINDINGS:  Normal vertebral body heights. 1.5 to 2 mm stepwise degenerative anterolisthesis at C3-4 and C4-5. Reversal the normal cervical lordosis centered at C6. Prevertebral soft tissues unremarkable. No extraspinal abnormality.     Craniovertebral junction and C1-C2: Moderate degenerative change. Patent central canal.    C2-C3: Facet arthropathy. Patent canal and foramen. Unremarkable disc.    C3-C4: Degenerative disc change. Facet DJD and uncinate spur. Severe left and moderate to severe right foraminal stenosis. Patent central canal.     C4-C5: Left facet arthropathy. Mild disc degenerative change. Patent central canal. Mild left foraminal stenosis.     C5-C6: Disc osteophyte. Facet and uncinate degenerative change. Moderate left and mild right foraminal stenosis. Patent central canal.     C6-C7: Disc osteophyte complex is broad-based. Mild central canal stenosis. Uncinate spur with moderate to severe left and moderate right foraminal stenosis.     C7-T1: Facet arthropathy and uncinate spur. Severe left and moderate right foraminal stenosis. Patent central canal. Disc osteophyte.       Impression    IMPRESSION:  1.  No fracture or posttraumatic subluxation.  2.  Multilevel cervical spondylosis.   CT Lumbar Spine w/o Contrast    Narrative    EXAM: CT LUMBAR SPINE W/O CONTRAST  LOCATION: [facility]  DATE: 7/31/2024    INDICATION: eval fracture trauma. Injury. Pain.  COMPARISON: None.  TECHNIQUE: Routine CT Lumbar Spine without IV contrast. Multiplanar reformats.  Dose reduction techniques were used.    FINDINGS:  Nomenclature is based on 5 lumbar type vertebral bodies. Negative for acute fracture or traumatic malalignment. No extraspinal soft tissue abnormality. Moderate bilateral SI joint arthropathy. Incompletely visualized catheter within the abdomen.    T11-12: Vacuum disc. Facet DJD. Severe left and moderate right foraminal stenosis. Patent central canal.    T12-L1: Severe disc degenerative changes and disc osteophyte. Ventral osteophyte. Mild central canal stenosis. Moderate right and mild left foraminal stenosis. Facet DJD.    L1-L2: Ankylosis across the disc space. 5.5 mm retrolisthesis of L1 on L2. Disc osteophyte. Mild central canal stenosis. Severe right and mild left foraminal stenosis. Facet DJD.    L2-L3: Vacuum disc and disc osteophyte. Mild central canal stenosis. Moderate to severe bilateral foraminal stenosis. Facet DJD.    L3-L4: Disc osteophyte. Facet DJD. Moderate to severe left and moderate right foraminal stenosis. Mild central canal stenosis.    L4-L5: Disc osteophyte. Moderate central canal stenosis. Facet DJD. Moderate bilateral lateral recess stenosis. Severe right and moderate to severe left foraminal stenosis.    L5-S1: Facet DJD, vacuum disc and disc osteophyte. Severe bilateral foraminal stenosis. Adequate central canal.      Impression    IMPRESSION:  1.  Negative for lumbar fractures.    2.  Moderate to severe, multilevel lumbar spondylosis.   CTA Chest with Contrast    Narrative    EXAM: CTA CHEST WITH CONTRAST  LOCATION: [facility]  DATE: 7/31/2024    INDICATION: Trauma, eval for PE, aortic dissection  COMPARISON: CT chest 6/20/2024, CTA chest 5/12/2024  TECHNIQUE: Helical acquisition through the chest was performed during the arterial phase of contrast enhancement. 2D and 3D reconstructions performed by the CT technologist. Dose reduction techniques were used.  CONTRAST: isovue 370 90ml    CT ANGIOGRAM CHEST: No  mediastinal or aortic hematoma. Aorta is normal caliber without dissection or aneurysm. Great vessels are patent. Mild diffuse arterial calcifications. Descending thoracic aorta is ectatic. Celiac, SMA and single bilateral renal   arteries are patent.    No pulmonary emboli.    LUNGS AND PLEURA: No hydropneumothorax. Irregular pleural-based lobulated opacity in the right lower lobe has decreased in size and now measures 1.7 x 1.4 cm. Mild dependent density. No airway lesions.    MEDIASTINUM/AXILLAE: No adenopathy.    CORONARY ARTERY CALCIFICATION: Mild.    UPPER ABDOMEN: Gastric band. Diffuse fatty infiltration of the pancreas. There is a 10 x 7 mm hypodense lesion posteriorly along the mid body the pancreas. 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    MUSCULOSKELETAL: No acute fractures or soft tissue hematomas. Old fracture deformity of the posterior right 11th rib at the costovertebral junction. Advanced degenerative changes in the upper lumbar spine. Bilateral, subpectoral breast implants with a   right mastectomy.      Impression    IMPRESSION:  1.  No evidence of acute traumatic injury to the chest.  2.  Specifically, no mediastinal hematoma, aortic dissection or hydropneumothorax.  3.  Lobulated pleural-based opacity laterally in the right lower lobe is decreasing in size consistent with post inflammatory change. As noted before, suggest a follow-up in end of September 2024 which would be three months since one was suggested.  4.  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 Follow-up recommendations given below.   5.  Other numerous noncritical findings as noted above.    REFERENCE:  Revisions of international consensus [place] guidelines for the management of IPMN of the pancreas. Pancreatology 2017;17(5):738-753.    Largest cyst between 10-20 mm: CT or MRI/MRCP every 6 months for 1 year and then yearly for 2 years and then every 2 years if no change.      CT Thoracic Spine  Reconstructed    Narrative    EXAM: CT THORACIC SPINE RECONSTRUCTED  LOCATION: [facility]  DATE: 7/31/2024    INDICATION: eval fracture, trauma. Injury. Pain.  COMPARISON: 20 June 2024 chest CT.  TECHNIQUE: Routine CT Thoracic Spine without IV contrast. Multiplanar reformats. Dose reduction techniques were used.     FINDINGS:  VERTEBRA: T9 upper endplate compression with about 20% height loss. Completely healed. No bone retropulsion.    T11 upper endplate fracture with sclerotic margins. 2.5 mm retropulsion of the upper posterior cortex. Completely healed.    No acute or subacute fractures. Stable appearance when compared with the previous chest CT. No acute fracture or posttraumatic subluxation.     Upper thoracic levoconvex curvature centered at T2-3. Mild lower lumbar dextroconvex curvature centered at T8-9.    CANAL/FORAMINA: Disc osteophyte complexes centrally at T5-6, T6-7 and T10-11. Mild central canal stenosis at T10-11. Otherwise, patent central canal.    Multilevel foraminal stenosis due to facet hypertrophic change most severe at T7-8 and T8-9.    PARASPINAL: No extraspinal abnormality.      Impression    IMPRESSION:  1.  Chronic thoracic fractures    2.  No acute fractures    3.  Thoracic scoliosis and multilevel spondylosis.

## 2024-07-31 NOTE — ED TRIAGE NOTES
Patient reports having back pain x 1 week, left lumbar and 2 days, right lumbar. No treatment at home.     LOC: The patient is awake, alert and aware of environment with an appropriate affect, the patient is oriented x 3 and speaking appropriately.  APPEARANCE: Patient resting comfortably and in no acute distress, patient is clean and well groomed, patient's clothing is properly fastened.  HEENT: Brief WNL  SKIN: Brief WNL.   MUSCULOSKELETAL: Brief WNL. Right and left lumbar pain. Denies injury.   RESPIRATORY: Brief WNL  CARDIAC: Brief WNL  GASTRO: Brief WNL. Denies   : Brief WNL. Denies urinary symptoms  Peripheral Vasc: Brief WNL  NEURO: Brief WNL  PSYCH: Brief WNL   Patient states she stood up yesterday from a couch and fell forwards while passing out hitting her chest on carpeting.  She c/o mid sternal chest pain, BUE bruising and pain, lower mid back pain, right upper lip swelling and bruising, and right knee pain. 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 She takes Tylenol every 6 hours. She is being treated for low potassium, recently started on DM pill.       Triage Assessment (Adult)       Row Name 07/31/24 8044          Triage Assessment    Airway WDL WDL        Respiratory WDL    Respiratory WDL WDL        Skin Circulation/Temperature WDL    Skin Circulation/Temperature WDL WDL        Cardiac WDL    Cardiac WDL WDL        Peripheral/Neurovascular WDL    Peripheral Neurovascular WDL WDL        Cognitive/Neuro/Behavioral WDL    Cognitive/Neuro/Behavioral WDL WDL

## 2024-07-31 NOTE — MEDICATION SCRIBE - ADMISSION MEDICATION HISTORY
Medication Scribe Admission Medication History    Admission medication history is complete. The information provided in this note is only as accurate as the sources available at the time of the update.    Information Source(s): Patient via in-person    Pertinent Information: patient manages medications. Patient provided a typed out home made medication list.     Plavix: Patient reports last dose was taken on 7/29/24    Changes made to PTA medication list:  Added: Melatonin   Deleted: None  Changed: None    Allergies reviewed with patient and updates made in EHR: yes    Medication History Completed By: Bhaskar Cerda 7/31/2024 3:34 PM    Current Facility-Administered Medications for the 7/31/24 encounter (Hospital Encounter)   Medication    0.5 mL ropivacaine (NAROPIN) injection 5 mg/mL    0.5 mL ropivacaine (NAROPIN) injection 5 mg/mL    betamethasone acet & sod phos (CELESTONE) injection 6 mg    betamethasone acet & sod phos (CELESTONE) injection 6 mg     PTA Med List   Medication Sig Last Dose    acetaminophen (TYLENOL) 500 MG tablet Take 1 tablet (500 mg) by mouth every 6 hours as needed for mild pain Past Month at prn    aspirin 81 MG EC tablet Take 1 tablet (81 mg) by mouth daily 7/31/2024 at am    atorvastatin (LIPITOR) 80 MG tablet Take 1 tablet (80 mg) by mouth every evening 7/30/2024 at pm    bimatoprost (LUMIGAN) 0.01 % SOLN Place 1 drop into the right eye at bedtime 7/30/2024 at pm    bumetanide (BUMEX) 2 MG tablet Take 1 tablet (2 mg) by mouth daily 7/31/2024 at am    Cranberry-Vitamin C (CRANBERRY CONCENTRATE/VITAMINC) 05175-479 MG CAPS Take 2 capsules by mouth daily 7/31/2024 at am    empagliflozin (JARDIANCE) 10 MG TABS tablet Take 1 tablet (10 mg) by mouth daily 7/31/2024 at am    levothyroxine (SYNTHROID/LEVOTHROID) 137 MCG tablet Take 1 tablet (137 mcg) by mouth daily 7/31/2024 at am    Melatonin 10-10 MG TBCR Take 1 tablet by mouth at bedtime 7/30/2024 at pm    metoprolol tartrate (LOPRESSOR) 25 MG  tablet Take 1 tablet (25 mg) by mouth 2 times daily 7/31/2024 at am    sertraline (ZOLOFT) 25 MG tablet Take 1 tablet (25 mg) by mouth daily 7/31/2024 at am

## 2024-08-01 ENCOUNTER — APPOINTMENT (OUTPATIENT)
Dept: OCCUPATIONAL THERAPY | Facility: HOSPITAL | Age: 74
DRG: 640 | End: 2024-08-01
Payer: COMMERCIAL

## 2024-08-01 ENCOUNTER — APPOINTMENT (OUTPATIENT)
Dept: CARDIOLOGY | Facility: HOSPITAL | Age: 74
DRG: 640 | End: 2024-08-01
Payer: COMMERCIAL

## 2024-08-01 ENCOUNTER — APPOINTMENT (OUTPATIENT)
Dept: PHYSICAL THERAPY | Facility: HOSPITAL | Age: 74
DRG: 640 | End: 2024-08-01
Payer: COMMERCIAL

## 2024-08-01 DIAGNOSIS — R79.89 ELEVATED FERRITIN: Primary | ICD-10-CM

## 2024-08-01 DIAGNOSIS — E83.110 HEREDITARY HEMOCHROMATOSIS (H): ICD-10-CM

## 2024-08-01 DIAGNOSIS — Z83.49 FAMILY HISTORY OF HEMOCHROMATOSIS: ICD-10-CM

## 2024-08-01 LAB
ANION GAP SERPL CALCULATED.3IONS-SCNC: 11 MMOL/L (ref 7–15)
BUN SERPL-MCNC: 10.1 MG/DL (ref 8–23)
CALCIUM SERPL-MCNC: 8.6 MG/DL (ref 8.8–10.4)
CHLORIDE SERPL-SCNC: 96 MMOL/L (ref 98–107)
CREAT SERPL-MCNC: 0.57 MG/DL (ref 0.51–0.95)
EGFRCR SERPLBLD CKD-EPI 2021: >90 ML/MIN/1.73M2
GLUCOSE BLDC GLUCOMTR-MCNC: 104 MG/DL (ref 70–99)
GLUCOSE BLDC GLUCOMTR-MCNC: 123 MG/DL (ref 70–99)
GLUCOSE SERPL-MCNC: 167 MG/DL (ref 70–99)
HCO3 SERPL-SCNC: 30 MMOL/L (ref 22–29)
HOLD SPECIMEN: NORMAL
MAGNESIUM SERPL-MCNC: 2.3 MG/DL (ref 1.7–2.3)
PHOSPHATE SERPL-MCNC: 2.1 MG/DL (ref 2.5–4.5)
POTASSIUM SERPL-SCNC: 3.2 MMOL/L (ref 3.4–5.3)
POTASSIUM SERPL-SCNC: 3.3 MMOL/L (ref 3.4–5.3)
POTASSIUM SERPL-SCNC: 3.7 MMOL/L (ref 3.4–5.3)
SODIUM SERPL-SCNC: 137 MMOL/L (ref 135–145)

## 2024-08-01 PROCEDURE — 250N000013 HC RX MED GY IP 250 OP 250 PS 637

## 2024-08-01 PROCEDURE — 120N000001 HC R&B MED SURG/OB

## 2024-08-01 PROCEDURE — 250N000013 HC RX MED GY IP 250 OP 250 PS 637: Performed by: STUDENT IN AN ORGANIZED HEALTH CARE EDUCATION/TRAINING PROGRAM

## 2024-08-01 PROCEDURE — 80048 BASIC METABOLIC PNL TOTAL CA: CPT | Performed by: INTERNAL MEDICINE

## 2024-08-01 PROCEDURE — 36415 COLL VENOUS BLD VENIPUNCTURE: CPT

## 2024-08-01 PROCEDURE — 82962 GLUCOSE BLOOD TEST: CPT

## 2024-08-01 PROCEDURE — 93306 TTE W/DOPPLER COMPLETE: CPT | Mod: 26 | Performed by: INTERNAL MEDICINE

## 2024-08-01 PROCEDURE — 84100 ASSAY OF PHOSPHORUS: CPT | Performed by: STUDENT IN AN ORGANIZED HEALTH CARE EDUCATION/TRAINING PROGRAM

## 2024-08-01 PROCEDURE — 83735 ASSAY OF MAGNESIUM: CPT | Performed by: STUDENT IN AN ORGANIZED HEALTH CARE EDUCATION/TRAINING PROGRAM

## 2024-08-01 PROCEDURE — 84132 ASSAY OF SERUM POTASSIUM: CPT | Performed by: STUDENT IN AN ORGANIZED HEALTH CARE EDUCATION/TRAINING PROGRAM

## 2024-08-01 PROCEDURE — 99222 1ST HOSP IP/OBS MODERATE 55: CPT | Performed by: INTERNAL MEDICINE

## 2024-08-01 PROCEDURE — 250N000013 HC RX MED GY IP 250 OP 250 PS 637: Performed by: INTERNAL MEDICINE

## 2024-08-01 PROCEDURE — 99233 SBSQ HOSP IP/OBS HIGH 50: CPT | Performed by: STUDENT IN AN ORGANIZED HEALTH CARE EDUCATION/TRAINING PROGRAM

## 2024-08-01 PROCEDURE — 97165 OT EVAL LOW COMPLEX 30 MIN: CPT | Mod: GO

## 2024-08-01 PROCEDURE — 97530 THERAPEUTIC ACTIVITIES: CPT | Mod: GP | Performed by: PHYSICAL THERAPIST

## 2024-08-01 PROCEDURE — 250N000013 HC RX MED GY IP 250 OP 250 PS 637: Performed by: HOSPITALIST

## 2024-08-01 PROCEDURE — 97535 SELF CARE MNGMENT TRAINING: CPT | Mod: GO

## 2024-08-01 PROCEDURE — 36415 COLL VENOUS BLD VENIPUNCTURE: CPT | Performed by: STUDENT IN AN ORGANIZED HEALTH CARE EDUCATION/TRAINING PROGRAM

## 2024-08-01 PROCEDURE — 36415 COLL VENOUS BLD VENIPUNCTURE: CPT | Performed by: INTERNAL MEDICINE

## 2024-08-01 PROCEDURE — 97162 PT EVAL MOD COMPLEX 30 MIN: CPT | Mod: GP | Performed by: PHYSICAL THERAPIST

## 2024-08-01 PROCEDURE — 93306 TTE W/DOPPLER COMPLETE: CPT

## 2024-08-01 PROCEDURE — 84132 ASSAY OF SERUM POTASSIUM: CPT

## 2024-08-01 RX ORDER — OXYCODONE HYDROCHLORIDE 5 MG/1
5 TABLET ORAL EVERY 4 HOURS PRN
Status: DISCONTINUED | OUTPATIENT
Start: 2024-08-01 | End: 2024-08-02 | Stop reason: HOSPADM

## 2024-08-01 RX ORDER — POTASSIUM CHLORIDE 1500 MG/1
20 TABLET, EXTENDED RELEASE ORAL ONCE
Status: DISCONTINUED | OUTPATIENT
Start: 2024-08-01 | End: 2024-08-01

## 2024-08-01 RX ORDER — POLYETHYLENE GLYCOL 3350 17 G/17G
17 POWDER, FOR SOLUTION ORAL 2 TIMES DAILY PRN
Status: DISCONTINUED | OUTPATIENT
Start: 2024-08-01 | End: 2024-08-02 | Stop reason: HOSPADM

## 2024-08-01 RX ORDER — AMOXICILLIN 250 MG
1 CAPSULE ORAL
Status: DISCONTINUED | OUTPATIENT
Start: 2024-08-01 | End: 2024-08-02 | Stop reason: HOSPADM

## 2024-08-01 RX ORDER — SPIRONOLACTONE 25 MG
12.5 TABLET ORAL
Status: DISCONTINUED | OUTPATIENT
Start: 2024-08-01 | End: 2024-08-02 | Stop reason: HOSPADM

## 2024-08-01 RX ORDER — BUMETANIDE 2 MG/1
2 TABLET ORAL DAILY
Status: DISCONTINUED | OUTPATIENT
Start: 2024-08-01 | End: 2024-08-02 | Stop reason: HOSPADM

## 2024-08-01 RX ORDER — POTASSIUM CHLORIDE 1500 MG/1
40 TABLET, EXTENDED RELEASE ORAL ONCE
Status: COMPLETED | OUTPATIENT
Start: 2024-08-01 | End: 2024-08-01

## 2024-08-01 RX ADMIN — ACETAMINOPHEN 650 MG: 325 TABLET ORAL at 15:34

## 2024-08-01 RX ADMIN — OXYCODONE HYDROCHLORIDE 5 MG: 5 TABLET ORAL at 19:55

## 2024-08-01 RX ADMIN — POTASSIUM CHLORIDE 40 MEQ: 1500 TABLET, EXTENDED RELEASE ORAL at 07:53

## 2024-08-01 RX ADMIN — POLYETHYLENE GLYCOL 3350 17 G: 17 POWDER, FOR SOLUTION ORAL at 11:42

## 2024-08-01 RX ADMIN — OXYCODONE HYDROCHLORIDE 5 MG: 5 TABLET ORAL at 07:53

## 2024-08-01 RX ADMIN — SENNOSIDES AND DOCUSATE SODIUM 1 TABLET: 8.6; 5 TABLET ORAL at 11:42

## 2024-08-01 RX ADMIN — ATORVASTATIN CALCIUM 80 MG: 40 TABLET, FILM COATED ORAL at 19:55

## 2024-08-01 RX ADMIN — SPIRONOLACTONE 12.5 MG: 25 TABLET, FILM COATED ORAL at 15:34

## 2024-08-01 RX ADMIN — BIMATOPROST 1 DROP: 0.1 SOLUTION/ DROPS OPHTHALMIC at 22:46

## 2024-08-01 RX ADMIN — LEVOTHYROXINE SODIUM 137 MCG: 137 TABLET ORAL at 07:48

## 2024-08-01 RX ADMIN — Medication 3 MG: at 20:05

## 2024-08-01 RX ADMIN — POTASSIUM & SODIUM PHOSPHATES POWDER PACK 280-160-250 MG 1 PACKET: 280-160-250 PACK at 22:46

## 2024-08-01 RX ADMIN — POTASSIUM & SODIUM PHOSPHATES POWDER PACK 280-160-250 MG 1 PACKET: 280-160-250 PACK at 19:55

## 2024-08-01 RX ADMIN — BUMETANIDE 2 MG: 2 TABLET ORAL at 16:58

## 2024-08-01 RX ADMIN — EMPAGLIFLOZIN 10 MG: 10 TABLET, FILM COATED ORAL at 07:49

## 2024-08-01 RX ADMIN — ACETAMINOPHEN 650 MG: 325 TABLET ORAL at 01:30

## 2024-08-01 RX ADMIN — METOPROLOL TARTRATE 25 MG: 25 TABLET, FILM COATED ORAL at 07:52

## 2024-08-01 RX ADMIN — ASPIRIN 81 MG: 81 TABLET, COATED ORAL at 07:53

## 2024-08-01 RX ADMIN — METOPROLOL TARTRATE 25 MG: 25 TABLET, FILM COATED ORAL at 19:55

## 2024-08-01 RX ADMIN — POTASSIUM & SODIUM PHOSPHATES POWDER PACK 280-160-250 MG 1 PACKET: 280-160-250 PACK at 16:58

## 2024-08-01 ASSESSMENT — ACTIVITIES OF DAILY LIVING (ADL)
ADLS_ACUITY_SCORE: 40

## 2024-08-01 NOTE — CONSULTS
CARDIOLOGY CONSULT NOTE         Assessment:   1.  Syncope-sounds orthostatic after came on while standing up, possibly due to dehydration from increased amount of Bumex.  However, cannot rule out arrhythmia in the face of a right bundle branch block with QT prolongation.  Agree with telemetry and echo as you are doing, echo is improved without any pericardial fusion, will discharge patient home and arrange for outpatient telemetry monitoring with a Zio patch.  2.  Heart failure-acute on chronic in the setting of preserved ejection fraction of 65%.  Would utilize Bumex as doing but also given question of diastolic dysfunction utilize Aldactone.  3.  Hypokalemia-most likely secondary to Bumex.  Aldactone.  4.  Hemochromatosis-agree with primary, needs evaluation, would arrange for cardiac MRI.  5.  ECG changes-given this, and mild coronary calcification seen on chest CT, would arrange for stress testing when able.     Plan:   1.  Try Aldactone in addition to Bumex.  2.  Outpatient Zio patch monitor.  3.  Cardiac MRI to evaluate for cardiac hemochromatosis.  4.  Can follow with Dr. Gayatri Molina primary cardiologist.  5.  Could potentially discharge patient home.  6.  Would also arrange for outpatient pharmacological stress nuclear.     Current History:   [facility] has been requested by Dr. Noble to evaluate Mirna Grijalva  who is a 74 year old year old white female for syncope.  Patient was recently discharged after having been diagnosed with heart failure and having some diuresis done.  While at home, she got up from a couch and fell forward.  She had no aura and no postictal state nor was any seizure activity nor was any bowel or bladder incontinence but she became syncopal.  She came  to within seconds, came to the hospital and has been monitored and cardiology consultation was requested.  There has been no prior episodes nor have there been any subsequent episodes.    Past Medical History:      Past Medical History:   Diagnosis Date    Arthritis years ago?    Cancer (H) breast status postmastectomy 1988  & 2016?    Glaucoma (increased eye pressure) About 11 years    Hypertension years ago?    Mumps     Nonsenile cataract About 11 years    Thyroid cancer (H) status post thyroidectomy     TIA (transient ischemic attack) 04/27/2024    right eye      Past history is negative for tuberculosis, diabetes mellitus, myocardial infarction,  rheumatic fever, cerebrovascular accident, chronic kidney disease, peptic ulcer disease, chronic obstructive pulmonary disease, or thyroid disorder  and no lipid disorder.    Past Surgical History:     Past Surgical History:   Procedure Laterality Date    BIOPSY      BREAST SURGERY      CATARACT IOL, RT/LT Left 2022?    With Istent    CYSTOSCOPY      GLAUCOMA SURGERY  2022?    GYN SURGERY       Family History:   Reviewed, and heart attacks in her dad.    Social History:   Reviewed, and she  reports that she has quit smoking. Her smoking use included cigarettes. She has a 20 pack-year smoking history, up to a pack a day quitting about 20 years ago. She has never been exposed to tobacco smoke. She has never used smokeless tobacco. She reports current alcohol use. She reports that she does not use drugs.  She lives at home independently with her , and primary care provider is Dr. Harris.    Meds:     Current Facility-Administered Medications   Medication Dose Route Frequency Provider Last Rate Last Admin    0.5 mL ropivacaine (NAROPIN) injection 5 mg/mL  0.5 mL      0.5 mL at 07/25/24 1412    0.5 mL ropivacaine (NAROPIN) injection 5 mg/mL  0.5 mL      0.5 mL at 07/25/24 1412    aspirin EC tablet 81 mg  81 mg Oral Daily Nia Tyler PA-C   81 mg at 08/01/24 0753    atorvastatin (LIPITOR) tablet 80 mg  80 mg Oral QPM Nia Tyler PA-C   80 mg at 07/31/24 2122    betamethasone acet & sod phos (CELESTONE) injection 6 mg  6 mg      6 mg at 07/25/24 1412    betamethasone acet  & sod phos (CELESTONE) injection 6 mg  6 mg      6 mg at 07/25/24 1412    bimatoprost (LUMIGAN) 0.01 % ophthalmic drops 1 drop  1 drop Right Eye At Bedtime Nia Tyler PA-C   1 drop at 07/31/24 2217    [Held by provider] bumetanide (BUMEX) tablet 2 mg  2 mg Oral Daily Nia Tyler PA-C        empagliflozin (JARDIANCE) tablet 10 mg  10 mg Oral Daily Nia Tyler PA-C   10 mg at 08/01/24 0749    levothyroxine (SYNTHROID/LEVOTHROID) tablet 137 mcg  137 mcg Oral Daily Nia Tyler PA-C   137 mcg at 08/01/24 0748    metoprolol tartrate (LOPRESSOR) tablet 25 mg  25 mg Oral BID Nia Tyler PA-C   25 mg at 08/01/24 0752    [Held by provider] sertraline (ZOLOFT) tablet 25 mg  25 mg Oral Daily Nia Tyler PA-C        sodium chloride (PF) 0.9% PF flush 3 mL  3 mL Intracatheter Q8H Nia Tyler PA-C   3 mL at 08/01/24 0222       Allergies:   Ciprofloxacin hcl, Atenolol, Ciprofloxacin, Eliquis [apixaban], Nitrofurantoin, Sulfa antibiotics, and Penicillins    Review of Systems:   A 12 point comprehensive review of systems was  as follows:   General: Positive for fatigue, negative weight loss or weight gain  Constitutional: negative for anorexia, chills, fevers, malaise, night sweats   Eyes: negative for cataracts, color blindness, contacts/glasses, glaucoma, icterus, irritation, redness and visual disturbance  Ears, nose, mouth, throat, and face: negative for ear drainage, earaches, epistaxis, facial trauma, hearing loss, hoarseness, nasal congestion, snoring, sore mouth, sore throat, tinnitus and voice change  Respiratory: negative for cough, dyspnea on exertion,  hemoptysis, sputum production, pleurisy, stridor, wheezing, PND, orthopnea  Cardiovascular: negative for chest pain, chest pressure/discomfort, claudication, dyspnea, exertional chest pressure/discomfort, fatigue, irregular heart beat, lower extremity edema, near-syncope,  palpitations,  syncope   Gastrointestinal: negative for abdominal pain, change in bowel  "haibits, constipation, diarrhea, dyspepsia, dysphagia, jaundice, melena, nausea, odynophagia, reflux symptoms and vomiting  Genitourinary: negative for decreased stream  Hematologic/lymphatic: negative for bleeding  Musculoskeletal: negative for arthralgias, back pain, bone pain, muscle weakness, myalgias, neck pain and stiff joints  Neurological: Positive for syncope, presyncope, negative coordination problems, dizziness, gait problems, headaches, memory problems, paresthesia, seizures, speech problems, tremors, vertigo and weakness    Objective:     Physical Exam:  BP (!) 151/81   Pulse 95   Temp 97.3  F (36.3  C) (Oral)   Resp 20   Ht 1.626 m (5' 4\")   Wt 92.5 kg (204 lb)   LMP  (LMP Unknown)   SpO2 90%   BMI 35.02 kg/m       Head:    Normocephalic, without obvious abnormality, jaw and lip contusion   Eyes:    PERRL, conjunctiva/corneas clear, EOM's intact,           Nose:   Nares normal, septum midline, mucosa normal, no drainage  or sinus tenderness   Throat:   Lips, mucosa, and tongue normal; teeth and gums normal   Neck:   Supple, symmetrical, trachea midline, no adenopathy;        thyroid:  No enlargement/tenderness/nodules; no carotid    bruit or JVD   Back:     Symmetric, no curvature, ROM normal, no CVA tenderness   Lungs:     Clear to auscultation bilaterally, respirations unlabored   Chest wall:    No tenderness or deformity   Heart:    Regular rate and rhythm, S1 and S2 normal, no murmur, rub   or gallop   Abdomen:     Soft, non-tender, bowel sounds active all four quadrants,     no masses, no organomegaly   Extremities:   Extremities normal, atraumatic, no cyanosis or edema   Pulses:   2+ and symmetric all extremities   Skin:   Skin color, texture, turgor normal, no rashes or lesions   Neurologic:   CNII-XII intact. Normal strength, sensation and reflexes       throughout     Cardiographics and Imaging  Radiology Results: Chest x-ray shows   1.  No evidence of acute traumatic injury to the " "chest.  2.  Specifically, no mediastinal hematoma, aortic dissection or hydropneumothorax.  3.  Lobulated pleural-based opacity laterally in the right lower lobe is decreasing in size consistent with post inflammatory change. As noted before, suggest a follow-up in end of September 2024 which would be three months since one was suggested.  4.  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 Follow-up recommendations given below.   5.  Other numerous noncritical findings as noted above.    EKG Results: personally reviewed sinus rhythm, right bundle branch block pattern, QT prolongation, ST-T wave changes.      Lab Review   Pertinent Labs  Lab Results: personally reviewed       No results found for: \"CKTOTAL\", \"CKMB\", \"TROPONINI\"    Lab Results   Component Value Date    BUN 10.1 08/01/2024     08/01/2024    CO2 30 (H) 08/01/2024       Lab Results   Component Value Date    WBC 9.3 07/31/2024    HGB 14.6 07/31/2024    HCT 40.6 07/31/2024     (H) 07/31/2024     07/31/2024       No results found for: \"BNP\"    Clinically Significant Risk Factors Present on Admission        # Hypokalemia: Lowest K = 2.4 mmol/L in last 2 days, will replace as needed         # Drug Induced Platelet Defect: home medication list includes an antiplatelet medication   # Hypertension: Noted on problem list         # Obesity: Estimated body mass index is 35.02 kg/m  as calculated from the following:    Height as of this encounter: 1.626 m (5' 4\").    Weight as of this encounter: 92.5 kg (204 lb).               Hypokalemia              "

## 2024-08-01 NOTE — PROGRESS NOTES
[facility]    Medicine Progress Note - Hospitalist Service    Date of Admission:  7/31/2024    Assessment & Plan   Mirna Grijalva is a 74 year old female with PMHx of HFpEF, hypothyroidism, HTN, CARINE, AFX/TIA who was admitted on 7/31/2024 for syncope and collapse and hypokalemia.      Syncope and Collapse   -- Had hypokalemia  -- Troponin downtrended, BNP not elevated  -- CT head, CT C/T/L spine negative for acute changes  -- XR R knee/tib/fib negative for acute fractures  -- EKG RBBB, long QT (520)  -- US carotid shows less than 50% stenosis bilaterally  -- Echo shows EF 65%, no significant valvular heart disease.  Normal RVSP  -- Telemonitoring   -- Orthostatic vitals  -- Seen by cardiology, appreciate recommendations  -- Suspect if orthostatic in setting of Bumex  --Cannot rule out arrhythmia as patient has RBBB with QT prolongation, will need outpatient Zio patch  --Outpatient pharmacological stress nuclear     Hypokalemia -repleted  -- 2.6 --> 3.2  -- HIGH replacement protocols   -- Add Aldactone along with Bumex     Hypochloremia   -- Trend BMP     Hypophosphatemia  Monitor and replete as needed     Right Knee Pain   Acute on Chronic Back Pain   Exacerbated after fall   -- Imaging negative for acute fractures   -- Pain control with Tylenol, lido patch, low dose oxycodone     HFpEF    Does not appear decompensated; BNP within normal limits   -- Cont PTA metoprolol, Jardiance   -- Resume Bumex, add Aldactone for hypokalemia    Hemochromatosis  Cardiac MRI pending     Mood -- HOLDing PTA sertraline for now . Would recheck QT in the AM following Mag replacement.      Hypothyroidism   PTA levothyroxine      Hyperlipidemia  PTA statin, ASA     Incidental Findings on Imaging   -- Lobulated pleural-based opacity laterally in the right lower lobe is decreasing in size consistent with post inflammatory change. Suggest a follow-up in end of September 2024 which would be three months since one was  "suggested.   -- There is a 10 x 7 mm cystic pancreatic lesion. Needs follow-up and monitoring    PT recommend Home with Home PT          Diet: Combination Diet Low Saturated Fat Na <2400mg Diet    DVT Prophylaxis: Pneumatic Compression Devices  Yun Catheter: Not present  Lines: None     Cardiac Monitoring: ACTIVE order. Indication: Electrolyte Imbalance (24 hours)- Magnesium <1.3 mg/ml; Potassium < =2.8 or > 5.5 mg/ml  Code Status: Full Code      Clinically Significant Risk Factors Present on Admission        # Hypokalemia: Lowest K = 2.4 mmol/L in last 2 days, will replace as needed         # Drug Induced Platelet Defect: home medication list includes an antiplatelet medication   # Hypertension: Noted on problem list         # Obesity: Estimated body mass index is 35.02 kg/m  as calculated from the following:    Height as of this encounter: 1.626 m (5' 4\").    Weight as of this encounter: 92.5 kg (204 lb).              Disposition Plan     Medically Ready for Discharge: Anticipated Tomorrow             Jennifer Noble MD  Hospitalist Service  [facility]  Securely message with [employer] (more info)  Text page via [facility] Paging/Directory   ______________________________________________________________________    Interval History   Patient was examined at the bedside, states she feels sore throat as she had a fall.  Was seen by cardiology  Will monitor potassium levels and patient, added on Aldactone with Bumex.  Cardiac MRI pending.  Anticipate discharge tomorrow    Physical Exam   Vital Signs: Temp: 97.3  F (36.3  C) Temp src: Oral BP: (!) 151/81 Pulse: 95   Resp: 20 SpO2: 90 % O2 Device: None (Room air)    Weight: 204 lbs 0 oz    Constitutional: awake, alert, no apparent distress, and appears stated age  Respiratory: No increased work of breathing, no accessory muscle use, clear to auscultation bilaterally, no crackles or wheezing  Cardiovascular: Regular rate and rhythm, normal S1 and " S2  Skin: Scattered ecchymosis and edema noted to right upper lateral lip, bilateral hands and forearms and right knee.   Musculoskeletal: no lower extremity pitting edema present. 2+ DP pulses  Neurologic: Awake, alert.   Neuropsychiatric: Appropriate mood, affect and eye contact. Cooperative    Medical Decision Making       60 MINUTES SPENT BY ME on the date of service doing chart review, history, exam, documentation & further activities per the note.      Data     I have personally reviewed the following data over the past 24 hrs:    9.3  \   14.6   / 183     139 91 (L) 13.6 /  104 (H)   3.2 (L) 32 (H) 0.65 \     Trop: 25 (H) BNP: 339       Imaging results reviewed over the past 24 hrs:   Recent Results (from the past 24 hour(s))   XR Knee Right 1/2 Views    Narrative    EXAM: XR KNEE RIGHT 1/2 VIEWS, XR TIBIA AND FIBULA RIGHT 2 VIEWS  LOCATION: [facility]  DATE: 7/31/2024    INDICATION: Fall, knee pain  COMPARISON: None.      Impression    IMPRESSION:     There is diffuse osseous demineralization, which decreases radiographic sensitivity for nondisplaced fractures. No acute, displaced right knee or right tibia/fibular fracture is identified. There is moderate medial and mild patellofemoral compartment   knee degenerative arthrosis. No sizable knee joint effusion.   XR Tibia and Fibula Right 2 Views    Narrative    EXAM: XR KNEE RIGHT 1/2 VIEWS, XR TIBIA AND FIBULA RIGHT 2 VIEWS  LOCATION: [facility]  DATE: 7/31/2024    INDICATION: Fall, knee pain  COMPARISON: None.      Impression    IMPRESSION:     There is diffuse osseous demineralization, which decreases radiographic sensitivity for nondisplaced fractures. No acute, displaced right knee or right tibia/fibular fracture is identified. There is moderate medial and mild patellofemoral compartment   knee degenerative arthrosis. No sizable knee joint effusion.   CT Head w/o Contrast    Narrative    EXAM: CT HEAD W/O  CONTRAST  LOCATION: [facility]  DATE: 7/31/2024    INDICATION: trauma on plavix eval bleed. Injury. Pain.  COMPARISON: CT brain March 1, 2024. MRI brain 27 April 2024.  TECHNIQUE: Routine CT Head without IV contrast. Multiplanar reformats. Dose reduction techniques were used.    FINDINGS:  INTRACRANIAL CONTENTS: No intracranial hemorrhage, extraaxial collection, or mass effect.  No CT evidence of acute infarct. Moderate presumed chronic small vessel ischemic changes. Moderate generalized volume loss. No hydrocephalus. Dystrophic   calcification in the subependymal area of the posterior left lateral ventricle. No change.     VISUALIZED ORBITS/SINUSES/MASTOIDS: Prior left cataract surgery. Visualized portions of the orbits are otherwise unremarkable. No paranasal sinus mucosal disease. No middle ear or mastoid effusion.    BONES/SOFT TISSUES: No acute abnormality.      Impression    IMPRESSION:  1.  No CT evidence for acute intracranial process.  2.  Brain atrophy and presumed chronic microvascular ischemic changes as above.   CT Cervical Spine w/o Contrast    Narrative    EXAM: CT CERVICAL SPINE W/O CONTRAST  LOCATION: [facility]  DATE: 7/31/2024    INDICATION: trauma, extension mechanism, eval for fracture. Pain.  COMPARISON: None.  TECHNIQUE: Routine CT Cervical Spine without IV contrast. Multiplanar reformats. Dose reduction techniques were used.    FINDINGS:  Normal vertebral body heights. 1.5 to 2 mm stepwise degenerative anterolisthesis at C3-4 and C4-5. Reversal the normal cervical lordosis centered at C6. Prevertebral soft tissues unremarkable. No extraspinal abnormality.     Craniovertebral junction and C1-C2: Moderate degenerative change. Patent central canal.    C2-C3: Facet arthropathy. Patent canal and foramen. Unremarkable disc.    C3-C4: Degenerative disc change. Facet DJD and uncinate spur. Severe left and moderate to severe right foraminal stenosis.  Patent central canal.     C4-C5: Left facet arthropathy. Mild disc degenerative change. Patent central canal. Mild left foraminal stenosis.     C5-C6: Disc osteophyte. Facet and uncinate degenerative change. Moderate left and mild right foraminal stenosis. Patent central canal.     C6-C7: Disc osteophyte complex is broad-based. Mild central canal stenosis. Uncinate spur with moderate to severe left and moderate right foraminal stenosis.     C7-T1: Facet arthropathy and uncinate spur. Severe left and moderate right foraminal stenosis. Patent central canal. Disc osteophyte.       Impression    IMPRESSION:  1.  No fracture or posttraumatic subluxation.  2.  Multilevel cervical spondylosis.   CT Lumbar Spine w/o Contrast    Narrative    EXAM: CT LUMBAR SPINE W/O CONTRAST  LOCATION: [facility]  DATE: 7/31/2024    INDICATION: eval fracture trauma. Injury. Pain.  COMPARISON: None.  TECHNIQUE: Routine CT Lumbar Spine without IV contrast. Multiplanar reformats. Dose reduction techniques were used.    FINDINGS:  Nomenclature is based on 5 lumbar type vertebral bodies. Negative for acute fracture or traumatic malalignment. No extraspinal soft tissue abnormality. Moderate bilateral SI joint arthropathy. Incompletely visualized catheter within the abdomen.    T11-12: Vacuum disc. Facet DJD. Severe left and moderate right foraminal stenosis. Patent central canal.    T12-L1: Severe disc degenerative changes and disc osteophyte. Ventral osteophyte. Mild central canal stenosis. Moderate right and mild left foraminal stenosis. Facet DJD.    L1-L2: Ankylosis across the disc space. 5.5 mm retrolisthesis of L1 on L2. Disc osteophyte. Mild central canal stenosis. Severe right and mild left foraminal stenosis. Facet DJD.    L2-L3: Vacuum disc and disc osteophyte. Mild central canal stenosis. Moderate to severe bilateral foraminal stenosis. Facet DJD.    L3-L4: Disc osteophyte. Facet DJD. Moderate to severe left  and moderate right foraminal stenosis. Mild central canal stenosis.    L4-L5: Disc osteophyte. Moderate central canal stenosis. Facet DJD. Moderate bilateral lateral recess stenosis. Severe right and moderate to severe left foraminal stenosis.    L5-S1: Facet DJD, vacuum disc and disc osteophyte. Severe bilateral foraminal stenosis. Adequate central canal.      Impression    IMPRESSION:  1.  Negative for lumbar fractures.    2.  Moderate to severe, multilevel lumbar spondylosis.   CTA Chest with Contrast    Narrative    EXAM: CTA CHEST WITH CONTRAST  LOCATION: [facility]  DATE: 7/31/2024    INDICATION: Trauma, eval for PE, aortic dissection  COMPARISON: CT chest 6/20/2024, CTA chest 5/12/2024  TECHNIQUE: Helical acquisition through the chest was performed during the arterial phase of contrast enhancement. 2D and 3D reconstructions performed by the CT technologist. Dose reduction techniques were used.  CONTRAST: isovue 370 90ml    CT ANGIOGRAM CHEST: No mediastinal or aortic hematoma. Aorta is normal caliber without dissection or aneurysm. Great vessels are patent. Mild diffuse arterial calcifications. Descending thoracic aorta is ectatic. Celiac, SMA and single bilateral renal   arteries are patent.    No pulmonary emboli.    LUNGS AND PLEURA: No hydropneumothorax. Irregular pleural-based lobulated opacity in the right lower lobe has decreased in size and now measures 1.7 x 1.4 cm. Mild dependent density. No airway lesions.    MEDIASTINUM/AXILLAE: No adenopathy.    CORONARY ARTERY CALCIFICATION: Mild.    UPPER ABDOMEN: Gastric band. Diffuse fatty infiltration of the pancreas. There is a 10 x 7 mm hypodense lesion posteriorly along the mid body the pancreas. 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    MUSCULOSKELETAL: No acute fractures or soft tissue hematomas. Old fracture deformity of the posterior right 11th rib at the costovertebral junction. Advanced degenerative changes in  the upper lumbar spine. Bilateral, subpectoral breast implants with a   right mastectomy.      Impression    IMPRESSION:  1.  No evidence of acute traumatic injury to the chest.  2.  Specifically, no mediastinal hematoma, aortic dissection or hydropneumothorax.  3.  Lobulated pleural-based opacity laterally in the right lower lobe is decreasing in size consistent with post inflammatory change. As noted before, suggest a follow-up in end of September 2024 which would be three months since one was suggested.  4.  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 Follow-up recommendations given below.   5.  Other numerous noncritical findings as noted above.    REFERENCE:  Revisions of international consensus [place] guidelines for the management of IPMN of the pancreas. Pancreatology 2017;17(5):738-753.    Largest cyst between 10-20 mm: CT or MRI/MRCP every 6 months for 1 year and then yearly for 2 years and then every 2 years if no change.      CT Thoracic Spine Reconstructed    Narrative    EXAM: CT THORACIC SPINE RECONSTRUCTED  LOCATION: [facility]  DATE: 7/31/2024    INDICATION: eval fracture, trauma. Injury. Pain.  COMPARISON: 20 June 2024 chest CT.  TECHNIQUE: Routine CT Thoracic Spine without IV contrast. Multiplanar reformats. Dose reduction techniques were used.     FINDINGS:  VERTEBRA: T9 upper endplate compression with about 20% height loss. Completely healed. No bone retropulsion.    T11 upper endplate fracture with sclerotic margins. 2.5 mm retropulsion of the upper posterior cortex. Completely healed.    No acute or subacute fractures. Stable appearance when compared with the previous chest CT. No acute fracture or posttraumatic subluxation.     Upper thoracic levoconvex curvature centered at T2-3. Mild lower lumbar dextroconvex curvature centered at T8-9.    CANAL/FORAMINA: Disc osteophyte complexes centrally at T5-6, T6-7 and T10-11. Mild central canal stenosis at  T10-11. Otherwise, patent central canal.    Multilevel foraminal stenosis due to facet hypertrophic change most severe at T7-8 and T8-9.    PARASPINAL: No extraspinal abnormality.      Impression    IMPRESSION:  1.  Chronic thoracic fractures    2.  No acute fractures    3.  Thoracic scoliosis and multilevel spondylosis.     US Carotid Bilateral    Narrative    EXAM: US CAROTID BILATERAL  LOCATION: [facility]  DATE: 7/31/2024    INDICATION: syncope, hx of TIA  COMPARISON: None.  TECHNIQUE: Duplex exam performed utilizing 2D gray-scale imaging, Doppler interrogation with color-flow and spectral waveform analysis. The percent diameter stenosis is determined using Updated Recommendations for Carotid Stenosis Interpretation Criteria   from [employer] [facility].    FINDINGS:    RIGHT: Mild plaque at the bifurcation. The peak systolic velocity in the ICA is less than 180 cm/sec, consistent with less than 50% stenosis. Normal velocities in the ECA. Antegrade flow within the vertebral artery.     LEFT: Mild plaque at the bifurcation. The peak systolic velocity in the ICA is less than 180 cm/sec, consistent with less than 50% stenosis. Normal velocities in the ECA. Antegrade flow within the vertebral artery.    VELOCITY CHART:  CCA   Right: 105 cm/s   Left: 90 cm/s  ICA   Right: 91 cm/s   Left: 80 cm/s  ECA   Right: 129 cm/s   Left: 78 cm/s  ICA/CCA PSV Ratio   Right: 0.9   Left: 0.9      Impression    IMPRESSION:  1.  Mild plaque formation, velocities consistent with less than 50% stenosis in the right internal carotid artery.  2.  Mild plaque formation, velocities consistent with less than 50% stenosis in the left internal carotid artery.  3.  Flow within the vertebral arteries is antegrade.

## 2024-08-01 NOTE — PLAN OF CARE
Problem: Adult Inpatient Plan of Care  Goal: Absence of Hospital-Acquired Illness or Injury  Outcome: Progressing  Intervention: Identify and Manage Fall Risk  Recent Flowsheet Documentation  Taken 7/31/2024 2300 by Magdy Nelson RN  Safety Promotion/Fall Prevention: assistive device/personal items within reach  Taken 7/31/2024 2100 by Magdy Nelson RN  Safety Promotion/Fall Prevention: assistive device/personal items within reach  Intervention: Prevent Skin Injury  Recent Flowsheet Documentation  Taken 8/1/2024 0130 by Magdy Nelson RN  Body Position:   position changed independently   supine, legs elevated   supine, head elevated  Taken 7/31/2024 2123 by Magdy Nelson RN  Body Position: position changed independently  Intervention: Prevent and Manage VTE (Venous Thromboembolism) Risk  Recent Flowsheet Documentation  Taken 7/31/2024 2300 by Magdy Nelson RN  VTE Prevention/Management: SCDs off (sequential compression devices)  Taken 7/31/2024 2100 by Magdy Nelson RN  VTE Prevention/Management: SCDs off (sequential compression devices)  Intervention: Prevent Infection  Recent Flowsheet Documentation  Taken 7/31/2024 2300 by Magdy Nelson RN  Infection Prevention:   hand hygiene promoted   rest/sleep promoted   single patient room provided  Taken 7/31/2024 2100 by Magdy Nelson RN  Infection Prevention:   hand hygiene promoted   rest/sleep promoted   single patient room provided     Problem: Pain Acute  Goal: Optimal Pain Control and Function  Outcome: Progressing  Intervention: Develop Pain Management Plan  Recent Flowsheet Documentation  Taken 7/31/2024 2123 by Magdy Nelson RN  Pain Management Interventions: medication (see MAR)  Intervention: Prevent or Manage Pain  Recent Flowsheet Documentation  Taken 7/31/2024 2300 by Magdy Nelson RN  Medication Review/Management: medications reviewed  Taken 7/31/2024 2100 by Magdy Nelson RN  Medication Review/Management: medications reviewed   Goal Outcome  Evaluation:  VSS on RA, A&Ox4, pain as high as 8/10 given oxycodone and tylenol (see MAR) with relief, MD ordered higher dose Q4H for better coverage of pain. Pain in legs with activity. Bruises to lip, BUE forearms and hands, and R knee. R knee swollen and tender. Edema +1/+2 throughout BLE. K protocol, last result 3.2.  and 104. Ax1 pivot to commode. Will continue with plan of care.

## 2024-08-01 NOTE — PROGRESS NOTES
"   08/01/24 1410   Appointment Info   Signing Clinician's Name / Credentials (OT) Mary Reyes,OTR/L   Living Environment   People in Home spouse   Current Living Arrangements house   Transportation Anticipated family or friend will provide   Living Environment Comments 1 level home   Self-Care   Current Activity Tolerance moderate   Equipment Currently Used at Home cane, straight;wheelchair, manual;walker, rolling  (uses cane)   Fall history within last six months yes   Number of times patient has fallen within last six months 1   Activity/Exercise/Self-Care Comment reports spouse is being seen by medical professional for SOB, spouse usually \"strong\"   Instrumental Activities of Daily Living (IADL)   IADL Comments IND ADLs, IADLs, drives close to home only   General Information   Onset of Illness/Injury or Date of Surgery 07/31/24   Patient/Family Therapy Goal Statement (OT) home   Additional Occupational Profile Info/Pertinent History of Current Problem Mirna Grijalva is a 74 year old female with PMHx of HFpEF, hypothyroidism, HTN, CARINE, AFX/TIA who was admitted on 7/31/2024 for syncope and collapse and hypokalemia.   Existing Precautions/Restrictions fall   Cognitive Status Examination   Orientation Status person;place  (time NT)   Affect/Mental Status (Cognitive)   (frustration w/ lab values (potassium))   Follows Commands WFL   Cognitive Status Comments pt able to answer all Q's, aware of why she is in hospital and can give PLOF, appears flustered by lab values, brusing on body and pain,   Visual Perception   Visual Impairment/Limitations corrective lenses full-time   Pain Assessment   Patient Currently in Pain Yes, see Vital Sign flowsheet   Range of Motion Comprehensive   General Range of Motion no range of motion deficits identified   Strength Comprehensive (MMT)   General Manual Muscle Testing (MMT) Assessment no strength deficits identified   Coordination   Upper Extremity Coordination No deficits were " identified   Bed Mobility   Bed Mobility supine-sit;sit-supine   Supine-Sit [place] (Bed Mobility) supervision   Sit-Supine [place] (Bed Mobility) supervision   Transfers   Transfers toilet transfer;bed-chair transfer   Transfer Skill: Bed to Chair/Chair to Bed   Bed-Chair [place] (Transfers) supervision   Toilet Transfer   Type (Toilet Transfer) sit-stand;stand-sit   [place] Level (Toilet Transfer) supervision   Balance   Balance Assessment standing dynamic balance   Balance Comments WFL   Clinical Impression   Criteria for Skilled Therapeutic Interventions Met (OT) Yes, treatment indicated   OT Diagnosis decreased ADLs   OT Problem List-Impairments impacting ADL problems related to;pain;mobility   Assessment of Occupational Performance 1-3 Performance Deficits   Identified Performance Deficits toileting,g/h, trsfs, bed mobility   Planned Therapy Interventions (OT) ADL retraining;bed mobility training;transfer training   Clinical Decision Making Complexity (OT) problem focused assessment/low complexity   Risk & Benefits of therapy have been explained evaluation/treatment results reviewed;care plan/treatment goals reviewed;patient;participants included;participants voiced agreement with care plan   OT Total Evaluation Time   OT Eval, Low Complexity Minutes (04550) 10   OT Goals   Therapy Frequency (OT) One time eval and treatment   OT Predicted Duration/Target Date for Goal Attainment 08/01/24   OT Goals Hygiene/Grooming;Bed Mobility;Transfers;Toilet Transfer/Toileting   OT: Hygiene/Grooming supervision/stand-by assist;while standing   OT: Bed Mobility Supervision/stand-by assist;supine to/from sitting   OT: Transfer Supervision/stand-by assist;with assistive device   OT: Toilet Transfer/Toileting Supervision/stand-by assist;cleaning and garment management   Interventions   Interventions Quick Adds Self-Care/Home Management   Self-Care/Home Management   Self-Care/Home Mgmt/ADL, Compensatory, Meal  Prep Minutes (81002) 10   Symptoms Noted During/After Treatment (Meal Preparation/Planning Training) increased pain   Treatment Detail/Skilled Intervention Evaluation completed. Treatment initiated. Pt demo supine>sit w/ SBA. Pt c/o abd.and back pain. Moving slowly. Amb. to BR w/ cane SBA. Trsf bed<>toilet w/ cane w/ SBA w/ supervision for safety and observation of grab bar use. Reviewed BR set up at home to determine level of safety. While at EOB reviewed bed mobility techniques w/ abd./back pain. Pt able to demo partially how to logroll w/ SBA w/ cues. Left in supine w/ call light.   [place] Level (Grooming Training) stand-by assist   Assistance (Grooming Training) set-up required;supervision;verbal cues   [place] Level (Toilet Training) stand-by assist   Assistance (Toilet Training) set-up required;supervision;verbal cues;1 person assist   Toilet Training Assistance - Assistive Device wall grab bar   OT Discharge Planning   OT Plan d/c OT   OT Discharge Recommendation (DC Rec) home with assist  (may need assist w/ shopping and driving initially)   OT Rationale for DC Rec Ax1 for ADLs and transfers. Anticipate will be able to d/c home w/ when medically stable.Pt reports moving better than a few days ago. May need assist w/ shopping and driving initially.  D/C OT.   OT Brief overview of current status SBA trsfs, toileting,g/h, bed mobility   Total Session Time   Timed Code Treatment Minutes 10   Total Session Time (sum of timed and untimed services) 20

## 2024-08-01 NOTE — PROGRESS NOTES
"   08/01/24 4520   Appointment Info   Signing Clinician's Name / Credentials (PT) Evita Ontiveros, PT, DPT   Living Environment   People in Home spouse   Current Living Arrangements house   Home Accessibility no concerns   Self-Care   Equipment Currently Used at Home cane, straight  (has a walker but does not use)   Fall history within last six months yes   Number of times patient has fallen within last six months 1   Activity/Exercise/Self-Care Comment Pt independent with ADLs.   General Information   Onset of Illness/Injury or Date of Surgery 07/31/24   Referring Physician Nia Tyler PA-C   Patient/Family Therapy Goals Statement (PT) None stated.   Pertinent History of Current Problem (include personal factors and/or comorbidities that impact the POC) Per H&P: \"74 year old female with PMHx of HFpEF, hypothyroidism, HTN, CARINE, AFX/TIA who was admitted on 7/31/2024 for syncope and collapse and hypokalemia. \"   Existing Precautions/Restrictions fall   Range of Motion (ROM)   Range of Motion ROM is WFL   Strength (Manual Muscle Testing)   Strength (Manual Muscle Testing) Deficits observed during functional mobility   Bed Mobility   Bed Mobility supine-sit;sit-supine   Supine-Sit Taney (Bed Mobility) minimum assist (75% patient effort);verbal cues   Sit-Supine Taney (Bed Mobility) minimum assist (75% patient effort);verbal cues   Impairments Contributing to Impaired Bed Mobility impaired balance;decreased strength;pain   Assistive Device (Bed Mobility) bed rails   Transfers   Transfers sit-stand transfer   Transfer Safety Concerns Noted decreased weight-shifting ability   Impairments Contributing to Impaired Transfers impaired balance;decreased strength   Sit-Stand Transfer   Sit-Stand Taney (Transfers) contact guard   Assistive Device (Sit-Stand Transfers) cane, straight   Gait/Stairs (Locomotion)   Taney Level (Gait) contact guard;verbal cues   Assistive Device (Gait) cane, straight " "  Distance in Feet (Gait) 20'   Pattern (Gait) step-to   Deviations/Abnormal Patterns (Gait) aileen decreased;gait speed decreased   Clinical Impression   Criteria for Skilled Therapeutic Intervention Yes, treatment indicated   PT Diagnosis (PT) impaired functional mobility   Influenced by the following impairments decreased strength, impaired balance, pain   Functional limitations due to impairments transfers, ambulation   Clinical Presentation (PT Evaluation Complexity) evolving   Clinical Presentation Rationale Clinical judgment.   Clinical Decision Making (Complexity) moderate complexity   Planned Therapy Interventions (PT) balance training;bed mobility training;gait training;joint mobilization;neuromuscular re-education;patient/family education;strengthening;transfer training   Risk & Benefits of therapy have been explained evaluation/treatment results reviewed;participants voiced agreement with care plan;participants included;patient   PT Total Evaluation Time   PT Eval, Moderate Complexity Minutes (82520) 10   Physical Therapy Goals   PT Frequency Daily   PT Predicted Duration/Target Date for Goal Attainment 08/08/24   PT Goals Bed Mobility;Transfers;Gait   PT: Bed Mobility Independent;Supine to/from sit   PT: Transfers Supervision/stand-by assist;Sit to/from stand   PT: Gait Supervision/stand-by assist;Assistive device;Rolling walker;150 feet   Interventions   Interventions Quick Adds Therapeutic Activity   Therapeutic Activity   Therapeutic Activities: dynamic activities to improve functional performance Minutes (44514) 10   Symptoms Noted During/After Treatment Increased pain   Treatment Detail/Skilled Intervention Patient self limits ambulation distance secondary to pain. Patient reports pain \"all over\" from fall. Increased time needed throughout session to explain role of therapy and purpose of mobility. Patient ambulates additionaol 20'. Requires assist for LEs getting into and out of bed. Pt supine in " bed at end of session, call light in reach, chair alarm engaged.   PT Discharge Planning   PT Plan gait with cane, all transfers   PT Discharge Recommendation (DC Rec) home with assist;home with home care physical therapy   PT Rationale for DC Rec Patient's mobility limited by pain, however patient reports spouse will be able to assist her at home. If spouse is able to assist patient with IADLs, anticipate patient will be safe to discharge home. Recommend home PT for continued strengthening. If spouse is unable to assist patient, recommend discharge to TCU.   PT Brief overview of current status Supine <> sit, min assist of 1. Sit <> stand, CGA. Ambulates 40' with cane, min assist of 1.   Total Session Time   Timed Code Treatment Minutes 10   Total Session Time (sum of timed and untimed services) 20

## 2024-08-01 NOTE — PLAN OF CARE
Occupational Therapy Discharge Summary    Reason for therapy discharge:    All goals and outcomes met, no further needs identified.    Progress towards therapy goal(s). See goals on Care Plan in [facility] electronic health record for goal details.  Goals met    Therapy recommendation(s):    No further therapy is recommended.    Goal Outcome Evaluation:         Mary Reyes, OTR/SHAWNEE  8/1/2024

## 2024-08-01 NOTE — PROGRESS NOTES
Patient alert and oriented.  States they have pain all over from falling.  Pain is tolerable with PRN oxycodone and tylenol.  Up with assist of GB and cane for transfers.

## 2024-08-02 ENCOUNTER — APPOINTMENT (OUTPATIENT)
Dept: MRI IMAGING | Facility: HOSPITAL | Age: 74
DRG: 640 | End: 2024-08-02
Attending: INTERNAL MEDICINE
Payer: COMMERCIAL

## 2024-08-02 ENCOUNTER — ORDERS ONLY (AUTO-RELEASED) (OUTPATIENT)
Dept: EMERGENCY MEDICINE | Facility: HOSPITAL | Age: 74
End: 2024-08-02
Payer: COMMERCIAL

## 2024-08-02 ENCOUNTER — TELEPHONE (OUTPATIENT)
Dept: FAMILY MEDICINE | Facility: CLINIC | Age: 74
End: 2024-08-02
Payer: COMMERCIAL

## 2024-08-02 VITALS
TEMPERATURE: 97.8 F | HEIGHT: 64 IN | BODY MASS INDEX: 34.83 KG/M2 | DIASTOLIC BLOOD PRESSURE: 79 MMHG | WEIGHT: 204 LBS | OXYGEN SATURATION: 93 % | SYSTOLIC BLOOD PRESSURE: 132 MMHG | HEART RATE: 90 BPM | RESPIRATION RATE: 31 BRPM

## 2024-08-02 DIAGNOSIS — R55 SYNCOPE AND COLLAPSE: ICD-10-CM

## 2024-08-02 LAB
ANION GAP SERPL CALCULATED.3IONS-SCNC: 12 MMOL/L (ref 7–15)
BUN SERPL-MCNC: 9.6 MG/DL (ref 8–23)
CALCIUM SERPL-MCNC: 8.5 MG/DL (ref 8.8–10.4)
CHLORIDE SERPL-SCNC: 98 MMOL/L (ref 98–107)
CREAT SERPL-MCNC: 0.62 MG/DL (ref 0.51–0.95)
EGFRCR SERPLBLD CKD-EPI 2021: >90 ML/MIN/1.73M2
GLUCOSE SERPL-MCNC: 108 MG/DL (ref 70–99)
HCO3 SERPL-SCNC: 31 MMOL/L (ref 22–29)
HGB BLD-MCNC: 12.6 G/DL (ref 11.7–15.7)
PHOSPHATE SERPL-MCNC: 3.5 MG/DL (ref 2.5–4.5)
POTASSIUM SERPL-SCNC: 3.3 MMOL/L (ref 3.4–5.3)
POTASSIUM SERPL-SCNC: 3.5 MMOL/L (ref 3.4–5.3)
SODIUM SERPL-SCNC: 141 MMOL/L (ref 135–145)

## 2024-08-02 PROCEDURE — 85018 HEMOGLOBIN: CPT | Performed by: STUDENT IN AN ORGANIZED HEALTH CARE EDUCATION/TRAINING PROGRAM

## 2024-08-02 PROCEDURE — 75561 CARDIAC MRI FOR MORPH W/DYE: CPT

## 2024-08-02 PROCEDURE — 250N000013 HC RX MED GY IP 250 OP 250 PS 637: Performed by: INTERNAL MEDICINE

## 2024-08-02 PROCEDURE — 36415 COLL VENOUS BLD VENIPUNCTURE: CPT | Performed by: STUDENT IN AN ORGANIZED HEALTH CARE EDUCATION/TRAINING PROGRAM

## 2024-08-02 PROCEDURE — 75561 CARDIAC MRI FOR MORPH W/DYE: CPT | Mod: 26 | Performed by: INTERNAL MEDICINE

## 2024-08-02 PROCEDURE — 250N000013 HC RX MED GY IP 250 OP 250 PS 637

## 2024-08-02 PROCEDURE — 84132 ASSAY OF SERUM POTASSIUM: CPT | Performed by: STUDENT IN AN ORGANIZED HEALTH CARE EDUCATION/TRAINING PROGRAM

## 2024-08-02 PROCEDURE — 999N000122 MR MYOCARDIUM  OVERREAD

## 2024-08-02 PROCEDURE — A9585 GADOBUTROL INJECTION: HCPCS | Performed by: STUDENT IN AN ORGANIZED HEALTH CARE EDUCATION/TRAINING PROGRAM

## 2024-08-02 PROCEDURE — 250N000013 HC RX MED GY IP 250 OP 250 PS 637: Performed by: STUDENT IN AN ORGANIZED HEALTH CARE EDUCATION/TRAINING PROGRAM

## 2024-08-02 PROCEDURE — 84100 ASSAY OF PHOSPHORUS: CPT | Performed by: STUDENT IN AN ORGANIZED HEALTH CARE EDUCATION/TRAINING PROGRAM

## 2024-08-02 PROCEDURE — 250N000013 HC RX MED GY IP 250 OP 250 PS 637: Performed by: HOSPITALIST

## 2024-08-02 PROCEDURE — 255N000002 HC RX 255 OP 636: Performed by: STUDENT IN AN ORGANIZED HEALTH CARE EDUCATION/TRAINING PROGRAM

## 2024-08-02 PROCEDURE — 80048 BASIC METABOLIC PNL TOTAL CA: CPT | Performed by: STUDENT IN AN ORGANIZED HEALTH CARE EDUCATION/TRAINING PROGRAM

## 2024-08-02 PROCEDURE — 99239 HOSP IP/OBS DSCHRG MGMT >30: CPT | Performed by: STUDENT IN AN ORGANIZED HEALTH CARE EDUCATION/TRAINING PROGRAM

## 2024-08-02 RX ORDER — SPIRONOLACTONE 25 MG/1
12.5 TABLET ORAL
Qty: 30 TABLET | Refills: 1 | Status: SHIPPED | OUTPATIENT
Start: 2024-08-02 | End: 2024-08-15

## 2024-08-02 RX ORDER — ACETAMINOPHEN 325 MG/1
650 TABLET ORAL EVERY 4 HOURS PRN
Status: DISCONTINUED | OUTPATIENT
Start: 2024-08-02 | End: 2024-08-02 | Stop reason: HOSPADM

## 2024-08-02 RX ORDER — ACETAMINOPHEN 650 MG/1
650 SUPPOSITORY RECTAL EVERY 4 HOURS PRN
Status: DISCONTINUED | OUTPATIENT
Start: 2024-08-02 | End: 2024-08-02 | Stop reason: HOSPADM

## 2024-08-02 RX ORDER — OXYCODONE HYDROCHLORIDE 5 MG/1
5 TABLET ORAL EVERY 4 HOURS PRN
Qty: 16 TABLET | Refills: 0 | Status: SHIPPED | OUTPATIENT
Start: 2024-08-02 | End: 2024-09-14

## 2024-08-02 RX ORDER — POTASSIUM CHLORIDE 1500 MG/1
20 TABLET, EXTENDED RELEASE ORAL ONCE
Status: COMPLETED | OUTPATIENT
Start: 2024-08-02 | End: 2024-08-02

## 2024-08-02 RX ORDER — GADOBUTROL 604.72 MG/ML
15 INJECTION INTRAVENOUS ONCE
Status: COMPLETED | OUTPATIENT
Start: 2024-08-02 | End: 2024-08-02

## 2024-08-02 RX ORDER — POLYETHYLENE GLYCOL 3350 17 G/17G
17 POWDER, FOR SOLUTION ORAL DAILY
Qty: 510 G | Refills: 0 | Status: SHIPPED | OUTPATIENT
Start: 2024-08-02 | End: 2024-08-21

## 2024-08-02 RX ORDER — POTASSIUM CHLORIDE 1500 MG/1
40 TABLET, EXTENDED RELEASE ORAL ONCE
Status: COMPLETED | OUTPATIENT
Start: 2024-08-02 | End: 2024-08-02

## 2024-08-02 RX ORDER — SERTRALINE HYDROCHLORIDE 25 MG/1
25 TABLET, FILM COATED ORAL DAILY
Qty: 90 TABLET | Refills: 0 | Status: SHIPPED | OUTPATIENT
Start: 2024-08-16

## 2024-08-02 RX ADMIN — METOPROLOL TARTRATE 25 MG: 25 TABLET, FILM COATED ORAL at 09:15

## 2024-08-02 RX ADMIN — BUMETANIDE 2 MG: 2 TABLET ORAL at 09:15

## 2024-08-02 RX ADMIN — ASPIRIN 81 MG: 81 TABLET, COATED ORAL at 09:15

## 2024-08-02 RX ADMIN — LEVOTHYROXINE SODIUM 137 MCG: 137 TABLET ORAL at 09:16

## 2024-08-02 RX ADMIN — ACETAMINOPHEN 650 MG: 325 TABLET ORAL at 00:36

## 2024-08-02 RX ADMIN — SPIRONOLACTONE 12.5 MG: 25 TABLET, FILM COATED ORAL at 09:15

## 2024-08-02 RX ADMIN — OXYCODONE HYDROCHLORIDE 5 MG: 5 TABLET ORAL at 00:36

## 2024-08-02 RX ADMIN — EMPAGLIFLOZIN 10 MG: 10 TABLET, FILM COATED ORAL at 09:15

## 2024-08-02 RX ADMIN — POTASSIUM CHLORIDE 20 MEQ: 1500 TABLET, EXTENDED RELEASE ORAL at 15:11

## 2024-08-02 RX ADMIN — ACETAMINOPHEN 650 MG: 325 TABLET ORAL at 09:38

## 2024-08-02 RX ADMIN — GADOBUTROL 15 ML: 604.72 INJECTION INTRAVENOUS at 12:29

## 2024-08-02 RX ADMIN — POTASSIUM CHLORIDE 40 MEQ: 1500 TABLET, EXTENDED RELEASE ORAL at 09:32

## 2024-08-02 ASSESSMENT — ACTIVITIES OF DAILY LIVING (ADL)
ADLS_ACUITY_SCORE: 40
ADLS_ACUITY_SCORE: 41
DEPENDENT_IADLS:: INDEPENDENT
ADLS_ACUITY_SCORE: 40
ADLS_ACUITY_SCORE: 40
ADLS_ACUITY_SCORE: 41
ADLS_ACUITY_SCORE: 40
ADLS_ACUITY_SCORE: 41
ADLS_ACUITY_SCORE: 41
ADLS_ACUITY_SCORE: 40
ADLS_ACUITY_SCORE: 40
ADLS_ACUITY_SCORE: 41
ADLS_ACUITY_SCORE: 41

## 2024-08-02 NOTE — PLAN OF CARE
"  Problem: Adult Inpatient Plan of Care  Goal: Plan of Care Review  Description: The Plan of Care Review/Shift note should be completed every shift.  The Outcome Evaluation is a brief statement about your assessment that the patient is improving, declining, or no change.  This information will be displayed automatically on your shift  note.  Outcome: Progressing  Goal: Patient-Specific Goal (Individualized)  Description: You can add care plan individualizations to a care plan. Examples of Individualization might be:  \"Parent requests to be called daily at 9am for status\", \"I have a hard time hearing out of my right ear\", or \"Do not touch me to wake me up as it startles  me\".  Outcome: Progressing  Goal: Absence of Hospital-Acquired Illness or Injury  Outcome: Progressing  Goal: Optimal Comfort and Wellbeing  Outcome: Progressing  Goal: Readiness for Transition of Care  Outcome: Progressing     Problem: Fall Injury Risk  Goal: Absence of Fall and Fall-Related Injury  Outcome: Progressing   Goal Outcome Evaluation:    Patient is alert and oriented and able to make needs known.  Patient complains of 7/10 pain \"all over\" but especially her chest, as she landed on chest when she fell.  Gave acetaminophen and oxycodone (see MAR) and patient fell asleep shortly after.  Patient is on tele with NS w/BBB and inverted p-waves.  Patient to have cardiac MRI at 11:30am.    /71 (BP Location: Left arm, Patient Position: Semi-Oliver's, Cuff Size: Adult Regular)   Pulse 85   Temp 97.7  F (36.5  C) (Oral)   Resp 18   Ht 1.626 m (5' 4\")   Wt 92.5 kg (204 lb)   LMP  (LMP Unknown)   SpO2 93%   BMI 35.02 kg/m      April N RUPINDER Hicks        "

## 2024-08-02 NOTE — DISCHARGE SUMMARY
[facility]    Hospitalist Discharge Summary       Date of Admission:  7/31/2024  Date of Discharge:  8/2/2024  Discharging Provider: Jennifer Noble MD      Discharge Diagnoses   Syncope and Collapse   Hypokalemia  Acute drop in hemoglobin    HFpEF  Hemochromatosis  Mood disorder  Hypothyroidism  HLD    Incidental findings:  Lobulated pleural-based opacity right lower lobe  Cystic pancreatic lesion    Follow-ups Needed After Discharge   Follow-up Appointments     Follow-up and recommended labs and tests       Follow up with primary care provider, Ambar Hernandez, within 3-4 days to   evaluate medication change and for hospital follow- up.  The following   labs/tests are recommended: BMP - Potassium, Magnesium, Phos, Hb.    Qtc - Zoloft on hold, pending Cardiac MRI results  Follow up on Right lower lobe pleural opacity and pancreatic lesion    Follow up with Cardiology,  within 2 weeks. outpatient stress   test and Zio patch ordered    Call our scheduling line at [phone] to make an   appointment, if you do not already have one scheduled            Unresulted Labs Ordered in the Past 30 Days of this Admission       No orders found from 7/1/2024 to 8/1/2024.            Hospital Course   Mirna Grijalva is a 74 year old female with PMHx of HFpEF, hypothyroidism, HTN, CARINE, AFX/TIA who was admitted on 7/31/2024 for syncope and collapse and hypokalemia.      Syncope and Collapse   -- Suspect secondary to orthostatic hypotension vs cardiac etiology  -- Had hypokalemia, orthostatic vitals positive  -- Trauma Workup negative  -- EKG RBBB, long QT (520)  -- US carotid shows less than 50% stenosis bilaterally  -- Echo shows EF 65%, no significant valvular heart disease.  Normal RVSP  -- Was on telemonitoring   -- Seen by cardiology, recommend adding Aldactone to Bumex for hypokalemia and HFpEF  --Cannot rule out arrhythmia as patient has RBBB with QT prolongation, will need outpatient Zio  patch  --Outpatient pharmacological stress nuclear  --Patient advised not to change positions too rapidly  --Follow up with Cardiology oupatient     Hypokalemia -repleted  -- presented with 2.6  -- repleted, added Aldactone with Bumex  -- Follow up with PCP    Acute drop in hemoglobin  Hb 14.6 -> 12.6  Likely due to bruising around the right knee joint and wide spread bruising  Follow up hemoglobin with PCP     Hypophosphatemia - resolved     Right Knee Pain   Acute on Chronic Back Pain   Exacerbated after fall   -- Imaging negative for acute fractures   --Pain management with Tylenol, oxycodone and Miralax prn     HFpEF    Does not appear decompensated; BNP within normal limits   -- Cont PTA metoprolol, Jardiance   -- Resume Bumex, add Aldactone for hypokalemia and HFpEF     Hemochromatosis  Cardiac MRI result pending     Mood -- HOLDing PTA sertraline for now . Follow up with PCP for Qtc prior to resuming     Hypothyroidism   PTA levothyroxine      Hyperlipidemia  PTA statin, ASA     Incidental Findings on Imaging   -- Lobulated pleural-based opacity laterally in the right lower lobe is decreasing in size consistent with post inflammatory change. Suggest a follow-up in end of September 2024  -- There is a 10 x 7 mm cystic pancreatic lesion. Needs follow-up and monitoring     PT recommend Home with Home PT    Consultations This Hospital Stay   PHYSICAL THERAPY ADULT IP CONSULT  OCCUPATIONAL THERAPY ADULT IP CONSULT  CARDIOLOGY IP CONSULT    Code Status   Full Code    Time Spent on this Encounter   Jennifer CHO, personally saw the patient today and spent approximately 35 minutes discharging this patient.       Jennifer Noble MD  [facility]  ______________________________________________________________________    Physical Exam   Vital Signs: Temp: 97.8  F (36.6  C) Temp src: Oral BP: 132/79 Pulse: 90   Resp: (!) 31 SpO2: 93 % O2 Device: None (Room air)    Weight: 204 lbs 0  oz    Physical Exam      Constitutional: awake, alert, no apparent distress, and appears stated age  Respiratory: No increased work of breathing, no accessory muscle use, clear to auscultation bilaterally, no crackles or wheezing  Cardiovascular: Regular rate and rhythm, normal S1 and S2  Skin: Scattered ecchymosis and edema noted to right upper lateral lip, bilateral hands and forearms and right knee.   Musculoskeletal: no lower extremity pitting edema present. 2+ DP pulses  Neurologic: Awake, alert.   Neuropsychiatric: Appropriate mood, affect and eye contact. Cooperative    Primary Care Physician   Ambar Hernandez    Discharge Disposition   Discharged to home  Condition at discharge: Stable    Significant Results and Procedures   Most Recent 3 CBC's:  Recent Labs   Lab Test 08/02/24  0920 07/31/24  1352 07/15/24  1652 05/28/24  0936   WBC  --  9.3 9.5 8.5   HGB 12.6 14.6 15.1 15.3   MCV  --  101* 102* 100   PLT  --  183 206 229     Most Recent 3 BMP's:  Recent Labs   Lab Test 08/02/24  1334 08/02/24  0744 08/01/24  1716 08/01/24  1312 08/01/24  0909 07/31/24  1615 07/31/24  1352   NA  --  141  --   --  137  --  139   POTASSIUM 3.5 3.3*  --  3.7 3.3*   < > 2.6*   CHLORIDE  --  98  --   --  96*  --  91*   CO2  --  31*  --   --  30*  --  32*   BUN  --  9.6  --   --  10.1  --  13.6   CR  --  0.62  --   --  0.57  --  0.65   ANIONGAP  --  12  --   --  11  --  16*   SAMANTHA  --  8.5*  --   --  8.6*  --  9.2   GLC  --  108* 123*  --  167*   < > 169*    < > = values in this interval not displayed.     Most Recent 2 LFT's:  Recent Labs   Lab Test 07/27/24  0917 04/27/24  1218   AST 40 26   ALT 49 32   ALKPHOS 113 93   BILITOTAL 0.7 0.8     Most Recent 3 INR's:  Recent Labs   Lab Test 04/27/24  1218   INR 0.94     Most Recent 3 Troponin's:No lab results found.  Most Recent 3 BNP's:  Recent Labs   Lab Test 07/31/24  1352 05/12/24  1025   NTBNPI 339 1,678     Most Recent D-dimer:No lab results found.  Most Recent Cholesterol  Panel:  Recent Labs   Lab Test 04/27/24  1218   CHOL 259*   *   HDL 75   TRIG 118       Results for orders placed or performed during the hospital encounter of 07/31/24   CT Head w/o Contrast    Narrative    EXAM: CT HEAD W/O CONTRAST  LOCATION: [facility]  DATE: 7/31/2024    INDICATION: trauma on plavix eval bleed. Injury. Pain.  COMPARISON: CT brain March 1, 2024. MRI brain 27 April 2024.  TECHNIQUE: Routine CT Head without IV contrast. Multiplanar reformats. Dose reduction techniques were used.    FINDINGS:  INTRACRANIAL CONTENTS: No intracranial hemorrhage, extraaxial collection, or mass effect.  No CT evidence of acute infarct. Moderate presumed chronic small vessel ischemic changes. Moderate generalized volume loss. No hydrocephalus. Dystrophic   calcification in the subependymal area of the posterior left lateral ventricle. No change.     VISUALIZED ORBITS/SINUSES/MASTOIDS: Prior left cataract surgery. Visualized portions of the orbits are otherwise unremarkable. No paranasal sinus mucosal disease. No middle ear or mastoid effusion.    BONES/SOFT TISSUES: No acute abnormality.      Impression    IMPRESSION:  1.  No CT evidence for acute intracranial process.  2.  Brain atrophy and presumed chronic microvascular ischemic changes as above.   CT Cervical Spine w/o Contrast    Narrative    EXAM: CT CERVICAL SPINE W/O CONTRAST  LOCATION: [facility]  DATE: 7/31/2024    INDICATION: trauma, extension mechanism, eval for fracture. Pain.  COMPARISON: None.  TECHNIQUE: Routine CT Cervical Spine without IV contrast. Multiplanar reformats. Dose reduction techniques were used.    FINDINGS:  Normal vertebral body heights. 1.5 to 2 mm stepwise degenerative anterolisthesis at C3-4 and C4-5. Reversal the normal cervical lordosis centered at C6. Prevertebral soft tissues unremarkable. No extraspinal abnormality.     Craniovertebral junction and C1-C2: Moderate  degenerative change. Patent central canal.    C2-C3: Facet arthropathy. Patent canal and foramen. Unremarkable disc.    C3-C4: Degenerative disc change. Facet DJD and uncinate spur. Severe left and moderate to severe right foraminal stenosis. Patent central canal.     C4-C5: Left facet arthropathy. Mild disc degenerative change. Patent central canal. Mild left foraminal stenosis.     C5-C6: Disc osteophyte. Facet and uncinate degenerative change. Moderate left and mild right foraminal stenosis. Patent central canal.     C6-C7: Disc osteophyte complex is broad-based. Mild central canal stenosis. Uncinate spur with moderate to severe left and moderate right foraminal stenosis.     C7-T1: Facet arthropathy and uncinate spur. Severe left and moderate right foraminal stenosis. Patent central canal. Disc osteophyte.       Impression    IMPRESSION:  1.  No fracture or posttraumatic subluxation.  2.  Multilevel cervical spondylosis.   XR Knee Right 1/2 Views    Narrative    EXAM: XR KNEE RIGHT 1/2 VIEWS, XR TIBIA AND FIBULA RIGHT 2 VIEWS  LOCATION: [facility]  DATE: 7/31/2024    INDICATION: Fall, knee pain  COMPARISON: None.      Impression    IMPRESSION:     There is diffuse osseous demineralization, which decreases radiographic sensitivity for nondisplaced fractures. No acute, displaced right knee or right tibia/fibular fracture is identified. There is moderate medial and mild patellofemoral compartment   knee degenerative arthrosis. No sizable knee joint effusion.   XR Tibia and Fibula Right 2 Views    Narrative    EXAM: XR KNEE RIGHT 1/2 VIEWS, XR TIBIA AND FIBULA RIGHT 2 VIEWS  LOCATION: [facility]  DATE: 7/31/2024    INDICATION: Fall, knee pain  COMPARISON: None.      Impression    IMPRESSION:     There is diffuse osseous demineralization, which decreases radiographic sensitivity for nondisplaced fractures. No acute, displaced right knee or right tibia/fibular fracture  is identified. There is moderate medial and mild patellofemoral compartment   knee degenerative arthrosis. No sizable knee joint effusion.   CT Lumbar Spine w/o Contrast    Narrative    EXAM: CT LUMBAR SPINE W/O CONTRAST  LOCATION: [facility]  DATE: 7/31/2024    INDICATION: eval fracture trauma. Injury. Pain.  COMPARISON: None.  TECHNIQUE: Routine CT Lumbar Spine without IV contrast. Multiplanar reformats. Dose reduction techniques were used.    FINDINGS:  Nomenclature is based on 5 lumbar type vertebral bodies. Negative for acute fracture or traumatic malalignment. No extraspinal soft tissue abnormality. Moderate bilateral SI joint arthropathy. Incompletely visualized catheter within the abdomen.    T11-12: Vacuum disc. Facet DJD. Severe left and moderate right foraminal stenosis. Patent central canal.    T12-L1: Severe disc degenerative changes and disc osteophyte. Ventral osteophyte. Mild central canal stenosis. Moderate right and mild left foraminal stenosis. Facet DJD.    L1-L2: Ankylosis across the disc space. 5.5 mm retrolisthesis of L1 on L2. Disc osteophyte. Mild central canal stenosis. Severe right and mild left foraminal stenosis. Facet DJD.    L2-L3: Vacuum disc and disc osteophyte. Mild central canal stenosis. Moderate to severe bilateral foraminal stenosis. Facet DJD.    L3-L4: Disc osteophyte. Facet DJD. Moderate to severe left and moderate right foraminal stenosis. Mild central canal stenosis.    L4-L5: Disc osteophyte. Moderate central canal stenosis. Facet DJD. Moderate bilateral lateral recess stenosis. Severe right and moderate to severe left foraminal stenosis.    L5-S1: Facet DJD, vacuum disc and disc osteophyte. Severe bilateral foraminal stenosis. Adequate central canal.      Impression    IMPRESSION:  1.  Negative for lumbar fractures.    2.  Moderate to severe, multilevel lumbar spondylosis.   CTA Chest with Contrast    Narrative    EXAM: CTA CHEST WITH  CONTRAST  LOCATION: [facility]  DATE: 7/31/2024    INDICATION: Trauma, eval for PE, aortic dissection  COMPARISON: CT chest 6/20/2024, CTA chest 5/12/2024  TECHNIQUE: Helical acquisition through the chest was performed during the arterial phase of contrast enhancement. 2D and 3D reconstructions performed by the CT technologist. Dose reduction techniques were used.  CONTRAST: isovue 370 90ml    CT ANGIOGRAM CHEST: No mediastinal or aortic hematoma. Aorta is normal caliber without dissection or aneurysm. Great vessels are patent. Mild diffuse arterial calcifications. Descending thoracic aorta is ectatic. Celiac, SMA and single bilateral renal   arteries are patent.    No pulmonary emboli.    LUNGS AND PLEURA: No hydropneumothorax. Irregular pleural-based lobulated opacity in the right lower lobe has decreased in size and now measures 1.7 x 1.4 cm. Mild dependent density. No airway lesions.    MEDIASTINUM/AXILLAE: No adenopathy.    CORONARY ARTERY CALCIFICATION: Mild.    UPPER ABDOMEN: Gastric band. Diffuse fatty infiltration of the pancreas. There is a 10 x 7 mm hypodense lesion posteriorly along the mid body the pancreas. 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    MUSCULOSKELETAL: No acute fractures or soft tissue hematomas. Old fracture deformity of the posterior right 11th rib at the costovertebral junction. Advanced degenerative changes in the upper lumbar spine. Bilateral, subpectoral breast implants with a   right mastectomy.      Impression    IMPRESSION:  1.  No evidence of acute traumatic injury to the chest.  2.  Specifically, no mediastinal hematoma, aortic dissection or hydropneumothorax.  3.  Lobulated pleural-based opacity laterally in the right lower lobe is decreasing in size consistent with post inflammatory change. As noted before, suggest a follow-up in end of September 2024 which would be three months since one was suggested.  4.  There is a 10 x 7 mm cystic  pancreatic lesion, likely an IUP. Follow-up recommendations given below.   5.  Other numerous noncritical findings as noted above.    REFERENCE:  Revisions of international consensus [place] guidelines for the management of IPMN of the pancreas. Pancreatology 2017;17(5):738-753.    Largest cyst between 10-20 mm: CT or MRI/MRCP every 6 months for 1 year and then yearly for 2 years and then every 2 years if no change.      CT Thoracic Spine Reconstructed    Narrative    EXAM: CT THORACIC SPINE RECONSTRUCTED  LOCATION: [facility]  DATE: 7/31/2024    INDICATION: eval fracture, trauma. Injury. Pain.  COMPARISON: 20 June 2024 chest CT.  TECHNIQUE: Routine CT Thoracic Spine without IV contrast. Multiplanar reformats. Dose reduction techniques were used.     FINDINGS:  VERTEBRA: T9 upper endplate compression with about 20% height loss. Completely healed. No bone retropulsion.    T11 upper endplate fracture with sclerotic margins. 2.5 mm retropulsion of the upper posterior cortex. Completely healed.    No acute or subacute fractures. Stable appearance when compared with the previous chest CT. No acute fracture or posttraumatic subluxation.     Upper thoracic levoconvex curvature centered at T2-3. Mild lower lumbar dextroconvex curvature centered at T8-9.    CANAL/FORAMINA: Disc osteophyte complexes centrally at T5-6, T6-7 and T10-11. Mild central canal stenosis at T10-11. Otherwise, patent central canal.    Multilevel foraminal stenosis due to facet hypertrophic change most severe at T7-8 and T8-9.    PARASPINAL: No extraspinal abnormality.      Impression    IMPRESSION:  1.  Chronic thoracic fractures    2.  No acute fractures    3.  Thoracic scoliosis and multilevel spondylosis.     US Carotid Bilateral    Narrative    EXAM: US CAROTID BILATERAL  LOCATION: [facility]  DATE: 7/31/2024    INDICATION: syncope, hx of TIA  COMPARISON: None.  TECHNIQUE: Duplex exam performed  utilizing 2D gray-scale imaging, Doppler interrogation with color-flow and spectral waveform analysis. The percent diameter stenosis is determined using Updated Recommendations for Carotid Stenosis Interpretation Criteria   from [employer].    FINDINGS:    RIGHT: Mild plaque at the bifurcation. The peak systolic velocity in the ICA is less than 180 cm/sec, consistent with less than 50% stenosis. Normal velocities in the ECA. Antegrade flow within the vertebral artery.     LEFT: Mild plaque at the bifurcation. The peak systolic velocity in the ICA is less than 180 cm/sec, consistent with less than 50% stenosis. Normal velocities in the ECA. Antegrade flow within the vertebral artery.    VELOCITY CHART:  CCA   Right: 105 cm/s   Left: 90 cm/s  ICA   Right: 91 cm/s   Left: 80 cm/s  ECA   Right: 129 cm/s   Left: 78 cm/s  ICA/CCA PSV Ratio   Right: 0.9   Left: 0.9      Impression    IMPRESSION:  1.  Mild plaque formation, velocities consistent with less than 50% stenosis in the right internal carotid artery.  2.  Mild plaque formation, velocities consistent with less than 50% stenosis in the left internal carotid artery.  3.  Flow within the vertebral arteries is antegrade.   MR Myocardium  Overread    Narrative    OVERREAD: DETAILED [facility] EXTRACARDIAC OVERREAD OF CARDIAC MRI   LOCATION: [facility]  DATE: 8/2/2024    INDICATION: Syncope. Chest pain.  COMPARISON: CTA chest 7/31/2024.    FINDINGS:      LIMITED CHEST: Breast prosthetics. Right lower lobe pulmonary nodule better seen on CT.    LIMITED MEDIASTINUM: Negative.    LIMITED UPPER ABDOMEN: Gastric band.      Impression    IMPRESSION:    1. Right lower lobe pulmonary nodule better seen on recent CT.    2. Please refer to cardiologist's dictation for the cardiac MRI report.     Echocardiogram Complete    Narrative    [id number]  [id number]  [id number]  [id number]^CK^[address]  [id number]     Name: LINDA YI  MRN: [id number]  : 1950  Study Date: 2024 11:08 AM  Age: 74 yrs  Gender: Female  Patient Location: [facility]  Reason For Study: Syncope  Ordering Physician: HERNANDO STOVER  Performed By: REBECA     BSA: 2.0 m2  Height: 64 in  Weight: 203 lb  HR: 84  BP: 151/81 mmHg  ______________________________________________________________________________  Procedure  Complete Portable Echo Adult.  ______________________________________________________________________________  Interpretation Summary     1. Normal left ventricular size and systolic performance with a visually  estimated ejection fraction of 65%.  2. No significant valvular heart disease is identified on this study.  3. Normal right ventricular size and systolic performance.  4. There is mild left atrial enlargement.     When compared to the prior real-time echocardiogram dated 12 May 2024, the  previously identified small pericardial effusion is no longer appreciated. The  findings are otherwise felt to be fairly similar on both examinations.  ______________________________________________________________________________  Left ventricle:  Normal left ventricular size and systolic performance with a visually  estimated ejection fraction of 65%. There is normal regional wall motion. Left  ventricular wall thickness is normal.     Assessment of LV Diastolic Function: The cumulative findings suggest impaired  diastolic filling [The septal e' velocity is > 7 cm/s & lateral e' velocity  is  < 10 cm/s. The average E/e' is >14. The TR velocity cannot be determined due  to insufficient tricuspid insufficiency signal. Left atrial volume index is  greater than 34 mL/mÂ ].     Assessment of left atrial pressure (LAP): The cumulative findings suggest  moderately elevated left atrial pressure (the E/A is > 0.8 and <2.0 plus 2 or  3 of 3 of the following present: Average E/e' > 14, TRvel > 2.8 m/s, and/or LA  vol. index > 34 ml/mÂ  ).      Right ventricle:  Normal right ventricular size and systolic performance.     Left atrium:  There is mild left atrial enlargement.     Right atrium:  There is borderline right atrial enlargement.     IVC:  The IVC is of normal caliber.     Aortic valve:  The aortic valve is comprised of three cusps. No significant aortic stenosis  or aortic insufficiency is detected on this study.     Mitral valve:  The mitral valve appears morphologically normal. There is trace mitral  insufficiency.     Tricuspid valve:  The tricuspid valve is grossly morphologically normal. There is trace  tricuspid insufficiency.     Pulmonic valve:  The pulmonic valve is grossly morphologically normal.     Thoracic aorta:  There is borderline aortic root enlargement.     Pericardium:  There is no significant pericardial effusion.  ______________________________________________________________________________  ______________________________________________________________________________  MMode/2D Measurements & Calculations  IVSd: 1.1 cm  LVIDd: 4.8 cm  LVIDs: 3.1 cm  LVPWd: 1.1 cm  FS: 35.1 %  LV mass(C)d: 196.3 grams  LV mass(C)dI: 99.6 grams/m2  Ao root diam: 4.0 cm  asc Aorta Diam: 3.8 cm  LVOT diam: 2.2 cm  LVOT area: 3.8 cm2  Ao root diam index Ht(cm/m): 2.5  Ao root diam index BSA (cm/m2): 2.0  Asc Ao diam index BSA (cm/m2): 1.9  Asc Ao diam index Ht(cm/m): 2.3  EF Biplane: 64.5 %  LA Volume (BP): 78.1 ml     LA Volume Index (BP): 39.6 ml/m2  LA Volume Indexed (AL/bp): 41.3 ml/m2  RWT: 0.46     Time Measurements  MM HR: 79.0 BPM     Doppler Measurements & Calculations  MV E max anika: 120.0 cm/sec  MV A max anika: 146.0 cm/sec  MV E/A: 0.82  MV dec slope: 490.0 cm/sec2  MV dec time: 0.24 sec  Ao V2 max: 174.0 cm/sec  Ao max P.0 mmHg  Ao V2 mean: 115.0 cm/sec  Ao mean P.0 mmHg  Ao V2 VTI: 35.5 cm  DAILY(I,D): 2.8 cm2  DAILY(V,D): 2.7 cm2  LV V1 max P.2 mmHg  LV V1 max: 124.0 cm/sec  LV V1 VTI: 25.9 cm  SV(LVOT): 98.5 ml  SI(LVOT):  49.9 ml/m2  Pulm Sys Kwan: 48.4 cm/sec  Pulm Jasso Kwan: 42.5 cm/sec  Pulm A Revs Kwan: 25.1 cm/sec  Pulm S/D: 1.1  AV Kwan Ratio (DI): 0.71  DAILY Index (cm2/m2): 1.4  E/E': 16.2  E/E' av.5  Lateral E/e': 12.7  Medial E/e': 16.2     Peak E' Kwan: 7.4 cm/sec     ______________________________________________________________________________  Report approved by: Breanna Perry 2024 01:12 PM         MR Cardiac WO & W Contrast    Narrative                                                     [facility]                                                     CMR Report      MRN:            [id number]                                  Name:        LINDA YI                                  :                                             Scan Date:     2024-Aug-02                                  Electronically signed by Gurjit Vivar 2024-Aug-02 13:22:52    VITALS   ==========================================================================================================    HEIGHT: 64.0 in    (162.6 cm)  WEIGHT: 204.0 lbs    (92.5 kgs)  BSA: 1.97 m^2    SUMMARY   ==========================================================================================================    1.  Normal left ventricular size, wall thickness and systolic function. The quantified left ventricular  ejection fraction is 55.2%.  No myocardial scar is identified. Normal pre-contrast T1:1014 ms.  Normal T2*  myocardium: 34 ms (normal > 20 ms on 1.5 T scanner).  No evidence of myocardial iron overload.   2. T2* liver: 20.4 ms (normal >11.4 ms).  No evidence of hepatic ion overload.      3.  Normal right ventricular size and systolic function.  RVEF: 62%.   4.  No significant valvular abnormalities.     ==========================================================================================================  REPORT FINDINGS:    LEFT VENTRICLE: Normal left ventricular size, wall thickness and systolic function. The quantified  left ventricular  ejection fraction is 55.2%.  No myocardial scar is identified. Normal pre-contrast T1:1014 ms.  Normal T2*  myocardium: 34 ms (normal > 20 ms on 1.5 T scanner).  No evidence of myocardial iron overload.     VIABILITY: Hyperenhancement is normal.    RIGHT VENTRICLE: Normal right ventricular size and systolic function.  RVEF: 62%.     LA/RA SEPTUM: The atrial septum is intact.     LEFT ATRIUM: LA cavity size is normal.    RIGHT ATRIUM: RA cavity size is normal.    PERICARDIUM: Pericardium is normal. There is no pericardial effusion.    PLEURAL EFFUSION: There is no pleural effusion.    AORTIC VALVE: The aortic valve annulus is normal in size. Aortic valve is trileaflet. There is no aortic regurgitation.  There is no aortic stenosis.     MITRAL VALVE: The mitral valve annulus is normal in size. Mitral valve leaflets are normal. There is trivial mitral  regurgitation. There is no mitral stenosis.     TRICUSPID VALVE: The tricuspid valve annulus is normal in size. Tricuspid valve leaflets are normal. There is mild tricuspid  regurgitation. There is no tricuspid stenosis.     PULMONIC VALVE: The pulmonic valve annulus is normal in size. Pulmonic valve leaflets are normal. There is no pulmonic  regurgitation. There is no pulmonic stenosis.     AORTIC ROOT: The aortic root is normal.    CHEST:  Normal thoracic aortic size and its major branches.       CORE EXAM   ==========================================================================================================    MEASUREMENTS   ----------------------------------------------------------------------------------------      VOLUMETRIC ANALYSIS       ----------------------------------------------  .---------------------------------------------------------.                    LV     Reference  RV    Reference   +------+-----------+-------+-----------+------+-----------+   EDV   ml          73.5   ()  91.9   ()          ml/m^2       37.3   (50-84)   46.6   (45-82)     ESV   ml          32.9   (18-55)   33.8   (6-58)            ml/m^2      16.7   (12-30)   17.1   (6-32)      CO    L/min       3.21             4.59                     L/min/m^2   1.63             2.32               MASS  g          122.5   ()                           g/m^2       62.1   (49-78)                      SV    ml          40.6   ()  58.0   ()          ml/m^2      20.6   (34-59)   29.4   (33-57)     EF    %           55.2   (60-78)   63.2   (59-83)    '------+-----------+-------+-----------+------+-----------'            CARDIAC OUTPUT HR:  79 bpm      IRON QUANTIFICATION       ----------------------------------------------          MYOCARDIAL T2*:  34 msec          LIVER T2*:  20.4 msec      17 SEGMENT   ----------------------------------------------------------------------------------------  .------------------------------------------------------------------------------------------.   Segments            Wall Motion   Hyperenhancement  Stress Perfusion  Interpretation   +--------------------+--------------+------------------+------------------+----------------+   Base Anterior       Normal/Hyper  None                                Normal            Base Anteroseptal   Normal/Hyper  None                                Normal            Base Inferoseptal   Normal/Hyper  None                                Normal            Base Inferior       Normal/Hyper  None                                Normal            Base Inferolateral  Normal/Hyper  None                                Normal            Base Anterolateral  Normal/Hyper  None                                Normal            Mid Anterior        Normal/Hyper  None                                Normal            Mid Anteroseptal    Normal/Hyper  None                                 Normal            Mid Inferoseptal    Normal/Hyper  None                                Normal            Mid Inferior        Normal/Hyper  None                                Normal            Mid Inferolateral   Normal/Hyper  None                                Normal            Mid Anterolateral   Normal/Hyper  None                                Normal            Apical Anterior     Normal/Hyper  None                                Normal            Apical Septal       Normal/Hyper  None                                Normal            Apical Inferior     Normal/Hyper  None                                Normal            Apical Lateral      Normal/Hyper  None                                Normal            [place]                Normal/Hyper  None                                Normal           +--------------------+--------------+------------------+------------------+----------------+   RV Segments         Wall Motion   Hyperenhancement                    Interpretation   +--------------------+--------------+------------------+------------------+----------------+   RV Basal Anterior   Normal/Hyper  None                                Normal            RV Basal Inferior   Normal/Hyper  None                                Normal            RV Mid              Normal/Hyper  None                                Normal            RV Apical           Normal/Hyper  None                                Normal           '--------------------+--------------+------------------+------------------+----------------'        FINDINGS       ----------------------------------------------          LV SCAR SIZE (17 SEGMENT):  0 %      SCAN INFO   ==========================================================================================================    GENERAL    ----------------------------------------------------------------------------------------      SCANNER       ----------------------------------------------          :  SIEMENS          MODEL:  Aera          PULSE SEQUENCES:  SSFP cine, GRE cine, 2D LGE segmented, 2D LGE single-shot, Pre-contrast T1          mapping, Post-contrast T1 mapping, T2 mapping, T2* mapping, First-pass perfusion without stress         CONTRAST AGENT       ----------------------------------------------          TYPE:  Gadavist          GD CONCENTRATION:  0.5 M          VOLUME ADMINISTERED:  15 ml          DOSAGE:  0.08 mmol/kg        SETUP       ----------------------------------------------          SCAN TYPE:  Clinical          PATIENT TYPE:  Inpatient          REASON(S) FOR SCAN:  Iron (known/suspect)           ATTENDING PHYSICIAN:  CLAU STOREY   ==========================================================================================================    CPT Codes  ICD10 Codes      Report generated by [employer], a product of [employer]       Discharge Orders      Home Care Referral      Reason for your hospital stay    Syncope     Follow-up and recommended labs and tests     Follow up with primary care provider, Ambar Hernandez, within 3-4 days to evaluate medication change and for hospital follow- up.  The following labs/tests are recommended: BMP - Potassium, Magnesium, Phos, Hb.    Qtc - Zoloft on hold, pending Cardiac MRI results  Follow up on Right lower lobe pleural opacity and pancreatic lesion    Follow up with Cardiology,  within 2 weeks. outpatient stress test and Zio patch ordered    Call our scheduling line at [phone] to make an   appointment, if you do not already have one scheduled     Activity    Your activity upon discharge: activity as tolerated     Diet    Follow this diet upon discharge: Orders Placed This Encounter      Combination Diet Low Saturated Fat Na  <2400mg Diet     NM Lexiscan stress test     ZIO PATCH MAIL OUT     Discharge Medications   Current Discharge Medication List        START taking these medications    Details   oxyCODONE (ROXICODONE) 5 MG tablet Take 1 tablet (5 mg) by mouth every 4 hours as needed for severe pain  Qty: 16 tablet, Refills: 0    Associated Diagnoses: Contusion of right knee and lower leg, initial encounter      polyethylene glycol (MIRALAX) 17 GM/Dose powder Take 17 g by mouth daily  Qty: 510 g, Refills: 0    Associated Diagnoses: Contusion of right knee and lower leg, initial encounter      spironolactone (ALDACTONE) 25 MG tablet Take 0.5 tablets (12.5 mg) by mouth 2 times daily  Qty: 30 tablet, Refills: 1    Associated Diagnoses: Hypokalemia           CONTINUE these medications which have CHANGED    Details   sertraline (ZOLOFT) 25 MG tablet Take 1 tablet (25 mg) by mouth daily  Qty: 90 tablet, Refills: 0    Comments: Repeat EKG, Qtc prior to resuming  Associated Diagnoses: CARINE (generalized anxiety disorder)           CONTINUE these medications which have NOT CHANGED    Details   acetaminophen (TYLENOL) 500 MG tablet Take 1 tablet (500 mg) by mouth every 6 hours as needed for mild pain  Qty: 60 tablet, Refills: 1    Associated Diagnoses: Chronic bilateral low back pain with bilateral sciatica      aspirin 81 MG EC tablet Take 1 tablet (81 mg) by mouth daily  Qty: 90 tablet, Refills: 2    Associated Diagnoses: AFX (amaurosis fugax)      atorvastatin (LIPITOR) 80 MG tablet Take 1 tablet (80 mg) by mouth every evening  Qty: 90 tablet, Refills: 0    Associated Diagnoses: AFX (amaurosis fugax)      bimatoprost (LUMIGAN) 0.01 % SOLN Place 1 drop into the right eye at bedtime  Qty: 7.5 mL, Refills: 3    Comments: 90 day supply ok  Associated Diagnoses: Primary open angle glaucoma (POAG) of both eyes, mild stage      bumetanide (BUMEX) 2 MG tablet Take 1 tablet (2 mg) by mouth daily  Qty: 90 tablet, Refills: 0    Associated Diagnoses:  Acute on chronic congestive heart failure, unspecified heart failure type (H)      Cranberry-Vitamin C (CRANBERRY CONCENTRATE/VITAMINC) 63000-905 MG CAPS Take 2 capsules by mouth daily      empagliflozin (JARDIANCE) 10 MG TABS tablet Take 1 tablet (10 mg) by mouth daily  Qty: 90 tablet, Refills: 1    Associated Diagnoses: Heart failure with preserved ejection fraction, NYHA class I (H)      levothyroxine (SYNTHROID/LEVOTHROID) 137 MCG tablet Take 1 tablet (137 mcg) by mouth daily  Qty: 90 tablet, Refills: 0    Associated Diagnoses: History of thyroid cancer; Hypothyroidism, unspecified type      Melatonin 10-10 MG TBCR Take 1 tablet by mouth at bedtime      metoprolol tartrate (LOPRESSOR) 25 MG tablet Take 1 tablet (25 mg) by mouth 2 times daily  Qty: 180 tablet, Refills: 1    Associated Diagnoses: Tachycardia           Allergies   Allergies   Allergen Reactions    Ciprofloxacin Hcl Swelling    Atenolol      Rash and Insomnia    Ciprofloxacin      Severe tendon pain and swelling    Eliquis [Apixaban]      Wanting to faint    Nitrofurantoin      'Bad, itchy, feverish, rash on arms and legs'    Sulfa Antibiotics      Rash on feet    Penicillins Rash

## 2024-08-02 NOTE — CONSULTS
"Care Management Initial Consult    General Information  Assessment completed with: Patient, Children, Mirna and daughter Jada  Type of CM/SW Visit: Initial Assessment    Primary Care Provider verified and updated as needed: Yes   Readmission within the last 30 days: no previous admission in last 30 days      Reason for Consult: discharge planning  Advance Care Planning: Advance Care Planning Reviewed: present on chart          Communication Assessment  Patient's communication style: spoken language (English or Bilingual)             Cognitive  Cognitive/Neuro/Behavioral: WDL                      Living Environment:   People in home: spouse  Mirna and  Gene  Current living Arrangements: house      Able to return to prior arrangements: yes       Family/Social Support:  Care provided by: self  Provides care for: no one  Marital Status:   , Children (\"my daughter Jada lives only 10 min away\")  Gene       Description of Support System: Supportive, Involved    Support Assessment: Adequate family and caregiver support, Adequate social supports, Patient communicates needs well met    Current Resources:   Patient receiving home care services: No (\"I have had [facility] in the past and want to use them again\".)     Community Resources: None  Equipment currently used at home: cane, straight, walker, rolling (\"I mostly use my cane. I also have a FWW I can use if needed, but now that my back doesn't hurt I don't need to use it\".)  Supplies currently used at home: Other (\"glasses, left hearing aid doesn't work\".)    Employment/Financial:  Employment Status: retired     Employment/ Comments: \"no  history\"  Financial Concerns: none   Referral to Financial Worker: No       Does the patient's insurance plan have a 3 day qualifying hospital stay waiver?  Yes     Which insurance plan 3 day waiver is available? Alternative insurance waiver    Will the waiver be used for post-acute " "placement? No    Lifestyle & Psychosocial Needs:  Social Determinants of Health     Food Insecurity: Low Risk  (1/23/2024)    Food Insecurity     Within the past 12 months, did you worry that your food would run out before you got money to buy more?: No     Within the past 12 months, did the food you bought just not last and you didn t have money to get more?: No   Depression: Not at risk (7/22/2024)    PHQ-2     PHQ-2 Score: 0   Housing Stability: Low Risk  (1/23/2024)    Housing Stability     Do you have housing? : Yes     Are you worried about losing your housing?: No   Tobacco Use: Medium Risk (7/25/2024)    Patient History     Smoking Tobacco Use: Former     Smokeless Tobacco Use: Never     Passive Exposure: Never   Financial Resource Strain: Low Risk  (1/23/2024)    Financial Resource Strain     Within the past 12 months, have you or your family members you live with been unable to get utilities (heat, electricity) when it was really needed?: No   Alcohol Use: Not on file   Transportation Needs: Low Risk  (1/23/2024)    Transportation Needs     Within the past 12 months, has lack of transportation kept you from medical appointments, getting your medicines, non-medical meetings or appointments, work, or from getting things that you need?: No   Physical Activity: Not on file   Interpersonal Safety: Low Risk  (4/23/2024)    Interpersonal Safety     Do you feel physically and emotionally safe where you currently live?: Yes     Within the past 12 months, have you been hit, slapped, kicked or otherwise physically hurt by someone?: No     Within the past 12 months, have you been humiliated or emotionally abused in other ways by your partner or ex-partner?: No   Stress: Not on file   Social Connections: Not on file   Health Literacy: Not on file       Functional Status:  Prior to admission patient needed assistance:   Dependent ADLs:: Ambulation-cane, Ambulation-walker, Independent  Dependent IADLs:: Independent (\"I " "drive close to home only. My  also can drive.\")  Assesssment of Functional Status: Not at baseline with mobility    Mental Health Status:  Mental Health Status: Past Concern  Mental Health Management: Medication    Chemical Dependency Status:  Chemical Dependency Status: No Current Concerns             Values/Beliefs:  Spiritual, Cultural Beliefs, Faith Practices, Values that affect care: no               Additional Information:  See below    Care Management Discharge Note    Discharge Date: 08/02/2024       Discharge Disposition: Home, Home Care    Discharge Services: Home Care    Discharge DME:  nothing new    Discharge Transportation: family or friend will provide (\"sue Elias will transport\")    Private pay costs discussed: Not applicable    Does the patient's insurance plan have a 3 day qualifying hospital stay waiver?  No    PAS Confirmation Code:    Patient/family educated on Medicare website which has current facility and service quality ratings: yes    Education Provided on the Discharge Plan: Yes  Persons Notified of Discharge Plans: Mirna and sue Elias  Patient/Family in Agreement with the Plan:  yes    Handoff Referral Completed: No    Additional Information:  Mirna lives in a house that is all 1 level with her  Serg. \"We built the house and planned to be able to stay there so there are no steps at all\".    \"I mostly use my cane. I also have a FWW I can use if needed, but now that my back doesn't hurt I don't need to use it\".  \"I drive close to home only. My  also can drive.\"    She is discharging home today.    \"I have had [facility] in the past and want to use them again\". I sent a referral for the discharge orders for Home Care PT to [facility]. Awaiting hearing about acceptance.    Sue Elias here to transport her home.    CM to follow for medical progression of care, discharge recommendations, and final discharge plan.    Geraldine Silvestre RN    "

## 2024-08-02 NOTE — PROGRESS NOTES
[facility] is asking for Home Care RN orders as well as the PT orders. I messaged the doctor to ask for that order addition.

## 2024-08-02 NOTE — TELEPHONE ENCOUNTER
Pt called clinic requesting an OV with any provider. Pt is getting discharged from hospital today and needs to have ED follow up Monday/Tuesday. Pt is scheduled with provider 8/5, no further questions at this time.    RUPINDER Huber.

## 2024-08-02 NOTE — TELEPHONE ENCOUNTER
Reason for Call:  Appointment Request    Patient requesting this type of appt:  Hospital/ED Follow-Up     Requested provider: Ambar Hernandez    Reason patient unable to be scheduled: Not within requested timeframe    When does patient want to be seen/preferred time: Monday or Tuesday of next week 8/5 or 8/6    Comments: Mirna was in the hospital for a fall and will be discharging today 8/2 and was instructed to schedule a follow up with pcp by Monday or Tuesday of next week. Clinic will have to call pt to schedule an appt.    Could we [facility] this [facility] you [facility] or would you prefer to receive a phone call?:   Patient would prefer a phone call   Okay to leave a detailed message?: Yes at Cell number on file:    Telephone Information:   Mobile [phone]       Call taken on 8/2/2024 at 12:45 PM by Tatyana Hartman

## 2024-08-02 NOTE — PLAN OF CARE
Problem: Adult Inpatient Plan of Care  Goal: Readiness for Transition of Care  Outcome: Adequate for Care Transition     Problem: Risk for Delirium  Goal: Improved Behavioral Control  Outcome: Adequate for Care Transition  Intervention: Minimize Safety Risk  Recent Flowsheet Documentation  Taken 8/2/2024 0934 by Dereck Kathleen RN  Enhanced Safety Measures: room near unit station   Goal Outcome Evaluation:       Pt given supplemental potassium twice this AM shift per potassium protocol. Pt instructed not to take potassium supplements once she discharges home as she is also prescribed spirolactone. Pt received cardiac MRI. Pain managed with PRN acetaminophen. Orthostatic BP taken. Laying BP was 141/80, sitting BP was 129/80 and standing BP was 101/65. Provider aware. Pt's heart rhythm was sinus with a prolonged Qtc interval. Pt to discharge home via personal vehicle driven by daughter. No other concerns noted.   Dereck Kathleen RN  8/2/2024  3:28 PM

## 2024-08-03 NOTE — PLAN OF CARE
Physical Therapy Discharge Summary    Reason for therapy discharge:    Discharged to home with home therapy.    Progress towards therapy goal(s). See goals on Care Plan in [facility] electronic health record for goal details.  Goals not met.  Barriers to achieving goals:   discharge from facility.    Therapy recommendation(s):    Continued therapy is recommended.  Rationale/Recommendations:  Continue with home PT as pt is progressing towards goals.

## 2024-08-05 ENCOUNTER — PATIENT OUTREACH (OUTPATIENT)
Dept: CARE COORDINATION | Facility: CLINIC | Age: 74
End: 2024-08-05

## 2024-08-05 ENCOUNTER — OFFICE VISIT (OUTPATIENT)
Dept: FAMILY MEDICINE | Facility: CLINIC | Age: 74
End: 2024-08-05
Payer: COMMERCIAL

## 2024-08-05 ENCOUNTER — TELEPHONE (OUTPATIENT)
Dept: CARDIOLOGY | Facility: CLINIC | Age: 74
End: 2024-08-05

## 2024-08-05 ENCOUNTER — MEDICAL CORRESPONDENCE (OUTPATIENT)
Dept: HEALTH INFORMATION MANAGEMENT | Facility: CLINIC | Age: 74
End: 2024-08-05

## 2024-08-05 VITALS
HEIGHT: 64 IN | HEART RATE: 91 BPM | BODY MASS INDEX: 36.4 KG/M2 | RESPIRATION RATE: 20 BRPM | SYSTOLIC BLOOD PRESSURE: 110 MMHG | WEIGHT: 213.2 LBS | DIASTOLIC BLOOD PRESSURE: 70 MMHG | OXYGEN SATURATION: 96 %

## 2024-08-05 DIAGNOSIS — Z11.59 NEED FOR HEPATITIS C SCREENING TEST: ICD-10-CM

## 2024-08-05 DIAGNOSIS — R79.0 LOW MAGNESIUM LEVEL: ICD-10-CM

## 2024-08-05 DIAGNOSIS — Z78.9 ALCOHOL USE: ICD-10-CM

## 2024-08-05 DIAGNOSIS — I10 PRIMARY HYPERTENSION: ICD-10-CM

## 2024-08-05 DIAGNOSIS — K86.9 PANCREATIC LESION: ICD-10-CM

## 2024-08-05 DIAGNOSIS — E87.6 HYPOKALEMIA: ICD-10-CM

## 2024-08-05 DIAGNOSIS — R55 SYNCOPE, UNSPECIFIED SYNCOPE TYPE: Primary | ICD-10-CM

## 2024-08-05 LAB
ALBUMIN SERPL BCG-MCNC: 4 G/DL (ref 3.5–5.2)
ALP SERPL-CCNC: 171 U/L (ref 40–150)
ALT SERPL W P-5'-P-CCNC: 44 U/L (ref 0–50)
ANION GAP SERPL CALCULATED.3IONS-SCNC: 12 MMOL/L (ref 7–15)
AST SERPL W P-5'-P-CCNC: 37 U/L (ref 0–45)
BASOPHILS # BLD AUTO: 0.1 10E3/UL (ref 0–0.2)
BASOPHILS NFR BLD AUTO: 1 %
BILIRUB SERPL-MCNC: 0.8 MG/DL
BUN SERPL-MCNC: 12.5 MG/DL (ref 8–23)
CALCIUM SERPL-MCNC: 9.3 MG/DL (ref 8.8–10.4)
CHLORIDE SERPL-SCNC: 98 MMOL/L (ref 98–107)
CREAT SERPL-MCNC: 0.73 MG/DL (ref 0.51–0.95)
EGFRCR SERPLBLD CKD-EPI 2021: 85 ML/MIN/1.73M2
EOSINOPHIL # BLD AUTO: 0.2 10E3/UL (ref 0–0.7)
EOSINOPHIL NFR BLD AUTO: 2 %
ERYTHROCYTE [DISTWIDTH] IN BLOOD BY AUTOMATED COUNT: 15.3 % (ref 10–15)
FOLATE SERPL-MCNC: 9.7 NG/ML (ref 4.6–34.8)
GLUCOSE SERPL-MCNC: 114 MG/DL (ref 70–99)
HCO3 SERPL-SCNC: 25 MMOL/L (ref 22–29)
HCT VFR BLD AUTO: 37.6 % (ref 35–47)
HGB BLD-MCNC: 12.9 G/DL (ref 11.7–15.7)
IMM GRANULOCYTES # BLD: 0 10E3/UL
IMM GRANULOCYTES NFR BLD: 1 %
LYMPHOCYTES # BLD AUTO: 1.6 10E3/UL (ref 0.8–5.3)
LYMPHOCYTES NFR BLD AUTO: 20 %
MAGNESIUM SERPL-MCNC: 2.2 MG/DL (ref 1.7–2.3)
MCH RBC QN AUTO: 37 PG (ref 26.5–33)
MCHC RBC AUTO-ENTMCNC: 34.3 G/DL (ref 31.5–36.5)
MCV RBC AUTO: 108 FL (ref 78–100)
MONOCYTES # BLD AUTO: 1 10E3/UL (ref 0–1.3)
MONOCYTES NFR BLD AUTO: 12 %
NEUTROPHILS # BLD AUTO: 5.3 10E3/UL (ref 1.6–8.3)
NEUTROPHILS NFR BLD AUTO: 65 %
PLATELET # BLD AUTO: 179 10E3/UL (ref 150–450)
POTASSIUM SERPL-SCNC: 3.9 MMOL/L (ref 3.4–5.3)
PROT SERPL-MCNC: 7 G/DL (ref 6.4–8.3)
RBC # BLD AUTO: 3.49 10E6/UL (ref 3.8–5.2)
SODIUM SERPL-SCNC: 135 MMOL/L (ref 135–145)
WBC # BLD AUTO: 8.2 10E3/UL (ref 4–11)

## 2024-08-05 PROCEDURE — 99495 TRANSJ CARE MGMT MOD F2F 14D: CPT | Performed by: FAMILY MEDICINE

## 2024-08-05 PROCEDURE — 36415 COLL VENOUS BLD VENIPUNCTURE: CPT | Performed by: FAMILY MEDICINE

## 2024-08-05 PROCEDURE — 83735 ASSAY OF MAGNESIUM: CPT | Performed by: FAMILY MEDICINE

## 2024-08-05 PROCEDURE — 80053 COMPREHEN METABOLIC PANEL: CPT | Performed by: FAMILY MEDICINE

## 2024-08-05 PROCEDURE — 82746 ASSAY OF FOLIC ACID SERUM: CPT | Performed by: FAMILY MEDICINE

## 2024-08-05 PROCEDURE — 85025 COMPLETE CBC W/AUTO DIFF WBC: CPT | Performed by: FAMILY MEDICINE

## 2024-08-05 RX ORDER — MULTIVITAMIN WITH IRON
1 TABLET ORAL DAILY
Qty: 100 TABLET | Refills: 1 | Status: SHIPPED | OUTPATIENT
Start: 2024-08-05 | End: 2024-08-14

## 2024-08-05 RX ORDER — LANOLIN ALCOHOL/MO/W.PET/CERES
100 CREAM (GRAM) TOPICAL DAILY
Qty: 100 TABLET | Refills: 3 | Status: SHIPPED | OUTPATIENT
Start: 2024-08-05

## 2024-08-05 NOTE — TELEPHONE ENCOUNTER
Health Call Center    Phone Message    May a detailed message be left on voicemail: yes     Reason for Call: Order(s): Home Care Orders: Skilled Nursing:  [facility] is calling regarding orders faxed over. Order were faxed over on 6/3, 6/23 and 7/25. Add visit for stilled nursing and medications. Please fax back or call [facility]. Sending over high priority since no fax or call back. Thank you     Action Taken: Other: cardiology     Travel Screening: Not Applicable    Thank you!  [facility]       Date of Service:

## 2024-08-05 NOTE — TELEPHONE ENCOUNTER
Called Josy montiel/ [facility], no answer. LM to call back and let us know if this should be routed to pt's PCP team. Do not believe cardiology is managing pt's HHC orders. Provided direct line to discuss.    NEDRA Leahy RN

## 2024-08-05 NOTE — PROGRESS NOTES
Contact   Chart Review     Situation: Patient chart reviewed by .    Background: Discharged from hospital, TCM created.     Assessment: Patient has completed PCP post hospital appt already this morning.     Plan/Recommendations: No outreach needed. PCP completed assessment.     Aida Peterson,   [facility]  [phone]

## 2024-08-05 NOTE — PATIENT INSTRUCTIONS
I have seen you today for follow-up of emergency room visit for syncope.    We are taking your blood count, electrolytes including potassium and magnesium today.    Noting you have a large red blood cell size, regular alcohol use, I am starting you on thiamine and folic acid.  I strongly advised her to cut down on alcohol slowly to not more than 1 drink a day and not more than 7 in 1 week.  These are general guidelines.    Noting low magnesium, start magnesium supplement prescription is sent to the pharmacy.  It may not be covered with insurance and you may need to buy OTC.    Other issues that need follow-up-    Neurology appointment.  Imaging studies for follow-up of pancreatic lesion/cyst    Follow-up with your PCP regularly so that multiple issues can be followed.

## 2024-08-06 NOTE — TELEPHONE ENCOUNTER
Spoke w/ [facility]. She sent a message to the  to reach out to pt's PCP for order needs. Will close on cardiology end.     NEDRA Leahy RN

## 2024-08-12 LAB
ATRIAL RATE - MUSE: 94 BPM
DIASTOLIC BLOOD PRESSURE - MUSE: NORMAL MMHG
INTERPRETATION ECG - MUSE: NORMAL
P AXIS - MUSE: NORMAL DEGREES
PR INTERVAL - MUSE: 148 MS
QRS DURATION - MUSE: 112 MS
QT - MUSE: 416 MS
QTC - MUSE: 520 MS
R AXIS - MUSE: 51 DEGREES
SYSTOLIC BLOOD PRESSURE - MUSE: NORMAL MMHG
T AXIS - MUSE: -42 DEGREES
VENTRICULAR RATE- MUSE: 94 BPM

## 2024-08-13 ENCOUNTER — TELEPHONE (OUTPATIENT)
Dept: FAMILY MEDICINE | Facility: CLINIC | Age: 74
End: 2024-08-13
Payer: COMMERCIAL

## 2024-08-13 NOTE — TELEPHONE ENCOUNTER
Reason for Call:  Appointment Request    Patient requesting this type of appt:  acute for lump in breast    Requested provider: Ambar Hernandez    Reason patient unable to be scheduled: Needs to be scheduled by clinic    When does patient want to be seen/preferred time: Same day or next day    Comments: Patient is trying to schedule two different appointment one she found a lump in her breast and since she has breast cancer in the past she would like this check out ASAP and 2nd she is trying scheduled a follow up appointment with Dr. Hernandez per Dr. Chase instructions and would like it to be in person.    Could we send this information to you in [facility] or would you prefer to receive a phone call?:   Patient would prefer a phone call   Okay to leave a detailed message?: Yes at Cell number on file:    Telephone Information:   Mobile [phone]     Almaz Boggs   [facility]  Central Scheduler  Call taken on 8/13/2024 at 1:01 PM by ALMAZ BOGGS

## 2024-08-13 NOTE — TELEPHONE ENCOUNTER
Spoke with patient regarding breast lump. Patient is scheduled tomorrow Aug 14 with Dr. Mayorga.    She also needs a hospital follow up. Ok to override slots to get patient in sooner? Still having swelling in the leg was in the hospital for a fall.

## 2024-08-14 ENCOUNTER — TELEPHONE (OUTPATIENT)
Dept: LAB | Facility: CLINIC | Age: 74
End: 2024-08-14

## 2024-08-14 ENCOUNTER — OFFICE VISIT (OUTPATIENT)
Dept: FAMILY MEDICINE | Facility: CLINIC | Age: 74
End: 2024-08-14
Payer: COMMERCIAL

## 2024-08-14 VITALS
DIASTOLIC BLOOD PRESSURE: 72 MMHG | WEIGHT: 214.3 LBS | BODY MASS INDEX: 36.58 KG/M2 | HEART RATE: 82 BPM | OXYGEN SATURATION: 98 % | TEMPERATURE: 98.2 F | RESPIRATION RATE: 18 BRPM | SYSTOLIC BLOOD PRESSURE: 128 MMHG | HEIGHT: 64 IN

## 2024-08-14 DIAGNOSIS — R39.89 SUSPECTED UTI: Primary | ICD-10-CM

## 2024-08-14 DIAGNOSIS — I50.30 HEART FAILURE WITH PRESERVED EJECTION FRACTION, NYHA CLASS I (H): ICD-10-CM

## 2024-08-14 DIAGNOSIS — R55 SYNCOPE, UNSPECIFIED SYNCOPE TYPE: ICD-10-CM

## 2024-08-14 DIAGNOSIS — N63.24 MASS OF LOWER INNER QUADRANT OF LEFT BREAST: ICD-10-CM

## 2024-08-14 DIAGNOSIS — E87.6 HYPOKALEMIA: Primary | ICD-10-CM

## 2024-08-14 LAB
ALBUMIN UR-MCNC: NEGATIVE MG/DL
ANION GAP SERPL CALCULATED.3IONS-SCNC: 11 MMOL/L (ref 7–15)
APPEARANCE UR: ABNORMAL
BILIRUB UR QL STRIP: NEGATIVE
BUN SERPL-MCNC: 12.1 MG/DL (ref 8–23)
CALCIUM SERPL-MCNC: 9.5 MG/DL (ref 8.8–10.4)
CHLORIDE SERPL-SCNC: 107 MMOL/L (ref 98–107)
COLOR UR AUTO: YELLOW
CREAT SERPL-MCNC: 0.71 MG/DL (ref 0.51–0.95)
EGFRCR SERPLBLD CKD-EPI 2021: 88 ML/MIN/1.73M2
GLUCOSE SERPL-MCNC: 105 MG/DL (ref 70–99)
GLUCOSE UR STRIP-MCNC: >=1000 MG/DL
HCO3 SERPL-SCNC: 21 MMOL/L (ref 22–29)
HGB UR QL STRIP: ABNORMAL
KETONES UR STRIP-MCNC: NEGATIVE MG/DL
LEUKOCYTE ESTERASE UR QL STRIP: ABNORMAL
MAGNESIUM SERPL-MCNC: 2.5 MG/DL (ref 1.7–2.3)
NITRATE UR QL: POSITIVE
PH UR STRIP: 6 [PH] (ref 5–8)
POTASSIUM SERPL-SCNC: 5.2 MMOL/L (ref 3.4–5.3)
SODIUM SERPL-SCNC: 139 MMOL/L (ref 135–145)
SP GR UR STRIP: 1.02 (ref 1–1.03)
UROBILINOGEN UR STRIP-ACNC: 0.2 E.U./DL

## 2024-08-14 PROCEDURE — 87186 SC STD MICRODIL/AGAR DIL: CPT | Performed by: FAMILY MEDICINE

## 2024-08-14 PROCEDURE — 81003 URINALYSIS AUTO W/O SCOPE: CPT | Mod: QW | Performed by: FAMILY MEDICINE

## 2024-08-14 PROCEDURE — 80048 BASIC METABOLIC PNL TOTAL CA: CPT | Performed by: FAMILY MEDICINE

## 2024-08-14 PROCEDURE — 99214 OFFICE O/P EST MOD 30 MIN: CPT | Performed by: FAMILY MEDICINE

## 2024-08-14 PROCEDURE — 83735 ASSAY OF MAGNESIUM: CPT | Performed by: FAMILY MEDICINE

## 2024-08-14 PROCEDURE — 36415 COLL VENOUS BLD VENIPUNCTURE: CPT | Performed by: FAMILY MEDICINE

## 2024-08-14 PROCEDURE — 87086 URINE CULTURE/COLONY COUNT: CPT | Mod: GZ | Performed by: FAMILY MEDICINE

## 2024-08-14 RX ORDER — FUROSEMIDE 20 MG
20 TABLET ORAL DAILY
Qty: 90 TABLET | Refills: 0 | Status: SHIPPED | OUTPATIENT
Start: 2024-08-14

## 2024-08-14 RX ORDER — POTASSIUM CHLORIDE 1500 MG/1
20 TABLET, EXTENDED RELEASE ORAL 2 TIMES DAILY
Status: SHIPPED
Start: 2024-08-14 | End: 2024-09-15

## 2024-08-14 RX ORDER — PNV NO.95/FERROUS FUM/FOLIC AC 28MG-0.8MG
TABLET ORAL
COMMUNITY
Start: 2024-08-05 | End: 2024-09-14

## 2024-08-14 ASSESSMENT — PAIN SCALES - GENERAL: PAINLEVEL: EXTREME PAIN (8)

## 2024-08-14 NOTE — PROGRESS NOTES
"Hello, Patient came to the [facility] lab today to have blood drawn and a UA collected. Please review the UA results.  The way it was ordered, it did not reflex to a culture.  A Culture tube was pulled off of the urine specimen container and is being held in the \"Just in case rack\"  here in our lab.  Please call to inform us if a culture needs to be added. [phone]  Thank you  "

## 2024-08-14 NOTE — PROGRESS NOTES
"  Assessment & Plan     Hypokalemia  Will do repeat labs today to get trend and compare to 8/5.  Took potassium today but has not been taking it regularly as they told her to stop it in the ER  - Basic metabolic panel  (Ca, Cl, CO2, Creat, Gluc, K, Na, BUN)  - Magnesium  - furosemide (LASIX) 20 MG tablet  Dispense: 90 tablet; Refill: 0  - Potassium  - potassium chloride luis angel ER (KLOR-CON M20) 20 MEQ CR tablet  - Basic metabolic panel  (Ca, Cl, CO2, Creat, Gluc, K, Na, BUN)  - Magnesium    Mass of lower inner quadrant of left breast  Will do US - suspect hematoma but will do imaging  - US Breast Left Limited 1-3 Quadrants    Syncope, unspecified syncope type  Recovering.  Significant bruising on right leg present with edema    Heart failure with preserved ejection fraction, NYHA class I (H)  Concern since stopping bumex with weight gain and increased swelling.  No sob.  If worsening symptoms go to the ER but agreed to start lasix at 20mg and continue with potassium, recheck potassium next week and follow up in clinic.  Will also recommend bimonthly potassiums for a while to ensure stable.          MED REC REQUIRED  Post Medication Reconciliation Status:  Discharge medications reconciled, continue medications without change  BMI  Estimated body mass index is 36.78 kg/m  as calculated from the following:    Height as of this encounter: 1.626 m (5' 4\").    Weight as of this encounter: 97.2 kg (214 lb 4.8 oz).   Weight management plan: Discussed healthy diet and exercise guidelines          Tien Bradley is a 74 year old, presenting for the following health issues:  Breast (Patient states she found a lump on her left breast./Patient states left breast is bruised from falling./Patient states she fell on 07/29/2024 and was hospitalized for 4 days. /)        8/14/2024    12:44 PM   Additional Questions   Roomed by Jean Carlos Solorzano MA   Accompanied by , Gene     History of Present Illness       Heart Failure:  She " "presents for follow up of heart failure. She is not experiencing shortness of breath at night, with rest or with activity  She is experiencing lower extremity edema which is worse than usual.   She has orthopenea and is not coughing at night. Patient is checking weight daily. She has recently had a weight decrease.  She has no side effects from medications.  She has has a medical visit for heart failure 1 time since the last visit.    She eats 2-3 servings of fruits and vegetables daily.She exercises with enough effort to increase her heart rate 9 or less minutes per day.  She exercises with enough effort to increase her heart rate 3 or less days per week.      Last mastectomy was 32 years ago.      Breast Concern  Onset/Duration: 09/13/2024  Description:   Location: Left breast  Pain or tenderness: YES  Redness: YES  Intensity: None  Progression of Symptoms: same  Accompanying Signs & Symptoms:  Any lumps in axillary region: No  Movable: YES  Nipple discharge: None  Changes in the skin or nipple: Bruise  On Hormone therapy: No  Does it change with menstrual cycle: No  Previous history of similar problem: None  First degree relative with breast cancer: None  Precipitating factors:           Worsened by: None  Alleviating factors:            Improved by: None  Therapies tried and outcome: None  No LMP recorded (lmp unknown). Patient has had a hysterectomy.        Review of Systems  Constitutional, HEENT, cardiovascular, pulmonary, gi and gu systems are negative, except as otherwise noted.      Objective    /72 (BP Location: Right arm, Patient Position: Sitting, Cuff Size: Adult Regular)   Pulse 82   Temp 98.2  F (36.8  C) (Oral)   Resp 18   Ht 1.626 m (5' 4\")   Wt 97.2 kg (214 lb 4.8 oz)   LMP  (LMP Unknown)   SpO2 98%   BMI 36.78 kg/m    Body mass index is 36.78 kg/m .  Physical Exam   GENERAL: alert and no distress  NECK: no adenopathy, no asymmetry, masses, or scars  RESP: lungs clear to auscultation " - no rales, rhonchi or wheezes  BREAST: mass left breast with hematoma at approximately 9 oclock on the left breast medial to the nipple.  CV: regular rate and rhythm, normal S1 S2, no S3 or S4, no murmur, click or rub, no peripheral edema  ABDOMEN: soft, nontender, no hepatosplenomegaly, no masses and bowel sounds normal  MS: significant edema to knee and RLE exam shows significant bruising length of posterior leg, swelling with some open ulcerations starting on the inferior patella/upper leg starting, mildly increased warmth, no significant erythema            Signed Electronically by: Kim Mayorga MD

## 2024-08-15 ENCOUNTER — TELEPHONE (OUTPATIENT)
Dept: FAMILY MEDICINE | Facility: CLINIC | Age: 74
End: 2024-08-15
Payer: COMMERCIAL

## 2024-08-15 DIAGNOSIS — E87.6 HYPOKALEMIA: Primary | ICD-10-CM

## 2024-08-15 DIAGNOSIS — K86.2 PANCREATIC CYST: ICD-10-CM

## 2024-08-15 DIAGNOSIS — Z51.81 ENCOUNTER FOR THERAPEUTIC DRUG MONITORING: Primary | ICD-10-CM

## 2024-08-15 LAB — BACTERIA UR CULT: ABNORMAL

## 2024-08-15 RX ORDER — SPIRONOLACTONE 25 MG/1
12.5 TABLET ORAL
Qty: 30 TABLET | Refills: 1 | Status: SHIPPED | OUTPATIENT
Start: 2024-08-15 | End: 2024-08-15

## 2024-08-15 NOTE — TELEPHONE ENCOUNTER
Relayed Dr. Hernandez's message.     Pt states she was prescribed potassium also, will take it daily.     Pt informed to call [phone] to schedule her CT.

## 2024-08-15 NOTE — TELEPHONE ENCOUNTER
The spironolactone helps with low potassium. She can stop it if she is taking the potassium supplementation consistently    She needs to set up imaging for follow up on her pancreatic cyst as well. This has been ordered

## 2024-08-15 NOTE — TELEPHONE ENCOUNTER
S-(situation): Patient saw Dr. Hernandez yesterday and she states they thought she was not on a diuretic but now she realizes she is.     B-(background): Was prescribed spironolactone 8/2/24 from [facility]. Then was prescribed furosemide 8/14/24.    A-(assessment): Patient is out of spironolactone so she would like to know if she should be taking just the furosemide. She did not think the spironolactone did anything for her anyways.     R-(recommendations): Please reach out to patient today to advise and discontinue any med changes.

## 2024-08-16 ENCOUNTER — TELEPHONE (OUTPATIENT)
Dept: FAMILY MEDICINE | Facility: CLINIC | Age: 74
End: 2024-08-16
Payer: COMMERCIAL

## 2024-08-16 DIAGNOSIS — Z87.440 HISTORY OF UTI: Primary | ICD-10-CM

## 2024-08-16 RX ORDER — CEPHALEXIN 500 MG/1
500 CAPSULE ORAL 2 TIMES DAILY
Qty: 14 CAPSULE | Refills: 0 | Status: SHIPPED | OUTPATIENT
Start: 2024-08-16 | End: 2024-08-21

## 2024-08-16 NOTE — TELEPHONE ENCOUNTER
August 16, 2024    Home health orders was received via fax for Dr. Hernandez. Patient label was attached to paperwork and placed in provider's inbox to be signed.    Order #'s [id number] and [id number]  SUMI Campbell

## 2024-08-19 NOTE — TELEPHONE ENCOUNTER
Completed form received and faxed to [phone].    Copy placed in forms bin until scanned into chart.

## 2024-08-20 ENCOUNTER — MYC REFILL (OUTPATIENT)
Dept: INTERNAL MEDICINE | Facility: CLINIC | Age: 74
End: 2024-08-20
Payer: COMMERCIAL

## 2024-08-20 DIAGNOSIS — G45.3 AFX (AMAUROSIS FUGAX): ICD-10-CM

## 2024-08-20 RX ORDER — ATORVASTATIN CALCIUM 80 MG/1
80 TABLET, FILM COATED ORAL EVERY EVENING
Qty: 90 TABLET | Refills: 2 | Status: SHIPPED | OUTPATIENT
Start: 2024-08-20

## 2024-08-21 ENCOUNTER — TELEPHONE (OUTPATIENT)
Dept: FAMILY MEDICINE | Facility: CLINIC | Age: 74
End: 2024-08-21

## 2024-08-21 ENCOUNTER — OFFICE VISIT (OUTPATIENT)
Dept: FAMILY MEDICINE | Facility: CLINIC | Age: 74
End: 2024-08-21
Payer: COMMERCIAL

## 2024-08-21 VITALS
TEMPERATURE: 97.8 F | RESPIRATION RATE: 20 BRPM | OXYGEN SATURATION: 97 % | WEIGHT: 212 LBS | DIASTOLIC BLOOD PRESSURE: 74 MMHG | SYSTOLIC BLOOD PRESSURE: 120 MMHG | BODY MASS INDEX: 36.39 KG/M2 | HEART RATE: 84 BPM

## 2024-08-21 DIAGNOSIS — I50.30 HEART FAILURE WITH PRESERVED EJECTION FRACTION, NYHA CLASS I (H): ICD-10-CM

## 2024-08-21 DIAGNOSIS — E87.6 HYPOKALEMIA: Primary | ICD-10-CM

## 2024-08-21 DIAGNOSIS — N63.24 MASS OF LOWER INNER QUADRANT OF LEFT BREAST: ICD-10-CM

## 2024-08-21 LAB
ANION GAP SERPL CALCULATED.3IONS-SCNC: 10 MMOL/L (ref 7–15)
BUN SERPL-MCNC: 12.5 MG/DL (ref 8–23)
CALCIUM SERPL-MCNC: 9 MG/DL (ref 8.8–10.4)
CHLORIDE SERPL-SCNC: 104 MMOL/L (ref 98–107)
CREAT SERPL-MCNC: 0.65 MG/DL (ref 0.51–0.95)
EGFRCR SERPLBLD CKD-EPI 2021: >90 ML/MIN/1.73M2
GLUCOSE SERPL-MCNC: 109 MG/DL (ref 70–99)
HCO3 SERPL-SCNC: 23 MMOL/L (ref 22–29)
POTASSIUM SERPL-SCNC: 4.7 MMOL/L (ref 3.4–5.3)
SODIUM SERPL-SCNC: 137 MMOL/L (ref 135–145)

## 2024-08-21 PROCEDURE — 36415 COLL VENOUS BLD VENIPUNCTURE: CPT | Performed by: FAMILY MEDICINE

## 2024-08-21 PROCEDURE — 80048 BASIC METABOLIC PNL TOTAL CA: CPT | Performed by: FAMILY MEDICINE

## 2024-08-21 PROCEDURE — 99214 OFFICE O/P EST MOD 30 MIN: CPT | Performed by: FAMILY MEDICINE

## 2024-08-21 NOTE — TELEPHONE ENCOUNTER
Home Care is calling regarding an established patient with [facility].    Requesting orders from: Ambar Hernandez  Provider is following patient: Yes  Is this a 60-day recertification request?  No    Orders Requested    Skilled Nursing  Request for delay in care, service is not able to be provided within same scheduled day.   Patient is out of town, Home Care needing ok to miss nurse visit this week?       Information was gathered and will be sent to provider for review.  RN will contact Home Care with information after provider review.  Confirmed ok to leave a detailed message with call back.  Contact information confirmed and updated as needed.    Rivas's call back number is: [phone]    Amy Graham RN   [facility]

## 2024-08-21 NOTE — PROGRESS NOTES
Assessment & Plan     Hypokalemia  Repeat blood work today and will my chart with results  - Basic metabolic panel  (Ca, Cl, CO2, Creat, Gluc, K, Na, BUN)  - Basic metabolic panel  (Ca, Cl, CO2, Creat, Gluc, K, Na, BUN)    Heart failure with preserved ejection fraction, NYHA class I (H)  Stable.  Doing well on the lasix    Mass of lower inner quadrant of left breast  Will wait on US        MED REC REQUIRED  Post Medication Reconciliation Status:           Tien Bradley is a 74 year old, presenting for the following health issues:  Follow Up (BMP need repeat potassium since starting lasix. Was seen in hospital and no longer taking lasix was switched to furosemide was in hospital for potassium loss )      8/21/2024    12:27 PM   Additional Questions   Roomed by Kim HEWITT   Doing well on the lasix.  No issues.  Weight self daily.  Swelling decreased in the legs, bruising much improved.  US and Mammogram is tomorrow.            Review of Systems  Constitutional, HEENT, cardiovascular, pulmonary, gi and gu systems are negative, except as otherwise noted.      Objective    /74   Pulse 84   Temp 97.8  F (36.6  C) (Oral)   Resp 20   Wt 96.2 kg (212 lb)   LMP  (LMP Unknown)   SpO2 97%   BMI 36.39 kg/m    Body mass index is 36.39 kg/m .  Physical Exam   GENERAL: alert and no distress  NECK: no adenopathy, no asymmetry, masses, or scars  RESP: lungs clear to auscultation - no rales, rhonchi or wheezes  CV: regular rate and rhythm, normal S1 S2, no S3 or S4, no murmur, click or rub, no peripheral edema  ABDOMEN: soft, nontender, no hepatosplenomegaly, no masses and bowel sounds normal  MS: no gross musculoskeletal defects noted, no edema            Signed Electronically by: Kim Mayorga MD

## 2024-08-22 ENCOUNTER — ANCILLARY PROCEDURE (OUTPATIENT)
Dept: MAMMOGRAPHY | Facility: CLINIC | Age: 74
End: 2024-08-22
Attending: FAMILY MEDICINE
Payer: COMMERCIAL

## 2024-08-22 DIAGNOSIS — N63.24 MASS OF LOWER INNER QUADRANT OF LEFT BREAST: ICD-10-CM

## 2024-08-22 PROCEDURE — G0279 TOMOSYNTHESIS, MAMMO: HCPCS

## 2024-08-22 PROCEDURE — 76642 ULTRASOUND BREAST LIMITED: CPT | Mod: LT

## 2024-08-23 ENCOUNTER — HOSPITAL ENCOUNTER (OUTPATIENT)
Dept: CT IMAGING | Facility: HOSPITAL | Age: 74
Discharge: HOME | End: 2024-08-23
Attending: STUDENT IN AN ORGANIZED HEALTH CARE EDUCATION/TRAINING PROGRAM | Admitting: STUDENT IN AN ORGANIZED HEALTH CARE EDUCATION/TRAINING PROGRAM
Payer: COMMERCIAL

## 2024-08-23 DIAGNOSIS — K86.2 PANCREATIC CYST: ICD-10-CM

## 2024-08-23 PROCEDURE — 250N000009 HC RX 250: Performed by: STUDENT IN AN ORGANIZED HEALTH CARE EDUCATION/TRAINING PROGRAM

## 2024-08-23 PROCEDURE — 250N000011 HC RX IP 250 OP 636: Performed by: STUDENT IN AN ORGANIZED HEALTH CARE EDUCATION/TRAINING PROGRAM

## 2024-08-23 PROCEDURE — 74170 CT ABD WO CNTRST FLWD CNTRST: CPT

## 2024-08-23 PROCEDURE — 93248 EXT ECG>7D<15D REV&INTERPJ: CPT | Performed by: INTERNAL MEDICINE

## 2024-08-23 RX ORDER — IOPAMIDOL 755 MG/ML
90 INJECTION, SOLUTION INTRAVASCULAR ONCE
Status: COMPLETED | OUTPATIENT
Start: 2024-08-23 | End: 2024-08-23

## 2024-08-23 RX ADMIN — IOPAMIDOL 90 ML: 755 INJECTION, SOLUTION INTRAVENOUS at 13:03

## 2024-08-23 RX ADMIN — SODIUM CHLORIDE 90 ML: 9 INJECTION, SOLUTION INTRAVENOUS at 13:04

## 2024-08-26 DIAGNOSIS — N63.20 MASS OF LEFT BREAST, UNSPECIFIED QUADRANT: Primary | ICD-10-CM

## 2024-08-26 DIAGNOSIS — D49.0 IPMN (INTRADUCTAL PAPILLARY MUCINOUS NEOPLASM): Primary | ICD-10-CM

## 2024-08-26 DIAGNOSIS — R92.2 INCONCLUSIVE MAMMOGRAM: ICD-10-CM

## 2024-08-30 ENCOUNTER — OFFICE VISIT (OUTPATIENT)
Dept: ENDOCRINOLOGY | Facility: CLINIC | Age: 74
End: 2024-08-30
Payer: COMMERCIAL

## 2024-08-30 VITALS
DIASTOLIC BLOOD PRESSURE: 66 MMHG | SYSTOLIC BLOOD PRESSURE: 130 MMHG | BODY MASS INDEX: 36.48 KG/M2 | WEIGHT: 212.5 LBS | HEART RATE: 80 BPM

## 2024-08-30 DIAGNOSIS — Z85.850 HISTORY OF THYROID CANCER: Primary | ICD-10-CM

## 2024-08-30 DIAGNOSIS — E03.9 HYPOTHYROIDISM, UNSPECIFIED TYPE: ICD-10-CM

## 2024-08-30 DIAGNOSIS — M81.0 OSTEOPOROSIS WITHOUT CURRENT PATHOLOGICAL FRACTURE, UNSPECIFIED OSTEOPOROSIS TYPE: ICD-10-CM

## 2024-08-30 DIAGNOSIS — Z86.39 HISTORY OF PRIMARY HYPERPARATHYROIDISM: ICD-10-CM

## 2024-08-30 PROCEDURE — 99417 PROLNG OP E/M EACH 15 MIN: CPT | Performed by: INTERNAL MEDICINE

## 2024-08-30 PROCEDURE — 99215 OFFICE O/P EST HI 40 MIN: CPT | Performed by: INTERNAL MEDICINE

## 2024-08-30 RX ORDER — LEVOTHYROXINE SODIUM 137 UG/1
137 TABLET ORAL DAILY
Qty: 90 TABLET | Refills: 1 | Status: SHIPPED | OUTPATIENT
Start: 2024-08-30

## 2024-08-30 NOTE — PROGRESS NOTES
"  ENDOCRINOLOGY FOLLOW-UP         HISTORY OF PRESENT ILLNESS    Mirna Grijalva is seen for follow-up on the issues below.  The patient is accompanied by her  and also her daughter, Jada.    I had initial visit with patient in 7/2024, however we had limited past records so we are visiting today to reevaluate her endocrine conditions.    I reviewed her prior medical records and additional information that I derived from these records is included and pertinent endocrine history noted below.  Reviewed records are scanned in EMR.    1.  History of primary hyperparathyroidism.  We have discussed aiming for some dietary calcium intake but Ms. Grijalva notes that she has had some difficulty with this since she does not usually eat dairy products.  She does have cottage cheese each morning.    Previously on Centrum Silver, off this supplement.    Had an episode of syncope and collapse on 7/31/2024, suspected to be due to orthostatic hypotension versus cardiac etiology.  She was also significantly hypokalemic.  Thankfully, she did not sustain a fracture with her fall.    2.  Papillary thyroid carcinoma.  She has not noted a change in the feel of her neck (no neck masses or discomfort).    3.  Hypothyroidism.  Continues on levothyroxine 137 mcg daily: She typically takes this first thing in the morning with water and may go back to sleep.  She has all of her other medications (including Tums) before she has breakfast.  She has also recently been prescribed magnesium supplement.    Pertinent endocrine and related history:  1.  Primary hyperparathyroidism.  Evaluated at [facility], Dr. Walden, in 2016.  At that time both calcium and PTH were elevated: The patient was on hydrochlorothiazide, therefore medication was discontinued and calcium reevaluated.  Hypercalcemia persisted off hydrochlorothiazide and initially careful observation is recommended.  -Parathyroid sestamibi in 2017 which \"was not " "definitive\"  -Parathyroidectomy 11/6/2017; surgeons note also mentions \"excision of parathyroid #2 and 3, leaving her with 1 remaining fourth parathyroid gland) in 2018--presumably at the time of completion thyroidectomy as below.  -T11 fracture during breast MRI in 2017.  2. Osteoporosis. By virtue of low trauma fracture reported in 2017.  -DXA scan performed in our system on 2/13/2024 (study included one third radius) showed low bone density.  Lowest T-score was -2.2 at the one third radius.  -Vertebral fracture as above.  3. Papillary thyroid carcinoma.   -Initially had left lobectomy on 11/6/2017 after nodule was discovered intraoperatively.  According to endocrinology progress note pathology \"showed a 2.4 mm papillary cancer with tall cell features\".  She had completion thyroidectomy on 11/21/2017.  Right thyroid lobe did not have pathologic abnormalities.  -4/19/2022: Thyroglobulin antibodies 1.6 IU/mL (<3.9), thyroglobulin less than 0.1 ng/mL, concurrent TSH 2.43  -No history of excessive head or neck radiation exposure.  Mother with hypothyroidism, daughter  Jada with benign thyroid nodule.  No family history of thyroid cancer.  4.  Hypothyroidism.  Post thyroidectomy.    The patient's medical history is also notable for the following:  -Heart failure with preserved ejection fraction  -Hypertension  -Episode of amaurosis fugax  -Hemochromatosis gene carrier  -History of breast cancer    Pertinent Social History: , moved back to [place] (was living in [place]).  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    PAST MEDICAL HISTORY  Past Medical History:   Diagnosis Date    Arthritis years ago?    Cancer (H) 1988  & 2016?    Coronary artery disease May 2024    Glaucoma (increased eye pressure) About 11 years    Hypertension years ago?    Mumps     Nonsenile cataract About 11 years    Thyroid cancer (H)     TIA (transient ischemic attack) 04/27/2024    right eye "       MEDICATIONS  Current Outpatient Medications   Medication Sig Dispense Refill    acetaminophen (TYLENOL) 500 MG tablet Take 1 tablet (500 mg) by mouth every 6 hours as needed for mild pain 60 tablet 1    aspirin 81 MG EC tablet Take 1 tablet (81 mg) by mouth daily 90 tablet 2    atorvastatin (LIPITOR) 80 MG tablet Take 1 tablet (80 mg) by mouth every evening 90 tablet 2    bimatoprost (LUMIGAN) 0.01 % SOLN Place 1 drop into the right eye at bedtime 7.5 mL 3    Cranberry-Vitamin C (CRANBERRY CONCENTRATE/VITAMINC) 43431-249 MG CAPS Take 2 capsules by mouth daily      empagliflozin (JARDIANCE) 10 MG TABS tablet Take 1 tablet (10 mg) by mouth daily 90 tablet 1    furosemide (LASIX) 20 MG tablet Take 1 tablet (20 mg) by mouth daily 90 tablet 0    levothyroxine (SYNTHROID/LEVOTHROID) 137 MCG tablet Take 1 tablet (137 mcg) by mouth daily. 90 tablet 1    Magnesium Oxide 250 MG tablet Take 1 tablet (250 mg) by mouth daily*      Melatonin 10-10 MG TBCR Take 1 tablet by mouth at bedtime      metoprolol tartrate (LOPRESSOR) 25 MG tablet Take 1 tablet (25 mg) by mouth 2 times daily 180 tablet 1    oxyCODONE (ROXICODONE) 5 MG tablet Take 1 tablet (5 mg) by mouth every 4 hours as needed for severe pain 16 tablet 0    potassium chloride luis angel ER (KLOR-CON M20) 20 MEQ CR tablet Take 1 tablet (20 mEq) by mouth 2 times daily      sertraline (ZOLOFT) 25 MG tablet Take 1 tablet (25 mg) by mouth daily 90 tablet 0    thiamine (B-1) 100 MG tablet Take 1 tablet (100 mg) by mouth daily 100 tablet 3       Allergies, family, and social history were reviewed and documented as needed in EHR.     REVIEW OF SYSTEMS  A focused ROS was performed, with pertinent positives and negatives as noted in the HPI.    PHYSICAL EXAM  /66 (BP Location: Right arm, Patient Position: Sitting, Cuff Size: Adult Large)   Pulse 80   Wt 96.4 kg (212 lb 8 oz)   LMP  (LMP Unknown)   BMI 36.48 kg/m    Body mass index is 36.48 kg/m .  Constitutional: Vital  signs reviewed, as recorded above. Patient is alert, oriented and appears in no acute distress.  Eyes: PER, EOMI, no stare, lid lag, or retraction; no conjunctival injection.  ENMT: OP clear with moist MM. Lips are without lesions.   Neck: Neck supple, no palpable thyroid tissue or neck masses.  Lymphatic: No cervical or supraclavicular LAD.  Cardiovascular: RRR, normal S1/S2, no audible murmurs, rubs or gallops; bilateral ankle edema.  Respiratory: CTAB, without wheezes, crackles or rhonchi; normal chest wall motion and respiratory effort.  MSK: No clubbing or cyanosis; normal muscle bulk and tone.  Skin: Normal skin color, temperature, turgor and texture.  Neurological: Alert and oriented times 3. No tremor.      DATA REVIEW  Each of the following laboratory and/or imaging studies were reviewed.      ASSESSMENT  1.  History of primary hyperparathyroidism.  Status post 3-gland parathyroidectomy.  Had 1 episode of hypercalcemia captured on 7/19/2024, normocalcemic since then: Instance of hypercalcemia may have been spuriously.  PTH has been elevated and this may be due to insufficient calcium intake (vitamin D level is in acceptable range).  Would therefore recommend low dose of calcium supplement and reevaluation of calcium metabolism labs after 1 month: If serum calcium remains normal, we will adjust calcium supplement to maintain sufficient intake for bone health.    2.  History of papillary thyroid microcarcinoma.  Endocrinology progress note indicates there may have been concern for tall cell variant.  I am awaiting records of surgical pathology.  In the meantime, we will aim for thyroid hormone suppression with TSH between lower limit of normal and 2 and check thyroglobulin as well as neck ultrasound.    3.  Hypothyroidism.  Postsurgical.  As above, thyroid hormone suppression with TSH no greater than 2 until we have more data with regards to thyroid cancer status.  We discussed how to take levothyroxine to  "maximize its absorption.    4.  Osteoporosis.  By virtue of thoracic vertebral fracture reported in past endocrine records.  DXA scan in 2/2024 showed low bone density.  Adjust calcium intake as above.  Once calcium intake is optimized, would be a candidate for pharmacologic therapy: We will discuss this at future visit.    PLAN  -Change timing of TUMS to lunchtime and take 2 TUMS with lunch each day  -Send me a message via [employer] on the strength of TUMS tablets  -Separate all calcium, magnesium from levothyroxine by at least 4 hours  -Continue the remainder of medications without changes  -We will await records from Dr. Kevin Parikh (ENT, [facility], [facility])   -Labs in 1 month  -Schedule neck ultrasound  -Return for a follow-up visit earliest available opening  -We will communicate results via [employer] or if needed by phone      ADDENDUM 10/7/2024:    Received and reviewed records from [facility].    Surgical pathology from 11/6/2017:  -\"Parathyroid gland, left parathyroidectomy\": 209 mg focally hypercellular parathyroid gland  -\"Parathyroid gland, left inferior parathyroidectomy\": Benign lymph node  -\"Thyroid gland, left lobe excision\": Papillary thyroid microcarcinoma, 2.4 mm.  Comment in surgical pathology reports indicates that this was \"conventional papillary thyroid microcarcinoma.\" No lymph node or parathyroid tissue is identified.  Also noted was that this was \"pending outside consultation.\"  -\"Lymph nodes, left neck dissection\": No metastatic carcinoma is identified in 3 lymph nodes (0 out of 3)    Amended report from 11/21/2017 indicates the following: \"noninvasive follicular thyroid neoplasm with papillary-like nuclear features, NIFPT, 2.2 cm.\"  Also noted was a separate papillary microcarcinoma with tall cell features (2.4 mm).  Background multinodular hyperplasia.    Surgical pathology from 12/28/2017:   -\"Parathyroid gland, right " "inferior\": Right inferior parathyroid, 1.3 g hypercellular parathyroid gland.  -\"Parathyroid gland, right\": Benign thyroid tissue  -\"Parathyroid gland, right superior\": 222 mg hypercellular parathyroid gland  -\"Thyroid gland, right lobe, completion thyroidectomy\": Adenomatoid nodule, mild lymphocytic thyroiditis, no lymph node or parathyroid tissue is identified.  -\"Soft tissue, subcutaneous\": Inflamed foreign body granuloma    Nuclear medicine parathyroid sestamibi scan performed on 8/28/2017 showed bilateral tracer retention in the thyroid bed, inferior aspect of the right and mid aspect of the left thyroid.    Ultrasound performed on 10/17/2017 showed solid hypoechoic nodules located inferior to the right lobe and posterior to the interpolar left lobe of the thyroid, possibly representing bilateral parathyroid adenomas.  Also noted was a solid 2.5 cm TI-RADS 3 nodule in the left lobe of the thyroid.    Arlene Crabtree MD 10/7/2024 4:57 PM      Orders Placed This Encounter   Procedures    US Head Neck Soft Tissue    Vitamin D Deficiency    Comprehensive metabolic panel    TSH with free T4 reflex    Thyroglobulin and Antibody (Sendout to [facility])    Parathyroid Hormone Intact         I spent a total of 78 minutes on the date of encounter reviewing medical records, evaluating the patient, coordinating care and documenting in the EHR, as detailed above.      Arlene Crabtree MD   Division of Diabetes, Endocrinology and Metabolism  Department of Medicine        "

## 2024-08-30 NOTE — PATIENT INSTRUCTIONS
-Change timing of TUMS to lunchtime and take 2 TUMS with lunch each day  -Send me a message via [employer] on the strength of TUMS tablets  -Separate all calcium, magnesium from levothyroxine by at least 4 hours  -Continue the remainder of medications without changes  -We will await records from Dr. Kevin Parikh (ENT, [facility], [facility])   -Labs in 1 month  -Schedule neck ultrasound  -Return for a follow-up visit earliest available opening  -We will communicate results via [employer], or if needed by phone

## 2024-08-30 NOTE — LETTER
8/30/2024      Mirna Grijalva  [address] [facility] [address]      Dear Colleague,    Thank you for referring your patient, Mirna Grijalva, to the [facility]. Please see a copy of my visit note below.      ENDOCRINOLOGY FOLLOW-UP         HISTORY OF PRESENT ILLNESS    Mirna Grijalva is seen for follow-up on the issues below.  The patient is accompanied by her  and also her daughter, Jada.    I had initial visit with patient in 7/2024, however we had limited past records so we are visiting today to reevaluate her endocrine conditions.    I reviewed her prior medical records and additional information that I derived from these records is included and pertinent endocrine history noted below.  Reviewed records are scanned in EMR.    1.  History of primary hyperparathyroidism.  We have discussed aiming for some dietary calcium intake but Ms. Grijalva notes that she has had some difficulty with this since she does not usually eat dairy products.  She does have cottage cheese each morning.    Previously on Centrum Silver, off this supplement.    Had an episode of syncope and collapse on 7/31/2024, suspected to be due to orthostatic hypotension versus cardiac etiology.  She was also significantly hypokalemic.  Thankfully, she did not sustain a fracture with her fall.    2.  Papillary thyroid carcinoma.  She has not noted a change in the feel of her neck (no neck masses or discomfort).    3.  Hypothyroidism.  Continues on levothyroxine 137 mcg daily: She typically takes this first thing in the morning with water and may go back to sleep.  She has all of her other medications (including Tums) before she has breakfast.  She has also recently been prescribed magnesium supplement.    Pertinent endocrine and related history:  1.  Primary hyperparathyroidism.  Evaluated at [facility], Dr. Walden, in 2016.  At that time both calcium and PTH were elevated: The patient was on  "hydrochlorothiazide, therefore medication was discontinued and calcium reevaluated.  Hypercalcemia persisted off hydrochlorothiazide and initially careful observation is recommended.  -Parathyroid sestamibi in 2017 which \"was not definitive\"  -Parathyroidectomy 11/6/2017; surgeons note also mentions \"excision of parathyroid #2 and 3, leaving her with 1 remaining fourth parathyroid gland) in 2018--presumably at the time of completion thyroidectomy as below.  -T11 fracture during breast MRI in 2017.  2. Osteoporosis. By virtue of low trauma fracture reported in 2017.  -DXA scan performed in our system on 2/13/2024 (study included one third radius) showed low bone density.  Lowest T-score was -2.2 at the one third radius.  -Vertebral fracture as above.  3. Papillary thyroid carcinoma.   -Initially had left lobectomy on 11/6/2017 after nodule was discovered intraoperatively.  According to endocrinology progress note pathology \"showed a 2.4 mm papillary cancer with tall cell features\".  She had completion thyroidectomy on 11/21/2017.  Right thyroid lobe did not have pathologic abnormalities.  -4/19/2022: Thyroglobulin antibodies 1.6 IU/mL (<3.9), thyroglobulin less than 0.1 ng/mL, concurrent TSH 2.43  -No history of excessive head or neck radiation exposure.  Mother with hypothyroidism, daughter  Jada with benign thyroid nodule.  No family history of thyroid cancer.  4.  Hypothyroidism.  Post thyroidectomy.    The patient's medical history is also notable for the following:  -Heart failure with preserved ejection fraction  -Hypertension  -Episode of amaurosis fugax  -Hemochromatosis gene carrier  -History of breast cancer    Pertinent Social History: , moved back to [place] (was living in [place]).  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    PAST MEDICAL HISTORY  Past Medical History:   Diagnosis Date     Arthritis years ago?     Cancer (H) 1988  & 2016?     Coronary " artery disease May 2024     Glaucoma (increased eye pressure) About 11 years     Hypertension years ago?     Mumps      Nonsenile cataract About 11 years     Thyroid cancer (H)      TIA (transient ischemic attack) 04/27/2024    right eye       MEDICATIONS  Current Outpatient Medications   Medication Sig Dispense Refill     acetaminophen (TYLENOL) 500 MG tablet Take 1 tablet (500 mg) by mouth every 6 hours as needed for mild pain 60 tablet 1     aspirin 81 MG EC tablet Take 1 tablet (81 mg) by mouth daily 90 tablet 2     atorvastatin (LIPITOR) 80 MG tablet Take 1 tablet (80 mg) by mouth every evening 90 tablet 2     bimatoprost (LUMIGAN) 0.01 % SOLN Place 1 drop into the right eye at bedtime 7.5 mL 3     Cranberry-Vitamin C (CRANBERRY CONCENTRATE/VITAMINC) 71737-460 MG CAPS Take 2 capsules by mouth daily       empagliflozin (JARDIANCE) 10 MG TABS tablet Take 1 tablet (10 mg) by mouth daily 90 tablet 1     furosemide (LASIX) 20 MG tablet Take 1 tablet (20 mg) by mouth daily 90 tablet 0     levothyroxine (SYNTHROID/LEVOTHROID) 137 MCG tablet Take 1 tablet (137 mcg) by mouth daily. 90 tablet 1     Magnesium Oxide 250 MG tablet Take 1 tablet (250 mg) by mouth daily*       Melatonin 10-10 MG TBCR Take 1 tablet by mouth at bedtime       metoprolol tartrate (LOPRESSOR) 25 MG tablet Take 1 tablet (25 mg) by mouth 2 times daily 180 tablet 1     oxyCODONE (ROXICODONE) 5 MG tablet Take 1 tablet (5 mg) by mouth every 4 hours as needed for severe pain 16 tablet 0     potassium chloride luis angel ER (KLOR-CON M20) 20 MEQ CR tablet Take 1 tablet (20 mEq) by mouth 2 times daily       sertraline (ZOLOFT) 25 MG tablet Take 1 tablet (25 mg) by mouth daily 90 tablet 0     thiamine (B-1) 100 MG tablet Take 1 tablet (100 mg) by mouth daily 100 tablet 3       Allergies, family, and social history were reviewed and documented as needed in EHR.     REVIEW OF SYSTEMS  A focused ROS was performed, with pertinent positives and negatives as noted in  the HPI.    PHYSICAL EXAM  /66 (BP Location: Right arm, Patient Position: Sitting, Cuff Size: Adult Large)   Pulse 80   Wt 96.4 kg (212 lb 8 oz)   LMP  (LMP Unknown)   BMI 36.48 kg/m    Body mass index is 36.48 kg/m .  Constitutional: Vital signs reviewed, as recorded above. Patient is alert, oriented and appears in no acute distress.  Eyes: PER, EOMI, no stare, lid lag, or retraction; no conjunctival injection.  ENMT: OP clear with moist MM. Lips are without lesions.   Neck: Neck supple, no palpable thyroid tissue or neck masses.  Lymphatic: No cervical or supraclavicular LAD.  Cardiovascular: RRR, normal S1/S2, no audible murmurs, rubs or gallops; bilateral ankle edema.  Respiratory: CTAB, without wheezes, crackles or rhonchi; normal chest wall motion and respiratory effort.  MSK: No clubbing or cyanosis; normal muscle bulk and tone.  Skin: Normal skin color, temperature, turgor and texture.  Neurological: Alert and oriented times 3. No tremor.      DATA REVIEW  Each of the following laboratory and/or imaging studies were reviewed.      ASSESSMENT  1.  History of primary hyperparathyroidism.  Status post 3-gland parathyroidectomy.  Had 1 episode of hypercalcemia captured on 7/19/2024, normocalcemic since then: Instance of hypercalcemia may have been spuriously.  PTH has been elevated and this may be due to insufficient calcium intake (vitamin D level is in acceptable range).  Would therefore recommend low dose of calcium supplement and reevaluation of calcium metabolism labs after 1 month: If serum calcium remains normal, we will adjust calcium supplement to maintain sufficient intake for bone health.    2.  History of papillary thyroid microcarcinoma.  Endocrinology progress note indicates there may have been concern for tall cell variant.  I am awaiting records of surgical pathology.  In the meantime, we will aim for thyroid hormone suppression with TSH between lower limit of normal and 2 and check  thyroglobulin as well as neck ultrasound.    3.  Hypothyroidism.  Postsurgical.  As above, thyroid hormone suppression with TSH no greater than 2 until we have more data with regards to thyroid cancer status.  We discussed how to take levothyroxine to maximize its absorption.    4.  Osteoporosis.  By virtue of thoracic vertebral fracture reported in past endocrine records.  DXA scan in 2/2024 showed low bone density.  Adjust calcium intake as above.  Once calcium intake is optimized, would be a candidate for pharmacologic therapy: We will discuss this at future visit.    PLAN  -Change timing of TUMS to lunchtime and take 2 TUMS with lunch each day  -Send me a message via [email or web address] on the strength of TUMS tablets  -Separate all calcium, magnesium from levothyroxine by at least 4 hours  -Continue the remainder of medications without changes  -We will await records from Dr. Kevin Parikh (ENT, [facility], [facility])   -Labs in 1 month  -Schedule neck ultrasound  -Return for a follow-up visit earliest available opening  -We will communicate results via [email or web address], or if needed by phone      Orders Placed This Encounter   Procedures     US Head Neck Soft Tissue     Vitamin D Deficiency     Comprehensive metabolic panel     TSH with free T4 reflex     Thyroglobulin and Antibody (Sendout to [facility])     Parathyroid Hormone Intact         I spent a total of 78 minutes on the date of encounter reviewing medical records, evaluating the patient, coordinating care and documenting in the EHR, as detailed above.      Arlene Crabtree MD   Division of Diabetes, Endocrinology and Metabolism  Department of Medicine          Again, thank you for allowing me to participate in the care of your patient.        Sincerely,        Arlene Crabtree MD

## 2024-09-03 ENCOUNTER — TELEPHONE (OUTPATIENT)
Dept: FAMILY MEDICINE | Facility: CLINIC | Age: 74
End: 2024-09-03

## 2024-09-03 ENCOUNTER — LAB REQUISITION (OUTPATIENT)
Dept: LAB | Facility: HOSPITAL | Age: 74
End: 2024-09-03
Payer: COMMERCIAL

## 2024-09-03 DIAGNOSIS — N30.00 ACUTE CYSTITIS WITHOUT HEMATURIA: Primary | ICD-10-CM

## 2024-09-03 LAB
ALBUMIN UR-MCNC: 20 MG/DL
APPEARANCE UR: ABNORMAL
BACTERIA #/AREA URNS HPF: ABNORMAL /HPF
BILIRUB UR QL STRIP: NEGATIVE
COLOR UR AUTO: YELLOW
GLUCOSE UR STRIP-MCNC: 500 MG/DL
HGB UR QL STRIP: ABNORMAL
KETONES UR STRIP-MCNC: NEGATIVE MG/DL
LEUKOCYTE ESTERASE UR QL STRIP: ABNORMAL
NITRATE UR QL: POSITIVE
PH UR STRIP: 6 [PH] (ref 5–7)
RBC URINE: 9 /HPF
SP GR UR STRIP: 1.01 (ref 1–1.03)
UROBILINOGEN UR STRIP-MCNC: <2 MG/DL
WBC CLUMPS #/AREA URNS HPF: PRESENT /HPF
WBC URINE: >182 /HPF

## 2024-09-03 PROCEDURE — 87186 SC STD MICRODIL/AGAR DIL: CPT | Mod: ORL | Performed by: STUDENT IN AN ORGANIZED HEALTH CARE EDUCATION/TRAINING PROGRAM

## 2024-09-03 PROCEDURE — 87086 URINE CULTURE/COLONY COUNT: CPT | Mod: ORL | Performed by: STUDENT IN AN ORGANIZED HEALTH CARE EDUCATION/TRAINING PROGRAM

## 2024-09-03 PROCEDURE — 81001 URINALYSIS AUTO W/SCOPE: CPT | Mod: ORL | Performed by: STUDENT IN AN ORGANIZED HEALTH CARE EDUCATION/TRAINING PROGRAM

## 2024-09-03 RX ORDER — CEPHALEXIN 500 MG/1
500 CAPSULE ORAL 2 TIMES DAILY
Qty: 6 CAPSULE | Refills: 0 | Status: SHIPPED | OUTPATIENT
Start: 2024-09-03 | End: 2024-09-14

## 2024-09-03 NOTE — TELEPHONE ENCOUNTER
Order/Referral Request    Who is requesting: Patient     Orders being requested: Patient home care is bringing [facility] now    Reason service is needed/diagnosis: Pt symptoms Frequency cloudy    When are orders needed by: today home care is on the way to drop off urine sample 9/3/24 12:10 pm    Has this been discussed with Provider: No    Does patient have a preference on a Group/Provider/Facility? [facility]    Does patient have an appointment scheduled?: No    Where to send orders: Place orders within Epic    Could we send this information to you in [employer] or would you prefer to receive a phone call?:   Patient would prefer a phone call   Okay to leave a detailed message?: Yes at Cell number on file:    Telephone Information:   Mobile [phone]

## 2024-09-04 LAB — BACTERIA UR CULT: ABNORMAL

## 2024-09-05 ENCOUNTER — TELEPHONE (OUTPATIENT)
Dept: FAMILY MEDICINE | Facility: CLINIC | Age: 74
End: 2024-09-05

## 2024-09-05 ENCOUNTER — VIRTUAL VISIT (OUTPATIENT)
Dept: FAMILY MEDICINE | Facility: CLINIC | Age: 74
End: 2024-09-05
Payer: COMMERCIAL

## 2024-09-05 DIAGNOSIS — N30.00 ACUTE CYSTITIS WITHOUT HEMATURIA: Primary | ICD-10-CM

## 2024-09-05 PROCEDURE — 99442 PR PHYSICIAN TELEPHONE EVALUATION 11-20 MIN: CPT | Mod: 93 | Performed by: PHYSICIAN ASSISTANT

## 2024-09-05 RX ORDER — CIPROFLOXACIN 250 MG/1
250 TABLET, FILM COATED ORAL 2 TIMES DAILY
Qty: 14 TABLET | Refills: 0 | Status: SHIPPED | OUTPATIENT
Start: 2024-09-05 | End: 2024-09-12

## 2024-09-05 RX ORDER — LOSARTAN POTASSIUM 100 MG/1
100 TABLET ORAL DAILY
Qty: 90 TABLET | Refills: 0 | OUTPATIENT
Start: 2024-09-05

## 2024-09-05 NOTE — PATIENT INSTRUCTIONS
Complete course of keflex until gone  Take probiotic   Take cipro 250 mg twice a day for 7 days  I highly encourage a repeat urine test - contact home care to get that set up  Return urgently if any change in symptoms like abdominal pain, fever, nausea, vomiting or other change in symptoms .    Call nurse line if develop any side effects with cipro

## 2024-09-05 NOTE — PROGRESS NOTES
Mirna is a 74 year old who is being evaluated via a billable telephone visit.      Originating Location (pt. Location): Home    Distant Location (provider location):  Off-site    Assessment & Plan     Acute cystitis without hematuria  On keflex and symptoms improving, however, urine culture shows resistance- patient can't recall allergy to cipro- will treat with cipro and hopefully home care can get another urine sample on Monday  Warning signs and symptoms warranting urgent evaluation reviewed. No way to get a repeat urine sample today   - ciprofloxacin (CIPRO) 250 MG tablet  Dispense: 14 tablet; Refill: 0  - UA Macroscopic with reflex to Microscopic and Culture - Lab Collect              Patient Instructions   Complete course of keflex until gone  Take probiotic   Take cipro 250 mg twice a day for 7 days  I highly encourage a repeat urine test - contact home care to get that set up  Return urgently if any change in symptoms like abdominal pain, fever, nausea, vomiting or other change in symptoms .    Call nurse line if develop any side effects with cipro     Subjective   Mirna is a 74 year old, presenting for the following health issues:  UTI      9/5/2024    12:18 PM   Additional Questions   Roomed by Gege PUGH   Accompanied by self     UTI    History of Present Illness       Reason for visit:  Clarification of UTI  information from lab this morning.   She is taking medications regularly.       Genitourinary - Female  Onset/Duration: 3 days   Description: cephALEXin cephALEXin (KEFLEX) 500 MG capsule 3days x 2= 6 tabs      Painful urination (Dysuria): No           Frequency: No  Blood in urine (Hematuria): No  Delay in urine (Hesitency): No  Intensity: mild  Progression of Symptoms:  improving  Accompanying Signs & Symptoms:  Fever/chills: No  Flank pain: No  Nausea and vomiting: No  Vaginal symptoms: none  Abdominal/Pelvic Pain: No  History:   History of frequent UTI s: YES  History of kidney stones: No  Sexually  Active: No  Possibility of pregnancy: No  Precipitating or alleviating factors: None  Therapies tried and outcome: Increase fluid intake and  on keflex       Patient unfamiliar to me with history last filled hypertension  hypothyroidism, pulmonary edema, respiratory failure presents for follow up urinary tract infection. Had Cloudy urine and little bit of discomfort around clitoris- home care delivered urine specimen and culture grew out bacteria with multiple resistant bacteria.  Symptoms have improved. Cloudiness of urine is gone   Clear today- slightly yellow- before cloudy and foamy and cloudy but that is   Clitoris gets sore - has been applyin desitin and vaseline  Had urgency- using adult diapers  and filled with urine andWeighed a pound   Unsure when home care is coming back to home.   Home care is coming out once a week.  No way to travel.  is in surgery today and daughter is with him   She cannot recall any reaction to cipro- multiple drug allergies   Urine cuture with multiple drug resistance and not susceptible to any antibiotics other than infusions that patient isn't allergic to    Review of Systems  Constitutional, HEENT, cardiovascular, pulmonary, gi and gu systems are negative, except as otherwise noted.      Objective           Vitals:  No vitals were obtained today due to virtual visit.    Physical Exam   General: Alert and no distress //Respiratory: No audible wheeze, cough, or shortness of breath // Psychiatric:  Appropriate affect, tone, and pace of words      Lab Requisition on 09/03/2024   Component Date Value Ref Range Status    Color Urine 09/03/2024 Yellow  Colorless, Straw, Light Yellow, Yellow Final    Appearance Urine 09/03/2024 Cloudy (A)  Clear Final    Glucose Urine 09/03/2024 500 (A)  Negative mg/dL Final    Bilirubin Urine 09/03/2024 Negative  Negative Final    Ketones Urine 09/03/2024 Negative  Negative mg/dL Final    Specific Gravity Urine 09/03/2024 1.008  1.001 - 1.030  Final    Blood Urine 09/03/2024 0.1 mg/dL (A)  Negative Final    pH Urine 09/03/2024 6.0  5.0 - 7.0 Final    Protein Albumin Urine 09/03/2024 20 (A)  Negative mg/dL Final    Urobilinogen Urine 09/03/2024 <2.0  <2.0 mg/dL Final    Nitrite Urine 09/03/2024 Positive (A)  Negative Final    Leukocyte Esterase Urine 09/03/2024 500 Caitlin/uL (A)  Negative Final    Bacteria Urine 09/03/2024 Few (A)  None Seen /HPF Final    WBC Clumps Urine 09/03/2024 Present (A)  None Seen /HPF Final    RBC Urine 09/03/2024 9 (H)  <=2 /HPF Final    WBC Urine 09/03/2024 >182 (H)  <=5 /HPF Final    Culture 09/03/2024 >100,000 CFU/mL Citrobacter freundii complex (A)   Final         Phone call duration: 14 minutes  Signed Electronically by: Mabel Batres PA-C

## 2024-09-05 NOTE — TELEPHONE ENCOUNTER
----- Message from Ambar Hernandez sent at 9/5/2024  1:00 PM CDT -----  Call patient x 2 and if no answer, please send letter     The antibiotics that were sent over didn't cover for the bacteria growing in your urine. You need to be on a new antibiotic but you have listed allergies to all the other options. Allergy listed to sulfa is a rash on the feet. This is not life threatening. Can we try the sulfa medication and do an antihistamine at the same time to prevent the rash?

## 2024-09-10 ENCOUNTER — TELEPHONE (OUTPATIENT)
Dept: FAMILY MEDICINE | Facility: CLINIC | Age: 74
End: 2024-09-10
Payer: COMMERCIAL

## 2024-09-10 NOTE — TELEPHONE ENCOUNTER
Patient calling in to report she has been taking Cipro with out any rash or side effects, further states she will remove cipro from her allergy list.  Patient received letter see below-      The antibiotics that were sent over didn't cover for the bacteria growing in your urine. You need to be on a new antibiotic but you have listed allergies to all the other options. Allergy listed to sulfa is a rash on the feet. This is not life threatening. Can we try the sulfa medication and do an antihistamine at the same time to prevent the rash?      Further reports her symptoms are resolving and she will finish cipro as directed.

## 2024-09-12 ENCOUNTER — HOSPITAL ENCOUNTER (OUTPATIENT)
Dept: ULTRASOUND IMAGING | Facility: HOSPITAL | Age: 74
Discharge: HOME | End: 2024-09-12
Attending: INTERNAL MEDICINE
Payer: COMMERCIAL

## 2024-09-12 ENCOUNTER — HOSPITAL ENCOUNTER (OUTPATIENT)
Dept: ULTRASOUND IMAGING | Facility: HOSPITAL | Age: 74
Discharge: HOME | End: 2024-09-12
Attending: STUDENT IN AN ORGANIZED HEALTH CARE EDUCATION/TRAINING PROGRAM
Payer: COMMERCIAL

## 2024-09-12 DIAGNOSIS — Z83.49 FAMILY HISTORY OF HEMOCHROMATOSIS: ICD-10-CM

## 2024-09-12 DIAGNOSIS — Z85.850 HISTORY OF THYROID CANCER: ICD-10-CM

## 2024-09-12 DIAGNOSIS — R79.89 ELEVATED FERRITIN: ICD-10-CM

## 2024-09-12 PROCEDURE — 76705 ECHO EXAM OF ABDOMEN: CPT

## 2024-09-12 PROCEDURE — 76536 US EXAM OF HEAD AND NECK: CPT

## 2024-09-14 ENCOUNTER — APPOINTMENT (OUTPATIENT)
Dept: MRI IMAGING | Facility: HOSPITAL | Age: 74
End: 2024-09-14
Attending: EMERGENCY MEDICINE
Payer: COMMERCIAL

## 2024-09-14 ENCOUNTER — HOSPITAL ENCOUNTER (OUTPATIENT)
Facility: HOSPITAL | Age: 74
Setting detail: OBSERVATION
Discharge: HOME | End: 2024-09-15
Attending: EMERGENCY MEDICINE | Admitting: STUDENT IN AN ORGANIZED HEALTH CARE EDUCATION/TRAINING PROGRAM
Payer: COMMERCIAL

## 2024-09-14 DIAGNOSIS — G45.3 AMAUROSIS FUGAX: Primary | ICD-10-CM

## 2024-09-14 DIAGNOSIS — G45.9 TIA (TRANSIENT ISCHEMIC ATTACK): ICD-10-CM

## 2024-09-14 DIAGNOSIS — H53.9 VISION CHANGES: ICD-10-CM

## 2024-09-14 LAB
ANION GAP SERPL CALCULATED.3IONS-SCNC: 11 MMOL/L (ref 7–15)
APTT PPP: 28 SECONDS (ref 22–38)
BUN SERPL-MCNC: 7.8 MG/DL (ref 8–23)
CALCIUM SERPL-MCNC: 8.5 MG/DL (ref 8.8–10.4)
CHLORIDE SERPL-SCNC: 103 MMOL/L (ref 98–107)
CHOLEST SERPL-MCNC: 186 MG/DL
CREAT SERPL-MCNC: 0.55 MG/DL (ref 0.51–0.95)
EGFRCR SERPLBLD CKD-EPI 2021: >90 ML/MIN/1.73M2
ERYTHROCYTE [DISTWIDTH] IN BLOOD BY AUTOMATED COUNT: 14.7 % (ref 10–15)
FASTING STATUS PATIENT QL REPORTED: NO
FERRITIN SERPL-MCNC: 203 NG/ML (ref 11–328)
GLUCOSE BLDC GLUCOMTR-MCNC: 125 MG/DL (ref 70–99)
GLUCOSE BLDC GLUCOMTR-MCNC: 139 MG/DL (ref 70–99)
GLUCOSE SERPL-MCNC: 105 MG/DL (ref 70–99)
HBA1C MFR BLD: 5 %
HCO3 SERPL-SCNC: 25 MMOL/L (ref 22–29)
HCT VFR BLD AUTO: 42.7 % (ref 35–47)
HDLC SERPL-MCNC: 78 MG/DL
HGB BLD-MCNC: 14.4 G/DL (ref 11.7–15.7)
INR PPP: 0.94 (ref 0.85–1.15)
LDLC SERPL CALC-MCNC: 86 MG/DL
MAGNESIUM SERPL-MCNC: 2 MG/DL (ref 1.7–2.3)
MCH RBC QN AUTO: 37.8 PG (ref 26.5–33)
MCHC RBC AUTO-ENTMCNC: 33.7 G/DL (ref 31.5–36.5)
MCV RBC AUTO: 112 FL (ref 78–100)
NONHDLC SERPL-MCNC: 108 MG/DL
PHOSPHATE SERPL-MCNC: 3.5 MG/DL (ref 2.5–4.5)
PLATELET # BLD AUTO: 193 10E3/UL (ref 150–450)
POTASSIUM SERPL-SCNC: 4.2 MMOL/L (ref 3.4–5.3)
RBC # BLD AUTO: 3.81 10E6/UL (ref 3.8–5.2)
SODIUM SERPL-SCNC: 139 MMOL/L (ref 135–145)
TRIGL SERPL-MCNC: 110 MG/DL
TROPONIN T SERPL HS-MCNC: 15 NG/L
TROPONIN T SERPL HS-MCNC: 16 NG/L
TSH SERPL DL<=0.005 MIU/L-ACNC: 1.36 UIU/ML (ref 0.3–4.2)
WBC # BLD AUTO: 6.3 10E3/UL (ref 4–11)

## 2024-09-14 PROCEDURE — 84100 ASSAY OF PHOSPHORUS: CPT | Performed by: STUDENT IN AN ORGANIZED HEALTH CARE EDUCATION/TRAINING PROGRAM

## 2024-09-14 PROCEDURE — 70544 MR ANGIOGRAPHY HEAD W/O DYE: CPT

## 2024-09-14 PROCEDURE — 82962 GLUCOSE BLOOD TEST: CPT

## 2024-09-14 PROCEDURE — 83036 HEMOGLOBIN GLYCOSYLATED A1C: CPT | Performed by: STUDENT IN AN ORGANIZED HEALTH CARE EDUCATION/TRAINING PROGRAM

## 2024-09-14 PROCEDURE — 99285 EMERGENCY DEPT VISIT HI MDM: CPT | Mod: 25

## 2024-09-14 PROCEDURE — 255N000002 HC RX 255 OP 636: Performed by: EMERGENCY MEDICINE

## 2024-09-14 PROCEDURE — 80048 BASIC METABOLIC PNL TOTAL CA: CPT | Performed by: EMERGENCY MEDICINE

## 2024-09-14 PROCEDURE — 99207 PR NO BILLABLE SERVICE THIS VISIT: CPT | Performed by: PHYSICIAN ASSISTANT

## 2024-09-14 PROCEDURE — G0378 HOSPITAL OBSERVATION PER HR: HCPCS

## 2024-09-14 PROCEDURE — 36415 COLL VENOUS BLD VENIPUNCTURE: CPT | Performed by: STUDENT IN AN ORGANIZED HEALTH CARE EDUCATION/TRAINING PROGRAM

## 2024-09-14 PROCEDURE — 70553 MRI BRAIN STEM W/O & W/DYE: CPT

## 2024-09-14 PROCEDURE — 82728 ASSAY OF FERRITIN: CPT | Performed by: EMERGENCY MEDICINE

## 2024-09-14 PROCEDURE — 84484 ASSAY OF TROPONIN QUANT: CPT | Performed by: STUDENT IN AN ORGANIZED HEALTH CARE EDUCATION/TRAINING PROGRAM

## 2024-09-14 PROCEDURE — 99223 1ST HOSP IP/OBS HIGH 75: CPT | Performed by: STUDENT IN AN ORGANIZED HEALTH CARE EDUCATION/TRAINING PROGRAM

## 2024-09-14 PROCEDURE — 85610 PROTHROMBIN TIME: CPT | Performed by: EMERGENCY MEDICINE

## 2024-09-14 PROCEDURE — 84443 ASSAY THYROID STIM HORMONE: CPT | Performed by: STUDENT IN AN ORGANIZED HEALTH CARE EDUCATION/TRAINING PROGRAM

## 2024-09-14 PROCEDURE — 80061 LIPID PANEL: CPT | Performed by: STUDENT IN AN ORGANIZED HEALTH CARE EDUCATION/TRAINING PROGRAM

## 2024-09-14 PROCEDURE — 70549 MR ANGIOGRAPH NECK W/O&W/DYE: CPT

## 2024-09-14 PROCEDURE — 85027 COMPLETE CBC AUTOMATED: CPT | Performed by: EMERGENCY MEDICINE

## 2024-09-14 PROCEDURE — 250N000013 HC RX MED GY IP 250 OP 250 PS 637: Performed by: STUDENT IN AN ORGANIZED HEALTH CARE EDUCATION/TRAINING PROGRAM

## 2024-09-14 PROCEDURE — A9585 GADOBUTROL INJECTION: HCPCS | Performed by: EMERGENCY MEDICINE

## 2024-09-14 PROCEDURE — 36415 COLL VENOUS BLD VENIPUNCTURE: CPT | Performed by: EMERGENCY MEDICINE

## 2024-09-14 PROCEDURE — 83735 ASSAY OF MAGNESIUM: CPT | Performed by: STUDENT IN AN ORGANIZED HEALTH CARE EDUCATION/TRAINING PROGRAM

## 2024-09-14 PROCEDURE — 85730 THROMBOPLASTIN TIME PARTIAL: CPT | Performed by: EMERGENCY MEDICINE

## 2024-09-14 PROCEDURE — 250N000013 HC RX MED GY IP 250 OP 250 PS 637: Performed by: EMERGENCY MEDICINE

## 2024-09-14 RX ORDER — SODIUM CHLORIDE 9 MG/ML
INJECTION, SOLUTION INTRAVENOUS CONTINUOUS PRN
Status: DISCONTINUED | OUTPATIENT
Start: 2024-09-14 | End: 2024-09-14

## 2024-09-14 RX ORDER — FUROSEMIDE 20 MG
20 TABLET ORAL DAILY
Status: DISCONTINUED | OUTPATIENT
Start: 2024-09-15 | End: 2024-09-15 | Stop reason: HOSPADM

## 2024-09-14 RX ORDER — METOPROLOL TARTRATE 25 MG/1
25 TABLET, FILM COATED ORAL 2 TIMES DAILY
Status: DISCONTINUED | OUTPATIENT
Start: 2024-09-14 | End: 2024-09-15 | Stop reason: HOSPADM

## 2024-09-14 RX ORDER — SERTRALINE HYDROCHLORIDE 25 MG/1
25 TABLET, FILM COATED ORAL DAILY
Status: DISCONTINUED | OUTPATIENT
Start: 2024-09-15 | End: 2024-09-15 | Stop reason: HOSPADM

## 2024-09-14 RX ORDER — ASPIRIN 81 MG/1
81 TABLET, CHEWABLE ORAL DAILY
Status: DISCONTINUED | OUTPATIENT
Start: 2024-09-15 | End: 2024-09-15 | Stop reason: HOSPADM

## 2024-09-14 RX ORDER — ACETAMINOPHEN 500 MG
1000 TABLET ORAL EVERY 6 HOURS
Status: DISCONTINUED | OUTPATIENT
Start: 2024-09-14 | End: 2024-09-15 | Stop reason: HOSPADM

## 2024-09-14 RX ORDER — LIDOCAINE 40 MG/G
CREAM TOPICAL
Status: DISCONTINUED | OUTPATIENT
Start: 2024-09-14 | End: 2024-09-15 | Stop reason: HOSPADM

## 2024-09-14 RX ORDER — ONDANSETRON 4 MG/1
4 TABLET, ORALLY DISINTEGRATING ORAL EVERY 6 HOURS PRN
Status: DISCONTINUED | OUTPATIENT
Start: 2024-09-14 | End: 2024-09-15 | Stop reason: HOSPADM

## 2024-09-14 RX ORDER — ATORVASTATIN CALCIUM 40 MG/1
80 TABLET, FILM COATED ORAL EVERY EVENING
Status: DISCONTINUED | OUTPATIENT
Start: 2024-09-14 | End: 2024-09-15 | Stop reason: HOSPADM

## 2024-09-14 RX ORDER — ONDANSETRON 2 MG/ML
4 INJECTION INTRAMUSCULAR; INTRAVENOUS EVERY 6 HOURS PRN
Status: DISCONTINUED | OUTPATIENT
Start: 2024-09-14 | End: 2024-09-15 | Stop reason: HOSPADM

## 2024-09-14 RX ORDER — ASPIRIN 81 MG/1
162 TABLET, CHEWABLE ORAL ONCE
Status: COMPLETED | OUTPATIENT
Start: 2024-09-14 | End: 2024-09-14

## 2024-09-14 RX ORDER — ASPIRIN 81 MG/1
81 TABLET ORAL DAILY
Status: DISCONTINUED | OUTPATIENT
Start: 2024-09-15 | End: 2024-09-15 | Stop reason: HOSPADM

## 2024-09-14 RX ORDER — CLOPIDOGREL BISULFATE 75 MG/1
75 TABLET ORAL DAILY
Status: DISCONTINUED | OUTPATIENT
Start: 2024-09-15 | End: 2024-09-15 | Stop reason: HOSPADM

## 2024-09-14 RX ORDER — CLOPIDOGREL BISULFATE 75 MG/1
300 TABLET ORAL ONCE
Status: COMPLETED | OUTPATIENT
Start: 2024-09-14 | End: 2024-09-14

## 2024-09-14 RX ORDER — GADOBUTROL 604.72 MG/ML
9 INJECTION INTRAVENOUS ONCE
Status: COMPLETED | OUTPATIENT
Start: 2024-09-14 | End: 2024-09-14

## 2024-09-14 RX ORDER — GADOBUTROL 604.72 MG/ML
10 INJECTION INTRAVENOUS ONCE
Status: DISCONTINUED | OUTPATIENT
Start: 2024-09-14 | End: 2024-09-14

## 2024-09-14 RX ORDER — LEVOTHYROXINE SODIUM 137 UG/1
137 TABLET ORAL
Status: DISCONTINUED | OUTPATIENT
Start: 2024-09-15 | End: 2024-09-15 | Stop reason: HOSPADM

## 2024-09-14 RX ORDER — ACETAMINOPHEN 500 MG
1000 TABLET ORAL EVERY 6 HOURS
COMMUNITY

## 2024-09-14 RX ADMIN — ASPIRIN 81 MG CHEWABLE TABLET 162 MG: 81 TABLET CHEWABLE at 15:05

## 2024-09-14 RX ADMIN — METOPROLOL TARTRATE 25 MG: 25 TABLET, FILM COATED ORAL at 20:44

## 2024-09-14 RX ADMIN — ATORVASTATIN CALCIUM 80 MG: 40 TABLET, FILM COATED ORAL at 20:44

## 2024-09-14 RX ADMIN — ACETAMINOPHEN 1000 MG: 500 TABLET ORAL at 18:12

## 2024-09-14 RX ADMIN — BIMATOPROST 1 DROP: 0.1 SOLUTION/ DROPS OPHTHALMIC at 21:31

## 2024-09-14 RX ADMIN — CLOPIDOGREL BISULFATE 300 MG: 75 TABLET ORAL at 14:57

## 2024-09-14 RX ADMIN — Medication 5 MG: at 23:02

## 2024-09-14 RX ADMIN — GADOBUTROL 9 ML: 604.72 INJECTION INTRAVENOUS at 13:22

## 2024-09-14 RX ADMIN — ACETAMINOPHEN 1000 MG: 500 TABLET ORAL at 23:02

## 2024-09-14 ASSESSMENT — COLUMBIA-SUICIDE SEVERITY RATING SCALE - C-SSRS
1. IN THE PAST MONTH, HAVE YOU WISHED YOU WERE DEAD OR WISHED YOU COULD GO TO SLEEP AND NOT WAKE UP?: NO
2. HAVE YOU ACTUALLY HAD ANY THOUGHTS OF KILLING YOURSELF IN THE PAST MONTH?: NO
6. HAVE YOU EVER DONE ANYTHING, STARTED TO DO ANYTHING, OR PREPARED TO DO ANYTHING TO END YOUR LIFE?: NO

## 2024-09-14 ASSESSMENT — ACTIVITIES OF DAILY LIVING (ADL)
ADLS_ACUITY_SCORE: 40
ADLS_ACUITY_SCORE: 38
ADLS_ACUITY_SCORE: 40
ADLS_ACUITY_SCORE: 38
ADLS_ACUITY_SCORE: 40
DEPENDENT_IADLS:: INDEPENDENT
ADLS_ACUITY_SCORE: 36
ADLS_ACUITY_SCORE: 36

## 2024-09-14 ASSESSMENT — VISUAL ACUITY
OS: 20/40;WITH CORRECTIVE LENSES
OD: 20/70;WITH CORRECTIVE LENSES

## 2024-09-14 NOTE — PROGRESS NOTES
"  [facility]    Stroke Telephone Note    I was called by Soledad Graham on 09/14/24 regarding patient Mirna Grijalva. Please see my consult note from earlier today for details of HPI.    R eye vision has improved some but not resolved completely.    Vitals  BP: (!) 158/92   Pulse: 93   Resp: 16       Weight: 96.2 kg (212 lb)    Imaging Findings  MRI brain and orbits w/wo contrast: no acute stroke  MRA head/neck: no LVO, moderate R ICA athero, no comment from radiology about R ophthalmic artery, there is some motion artifact which limits the study    Impression  R eye recurrent blurriness with prior imaging showing R ophthalmic artery severe stenosis - amaurosis fugax vs CRAO    Recommendations  - Use orderset: \"Ischemic Stroke Routine Admission\" or \"Ischemic Stroke No Thrombolytics/No Thrombectomy ICU Admission\"  - Place Neurology IP Stroke Consult order   - Load with Plavix 300 mg, give aspirin 325 mg if she has not already taken aspirin today, duration of DAPT per in house team but likely x3 months for large artery athero  - Neurochecks and Vital Signs every 4 hours   - Permissive HTN; goal SBP < 220 mmHg  - Statin: continue PTA atorvastatin 80 mg, LDL pending  - Telemetry, EKG  - Bedside Glucose Monitoring  - Nutrition: per nursing  - A1c, Lipid Panel, Troponin x 3  - PT/OT/SLP  - Stroke Education  - Depression Screen  - Euthermia, Euglycemia    Case discussed with vascular neurology attending Dr. Horner.    My recommendations are based on the information provided over the phone by iMrna Grijalva's in-person providers. They are not intended to replace the clinical judgment of her in-person providers. I was not requested to personally see or examine the patient at this time.     Jenna Sanchez PA-C  Vascular Neurology    To page me or covering stroke neurology team member, click here: [facility]  Choose \"On Call\" tab at top, then select \"NEUROLOGY/ALL SITES\" from middle drop-down box, press " "Enter, then look for \"stroke\" or \"telestroke\" for your site.    "

## 2024-09-14 NOTE — PHARMACY-ADMISSION MEDICATION HISTORY
Pharmacy Intern Admission Medication History    Admission medication history is complete. The information provided in this note is only as accurate as the sources available at the time of the update.    Information Source(s): Patient via in-person    Pertinent Information: Patient took their medications prior to coming to the hospital today. Patient arrived with list that she had last updated 8/20/2024.     Changes made to PTA medication list:  Added: None  Deleted: calcium carbonate, Keflex, Magnesium, Oxycodone   Changed:   Tylenol - 1000 mg prn to 1000 Q6H scheduled   Potassium Cl from 20 mEq BID to 10 mEq BID    Allergies reviewed with patient and updates made in EHR: yes    Medication History Completed By: Leighton RADFORD 9/14/2024 4:14 PM    Current Facility-Administered Medications for the 9/14/24 encounter (Hospital Encounter)   Medication    0.5 mL ropivacaine (NAROPIN) injection 5 mg/mL    0.5 mL ropivacaine (NAROPIN) injection 5 mg/mL    betamethasone acet & sod phos (CELESTONE) injection 6 mg    betamethasone acet & sod phos (CELESTONE) injection 6 mg     PTA Med List   Medication Sig Last Dose    acetaminophen (TYLENOL) 500 MG tablet Take 1,000 mg by mouth every 6 hours.     aspirin 81 MG EC tablet Take 1 tablet (81 mg) by mouth daily 9/14/2024    atorvastatin (LIPITOR) 80 MG tablet Take 1 tablet (80 mg) by mouth every evening 9/13/2024 at pm    bimatoprost (LUMIGAN) 0.01 % SOLN Place 1 drop into the right eye at bedtime 9/13/2024 at hs    Cranberry-Vitamin C (CRANBERRY CONCENTRATE/VITAMINC) 56059-502 MG CAPS Take 2 capsules by mouth daily 9/14/2024 at am    empagliflozin (JARDIANCE) 10 MG TABS tablet Take 1 tablet (10 mg) by mouth daily 9/14/2024 at am    furosemide (LASIX) 20 MG tablet Take 1 tablet (20 mg) by mouth daily 9/14/2024 at am    levothyroxine (SYNTHROID/LEVOTHROID) 137 MCG tablet Take 1 tablet (137 mcg) by mouth daily. 9/14/2024 at am    Melatonin 10-10 MG TBCR Take 1 tablet by mouth at  bedtime 9/13/2024 at hs    metoprolol tartrate (LOPRESSOR) 25 MG tablet Take 1 tablet (25 mg) by mouth 2 times daily 9/14/2024 at am

## 2024-09-14 NOTE — ED PROVIDER NOTES
Emergency Department Encounter     Evaluation Date & Time:   No admission date for patient encounter.    CHIEF COMPLAINT:  Eye Problem      Triage Note:Patient has a TIA end of July and her vision was blurry in the right eye. Today she woke up at 0930 and the eye was again blurry in the right eye.       Neuro exam called and Bud came out to evaluate   No stroke code call per Dr. Farrell      Triage Assessment (Adult)       Row Name 09/14/24 1047          Triage Assessment    Airway WDL WDL        Respiratory WDL    Respiratory WDL WDL        Skin Circulation/Temperature WDL    Skin Circulation/Temperature WDL WDL        Cardiac WDL    Cardiac WDL WDL        Peripheral/Neurovascular WDL    Peripheral Neurovascular WDL WDL                     Impression and Plan       FINAL IMPRESSION:    ICD-10-CM    1. Vision changes  H53.9           ED COURSE & MEDICAL DECISION MAKING:  10:50 AM I met the patient and performed my initial interview and exam. Discussed workup in the emergency department, management of symptoms, and likely disposition.    11:57 AM I spoke with Jenna Sanchez PA-C, stroke neurology.  2:36 PM I rechecked patient - right vision is ~75% back to normal. Also reports to me that she has the gene for hemachromatosis and her ferritin levels were high at her last admission      74 year old female, history of amaurosis fugax / TIA, CHF, HTN, open angle glaucoma and pulmonary nodules, who presents for evaluation of blurry right vision that was present when she awoke at 9:30 (~90 minutes ago). She reports similar episode in July (on chart review, this was in April) that was attributed to TIA; she reports that vision was worse at that time.     She denies associated eye pain and headache. She also denies associated extremity weakness / paresthesias, vertigo, dysphagia, speech difficulties or trouble ambulating.     She is not anticoagulated.    On exam, right pupil is ~1mm larger than the left with EOMI and no  visual field deficits. Neuro exam is otherwise normal.    Given that patient awoke with symptoms (last known well time ~11.5 hours ago) with low NIH score, patient does not fit into Stroke Code criteria and Stroke Code not called. I did speak with the Stroke Neurologist given concern for possible retinal artery occlusion (imaging performed with similar symptoms previously demonstrated possible occlusion proximal right ophthalmic artery) who agrees with plan to obtain MRI / MRA imaging      MRI brain with no acute intracranial abnormality; no evidence of acute infarct, hemorrhage or mass.      MRI orbits also unremarkable.    MRA head demonstrates patent intracranial arterial vasculature without stenosis MRI or aneurysm.    MRA neck with ~45% stenosis proximal right ICA.    No eye pain or headache to suggest acute glaucoma.     I spoke with the Stroke Neurologist once imaging was completed who recommended admission and restarting her Plavix (300mg loading dose) and dose of aspirin. Will defer to inpatient team regarding possible CTA imaging to further rule out ophthalmic artery occlusion.    Labs remarkable for no leukocytosis, anemia, significant electrolyte derangements or renal impairment.  Coags WNL.    Patient also informed me that she has family history of hemochromatosis and she is positive for the gene with previous elevated ferritin levels; ferritin level added.    Patient admitted to the Hospitalist Service for further evaluation and management.  Patient stable throughout ED course.      At the conclusion of the encounter I discussed the results of all the tests and the disposition. The questions were answered. The patient and family acknowledged understanding and were agreeable with the care plan.      Medical Decision Making    History:  Obtained supplemental history:Supplemental history obtained?: Family Member/Significant Other  Reviewed external records: External records reviewed?: Outpatient Record:  [facility] Clinical Summary on 9/4/24    External consultation:  Did you consider but not order tests?: Work up considered but not performed and documented in chart, if applicable  Did you interpret images independently?: Independent interpretation of ECG and images noted in documentation, when applicable.  Consultation discussion with other provider:Did you involve another provider (consultant, MH, pharmacy, etc.)?: I discussed the care with another health care provider, see documentation for details. Stroke Neurology; Hospitalist    Complicating factors:  Care impacted by chronic illness:Heart Disease and Hypertension  Care significantly affected by social determinants of health:N/A    Disposition considerations: Admit.      MEDICATIONS GIVEN IN THE EMERGENCY DEPARTMENT:  Medications   lidocaine 1 % 0.1-1 mL (has no administration in time range)   lidocaine (LMX4) cream (has no administration in time range)   sodium chloride (PF) 0.9% PF flush 3 mL (has no administration in time range)   sodium chloride (PF) 0.9% PF flush 3 mL (has no administration in time range)   sodium chloride 0.9 % infusion (has no administration in time range)   aspirin EC tablet 81 mg (has no administration in time range)     Or   aspirin (ASA) chewable tablet 81 mg (has no administration in time range)   clopidogrel (PLAVIX) tablet 75 mg (has no administration in time range)   ondansetron (ZOFRAN ODT) ODT tab 4 mg (has no administration in time range)     Or   ondansetron (ZOFRAN) injection 4 mg (has no administration in time range)   gadobutrol (GADAVIST) injection 9 mL (9 mLs Intravenous $Given 9/14/24 1322)   clopidogrel (PLAVIX) tablet 300 mg (300 mg Oral $Given 9/14/24 1457)   aspirin (ASA) chewable tablet 162 mg (162 mg Oral $Given 9/14/24 1505)       NEW PRESCRIPTIONS STARTED AT TODAY'S ED VISIT:  New Prescriptions    No medications on file       HPI     HPI     Mirna Grijalva is a 74 year old female, history of amaurosis fugax  "/ TIA, CHF with preserved EF, HTN, glaucoma and hypothyroidism, who presents to this ED by walk-in for evaluation of blurred vision in the right eye.    Per chart review, the patient was seen at [facility] on 9/4/24. She was seen by Dr. Hutton on 5/22/2024 for hospital follow-up for right amaurosis fugax on 4/27/2024. When hospitalized for amaurosis fugax, brain MRI did not show any acute infarct, hemorrhage or mass. There was possible occlusion of right ophthalmic artery on head MRA. CTA of the head and neck revealed possible short segment of high-grade stenosis at the origin of the right ophthalmic artery along with mild-to-moderate 30-45% narrowing of the right internal carotid artery. Echocardiogram showed no sign of patent foramen ovale, there was normal ejection fraction. After hospitalization, she was advised to follow-up with cardiology and PCP for optimization of vascular risk factors and to schedule another CTA of the head and neck in August 2024 to look for any interval changes due to the potential right ophthalmic artery stenosis. At the time of   this office visit, she had completed a 3 month course of Plavix.    She awoke this morning at 9:30 AM (~90 minutes ago) with blurred vision in the right eye, but states that the blurriness is not as severe as when she had blurred vision in April 2024. She denies double vision, loss of visual field(s) and eye pain. No associated headache, extremity weakness / paresthesias, vertigo, dysphagia, difficulty with speech or ambulation. She is no longer on Plavix but does continue aspirin 81 mg daily.     Per the patient, she reported having a TIA in July 2024 (but per chart review, she was hospitalized for right amaurosis fugax on 4/27/2024). At the time, she reported blurred vision in the right eye, described as if she was \"looking through a tissue.\" She followed up with Neurology ([facility]) and has a CT scheduled on 9/17. She has not followed " up with an ophthalmologist.     She notes that she had a recent UTI and completed a course of Cipro yesterday. She has otherwise been in her usual state of health and denies chest pain, shortness of breath, abdominal pain, nausea, vomiting, URI symptoms or other concerns.     REVIEW OF SYSTEMS:  All other systems reviewed and are negative.      Medical History     Past Medical History:   Diagnosis Date    Arthritis years ago?    Cancer (H) 1988  & 2016?    Coronary artery disease May 2024    Glaucoma (increased eye pressure) About 11 years    Hypertension years ago?    Mumps     Nonsenile cataract About 11 years    Thyroid cancer (H)     TIA (transient ischemic attack) 04/27/2024       Past Surgical History:   Procedure Laterality Date    BIOPSY      BREAST SURGERY      CATARACT IOL, RT/LT Left 2022?    With Istent    CYSTOSCOPY      GLAUCOMA SURGERY  2022?    GYN SURGERY         Family History   Problem Relation Age of Onset    Glaucoma Mother     Hypertension Mother         Alzheimers and arthritis    Thyroid Disease Mother     Eye Surgery Mother     Glasses (<9 y/o) Mother     Cancer Father     Diabetes Father         And Hemochromatosis and cancer    Glasses (<9 y/o) Father     Hypertension Brother         Alzheimers    Hypertension Sister         Heart failure       Social History     Tobacco Use    Smoking status: Former     Current packs/day: 1.00     Average packs/day: 1 pack/day for 20.0 years (20.0 ttl pk-yrs)     Types: Cigarettes     Passive exposure: Never    Smokeless tobacco: Never   Vaping Use    Vaping status: Never Used   Substance Use Topics    Alcohol use: Yes    Drug use: Never       acetaminophen (TYLENOL) 500 MG tablet  aspirin 81 MG EC tablet  atorvastatin (LIPITOR) 80 MG tablet  bimatoprost (LUMIGAN) 0.01 % SOLN  calcium carbonate 750 MG CHEW  cephALEXin (KEFLEX) 500 MG capsule  Cranberry-Vitamin C (CRANBERRY CONCENTRATE/VITAMINC) 97189-894 MG CAPS  empagliflozin (JARDIANCE) 10 MG TABS  "tablet  furosemide (LASIX) 20 MG tablet  levothyroxine (SYNTHROID/LEVOTHROID) 137 MCG tablet  Magnesium Oxide 250 MG tablet  Melatonin 10-10 MG TBCR  metoprolol tartrate (LOPRESSOR) 25 MG tablet  oxyCODONE (ROXICODONE) 5 MG tablet  potassium chloride luis angel ER (KLOR-CON M20) 20 MEQ CR tablet  sertraline (ZOLOFT) 25 MG tablet  thiamine (B-1) 100 MG tablet        Physical Exam     First Vitals:  Patient Vitals for the past 24 hrs:   BP Temp Temp src Pulse Resp SpO2 Height Weight   09/14/24 1501 (!) 144/89 -- -- -- -- -- -- --   09/14/24 1456 (!) 148/96 97.9  F (36.6  C) Oral -- -- -- -- --   09/14/24 1401 (!) 158/92 -- -- -- -- -- -- --   09/14/24 1049 (!) 183/135 -- -- 93 -- 94 % -- --   09/14/24 1046 -- -- -- -- 16 -- 1.626 m (5' 4\") 96.2 kg (212 lb)       PHYSICAL EXAM:   Physical Exam    GENERAL: Awake, alert.  In mild acute distress.   HEENT: Normocephalic, atraumatic. Right pupil is ~1mm larger compared to left pupil. Pupils round and reactive. Conjunctiva normal. EOMI without nystagmus. No visual field deficits. Normal posterior oropharynx. Tongue is midline.   NECK: No stridor.  PULMONARY: Symmetrical breath sounds without distress.  Lungs clear to auscultation bilaterally without wheezes, rhonchi or rales.  CARDIO: Regular rate and rhythm.  No significant murmur, rub or gallop.  Radial pulses strong and symmetrical.  ABDOMINAL: Abdomen soft, non-distended and non-tender to palpation.    EXTREMITIES: No lower extremity swelling or edema.      NEURO: Alert and oriented to person, place and time.  Right pupil ~1mm larger than left pupil. EOMI. No visual field deficits. Cranial nerves III-XII intact.  Strength 5/5 BL upper and lower extremities with sensation to light touch grossly intact.  Normal finger-nose-finger testing without dysmetria or overshooting. No ataxia on heel-shin testing.   PSYCH: Normal mood and affect.      Results     LAB:  All pertinent labs reviewed and interpreted  Labs Ordered and Resulted " from Time of ED Arrival to Time of ED Departure   CBC WITH PLATELETS - Abnormal       Result Value    WBC Count 6.3      RBC Count 3.81      Hemoglobin 14.4      Hematocrit 42.7       (*)     MCH 37.8 (*)     MCHC 33.7      RDW 14.7      Platelet Count 193     BASIC METABOLIC PANEL - Abnormal    Sodium 139      Potassium 4.2      Chloride 103      Carbon Dioxide (CO2) 25      Anion Gap 11      Urea Nitrogen 7.8 (*)     Creatinine 0.55      GFR Estimate >90      Calcium 8.5 (*)     Glucose 105 (*)    INR - Normal    INR 0.94     PARTIAL THROMBOPLASTIN TIME - Normal    aPTT 28     FERRITIN   GLUCOSE MONITOR NURSING POCT   GLUCOSE MONITOR NURSING POCT   GLUCOSE MONITOR NURSING POCT   TROPONIN T, HIGH SENSITIVITY   HEMOGLOBIN A1C   LIPID PROFILE   MAGNESIUM   PHOSPHORUS       RADIOLOGY:  MRA Neck (Carotids) wo & w Contrast   Final Result   IMPRESSION:   HEAD MRI:    1.  No acute intracranial abnormality. No evidence of acute infarct, hemorrhage, or mass.      ORBIT MRI:   1.  Unremarkable appearance of the orbits.      HEAD MRA:    1.  Patent intracranial arterial vasculature without flow-limiting stenosis.   2.  No aneurysm.      NECK MRA:   1.  There is 45% stenosis of the proximal right ICA.            MRA Brain ([place] [place]) wo Contrast   Final Result   IMPRESSION:   HEAD MRI:    1.  No acute intracranial abnormality. No evidence of acute infarct, hemorrhage, or mass.      ORBIT MRI:   1.  Unremarkable appearance of the orbits.      HEAD MRA:    1.  Patent intracranial arterial vasculature without flow-limiting stenosis.   2.  No aneurysm.      NECK MRA:   1.  There is 45% stenosis of the proximal right ICA.            MR Brain and Orbits w/o & w Contrast   Final Result   IMPRESSION:   HEAD MRI:    1.  No acute intracranial abnormality. No evidence of acute infarct, hemorrhage, or mass.      ORBIT MRI:   1.  Unremarkable appearance of the orbits.      HEAD MRA:    1.  Patent intracranial arterial  vasculature without flow-limiting stenosis.   2.  No aneurysm.      NECK MRA:   1.  There is 45% stenosis of the proximal right ICA.            Echocardiogram Complete - For age > 60 yrs    (Results Pending)         I, Bg Arce, am serving as a scribe to document services personally performed by Soledad Graham MD based on my observation and the provider's statements to me. I, Soledad Graham MD attest that Bg Arce is acting in a scribe capacity, has observed my performance of the services and has documented them in accordance with my direction.    Soledad Graham MD  Emergency Medicine  [facility] EMERGENCY DEPARTMENT           Soledad Graham MD  09/14/24 [id number]

## 2024-09-14 NOTE — H&P
[facility]    History and Physical - Hospitalist Service       Date of Admission:  9/14/2024    Assessment & Plan    Assessment:   Mirna Grijalva is a 74 year old female with a past medical history of TIA/amaurosis fugax in July with improvement in vision status post presented again with blurry vision in the right eye upon awakening this morning 9:30 AM, last known well was around 10 PM last night, MRI brain and orbit along with MRA head and neck showed No acute intracranial abnormality. No evidence of acute infarct, hemorrhage, or mass. Unremarkable appearance of the orbits. Patent intracranial arterial vasculature without flow-limiting stenosis. No aneurysm. There is 45% stenosis of the proximal right ICA.  Patient again has improved but not returned to baseline in the ED, ED provider discussed with stroke neuro who recommended dual antiplatelet therapy and admission for MRI amaurosis fugax VS CRAO, patient is going to be admitted for further stroke workup.    Problem list and plan:  Amaurosis fugax VS CRAO  Right ophthalmic artery stenosis  Presented with blurred vision in the right eye on waking up around 9:30 in the morning with last known well 10 PM last night  Has a history of amaurosis fugax/CRAO in the setting of thalamic artery stenosis 4/2024 when she presented with right eye blurred vision, at that point there was also no stroke on MRI but vessel imaging showed right ophthalmic artery stenosis and patient was treated with dual antiplatelet therapy for 90 days, as per patient vision was more blurry at that time  Currently vision improving in the ER but not quite to baseline yet  No other acute focal neurological deficits  Neurology currently recommending admission for stroke workup  Neurology consulted, follow-up recommendations  Loading with aspirin and Plavix and then continue dual antiplatelet therapy, neurology recommends 3 months but suspecting would need longer as this is the  second episode of blurry vision  Continue with neurochecks  Permissive control of blood pressure, blood pressure elevated at current time which would need to be closely monitored  Continue with statins  Monitor on telemetry  PT/OT/speech evaluation  Gentle IV hydration  MRI brain and orbits and MRA head and neck showed No acute intracranial abnormality. No evidence of acute infarct, hemorrhage, or mass. Unremarkable appearance of the orbits. Patent intracranial arterial vasculature without flow-limiting stenosis. No aneurysm. There is 45% stenosis of the proximal right ICA    Mild hyperglycemia most likely reactive  Hemoglobin A1c 5  Monitor blood sugar level intermittently    History of hypertension  Monitor vital signs as per protocol  Hold blood pressure medication for now for permissive control of blood pressure  May add metoprolol tomorrow as appropriate    Family history of hemochromatosis  Ferritin ordered by ED provider  As per ED provider patient has gene positive for hemochromatosis    History of hyperlipidemia  Continue with aspirin, now added Plavix, continue with atorvastatin    History of glaucoma  Continue with Lumigan    History of hypothyroidism  Continue levothyroxine    History of heart failure with preserved ejection fraction not in exacerbation  Monitor vital signs as per protocol  Monitor volume status closely  Holding Lasix for permissive control, may add back tomorrow as appropriate  Holding Jardiance for permissive control, may add back tomorrow as appropriate    History of mood disorder  Continue with sertraline    DVT PPx  Intermittent pneumatic compression    CODE STATUS  Full code as per discussion with the patient, as per patient she does want to be resuscitated but only if she can recover and she does not want to continue on a ventilator in a vegetative state.          Diet: Regular Diet Adult  DVT Prophylaxis: Pneumatic Compression Devices  Yun Catheter: Not present  Lines: None    "  Cardiac Monitoring: ACTIVE order. Indication: Stroke, acute (48 hours)  Code Status: Full Code  Mental status: Patient is alert awake and oriented x 3, patient is a good story and most of the history was obtained from the patient, some history was obtained from chart review and the rest of the history was obtained from discussion with the ER physician.  Clinically Significant Risk Factors Present on Admission                # Drug Induced Platelet Defect: home medication list includes an antiplatelet medication   # Hypertension: Noted on problem list         # Obesity: Estimated body mass index is 36.39 kg/m  as calculated from the following:    Height as of this encounter: 1.626 m (5' 4\").    Weight as of this encounter: 96.2 kg (212 lb).         # Financial/Environmental Concerns:         Disposition Plan     Medically Ready for Discharge: Ready Now     Saad J. Gondal, MD  Hospitalist Service  [facility]  Securely message with [employer] (more info)  Text page via [employer] (Inspect Manager) Paging/Directory     ______________________________________________________________________    Chief Complaint   Blurry Right eye    History is obtained from the patient    History of Present Illness   Mirna Grijalva is a 74 year old female with a past medical history of TIA/amaurosis fugax in July with improvement in vision status post presented again with blurry vision in the right eye upon awakening this morning 9:30 AM, last known well was around 10 PM last night, patient on previous occasion was given dual antiplatelet therapy for 3 months, currently vitals significant for elevated blood pressure otherwise vitally stable, Labs fairly within normal limits, MRI brain and orbit along with MRA head and neck showed No acute intracranial abnormality. No evidence of acute infarct, hemorrhage, or mass. Unremarkable appearance of the orbits. Patent intracranial arterial vasculature without flow-limiting stenosis. No aneurysm. There " is 45% stenosis of the proximal right ICA.  Patient again has improved but not returned to baseline in the ED, ED provider discussed with stroke neuro who recommended dual antiplatelet therapy and admission for MRI amaurosis fugax VS CRAO, patient is going to be admitted for further stroke workup.      Past Medical History    Past Medical History:   Diagnosis Date    Arthritis years ago?    Cancer (H) 1988  & 2016?    Coronary artery disease May 2024    Glaucoma (increased eye pressure) About 11 years    Hypertension years ago?    Mumps     Nonsenile cataract About 11 years    Thyroid cancer (H)     TIA (transient ischemic attack) 04/27/2024    right eye       Past Surgical History   Past Surgical History:   Procedure Laterality Date    BIOPSY      BREAST SURGERY      CATARACT IOL, RT/LT Left 2022?    With Istent    CYSTOSCOPY      GLAUCOMA SURGERY  2022?    GYN SURGERY         Prior to Admission Medications   Prior to Admission Medications   Prescriptions Last Dose Informant Patient Reported? Taking?   Cranberry-Vitamin C (CRANBERRY CONCENTRATE/VITAMINC) 59249-951 MG CAPS   Yes No   Sig: Take 2 capsules by mouth daily   Magnesium Oxide 250 MG tablet   Yes No   Sig: Take 1 tablet (250 mg) by mouth daily*   Melatonin 10-10 MG TBCR   Yes No   Sig: Take 1 tablet by mouth at bedtime   acetaminophen (TYLENOL) 500 MG tablet   No No   Sig: Take 1 tablet (500 mg) by mouth every 6 hours as needed for mild pain   aspirin 81 MG EC tablet   No No   Sig: Take 1 tablet (81 mg) by mouth daily   atorvastatin (LIPITOR) 80 MG tablet   No No   Sig: Take 1 tablet (80 mg) by mouth every evening   bimatoprost (LUMIGAN) 0.01 % SOLN   No No   Sig: Place 1 drop into the right eye at bedtime   calcium carbonate 750 MG CHEW   Yes No   Sig: Take 750 mg by mouth daily.   cephALEXin (KEFLEX) 500 MG capsule   No No   Sig: Take 1 capsule (500 mg) by mouth 2 times daily.   empagliflozin (JARDIANCE) 10 MG TABS tablet   No No   Sig: Take 1 tablet (10  mg) by mouth daily   furosemide (LASIX) 20 MG tablet   No No   Sig: Take 1 tablet (20 mg) by mouth daily   levothyroxine (SYNTHROID/LEVOTHROID) 137 MCG tablet   No No   Sig: Take 1 tablet (137 mcg) by mouth daily.   metoprolol tartrate (LOPRESSOR) 25 MG tablet   No No   Sig: Take 1 tablet (25 mg) by mouth 2 times daily   oxyCODONE (ROXICODONE) 5 MG tablet   No No   Sig: Take 1 tablet (5 mg) by mouth every 4 hours as needed for severe pain   potassium chloride luis angel ER (KLOR-CON M20) 20 MEQ CR tablet   No No   Sig: Take 1 tablet (20 mEq) by mouth 2 times daily   sertraline (ZOLOFT) 25 MG tablet   No No   Sig: Take 1 tablet (25 mg) by mouth daily   thiamine (B-1) 100 MG tablet   No No   Sig: Take 1 tablet (100 mg) by mouth daily      Facility-Administered Medications Last Administration Doses Remaining   0.5 mL ropivacaine (NAROPIN) injection 5 mg/mL 7/25/2024  2:12 PM    0.5 mL ropivacaine (NAROPIN) injection 5 mg/mL 7/25/2024  2:12 PM    betamethasone acet & sod phos (CELESTONE) injection 6 mg 7/25/2024  2:12 PM    betamethasone acet & sod phos (CELESTONE) injection 6 mg 7/25/2024  2:12 PM            Review of Systems    The 10 point Review of Systems is negative other than noted in the HPI or here. Blurry Right eye    Physical Exam   Vital Signs: Temp: 97.9  F (36.6  C) Temp src: Oral BP: (!) 144/89 Pulse: 93   Resp: 16 SpO2: 94 %      Weight: 212 lbs 0 oz    GENERAL: Patient was seen and examined at bedside with no acute distress  HENT:  Head is normocephalic, atraumatic.   EYES:  Eyes have anicteric sclerae without conjunctival injection   LUNGS: Bilateral air entry, clear to auscultation bilaterally  CARDIOVASCULAR:  Regular rate and rhythm with no murmurs, gallops or rubs.  ABDOMEN:  Normal bowel sounds. Soft; nontender   EXT: Bilateral lower extremity dependent edema  SKIN:  No acute rashes.   NEUROLOGIC:  Grossly nonfocal.      Medical Decision Making       80 MINUTES SPENT BY ME on the date of service doing  chart review, history, exam, documentation & further activities per the note.      Data     I have personally reviewed the following data over the past 24 hrs:    6.3  \   14.4   / 193     139 103 7.8 (L) /  105 (H)   4.2 25 0.55 \     TSH: N/A T4: N/A A1C: 5.0     INR:  0.94 PTT:  28   D-dimer:  N/A Fibrinogen:  N/A       Imaging results reviewed over the past 24 hrs:   Recent Results (from the past 24 hour(s))   MR Brain and Orbits w/o & w Contrast    Narrative    EXAM: MR BRAIN AND ORBITS W/O and W CONTRAST, MRA NECK (CAROTIDS) W/O and W CONTRAST, MRA BRAIN ([place]) W/O CONTRAST  LOCATION: [facility]  DATE: 9/14/2024    INDICATION: acute vision changes (blurry)   similar but less severe episode in July; no visual field cuts and neuro exam otherwise normal  COMPARISON: CT head 7/31/2024  CONTRAST: 9ml gadavist  TECHNIQUE:   1) Routine multiplanar multisequence head MRI without and with intravenous contrast.  2) Dedicated high-resolution multiplanar multisequence MRI of the orbits without and with intravenous contrast.  2) 3D time-of-flight head MRA without intravenous contrast.  3) Neck MRA without and with IV contrast. Stenosis measurements made according to NASCET criteria unless otherwise specified.    FINDINGS:  HEAD MRI:  INTRACRANIAL CONTENTS: No acute or subacute infarct. No mass, acute hemorrhage, or extra-axial fluid collections. Patchy nonspecific T2/FLAIR hyperintensities within the cerebral white matter most consistent with mild to moderate chronic microvascular   ischemic change. Unchanged mild to moderate generalized cerebral atrophy. No hydrocephalus. Normal position of the cerebellar tonsils. No pathologic contrast enhancement. Unchanged calcification adjacent to the medial left thalamus.    SELLA: No abnormality accounting for technique.    OSSEOUS STRUCTURES/SOFT TISSUES: There is susceptibility artifact involving the lateral right frontal bone. The major  intracranial vascular flow voids are maintained.     ORBITS: Dedicated MRI of the orbits was performed. Intact globes. Symmetrical extraocular musculature without thickening/enlargement. The intraorbital, canalicular and prechiasmatic optic nerve segments are normal in size and signal intensity   bilaterally. The optic chiasm and proximal optic tracts are unremarkable. No localized inflammation of the intraconal or extraconal orbital fat. Normal lacrimal glands. No intraorbital mass or pathologic intraorbital enhancement.     SINUSES/MASTOIDS: No paranasal sinus mucosal disease. No middle ear or mastoid effusion.     HEAD MRA:   ANTERIOR CIRCULATION: No stenosis/occlusion, aneurysm, or high flow vascular malformation. Standard [place] anatomy.    POSTERIOR CIRCULATION: No stenosis/occlusion, aneurysm, or high flow vascular malformation. Balanced vertebral arteries supply a normal basilar artery.     NECK MRA:   RIGHT CAROTID: There is 45% stenosis of the proximal right ICA.    LEFT CAROTID: No measurable stenosis or dissection.    VERTEBRAL ARTERIES: The left vertebral artery is diminutive compared to the right. .    AORTIC ARCH: Classic aortic arch anatomy with no significant stenosis at the origin of the great vessels.      Impression    IMPRESSION:  HEAD MRI:   1.  No acute intracranial abnormality. No evidence of acute infarct, hemorrhage, or mass.    ORBIT MRI:  1.  Unremarkable appearance of the orbits.    HEAD MRA:   1.  Patent intracranial arterial vasculature without flow-limiting stenosis.  2.  No aneurysm.    NECK MRA:  1.  There is 45% stenosis of the proximal right ICA.       MRA Brain ([place]) wo Contrast    Narrative    EXAM: MR BRAIN AND ORBITS W/O and W CONTRAST, MRA NECK (CAROTIDS) W/O and W CONTRAST, MRA BRAIN ([place] OF PAT[place]) W/O CONTRAST  LOCATION: [facility]  DATE: 9/14/2024    INDICATION: acute vision changes (blurry)   similar but less severe  episode in July; no visual field cuts and neuro exam otherwise normal  COMPARISON: CT head 7/31/2024  CONTRAST: 9ml gadavist  TECHNIQUE:   1) Routine multiplanar multisequence head MRI without and with intravenous contrast.  2) Dedicated high-resolution multiplanar multisequence MRI of the orbits without and with intravenous contrast.  2) 3D time-of-flight head MRA without intravenous contrast.  3) Neck MRA without and with IV contrast. Stenosis measurements made according to NASCET criteria unless otherwise specified.    FINDINGS:  HEAD MRI:  INTRACRANIAL CONTENTS: No acute or subacute infarct. No mass, acute hemorrhage, or extra-axial fluid collections. Patchy nonspecific T2/FLAIR hyperintensities within the cerebral white matter most consistent with mild to moderate chronic microvascular   ischemic change. Unchanged mild to moderate generalized cerebral atrophy. No hydrocephalus. Normal position of the cerebellar tonsils. No pathologic contrast enhancement. Unchanged calcification adjacent to the medial left thalamus.    SELLA: No abnormality accounting for technique.    OSSEOUS STRUCTURES/SOFT TISSUES: There is susceptibility artifact involving the lateral right frontal bone. The major intracranial vascular flow voids are maintained.     ORBITS: Dedicated MRI of the orbits was performed. Intact globes. Symmetrical extraocular musculature without thickening/enlargement. The intraorbital, canalicular and prechiasmatic optic nerve segments are normal in size and signal intensity   bilaterally. The optic chiasm and proximal optic tracts are unremarkable. No localized inflammation of the intraconal or extraconal orbital fat. Normal lacrimal glands. No intraorbital mass or pathologic intraorbital enhancement.     SINUSES/MASTOIDS: No paranasal sinus mucosal disease. No middle ear or mastoid effusion.     HEAD MRA:   ANTERIOR CIRCULATION: No stenosis/occlusion, aneurysm, or high flow vascular malformation. Standard  [place] anatomy.    POSTERIOR CIRCULATION: No stenosis/occlusion, aneurysm, or high flow vascular malformation. Balanced vertebral arteries supply a normal basilar artery.     NECK MRA:   RIGHT CAROTID: There is 45% stenosis of the proximal right ICA.    LEFT CAROTID: No measurable stenosis or dissection.    VERTEBRAL ARTERIES: The left vertebral artery is diminutive compared to the right. .    AORTIC ARCH: Classic aortic arch anatomy with no significant stenosis at the origin of the great vessels.      Impression    IMPRESSION:  HEAD MRI:   1.  No acute intracranial abnormality. No evidence of acute infarct, hemorrhage, or mass.    ORBIT MRI:  1.  Unremarkable appearance of the orbits.    HEAD MRA:   1.  Patent intracranial arterial vasculature without flow-limiting stenosis.  2.  No aneurysm.    NECK MRA:  1.  There is 45% stenosis of the proximal right ICA.       MRA Neck (Carotids) wo & w Contrast    Narrative    EXAM: MR BRAIN AND ORBITS W/O and W CONTRAST, MRA NECK (CAROTIDS) W/O and W CONTRAST, MRA BRAIN ([place] OF [place]) W/O CONTRAST  LOCATION: [facility]  DATE: 9/14/2024    INDICATION: acute vision changes (blurry)   similar but less severe episode in July; no visual field cuts and neuro exam otherwise normal  COMPARISON: CT head 7/31/2024  CONTRAST: 9ml gadavist  TECHNIQUE:   1) Routine multiplanar multisequence head MRI without and with intravenous contrast.  2) Dedicated high-resolution multiplanar multisequence MRI of the orbits without and with intravenous contrast.  2) 3D time-of-flight head MRA without intravenous contrast.  3) Neck MRA without and with IV contrast. Stenosis measurements made according to NASCET criteria unless otherwise specified.    FINDINGS:  HEAD MRI:  INTRACRANIAL CONTENTS: No acute or subacute infarct. No mass, acute hemorrhage, or extra-axial fluid collections. Patchy nonspecific T2/FLAIR hyperintensities within the cerebral white matter most  consistent with mild to moderate chronic microvascular   ischemic change. Unchanged mild to moderate generalized cerebral atrophy. No hydrocephalus. Normal position of the cerebellar tonsils. No pathologic contrast enhancement. Unchanged calcification adjacent to the medial left thalamus.    SELLA: No abnormality accounting for technique.    OSSEOUS STRUCTURES/SOFT TISSUES: There is susceptibility artifact involving the lateral right frontal bone. The major intracranial vascular flow voids are maintained.     ORBITS: Dedicated MRI of the orbits was performed. Intact globes. Symmetrical extraocular musculature without thickening/enlargement. The intraorbital, canalicular and prechiasmatic optic nerve segments are normal in size and signal intensity   bilaterally. The optic chiasm and proximal optic tracts are unremarkable. No localized inflammation of the intraconal or extraconal orbital fat. Normal lacrimal glands. No intraorbital mass or pathologic intraorbital enhancement.     SINUSES/MASTOIDS: No paranasal sinus mucosal disease. No middle ear or mastoid effusion.     HEAD MRA:   ANTERIOR CIRCULATION: No stenosis/occlusion, aneurysm, or high flow vascular malformation. Standard [place] anatomy.    POSTERIOR CIRCULATION: No stenosis/occlusion, aneurysm, or high flow vascular malformation. Balanced vertebral arteries supply a normal basilar artery.     NECK MRA:   RIGHT CAROTID: There is 45% stenosis of the proximal right ICA.    LEFT CAROTID: No measurable stenosis or dissection.    VERTEBRAL ARTERIES: The left vertebral artery is diminutive compared to the right. .    AORTIC ARCH: Classic aortic arch anatomy with no significant stenosis at the origin of the great vessels.      Impression    IMPRESSION:  HEAD MRI:   1.  No acute intracranial abnormality. No evidence of acute infarct, hemorrhage, or mass.    ORBIT MRI:  1.  Unremarkable appearance of the orbits.    HEAD MRA:   1.  Patent intracranial  arterial vasculature without flow-limiting stenosis.  2.  No aneurysm.    NECK MRA:  1.  There is 45% stenosis of the proximal right ICA.

## 2024-09-14 NOTE — ED TRIAGE NOTES
Patient has a TIA end of July and her vision was blurry in the right eye. Today she woke up at 0930 and the eye was again blurry in the right eye.       Neuro exam called and Bud came out to evaluate   No stroke code call per Dr. Farrell      Triage Assessment (Adult)       Row Name 09/14/24 1047          Triage Assessment    Airway WDL WDL        Respiratory WDL    Respiratory WDL WDL        Skin Circulation/Temperature WDL    Skin Circulation/Temperature WDL WDL        Cardiac WDL    Cardiac WDL WDL        Peripheral/Neurovascular WDL    Peripheral Neurovascular WDL WDL

## 2024-09-14 NOTE — CONSULTS
"  [facility]    Stroke Telephone Note    I was called by Soledad Graham on 09/14/24 regarding patient Mirna Grijalva. The patient is a 74 year old female with PMH HTN, HLD, prior R eye amaurosis fugax/R CRAO in the setting of ophthalmic artery stenosis (4/2024 presented with R eye blurred vision, no stroke on MRI but vessel imaging showed R ophthalmic artery stenosis, treated with DAPT x90 days).     Currently she presents after waking up with recurrence of R eye blurred vision. No other acute focal neuro deficits reported. LKW 2200 bedtime. No longer on plavix, taking aspirin monotherapy PTA.    Vitals  BP: (!) 183/135   Pulse: 93   Resp: 16       Weight: 96.2 kg (212 lb)    Imaging Findings  MRI brain w/wo contrast: pending    MRA head/neck: pending    Impression  R eye blurred vision in the setting of previously diagnosed R ophthalmic artery stenosis - amaurosis fugax vs CRAO    Recommendations  - MRI brain and orbits w/wo contrast, MRA head/neck - please notify stroke neurology when MRI is complete for review and further recommendations      Case discussed with vascular neurology attending Dr. Horner.    My recommendations are based on the information provided over the phone by Mirna Grijalva's in-person providers. They are not intended to replace the clinical judgment of her in-person providers. I was not requested to personally see or examine the patient at this time.     Jenna Sanchez PA-C  Vascular Neurology    To page me or covering stroke neurology team member, click here: [facility]  Choose \"On Call\" tab at top, then select \"NEUROLOGY/ALL SITES\" from middle drop-down box, press Enter, then look for \"stroke\" or \"telestroke\" for your site.    "

## 2024-09-15 ENCOUNTER — MYC REFILL (OUTPATIENT)
Dept: FAMILY MEDICINE | Facility: CLINIC | Age: 74
End: 2024-09-15
Payer: COMMERCIAL

## 2024-09-15 ENCOUNTER — MYC MEDICAL ADVICE (OUTPATIENT)
Dept: FAMILY MEDICINE | Facility: CLINIC | Age: 74
End: 2024-09-15
Payer: COMMERCIAL

## 2024-09-15 VITALS
HEIGHT: 64 IN | TEMPERATURE: 97.9 F | HEART RATE: 75 BPM | DIASTOLIC BLOOD PRESSURE: 63 MMHG | BODY MASS INDEX: 36.19 KG/M2 | OXYGEN SATURATION: 98 % | SYSTOLIC BLOOD PRESSURE: 102 MMHG | RESPIRATION RATE: 19 BRPM | WEIGHT: 212 LBS

## 2024-09-15 DIAGNOSIS — E87.6 HYPOKALEMIA: ICD-10-CM

## 2024-09-15 LAB
ANION GAP SERPL CALCULATED.3IONS-SCNC: 12 MMOL/L (ref 7–15)
BUN SERPL-MCNC: 10.9 MG/DL (ref 8–23)
CALCIUM SERPL-MCNC: 8.5 MG/DL (ref 8.8–10.4)
CHLORIDE SERPL-SCNC: 103 MMOL/L (ref 98–107)
CREAT SERPL-MCNC: 0.59 MG/DL (ref 0.51–0.95)
EGFRCR SERPLBLD CKD-EPI 2021: >90 ML/MIN/1.73M2
ERYTHROCYTE [DISTWIDTH] IN BLOOD BY AUTOMATED COUNT: 14.7 % (ref 10–15)
GLUCOSE SERPL-MCNC: 100 MG/DL (ref 70–99)
HCO3 SERPL-SCNC: 23 MMOL/L (ref 22–29)
HCT VFR BLD AUTO: 38.8 % (ref 35–47)
HGB BLD-MCNC: 13.1 G/DL (ref 11.7–15.7)
MAGNESIUM SERPL-MCNC: 2.1 MG/DL (ref 1.7–2.3)
MCH RBC QN AUTO: 38 PG (ref 26.5–33)
MCHC RBC AUTO-ENTMCNC: 33.8 G/DL (ref 31.5–36.5)
MCV RBC AUTO: 113 FL (ref 78–100)
PHOSPHATE SERPL-MCNC: 4 MG/DL (ref 2.5–4.5)
PLATELET # BLD AUTO: 162 10E3/UL (ref 150–450)
POTASSIUM SERPL-SCNC: 3.7 MMOL/L (ref 3.4–5.3)
RBC # BLD AUTO: 3.45 10E6/UL (ref 3.8–5.2)
SODIUM SERPL-SCNC: 138 MMOL/L (ref 135–145)
WBC # BLD AUTO: 6 10E3/UL (ref 4–11)

## 2024-09-15 PROCEDURE — 99239 HOSP IP/OBS DSCHRG MGMT >30: CPT | Performed by: HOSPITALIST

## 2024-09-15 PROCEDURE — 84100 ASSAY OF PHOSPHORUS: CPT | Performed by: STUDENT IN AN ORGANIZED HEALTH CARE EDUCATION/TRAINING PROGRAM

## 2024-09-15 PROCEDURE — 999N000147 HC STATISTIC PT IP EVAL DEFER

## 2024-09-15 PROCEDURE — 250N000013 HC RX MED GY IP 250 OP 250 PS 637: Performed by: STUDENT IN AN ORGANIZED HEALTH CARE EDUCATION/TRAINING PROGRAM

## 2024-09-15 PROCEDURE — 999N000226 HC STATISTIC SLP IP EVAL DEFER

## 2024-09-15 PROCEDURE — 83735 ASSAY OF MAGNESIUM: CPT | Performed by: STUDENT IN AN ORGANIZED HEALTH CARE EDUCATION/TRAINING PROGRAM

## 2024-09-15 PROCEDURE — 80048 BASIC METABOLIC PNL TOTAL CA: CPT | Performed by: STUDENT IN AN ORGANIZED HEALTH CARE EDUCATION/TRAINING PROGRAM

## 2024-09-15 PROCEDURE — 85018 HEMOGLOBIN: CPT | Performed by: STUDENT IN AN ORGANIZED HEALTH CARE EDUCATION/TRAINING PROGRAM

## 2024-09-15 PROCEDURE — G0378 HOSPITAL OBSERVATION PER HR: HCPCS

## 2024-09-15 PROCEDURE — 36415 COLL VENOUS BLD VENIPUNCTURE: CPT | Performed by: STUDENT IN AN ORGANIZED HEALTH CARE EDUCATION/TRAINING PROGRAM

## 2024-09-15 RX ORDER — CLOPIDOGREL BISULFATE 75 MG/1
75 TABLET ORAL DAILY
Qty: 30 TABLET | Refills: 3 | Status: SHIPPED | OUTPATIENT
Start: 2024-09-15

## 2024-09-15 RX ADMIN — ASPIRIN 81 MG: 81 TABLET, COATED ORAL at 08:13

## 2024-09-15 RX ADMIN — LEVOTHYROXINE SODIUM 137 MCG: 137 TABLET ORAL at 06:47

## 2024-09-15 RX ADMIN — METOPROLOL TARTRATE 25 MG: 25 TABLET, FILM COATED ORAL at 08:13

## 2024-09-15 RX ADMIN — THIAMINE HCL TAB 100 MG 100 MG: 100 TAB at 08:13

## 2024-09-15 RX ADMIN — ACETAMINOPHEN 1000 MG: 500 TABLET ORAL at 06:47

## 2024-09-15 RX ADMIN — CLOPIDOGREL BISULFATE 75 MG: 75 TABLET ORAL at 08:13

## 2024-09-15 RX ADMIN — SERTRALINE HYDROCHLORIDE 25 MG: 25 TABLET ORAL at 08:13

## 2024-09-15 ASSESSMENT — ACTIVITIES OF DAILY LIVING (ADL)
ADLS_ACUITY_SCORE: 40

## 2024-09-15 NOTE — PLAN OF CARE
Goal Outcome Evaluation:      Plan of Care Reviewed With: patient          Outcome Evaluation: Likely home at discharge. May need to add PT and OT to her [facility] RN services she already has.

## 2024-09-15 NOTE — DISCHARGE SUMMARY
"[facility]  Hospitalist Discharge Summary      Date of Admission:  9/14/2024  Date of Discharge:  9/15/2024  Discharging Provider: Shaka Munoz DO  Discharge Service: Hospitalist Service    Discharge Diagnoses   Amaurosis fugax vs central retinal artery occlusion  45% R ICA stenosis      Clinically Significant Risk Factors     # Obesity: Estimated body mass index is 36.39 kg/m  as calculated from the following:    Height as of this encounter: 1.626 m (5' 4\").    Weight as of this encounter: 96.2 kg (212 lb).       Follow-ups Needed After Discharge   Follow-up Appointments     Follow-up and recommended labs and tests       Follow up with your Opthalmology clinic this week    Follow up with primary care provider, Ambar Hernandez, within 7 days for   hospital follow- up.  The following labs/tests are recommended: BMP, CBC.              Unresulted Labs Ordered in the Past 30 Days of this Admission       No orders found for last 31 day(s).            Discharge Disposition   Discharged to home  Condition at discharge: Stable    Hospital Course   Patient is a 74-year-old female with HTN, chronic HFpEF and prior amaurosis fugax in July who presents with similar presentation of right sided blurry vision.  Stroke neurology consulted.  MRI brain and orbits with and without contrast showed no acute stroke.  MRA head and neck showed moderate right ICA stenosis, no comment from radiology about right ophthalmic artery, motion artifact which limits study.  Stroke neurology recommends dual antiplatelet therapy for at least 3 months.  Will continue home high intensity statin  Patient's vision is back to baseline.  Advised patient to see her ophthalmologist this week and will set her up with a neurology follow-up in 4 weeks.    Consultations This Hospital Stay   SPEECH LANGUAGE PATH ADULT IP CONSULT  PHARMACY IP CONSULT  PHARMACY IP CONSULT  PHARMACY IP CONSULT  PHYSICAL THERAPY ADULT IP " CONSULT  OCCUPATIONAL THERAPY ADULT IP CONSULT  REHAB ADMISSIONS LIAISON IP CONSULT  CARE MANAGEMENT / SOCIAL WORK IP CONSULT  NEUROLOGY IP CONSULT  SMOKING CESSATION PROGRAM IP CONSULT    Code Status   Full Code    Time Spent on this Encounter   I, Shaka Munoz DO, personally saw the patient today and spent greater than 30 minutes discharging this patient.       Shaka Munoz DO  [facility] EMERGENCY DEPARTMENT  [address] [place] [id number]  Phone: [phone]  ______________________________________________________________________    Physical Exam   Vital Signs: Temp: 97.9  F (36.6  C) Temp src: Oral BP: 102/63 Pulse: 75   Resp: 19 SpO2: 98 % O2 Device: None (Room air)    Weight: 212 lbs 0 oz  General Appearance:  No acute distress  Respiratory: Clear to auscultation bilaterally  Cardiovascular: Regular rate and rhythm  Extremities: No peripheral edema or cyanosis  Neuro: Alert and oriented x 3, normal speech, cranial nerves II through XII grossly intact, motor 5/5 throughout         Primary Care Physician   Ambar Hernandez    Discharge Orders      Reason for your hospital stay    Amaurosis fugax     Activity    Your activity upon discharge: activity as tolerated     Follow-up and recommended labs and tests     Follow up with your Opthalmology clinic this week    Follow up with primary care provider, Ambar Hernandez, within 7 days for hospital follow- up.  The following labs/tests are recommended: BMP, CBC.     Diet    Follow this diet upon discharge: Current Diet:Orders Placed This Encounter      Regular Diet Adult     Stroke Hospital Follow Up    Please be aware that coverage of these services is subject to the terms and limitations of your health insurance plan.  Call member services at your health plan with any benefit or coverage questions.  [employer] will call you to coordinate care as prescribed by your provider. If you don t hear from a representative  within 2 business days, please call [phone].         Significant Results and Procedures   Most Recent 3 CBC's:  Recent Labs   Lab Test 09/15/24  0455 09/14/24  1120 08/05/24  0945   WBC 6.0 6.3 8.2   HGB 13.1 14.4 12.9   * 112* 108*    193 179     Most Recent 3 BMP's:  Recent Labs   Lab Test 09/15/24  0455 09/14/24  2114 09/14/24  1659 09/14/24  1359 08/21/24  1310     --   --  139 137   POTASSIUM 3.7  --   --  4.2 4.7   CHLORIDE 103  --   --  103 104   CO2 23  --   --  25 23   BUN 10.9  --   --  7.8* 12.5   CR 0.59  --   --  0.55 0.65   ANIONGAP 12  --   --  11 10   SAMANTHA 8.5*  --   --  8.5* 9.0   * 125* 139* 105* 109*     Most Recent Cholesterol Panel:  Recent Labs   Lab Test 09/14/24  1610   CHOL 186   LDL 86   HDL 78   TRIG 110     Most Recent Hemoglobin A1c:  Recent Labs   Lab Test 09/14/24  1120   A1C 5.0   ,   Results for orders placed or performed during the hospital encounter of 09/14/24   MRA Brain ([place]) wo Contrast    Narrative    EXAM: MR BRAIN AND ORBITS W/O and W CONTRAST, MRA NECK (CAROTIDS) W/O and W CONTRAST, MRA BRAIN ([place]) W/O CONTRAST  LOCATION: [facility]  DATE: 9/14/2024    INDICATION: acute vision changes (blurry)   similar but less severe episode in July; no visual field cuts and neuro exam otherwise normal  COMPARISON: CT head 7/31/2024  CONTRAST: 9ml gadavist  TECHNIQUE:   1) Routine multiplanar multisequence head MRI without and with intravenous contrast.  2) Dedicated high-resolution multiplanar multisequence MRI of the orbits without and with intravenous contrast.  2) 3D time-of-flight head MRA without intravenous contrast.  3) Neck MRA without and with IV contrast. Stenosis measurements made according to NASCET criteria unless otherwise specified.    FINDINGS:  HEAD MRI:  INTRACRANIAL CONTENTS: No acute or subacute infarct. No mass, acute hemorrhage, or extra-axial fluid collections. Patchy nonspecific  T2/FLAIR hyperintensities within the cerebral white matter most consistent with mild to moderate chronic microvascular   ischemic change. Unchanged mild to moderate generalized cerebral atrophy. No hydrocephalus. Normal position of the cerebellar tonsils. No pathologic contrast enhancement. Unchanged calcification adjacent to the medial left thalamus.    SELLA: No abnormality accounting for technique.    OSSEOUS STRUCTURES/SOFT TISSUES: There is susceptibility artifact involving the lateral right frontal bone. The major intracranial vascular flow voids are maintained.     ORBITS: Dedicated MRI of the orbits was performed. Intact globes. Symmetrical extraocular musculature without thickening/enlargement. The intraorbital, canalicular and prechiasmatic optic nerve segments are normal in size and signal intensity   bilaterally. The optic chiasm and proximal optic tracts are unremarkable. No localized inflammation of the intraconal or extraconal orbital fat. Normal lacrimal glands. No intraorbital mass or pathologic intraorbital enhancement.     SINUSES/MASTOIDS: No paranasal sinus mucosal disease. No middle ear or mastoid effusion.     HEAD MRA:   ANTERIOR CIRCULATION: No stenosis/occlusion, aneurysm, or high flow vascular malformation. Standard [place] anatomy.    POSTERIOR CIRCULATION: No stenosis/occlusion, aneurysm, or high flow vascular malformation. Balanced vertebral arteries supply a normal basilar artery.     NECK MRA:   RIGHT CAROTID: There is 45% stenosis of the proximal right ICA.    LEFT CAROTID: No measurable stenosis or dissection.    VERTEBRAL ARTERIES: The left vertebral artery is diminutive compared to the right. .    AORTIC ARCH: Classic aortic arch anatomy with no significant stenosis at the origin of the great vessels.      Impression    IMPRESSION:  HEAD MRI:   1.  No acute intracranial abnormality. No evidence of acute infarct, hemorrhage, or mass.    ORBIT MRI:  1.  Unremarkable  appearance of the orbits.    HEAD MRA:   1.  Patent intracranial arterial vasculature without flow-limiting stenosis.  2.  No aneurysm.    NECK MRA:  1.  There is 45% stenosis of the proximal right ICA.       MRA Neck (Carotids) wo & w Contrast    Narrative    EXAM: MR BRAIN AND ORBITS W/O and W CONTRAST, MRA NECK (CAROTIDS) W/O and W CONTRAST, MRA BRAIN ([place]) W/O CONTRAST  LOCATION: [facility]  DATE: 9/14/2024    INDICATION: acute vision changes (blurry)   similar but less severe episode in July; no visual field cuts and neuro exam otherwise normal  COMPARISON: CT head 7/31/2024  CONTRAST: 9ml gadavist  TECHNIQUE:   1) Routine multiplanar multisequence head MRI without and with intravenous contrast.  2) Dedicated high-resolution multiplanar multisequence MRI of the orbits without and with intravenous contrast.  2) 3D time-of-flight head MRA without intravenous contrast.  3) Neck MRA without and with IV contrast. Stenosis measurements made according to NASCET criteria unless otherwise specified.    FINDINGS:  HEAD MRI:  INTRACRANIAL CONTENTS: No acute or subacute infarct. No mass, acute hemorrhage, or extra-axial fluid collections. Patchy nonspecific T2/FLAIR hyperintensities within the cerebral white matter most consistent with mild to moderate chronic microvascular   ischemic change. Unchanged mild to moderate generalized cerebral atrophy. No hydrocephalus. Normal position of the cerebellar tonsils. No pathologic contrast enhancement. Unchanged calcification adjacent to the medial left thalamus.    SELLA: No abnormality accounting for technique.    OSSEOUS STRUCTURES/SOFT TISSUES: There is susceptibility artifact involving the lateral right frontal bone. The major intracranial vascular flow voids are maintained.     ORBITS: Dedicated MRI of the orbits was performed. Intact globes. Symmetrical extraocular musculature without thickening/enlargement. The intraorbital, canalicular  and prechiasmatic optic nerve segments are normal in size and signal intensity   bilaterally. The optic chiasm and proximal optic tracts are unremarkable. No localized inflammation of the intraconal or extraconal orbital fat. Normal lacrimal glands. No intraorbital mass or pathologic intraorbital enhancement.     SINUSES/MASTOIDS: No paranasal sinus mucosal disease. No middle ear or mastoid effusion.     HEAD MRA:   ANTERIOR CIRCULATION: No stenosis/occlusion, aneurysm, or high flow vascular malformation. Standard [place] anatomy.    POSTERIOR CIRCULATION: No stenosis/occlusion, aneurysm, or high flow vascular malformation. Balanced vertebral arteries supply a normal basilar artery.     NECK MRA:   RIGHT CAROTID: There is 45% stenosis of the proximal right ICA.    LEFT CAROTID: No measurable stenosis or dissection.    VERTEBRAL ARTERIES: The left vertebral artery is diminutive compared to the right. .    AORTIC ARCH: Classic aortic arch anatomy with no significant stenosis at the origin of the great vessels.      Impression    IMPRESSION:  HEAD MRI:   1.  No acute intracranial abnormality. No evidence of acute infarct, hemorrhage, or mass.    ORBIT MRI:  1.  Unremarkable appearance of the orbits.    HEAD MRA:   1.  Patent intracranial arterial vasculature without flow-limiting stenosis.  2.  No aneurysm.    NECK MRA:  1.  There is 45% stenosis of the proximal right ICA.       MR Brain and Orbits w/o & w Contrast    Narrative    EXAM: MR BRAIN AND ORBITS W/O and W CONTRAST, MRA NECK (CAROTIDS) W/O and W CONTRAST, MRA BRAIN ([place]) W/O CONTRAST  LOCATION: [facility]  DATE: 9/14/2024    INDICATION: acute vision changes (blurry)   similar but less severe episode in July; no visual field cuts and neuro exam otherwise normal  COMPARISON: CT head 7/31/2024  CONTRAST: 9ml gadavist  TECHNIQUE:   1) Routine multiplanar multisequence head MRI without and with intravenous  contrast.  2) Dedicated high-resolution multiplanar multisequence MRI of the orbits without and with intravenous contrast.  2) 3D time-of-flight head MRA without intravenous contrast.  3) Neck MRA without and with IV contrast. Stenosis measurements made according to NASCET criteria unless otherwise specified.    FINDINGS:  HEAD MRI:  INTRACRANIAL CONTENTS: No acute or subacute infarct. No mass, acute hemorrhage, or extra-axial fluid collections. Patchy nonspecific T2/FLAIR hyperintensities within the cerebral white matter most consistent with mild to moderate chronic microvascular   ischemic change. Unchanged mild to moderate generalized cerebral atrophy. No hydrocephalus. Normal position of the cerebellar tonsils. No pathologic contrast enhancement. Unchanged calcification adjacent to the medial left thalamus.    SELLA: No abnormality accounting for technique.    OSSEOUS STRUCTURES/SOFT TISSUES: There is susceptibility artifact involving the lateral right frontal bone. The major intracranial vascular flow voids are maintained.     ORBITS: Dedicated MRI of the orbits was performed. Intact globes. Symmetrical extraocular musculature without thickening/enlargement. The intraorbital, canalicular and prechiasmatic optic nerve segments are normal in size and signal intensity   bilaterally. The optic chiasm and proximal optic tracts are unremarkable. No localized inflammation of the intraconal or extraconal orbital fat. Normal lacrimal glands. No intraorbital mass or pathologic intraorbital enhancement.     SINUSES/MASTOIDS: No paranasal sinus mucosal disease. No middle ear or mastoid effusion.     HEAD MRA:   ANTERIOR CIRCULATION: No stenosis/occlusion, aneurysm, or high flow vascular malformation. Standard [place] of [place] anatomy.    POSTERIOR CIRCULATION: No stenosis/occlusion, aneurysm, or high flow vascular malformation. Balanced vertebral arteries supply a normal basilar artery.     NECK MRA:   RIGHT CAROTID: There  is 45% stenosis of the proximal right ICA.    LEFT CAROTID: No measurable stenosis or dissection.    VERTEBRAL ARTERIES: The left vertebral artery is diminutive compared to the right. .    AORTIC ARCH: Classic aortic arch anatomy with no significant stenosis at the origin of the great vessels.      Impression    IMPRESSION:  HEAD MRI:   1.  No acute intracranial abnormality. No evidence of acute infarct, hemorrhage, or mass.    ORBIT MRI:  1.  Unremarkable appearance of the orbits.    HEAD MRA:   1.  Patent intracranial arterial vasculature without flow-limiting stenosis.  2.  No aneurysm.    NECK MRA:  1.  There is 45% stenosis of the proximal right ICA.           Discharge Medications   Current Discharge Medication List        START taking these medications    Details   clopidogrel (PLAVIX) 75 MG tablet Take 1 tablet (75 mg) by mouth or NG Tube daily.  Qty: 30 tablet, Refills: 3    Associated Diagnoses: Amaurosis fugax; TIA (transient ischemic attack)           CONTINUE these medications which have NOT CHANGED    Details   acetaminophen (TYLENOL) 500 MG tablet Take 1,000 mg by mouth every 6 hours.      aspirin 81 MG EC tablet Take 1 tablet (81 mg) by mouth daily  Qty: 90 tablet, Refills: 2    Associated Diagnoses: AFX (amaurosis fugax)      atorvastatin (LIPITOR) 80 MG tablet Take 1 tablet (80 mg) by mouth every evening  Qty: 90 tablet, Refills: 2    Associated Diagnoses: AFX (amaurosis fugax)      bimatoprost (LUMIGAN) 0.01 % SOLN Place 1 drop into the right eye at bedtime  Qty: 7.5 mL, Refills: 3    Comments: 90 day supply ok  Associated Diagnoses: Primary open angle glaucoma (POAG) of both eyes, mild stage      Cranberry-Vitamin C (CRANBERRY CONCENTRATE/VITAMINC) 10468-738 MG CAPS Take 2 capsules by mouth daily      empagliflozin (JARDIANCE) 10 MG TABS tablet Take 1 tablet (10 mg) by mouth daily  Qty: 90 tablet, Refills: 1    Associated Diagnoses: Heart failure with preserved ejection fraction, NYHA class I (H)       furosemide (LASIX) 20 MG tablet Take 1 tablet (20 mg) by mouth daily  Qty: 90 tablet, Refills: 0    Associated Diagnoses: Hypokalemia      levothyroxine (SYNTHROID/LEVOTHROID) 137 MCG tablet Take 1 tablet (137 mcg) by mouth daily.  Qty: 90 tablet, Refills: 1    Associated Diagnoses: History of thyroid cancer; Hypothyroidism, unspecified type      Melatonin 10-10 MG TBCR Take 1 tablet by mouth at bedtime      metoprolol tartrate (LOPRESSOR) 25 MG tablet Take 1 tablet (25 mg) by mouth 2 times daily  Qty: 180 tablet, Refills: 1    Associated Diagnoses: Tachycardia      potassium chloride luis angel ER (KLOR-CON M20) 20 MEQ CR tablet Take 1 tablet (20 mEq) by mouth 2 times daily    Associated Diagnoses: Hypokalemia      sertraline (ZOLOFT) 25 MG tablet Take 1 tablet (25 mg) by mouth daily  Qty: 90 tablet, Refills: 0    Comments: Repeat EKG, Qtc prior to resuming  Associated Diagnoses: CARINE (generalized anxiety disorder)      thiamine (B-1) 100 MG tablet Take 1 tablet (100 mg) by mouth daily  Qty: 100 tablet, Refills: 3    Associated Diagnoses: Alcohol use           Allergies   Allergies   Allergen Reactions    Atenolol      Rash and Insomnia    Eliquis [Apixaban]      Wanting to faint    Nitrofurantoin      'Bad, itchy, feverish, rash on arms and legs'    Sulfa Antibiotics      Rash on feet    Penicillins Rash

## 2024-09-15 NOTE — PHARMACY-CONSULT NOTE
Pharmacy Consult to evaluate for medication related stroke core measures    Mirna Grijalva, 74 year old female admitted for eye problem on 9/14/2024.    Thrombolytic was not given because of Time from onset contraindications    VTE Prophylaxis SCDs /PCDs placed on 9/15, as appropriate prior to end of hospital day 2.    Antithrombotic: aspirin and clopidogrel started on 9/15, as appropriate by end of hospital day 2. Continue antithrombotic therapy on discharge to meet quality measures, unless contraindicated.    Anticoagulation if history of A-fib/flutter: Patient does not have history of A-fib/flutter - anticoagulation not required for medication related stroke core measures.     LDL Cholesterol Calculated   Date Value Ref Range Status   09/14/2024 86 <100 mg/dL Final       Patient currently receiving Lipitor (atorvastatin) continue statin on discharge to meet quality measures, unless contraindicated.    Recommendations: None at this time    Thank you for the consult.    Jacqueline Mcbride RPH 9/15/2024 8:55 AM

## 2024-09-15 NOTE — PLAN OF CARE
"PRIMARY DIAGNOSIS: \"GENERIC\" NURSING  OUTPATIENT/OBSERVATION GOALS TO BE MET BEFORE DISCHARGE:  ADLs back to baseline: Yes    Activity and level of assistance: Up with standby assistance.    Pain status: Pain free.    Return to near baseline physical activity: Yes     Discharge Planner Nurse   Safe discharge environment identified: Yes  Barriers to discharge: No       Entered by: Ellie Gordillo RN 09/15/2024 12:55 AM     Please review provider order for any additional goals.   Nurse to notify provider when observation goals have been met and patient is ready for discharge.Goal Outcome Evaluation:    "

## 2024-09-15 NOTE — CONSULTS
"Care Management Initial Consult    General Information  Assessment completed with: PatientMirna  Type of CM/SW Visit: Initial Assessment    Primary Care Provider verified and updated as needed: Yes (\"I call Dr. Hernandez my primary, but she is hard to get into so I also see Dr. Mayorga some\".)   Readmission within the last 30 days: no previous admission in last 30 days      Reason for Consult: discharge planning  Advance Care Planning: Advance Care Planning Reviewed: present on chart          Communication Assessment  Patient's communication style: spoken language (English or Bilingual)             Cognitive  Cognitive/Neuro/Behavioral: WDL                      Living Environment:   People in home: spouse  Mirna and  Serg  Current living Arrangements: house (\"We built our house with no steps to get inside and no steps inside the house either\".)      Able to return to prior arrangements: yes       Family/Social Support:  Care provided by: self  Provides care for: no one  Marital Status:   Support system: , Children  Gene       Description of Support System: Supportive, Involved    Support Assessment: Adequate family and caregiver support, Adequate social supports, Patient communicates needs well met    Current Resources:   Patient receiving home care services: Yes  Skilled Home Care Services: Skilled Nursing (\"[facility] RN comes every 2 weeks to check my vital signs. It is ordered for about 2 more months. I finished the PT with them\".)     Community Resources: Home Care, Housekeeping/Chore Agency (\"Housekeeping help once a month\")  Equipment currently used at home: cane, straight, walker, rolling (\"I mostly use my cane. I also have a FWW I can use if needed, but now that my back doesn't hurt I don't need to use it\".)  Supplies currently used at home: Hearing Aid Batteries, Other (\"hearing aid, glasses\")    Employment/Financial:  Employment Status: retired     Employment/ " "Comments: \"no  history\"  Financial Concerns: none   Referral to Financial Worker: No       Does the patient's insurance plan have a 3 day qualifying hospital stay waiver?  No    Lifestyle & Psychosocial Needs:  Social Determinants of Health     Food Insecurity: Low Risk  (1/23/2024)    Food Insecurity     Within the past 12 months, did you worry that your food would run out before you got money to buy more?: No     Within the past 12 months, did the food you bought just not last and you didn t have money to get more?: No   Depression: Not at risk (7/22/2024)    PHQ-2     PHQ-2 Score: 0   Housing Stability: Low Risk  (1/23/2024)    Housing Stability     Do you have housing? : Yes     Are you worried about losing your housing?: No   Tobacco Use: Medium Risk (9/14/2024)    Patient History     Smoking Tobacco Use: Former     Smokeless Tobacco Use: Never     Passive Exposure: Never   Financial Resource Strain: Low Risk  (1/23/2024)    Financial Resource Strain     Within the past 12 months, have you or your family members you live with been unable to get utilities (heat, electricity) when it was really needed?: No   Alcohol Use: Not on file   Transportation Needs: Low Risk  (1/23/2024)    Transportation Needs     Within the past 12 months, has lack of transportation kept you from medical appointments, getting your medicines, non-medical meetings or appointments, work, or from getting things that you need?: No   Physical Activity: Not on file   Interpersonal Safety: Low Risk  (8/21/2024)    Interpersonal Safety     Do you feel physically and emotionally safe where you currently live?: Yes     Within the past 12 months, have you been hit, slapped, kicked or otherwise physically hurt by someone?: No     Within the past 12 months, have you been humiliated or emotionally abused in other ways by your partner or ex-partner?: No   Stress: Not on file   Social Connections: Not on file   Health Literacy: Not on file " "      Functional Status:  Prior to admission patient needed assistance:   Dependent ADLs:: Ambulation-cane, Independent  Dependent IADLs:: Independent (\"I drive close to home only and never on the freeway. I actually drive very little, but my  does help with transportation and shopping.\")  Assesssment of Functional Status: At functional baseline    Mental Health Status:  Mental Health Status: No Current Concerns       Chemical Dependency Status:  Chemical Dependency Status: No Current Concerns             Values/Beliefs:  Spiritual, Cultural Beliefs, Advent Practices, Values that affect care: no               Discussed  Partnership in Safe Discharge Planning  document with patient/family: No    Additional Information:  Mirna lives in a house with her  Serg. \"We built our house with no steps to get inside and no steps inside the house either\".     She is independent with ADLs and IADLs. \"I drive close to home only and never on the freeway. I actually drive very little, but my  does help with transportation and shopping.\"     She has [facility] RN services already. \"RN comes every 2 weeks to check my vital signs. It is ordered for about 2 more months. I finished the PT with them\".      to transport at discharge.    [place] was given and discussed. All questions were answered.    CM to follow for medical progression of care, discharge recommendations, and final discharge plan. Writer verified patient demographics and updated any changes needed in the patient chart.    Next Steps:   Plan: Unknown hospital course and neurology following. Awaiting PT/OT recommendations.  Needs: Likely home at discharge. May need to add PT and OT to her [facility] RN services she already has.    Geraldine Silvestre RN    "

## 2024-09-15 NOTE — PROGRESS NOTES
Physical Therapy     Chart reviewed; orders acknowledged. Pt is independent with functional mobility and has no concerns regarding strength or balance. Vision has returned to baseline. Will defer to nursing staff for mobilization while hospitalized to prevent deconditioning.  Plan was discussed with RN and OT.  Will D/C current PT orders; please reorder if status changes. Thank you.          9/15/2024 by Dotty Wick DPT

## 2024-09-15 NOTE — PROGRESS NOTES
SLP orders received. Chart reviewed.? Speech-Language Pathology Evaluations not indicated due to no reported or resolved deficits related to speech, language, or cognition. Patient tolerating current diet of Regular and Thin with no s/s aspiration per report. No acute SLP needs identified.? Defer discharge recommendations to treatment team.? Orders canceled.

## 2024-09-15 NOTE — PLAN OF CARE
Patient is alert and oriented. Patient is on telemetry, SBA with activity. Patient requested sleep medication, provider notified.       Andreina Tubbs RN      Problem: Adult Inpatient Plan of Care  Goal: Plan of Care Review  Description: The Plan of Care Review/Shift note should be completed every shift.  The Outcome Evaluation is a brief statement about your assessment that the patient is improving, declining, or no change.  This information will be displayed automatically on your shift  note.  Outcome: Progressing   Goal Outcome Evaluation:

## 2024-09-15 NOTE — PLAN OF CARE
"PRIMARY DIAGNOSIS: \"GENERIC\" NURSING  OUTPATIENT/OBSERVATION GOALS TO BE MET BEFORE DISCHARGE:  ADLs back to baseline: Yes    Activity and level of assistance: Up with standby assistance.    Pain status: Improved-controlled with oral pain medications.    Return to near baseline physical activity: Yes     Discharge Planner Nurse   Safe discharge environment identified: No  Barriers to discharge: Yes, PT/OT eval. Echo in AM. Neurology following.        Entered by: Ellie Gordillo RN 09/15/2024 7:00 AM     Please review provider order for any additional goals.   Nurse to notify provider when observation goals have been met and patient is ready for discharge.Goal Outcome Evaluation:        "

## 2024-09-16 ENCOUNTER — TELEPHONE (OUTPATIENT)
Dept: FAMILY MEDICINE | Facility: CLINIC | Age: 74
End: 2024-09-16
Payer: COMMERCIAL

## 2024-09-16 ENCOUNTER — PATIENT OUTREACH (OUTPATIENT)
Dept: CARE COORDINATION | Facility: CLINIC | Age: 74
End: 2024-09-16
Payer: COMMERCIAL

## 2024-09-16 ENCOUNTER — MYC MEDICAL ADVICE (OUTPATIENT)
Dept: FAMILY MEDICINE | Facility: CLINIC | Age: 74
End: 2024-09-16
Payer: COMMERCIAL

## 2024-09-16 DIAGNOSIS — E87.6 HYPOKALEMIA: ICD-10-CM

## 2024-09-16 DIAGNOSIS — F41.1 GAD (GENERALIZED ANXIETY DISORDER): ICD-10-CM

## 2024-09-16 RX ORDER — SERTRALINE HYDROCHLORIDE 25 MG/1
25 TABLET, FILM COATED ORAL DAILY
Qty: 90 TABLET | Refills: 0 | OUTPATIENT
Start: 2024-09-16

## 2024-09-16 RX ORDER — POTASSIUM CHLORIDE 1500 MG/1
20 TABLET, EXTENDED RELEASE ORAL 2 TIMES DAILY
Qty: 90 TABLET | Refills: 2 | Status: SHIPPED | OUTPATIENT
Start: 2024-09-16 | End: 2024-09-16

## 2024-09-16 NOTE — TELEPHONE ENCOUNTER
Florecita with [facility] calling.    Patient was in hospital and they said to follow-up in 7 days and get a BMP and CBC drawn. Florecita is asking if PCP would like [facility] to draw these labs?    She is scheduled for a hospital follow-up with another provider on 9/24/24.

## 2024-09-16 NOTE — TELEPHONE ENCOUNTER
Pharmacy calling to inquire if there is a reason why the patient's potassium prescription is for 45 days with refills instead of 90 days. Pharmacy would like to fill 90 days if possible.

## 2024-09-16 NOTE — PROGRESS NOTES
"Clinic Care Coordination Contact  Transitions of Care Outreach  Chief Complaint   Patient presents with    Clinic Care Coordination - Post Hospital       Most Recent Admission Date: 9/14/2024   Most Recent Admission Diagnosis:      Most Recent Discharge Date: 9/15/2024   Most Recent Discharge Diagnosis: Vision changes - H53.9  Amaurosis fugax - G45.3  TIA (transient ischemic attack) - G45.9     Transitions of Care Assessment    Discharge Assessment  How are you doing now that you are home?: \"Yes good, actually the home nurse stopped by yesterday.\"  How are your symptoms? (Red Flag symptoms escalate to triage hotline per guidelines): Improved  Do you know how to contact your clinic care team if you have future questions or changes to your health status? : Yes  Does the patient have their discharge instructions? : Yes  Does the patient have questions regarding their discharge instructions? : No  Were you started on any new medications or were there changes to any of your previous medications? : Yes  Does the patient have all of their medications?: Yes  Do you have questions regarding any of your medications? : No  Do you have all of your needed medical supplies or equipment (DME)?  (i.e. oxygen tank, CPAP, cane, etc.): Yes    Post-op ([facility] Only)  If the patient had a surgery or procedure, do they have any questions for a nurse?: No         [facility] Explained and offered Care Coordination support to eligible patients: Yes    Patient accepted? No    Follow up Plan     Discharge Follow-Up  Discharge follow up appointment scheduled in alignment with recommended follow up timeframe or Transitions of Risk Category? (Low = within 30 days; Moderate= within 14 days; High= within 7 days): Yes  Discharge Follow Up Appointment Date: 09/24/24  Discharge Follow Up Appointment Scheduled with?: Primary Care Provider    Future Appointments   Date Time Provider Department Center   9/17/2024  3:00 PM MICCT1 JNCTIC [facility]   9/23/2024  " 1:30 PM Lise Mendoza PA-C [facility]   9/24/2024  3:00 PM Derrick Jacinto PA-C [facility]   9/27/2024  1:15 PM [facility]   10/3/2024  2:20 [facility]   10/17/2024  3:30 PM Fred Potter, APRN CNP [facility]   10/23/2024  1:40 PM [facility]   10/25/2024  4:30 PM [facility]   3/7/2025  1:00 PM [facility]       Outpatient Plan as outlined on AVS reviewed with patient.    For any urgent concerns, please contact our 24 hour nurse triage line: [phone] ([phone])       ALEXANDER Garcia  [phone]  [facility]

## 2024-09-18 ENCOUNTER — MYC MEDICAL ADVICE (OUTPATIENT)
Dept: FAMILY MEDICINE | Facility: CLINIC | Age: 74
End: 2024-09-18

## 2024-09-18 RX ORDER — POTASSIUM CHLORIDE 1500 MG/1
20 TABLET, EXTENDED RELEASE ORAL 2 TIMES DAILY
Qty: 180 TABLET | Refills: 1 | Status: SHIPPED | OUTPATIENT
Start: 2024-09-18 | End: 2024-09-24

## 2024-09-19 ENCOUNTER — MYC MEDICAL ADVICE (OUTPATIENT)
Dept: FAMILY MEDICINE | Facility: CLINIC | Age: 74
End: 2024-09-19
Payer: COMMERCIAL

## 2024-09-20 NOTE — TELEPHONE ENCOUNTER
Provider Response to 2nd Level Triage Request    I have reviewed the RN documentation. My recommendation is:  Patient may continue to taking potassium 20 meq twice daily

## 2024-09-21 ENCOUNTER — NURSE TRIAGE (OUTPATIENT)
Dept: NURSING | Facility: CLINIC | Age: 74
End: 2024-09-21
Payer: COMMERCIAL

## 2024-09-21 NOTE — TELEPHONE ENCOUNTER
Nurse Triage SBAR    Is this a 2nd Level Triage? NO    Situation: Wants antibiotic sent in for UTI.    Background: Patient has chronic recurring UTIs.     Assessment: Done with a course of Cipro last week. Now her urine is cloudy again and having burning. Denies back pain and fever. Drinking lots of water.     Protocol Recommended Disposition:   See PCP Within 24 Hours    Recommendation: Advised urgent care within 24 hours. Patient declines and says she has appt with urology on Monday she will wait until then. Reviewed callback precautions with patient. No additional questions.     Giles Calvin RN on 9/21/2024 at 12:35 PM    Reason for Disposition   Urinating more frequently than usual (i.e., frequency)    Additional Information   Negative: Shock suspected (e.g., cold/pale/clammy skin, too weak to stand, low BP, rapid pulse)   Negative: Sounds like a life-threatening emergency to the triager   Negative: [1] Unable to urinate (or only a few drops) > 4 hours AND [2] bladder feels very full (e.g., palpable bladder or strong urge to urinate)   Negative: [1] Decreased urination and [2] drinking very little AND [2] dehydration suspected (e.g., dark urine, no urine > 12 hours, very dry mouth, very lightheaded)   Negative: Patient sounds very sick or weak to the triager   Negative: Fever > 100.4 F (38.0 C)   Negative: Side (flank) or lower back pain present    Protocols used: Urinary Symptoms-A-AH

## 2024-09-23 ENCOUNTER — TELEPHONE (OUTPATIENT)
Dept: FAMILY MEDICINE | Facility: CLINIC | Age: 74
End: 2024-09-23

## 2024-09-23 ENCOUNTER — MYC MEDICAL ADVICE (OUTPATIENT)
Dept: FAMILY MEDICINE | Facility: CLINIC | Age: 74
End: 2024-09-23

## 2024-09-23 ENCOUNTER — OFFICE VISIT (OUTPATIENT)
Dept: UROLOGY | Facility: CLINIC | Age: 74
End: 2024-09-23
Payer: COMMERCIAL

## 2024-09-23 VITALS — DIASTOLIC BLOOD PRESSURE: 82 MMHG | HEART RATE: 85 BPM | SYSTOLIC BLOOD PRESSURE: 140 MMHG

## 2024-09-23 DIAGNOSIS — R39.81 FUNCTIONAL INCONTINENCE: ICD-10-CM

## 2024-09-23 DIAGNOSIS — R10.2 VULVAR PAIN: ICD-10-CM

## 2024-09-23 DIAGNOSIS — Z85.3 PERSONAL HISTORY OF MALIGNANT NEOPLASM OF BREAST: ICD-10-CM

## 2024-09-23 DIAGNOSIS — R32 URINARY INCONTINENCE, UNSPECIFIED TYPE: ICD-10-CM

## 2024-09-23 DIAGNOSIS — N39.41 URGE INCONTINENCE: ICD-10-CM

## 2024-09-23 DIAGNOSIS — E87.6 HYPOKALEMIA: ICD-10-CM

## 2024-09-23 DIAGNOSIS — R39.15 URINARY URGENCY: Primary | ICD-10-CM

## 2024-09-23 PROCEDURE — 99214 OFFICE O/P EST MOD 30 MIN: CPT | Mod: 25 | Performed by: PHYSICIAN ASSISTANT

## 2024-09-23 PROCEDURE — 51798 US URINE CAPACITY MEASURE: CPT | Performed by: PHYSICIAN ASSISTANT

## 2024-09-23 RX ORDER — MIRABEGRON 25 MG/1
25 TABLET, FILM COATED, EXTENDED RELEASE ORAL DAILY
Qty: 90 TABLET | Refills: 3 | Status: SHIPPED | OUTPATIENT
Start: 2024-09-23 | End: 2024-09-30

## 2024-09-23 NOTE — PATIENT INSTRUCTIONS
Contact your primary care doctor about using estrogen cream given your history of breast cancer. If your primary care doctor is ok with you using estrogen cream, then start the estrogen cream 3 times per week at night. Apply blueberry sized amount on finger and rub along vaginal tissue like a face cream.   _______________________________________________     Resume the Myrbetriq 25mg once daily. This has been ordered for you and sent to your preferred pharmacy. If too expensive let my team know. Continue to monitor your blood pressure at home. If becomes elevated and stays elevated, notify my team and stop the myrbetriq.

## 2024-09-23 NOTE — PROGRESS NOTES
Urology Clinic      Name: Mirna Grijalva    MRN: [id number]   YOB: 1950  Accompanied at today's visit by:self                 Chief Complaint:   followup          History of Present Illness:   September 23, 2024    HISTORY:   We have been following 74 year old Mirna Grijalva for  hx of UTI, functional incontinence, UUI, occasional incontinence without warning, vaginal atrophy, hx of incomplete emptying, hx of breast cancer and thyroid cancer. Also has hx of glaucoma and hysterectomy. Last seen on 4/5/24 and restarted on myrbetriq 25mg daily; PVR at that time was WNL. Also advised to clear estrogen cream with her PCP given her hx of breat cancer. Since last encounter has been hospitalized for low potassium and fall as well as hospitalization for TIA in remote past. States she is no longer on myrbetriq as she accidentally threw out the medication. States when she was taking the medication it was working well for her without side effects or fluctuations in BP. Does take lasix. States her BP has been stable in 130s systolic and 60s diastolic at home. States she used the estrogen cream a few times then stopped. Was unsure why she stopped the estrogen cream. Has had 2 UTIs since last encounter. Not interested in going to PFPT. Denies s/s of UTI today. Patient voices no other concerns at this time.            Allergies:     Allergies   Allergen Reactions    Atenolol      Rash and Insomnia    Eliquis [Apixaban]      Wanting to faint    Nitrofurantoin      'Bad, itchy, feverish, rash on arms and legs'    Sulfa Antibiotics      Rash on feet    Penicillins Rash            Medications:     Current Outpatient Medications   Medication Sig Dispense Refill    acetaminophen (TYLENOL) 500 MG tablet Take 1,000 mg by mouth every 6 hours.      aspirin 81 MG EC tablet Take 1 tablet (81 mg) by mouth daily 90 tablet 2    atorvastatin (LIPITOR) 80 MG tablet Take 1 tablet (80 mg) by mouth every evening 90 tablet 2    bimatoprost  (LUMIGAN) 0.01 % SOLN Place 1 drop into the right eye at bedtime 7.5 mL 3    clopidogrel (PLAVIX) 75 MG tablet Take 1 tablet (75 mg) by mouth or NG Tube daily. 30 tablet 3    Cranberry-Vitamin C (CRANBERRY CONCENTRATE/VITAMINC) 48765-940 MG CAPS Take 2 capsules by mouth daily      empagliflozin (JARDIANCE) 10 MG TABS tablet Take 1 tablet (10 mg) by mouth daily 90 tablet 1    furosemide (LASIX) 20 MG tablet Take 1 tablet (20 mg) by mouth daily 90 tablet 0    levothyroxine (SYNTHROID/LEVOTHROID) 137 MCG tablet Take 1 tablet (137 mcg) by mouth daily. 90 tablet 1    Melatonin 10-10 MG TBCR Take 1 tablet by mouth at bedtime      metoprolol tartrate (LOPRESSOR) 25 MG tablet Take 1 tablet (25 mg) by mouth 2 times daily 180 tablet 1    mirabegron (MYRBETRIQ) 25 MG 24 hr tablet Take 1 tablet (25 mg) by mouth daily. 90 tablet 3    potassium chloride luis angel ER (KLOR-CON M20) 20 MEQ CR tablet Take 1 tablet (20 mEq) by mouth 2 times daily. 180 tablet 1    sertraline (ZOLOFT) 25 MG tablet Take 1 tablet (25 mg) by mouth daily 90 tablet 0    thiamine (B-1) 100 MG tablet Take 1 tablet (100 mg) by mouth daily 100 tablet 3     No current facility-administered medications for this visit.               Past  Surgical History:     Past Surgical History:   Procedure Laterality Date    BIOPSY      BREAST SURGERY      CATARACT IOL, RT/LT Left 2022?    With Istent    CYSTOSCOPY      GLAUCOMA SURGERY  2022?    GYN SURGERY               Physical Exam:     Vitals:    09/23/24 1318   BP: (!) 153/95   BP Location: Left arm   Patient Position: Sitting   Cuff Size: Adult Regular   Pulse: 85     PSYCH: NAD  EYES: EOMI  NEURO: AAO x3    LABS:     UC   9/3/24 >100k citrobacter  8/14/24 >100k E. Coli     Creatinine   Date Value Ref Range Status   09/15/2024 0.59 0.51 - 0.95 mg/dL Final            Assessment and Plan:   74 year old is a pleasant female who has  hx of UTI, functional incontinence, UUI, occasional incontinence without warning, vaginal  atrophy, hx of incomplete emptying, hx of breast cancer and thyroid cancer. Also has hx of glaucoma and hysterectomy.    Plan:  -  strongly advise starting estrogen cream for UTI prevention, only if cleared by PCP given her hx of TIA and breast cancer.  if not cleared by PCP, consider methenamine nightly vs low dose abx.  - BP elevated. Rechecked at end of encounter and improved. Patient asymptomatic. States it is high because of trying to get here on time. Advised to continue to monitor BP and if remains elevated to notify my team so can discontinue the myrbetriq.  - myrbetriq 25mg resumed. Again discussed that she needs to monitor her BP and to stop the medication if BP remains elevated. Consider gemtesa in the future.   - contact clinic if develops s/s of UTI in the future.   - she is not interested in PFPT.   - continue cranberry supplement.   - consider VUDS if fails conservative therapies for incontinence.   - followup in 4 months with PVR and BP check.  - After discussing the assessment and plan with patient, patient verbalizes understanding and agrees to the above plan. All questions answered.       Other orders as below:  Orders Placed This Encounter   Procedures    MEASURE POST-VOID RESIDUAL URINE/BLADDER CAPACITY, US NON-IMAGING       21 minutes spent on the date of the encounter doing chart review, review of ER notes, review of labs, ordering myrbetriq, review of test results, interpretation of tests, patient visit and documentation.      Lise Mendoza PA-C  Urology  September 23, 2024      Patient Care Team:  Ambar Hernandez DO as PCP - General (Family Medicine)  Ambar Hernandez DO as Assigned PCP  Lise Mendoza PA-C as Physician Assistant  Lise Mendoza PA-C as Assigned Surgical Provider  Ryan Koo MD as MD (Ophthalmology)  Sravanthi Mullins MD as MD (Neurology)  Gustavo Davis MD as MD (Neurology)  Lise Mendoza PA-C as Physician Assistant  Jesse  MD Gelacio as Assigned Heart and Vascular Provider  Arlene Crabtree MD as Assigned Endocrinology Provider  Aurelio Salinas MD as Assigned Musculoskeletal Provider  RAVI CASSIDY

## 2024-09-23 NOTE — NURSING NOTE
Mirna Grijalva's goals for this visit include:   Chief Complaint   Patient presents with    RECHECK     hx of UTIs, UUI, urinary urgency, urinary incontinence       She requests these members of her care team be copied on today's visit information:       PCP: Ambar Hernandez    Referring Provider:  Lise Mendoza PA-C  [address] [place] [id number]    LMP  (LMP Unknown)     post void residual: 58 ml    BP (!) 153/95 (BP Location: Left arm, Patient Position: Sitting, Cuff Size: Adult Regular)   Pulse 85   LMP  (LMP Unknown)       Do you need any medication refills at today's visit?     Jayda Mcmullen LPN on 9/23/2024 at 1:13 PM

## 2024-09-23 NOTE — TELEPHONE ENCOUNTER
Pharmacy calls stating patient has not picked up potassium RX and expressed to them that she normally takes 10mEq BID but current rx states 20mEq BID. Would like clarification on why this was changed. RN unable to locate in chart reason for medication change.

## 2024-09-23 NOTE — TELEPHONE ENCOUNTER
This medication is not on pt current medication list as mentioned in previous message.    RUPINDER Huber.

## 2024-09-24 ENCOUNTER — TELEPHONE (OUTPATIENT)
Dept: FAMILY MEDICINE | Facility: CLINIC | Age: 74
End: 2024-09-24

## 2024-09-24 ENCOUNTER — OFFICE VISIT (OUTPATIENT)
Dept: FAMILY MEDICINE | Facility: CLINIC | Age: 74
End: 2024-09-24
Payer: COMMERCIAL

## 2024-09-24 ENCOUNTER — ANCILLARY PROCEDURE (OUTPATIENT)
Dept: GENERAL RADIOLOGY | Facility: CLINIC | Age: 74
End: 2024-09-24
Attending: PHYSICIAN ASSISTANT
Payer: COMMERCIAL

## 2024-09-24 VITALS
SYSTOLIC BLOOD PRESSURE: 132 MMHG | HEIGHT: 64 IN | HEART RATE: 91 BPM | DIASTOLIC BLOOD PRESSURE: 82 MMHG | RESPIRATION RATE: 16 BRPM | WEIGHT: 213.5 LBS | OXYGEN SATURATION: 96 % | BODY MASS INDEX: 36.45 KG/M2 | TEMPERATURE: 98.5 F

## 2024-09-24 DIAGNOSIS — G45.9 TIA (TRANSIENT ISCHEMIC ATTACK): Primary | ICD-10-CM

## 2024-09-24 DIAGNOSIS — Z86.39 HISTORY OF PRIMARY HYPERPARATHYROIDISM: ICD-10-CM

## 2024-09-24 DIAGNOSIS — N30.00 ACUTE CYSTITIS WITHOUT HEMATURIA: ICD-10-CM

## 2024-09-24 DIAGNOSIS — Z85.850 HISTORY OF THYROID CANCER: ICD-10-CM

## 2024-09-24 DIAGNOSIS — Z83.49 FAMILY HISTORY OF HEMOCHROMATOSIS: ICD-10-CM

## 2024-09-24 DIAGNOSIS — M81.0 OSTEOPOROSIS WITHOUT CURRENT PATHOLOGICAL FRACTURE, UNSPECIFIED OSTEOPOROSIS TYPE: ICD-10-CM

## 2024-09-24 DIAGNOSIS — R79.89 ELEVATED FERRITIN: ICD-10-CM

## 2024-09-24 DIAGNOSIS — I50.30 HEART FAILURE WITH PRESERVED EJECTION FRACTION, NYHA CLASS I (H): ICD-10-CM

## 2024-09-24 DIAGNOSIS — R58 ECCHYMOSIS: ICD-10-CM

## 2024-09-24 DIAGNOSIS — E87.6 HYPOKALEMIA: ICD-10-CM

## 2024-09-24 DIAGNOSIS — E03.9 HYPOTHYROIDISM, UNSPECIFIED TYPE: ICD-10-CM

## 2024-09-24 DIAGNOSIS — E83.110 HEREDITARY HEMOCHROMATOSIS (H): ICD-10-CM

## 2024-09-24 LAB
ALBUMIN UR-MCNC: NEGATIVE MG/DL
APPEARANCE UR: ABNORMAL
BACTERIA #/AREA URNS HPF: ABNORMAL /HPF
BILIRUB UR QL STRIP: NEGATIVE
COLOR UR AUTO: YELLOW
ERYTHROCYTE [DISTWIDTH] IN BLOOD BY AUTOMATED COUNT: 13.9 % (ref 10–15)
GLUCOSE UR STRIP-MCNC: >=1000 MG/DL
HCT VFR BLD AUTO: 44.2 % (ref 35–47)
HGB BLD-MCNC: 14.7 G/DL (ref 11.7–15.7)
HGB UR QL STRIP: ABNORMAL
KETONES UR STRIP-MCNC: NEGATIVE MG/DL
LAB DIRECTOR COMMENTS: NORMAL
LAB DIRECTOR DISCLAIMER: NORMAL
LAB DIRECTOR INTERPRETATION: NORMAL
LAB DIRECTOR METHODOLOGY: NORMAL
LAB DIRECTOR RESULTS: NORMAL
LEUKOCYTE ESTERASE UR QL STRIP: ABNORMAL
MCH RBC QN AUTO: 37.7 PG (ref 26.5–33)
MCHC RBC AUTO-ENTMCNC: 33.3 G/DL (ref 31.5–36.5)
MCV RBC AUTO: 113 FL (ref 78–100)
NITRATE UR QL: NEGATIVE
PH UR STRIP: 7 [PH] (ref 5–8)
PLATELET # BLD AUTO: 199 10E3/UL (ref 150–450)
RBC # BLD AUTO: 3.9 10E6/UL (ref 3.8–5.2)
RBC #/AREA URNS AUTO: ABNORMAL /HPF
SP GR UR STRIP: 1.01 (ref 1–1.03)
SPECIMEN DESCRIPTION: NORMAL
SQUAMOUS #/AREA URNS AUTO: ABNORMAL /LPF
UROBILINOGEN UR STRIP-ACNC: 0.2 E.U./DL
WBC # BLD AUTO: 7.9 10E3/UL (ref 4–11)
WBC #/AREA URNS AUTO: ABNORMAL /HPF
WBC CLUMPS #/AREA URNS HPF: PRESENT /HPF

## 2024-09-24 PROCEDURE — 85027 COMPLETE CBC AUTOMATED: CPT | Performed by: PHYSICIAN ASSISTANT

## 2024-09-24 PROCEDURE — 84443 ASSAY THYROID STIM HORMONE: CPT | Performed by: PHYSICIAN ASSISTANT

## 2024-09-24 PROCEDURE — 99000 SPECIMEN HANDLING OFFICE-LAB: CPT | Performed by: PHYSICIAN ASSISTANT

## 2024-09-24 PROCEDURE — 81001 URINALYSIS AUTO W/SCOPE: CPT | Performed by: PHYSICIAN ASSISTANT

## 2024-09-24 PROCEDURE — 82306 VITAMIN D 25 HYDROXY: CPT | Performed by: PHYSICIAN ASSISTANT

## 2024-09-24 PROCEDURE — G0452 MOLECULAR PATHOLOGY INTERPR: HCPCS | Performed by: STUDENT IN AN ORGANIZED HEALTH CARE EDUCATION/TRAINING PROGRAM

## 2024-09-24 PROCEDURE — 73630 X-RAY EXAM OF FOOT: CPT | Mod: TC | Performed by: RADIOLOGY

## 2024-09-24 PROCEDURE — 86800 THYROGLOBULIN ANTIBODY: CPT | Mod: 90 | Performed by: PHYSICIAN ASSISTANT

## 2024-09-24 PROCEDURE — 84432 ASSAY OF THYROGLOBULIN: CPT | Mod: 90 | Performed by: PHYSICIAN ASSISTANT

## 2024-09-24 PROCEDURE — 80053 COMPREHEN METABOLIC PANEL: CPT | Performed by: PHYSICIAN ASSISTANT

## 2024-09-24 PROCEDURE — 99495 TRANSJ CARE MGMT MOD F2F 14D: CPT | Performed by: PHYSICIAN ASSISTANT

## 2024-09-24 PROCEDURE — 83970 ASSAY OF PARATHORMONE: CPT | Performed by: PHYSICIAN ASSISTANT

## 2024-09-24 PROCEDURE — 84466 ASSAY OF TRANSFERRIN: CPT | Performed by: PHYSICIAN ASSISTANT

## 2024-09-24 PROCEDURE — 81256 HFE GENE: CPT | Mod: 59 | Performed by: PHYSICIAN ASSISTANT

## 2024-09-24 PROCEDURE — 36415 COLL VENOUS BLD VENIPUNCTURE: CPT | Performed by: PHYSICIAN ASSISTANT

## 2024-09-24 PROCEDURE — 87086 URINE CULTURE/COLONY COUNT: CPT | Performed by: PHYSICIAN ASSISTANT

## 2024-09-24 RX ORDER — POTASSIUM CHLORIDE 750 MG/1
10 TABLET, EXTENDED RELEASE ORAL 2 TIMES DAILY
Qty: 180 TABLET | Refills: 1 | Status: SHIPPED | OUTPATIENT
Start: 2024-09-24

## 2024-09-24 RX ORDER — MULTIVITAMIN WITH IRON
TABLET ORAL
COMMUNITY
Start: 2024-09-16 | End: 2024-09-30

## 2024-09-24 RX ORDER — CEPHALEXIN 500 MG/1
500 CAPSULE ORAL 2 TIMES DAILY
Qty: 14 CAPSULE | Refills: 0 | Status: SHIPPED | OUTPATIENT
Start: 2024-09-24 | End: 2024-09-30

## 2024-09-24 NOTE — TELEPHONE ENCOUNTER
Called patient to relay provider message below. Patient states that she is having urinary frequency and her urine is cloudy. She states that UTI are very common for her. According to patient, her Urologist prescribed her a medication that will help with urinary frequency (Mirabegron per note) but she has not been able to pick this medication up yet. She would, however, like an antibiotic for current infection. Writer notified patient that we are also waiting on culture.     RUPINDER Diggs, Derrick Kirby PA-C  [facility]  Please call patient.  This was a future order from her urology team I believe.  She denies admits to me she was having any urologic symptoms.  Was not expecting to get a urine test.  However it appears she may have a urinary tract infection.  We will culture to confirm this.  Is patient having any symptoms at this time of increased urination frequency or pain with urination?    We will likely need to start an antibiotic while we await for the culture to return.    Karson Jacinto PA-C

## 2024-09-24 NOTE — TELEPHONE ENCOUNTER
Called patient and relayed information/instructions from provider. Patient has no further questions at this time and will follow up as needed/instructed.    Romeo Olvera RN     [facility]

## 2024-09-24 NOTE — PROGRESS NOTES
Assessment & Plan     TIA (transient ischemic attack)  Recent history of recurrent TIAs.  Followed closely by neurology.  Since discharge, denies any new neurologic symptoms or vision changes.  Other than easy bruising (see below) tolerating current regimen well without side effects.  Has follow-up plan for neurologist.  Does not appear her neurology notes are in her chart.  Recommending release of information to obtain these.  Otherwise, plan on following up with neurology as planned.    Ecchymosis  Noted on her left foot.  Radiographs obtained to assess for any possible significant trauma such as fracture or hemarthrosis.  Radiographs unable to review at this time of this visit.  The results are still in process.  Will await radiology review.  Otherwise, reassured.  Possibly mild trauma while she was sleeping.  Recommending monitoring and if any worsening pain or worsening swallowing develop, may need further imaging such as ultrasound.  Consider ADS versus ER visit if occurs.  Will check CBC for platelet counts.  If in acceptable range, continue to monitor for now and follow-up with neurology as scheduled.  - CBC with platelets; Future  - XR Foot Left G/E 3 Views; Future  - CBC with platelets    Heart failure with preserved ejection fraction, NYHA class I (H)  Follows cardiology Future appointment scheduled    Elevated ferritin  Endocrinology outside orders  - Hemochromatosis mutation  - Transferrin    Family history of hemochromatosis  As above  - Hemochromatosis mutation  - Transferrin    Hereditary hemochromatosis (H24)  As above  - Hemochromatosis mutation    Hypokalemia  As above    History of primary hyperparathyroidism  As above  - Vitamin D Deficiency  - Comprehensive metabolic panel  - Parathyroid Hormone Intact    Osteoporosis without current pathological fracture, unspecified osteoporosis type  As above  - Vitamin D Deficiency  - Comprehensive metabolic panel    History of thyroid cancer  As above  -  TSH with free T4 reflex  - Thyroglobulin and Antibody (Sendout to [facility])    Hypothyroidism, unspecified type  Above  - TSH with free T4 reflex    Acute cystitis without hematuria  Urine was obtained from future orders by different provider.  This was routed to me after the fact.  UA does show signs of possible UTI.  Patient did not admit to any urinary symptoms at all.  Culture is pending and call out to patient's to assess for any possible symptoms.  Likely to start antibiotic management.  She is allergic to penicillins as well as Bactrim and nitrofurantoin.  Likely to use Keflex.  Renal panel is pending however most common renal panels of all been within normal limits.  Dose adjustments likely not necessary.  Will await patient call back until this is sent.  - UA Macroscopic with reflex to Microscopic and Culture - Lab Collect  - Urine Microscopic Exam  - Urine Culture        MED REC REQUIRED  Post Medication Reconciliation Status:  Discharge medications reconciled, continue medications without change        Tien Bradley is a 74 year old, presenting for the following health issues:  Hospital F/U        9/24/2024     2:34 PM   Additional Questions   Roomed by Marlena HICKS CMA   Accompanied by Self     HPI        Hospital Follow-up Visit:    Hospital/Nursing Home/IP Rehab Facility: [facility]  Date of Admission: 9/14/24  Date of Discharge: 9/15/24  Reason(s) for Admission: TIA  Was the patient in the ICU or did the patient experience delirium during hospitalization?  No  Do you have any other stressors you would like to discuss with your provider? No    Problems taking medications regularly:  None  Medication changes since discharge: Medications for bladder infection  Problems adhering to non-medication therapy:  None    Summary of hospitalization:  [facility] discharge summary reviewed  Diagnostic Tests/Treatments reviewed.  Follow up needed: [facility] and BMP  Other  "Healthcare Providers Involved in Patient s Care:         Specialist appointment - neurology through [facility] clinic, cardiology, ophthalmology, endocrinology.  Update since discharge: improved.    In summary patient is a 74-year-old female with a past history of hypothyroidism, history of thyroid cancer, hereditary hemochromatosis recent ferritin within normal limits, heart failure with preserved ejection fraction, osteoporosis with primary hyperparathyroidism, and history of recurrent TIAs.  She was recently seen at [facility] for another TIA which she has had multiple in the past.  They have presented similarly with resolution within 24 hours.  This results in sudden vision loss in her right eye.  She was consulted during her hospitalization stay by neurology who recommended ongoing TIA management with aspirin and Plavix.  But continuing Plavix for at least 3 months and close follow-up with neurology.  She is not familiar of the follow-up date scheduled at her neurology clinic but states is not in the near future.    Since discharge, denies any new vision changes.  Also no weakness or slurred speech or headaches.  Does states this morning woke up with new bruising on her left distal foot.  Mild pain with this.  However, denies any known trauma.  Denies any swelling that is worsening in her lower legs or redness or warmth.  No chest pains or shortness of breath.      Patient would also like to establish care with me.    Plan of care communicated with patient                   Objective    /82 (BP Location: Left arm, Patient Position: Sitting, Cuff Size: Adult Regular)   Pulse 91   Temp 98.5  F (36.9  C) (Oral)   Resp 16   Ht 1.626 m (5' 4\")   Wt 96.8 kg (213 lb 8 oz)   LMP  (LMP Unknown)   SpO2 96%   BMI 36.65 kg/m    Body mass index is 36.65 kg/m .  Physical Exam   GENERAL: alert and no distress  RESP: lungs clear to auscultation - no rales, rhonchi or wheezes  CV: regular rates and " rhythm  SKIN: Ecchymosis with mild edema noted at distal dorsal left foot.  Full range of motion of toes.  Mild tenderness to palpation along diffuse MTP joints 2 through 4.  No crepitus.  PSYCH: mentation appears normal, affect normal/bright    Xray -right foot 3 view: X-rays at time of charting not available to personally review.  Pending radiology review.  Results for orders placed or performed in visit on 09/24/24 (from the past 24 hour(s))   CBC with platelets   Result Value Ref Range    WBC Count 7.9 4.0 - 11.0 10e3/uL    RBC Count 3.90 3.80 - 5.20 10e6/uL    Hemoglobin 14.7 11.7 - 15.7 g/dL    Hematocrit 44.2 35.0 - 47.0 %     (H) 78 - 100 fL    MCH 37.7 (H) 26.5 - 33.0 pg    MCHC 33.3 31.5 - 36.5 g/dL    RDW 13.9 10.0 - 15.0 %    Platelet Count 199 150 - 450 10e3/uL   UA Macroscopic with reflex to Microscopic and Culture - Lab Collect    Specimen: Urine, Clean Catch   Result Value Ref Range    Color Urine Yellow Colorless, Straw, Light Yellow, Yellow    Appearance Urine Cloudy (A) Clear    Glucose Urine >=1000 (A) Negative mg/dL    Bilirubin Urine Negative Negative    Ketones Urine Negative Negative mg/dL    Specific Gravity Urine 1.015 1.005 - 1.030    Blood Urine Trace (A) Negative    pH Urine 7.0 5.0 - 8.0    Protein Albumin Urine Negative Negative mg/dL    Urobilinogen Urine 0.2 0.2, 1.0 E.U./dL    Nitrite Urine Negative Negative    Leukocyte Esterase Urine Moderate (A) Negative   Urine Microscopic Exam   Result Value Ref Range    Bacteria Urine Few (A) None Seen /HPF    RBC Urine 25-50 (A) 0-2 /HPF /HPF    WBC Urine  (A) 0-5 /HPF /HPF    Squamous Epithelials Urine Few (A) None Seen /LPF    WBC Clumps Urine Present (A) None Seen /HPF           Signed Electronically by: Derrick Jacinto PA-C

## 2024-09-24 NOTE — TELEPHONE ENCOUNTER
Antibiotic called Keflex to be taken 500 mg twice daily for 7 days sent.  Culture is pending.  Depending on culture results recommendations may change.  Will be in touch once these return usually in 2 to 3 days    Karson Jacinto PA-C

## 2024-09-24 NOTE — TELEPHONE ENCOUNTER
Pt calling with ongoing UTI concerns. Pt had a UTI a few weeks back and completed her course of antibiotics.     Pt reports still having burning with urination, frequency, and cloudy urine.    No back pain, no fever, no abdominal pain, no blood in urine.    Pt requesting another round of antibiotics.    Pt does have an appointment with provider at the [facility] location today 9/24.    RUPINDER Huber.

## 2024-09-24 NOTE — LETTER
September 26, 2024      Mirna Grijalva  [address]  [place] [address]        Dear ,    We are writing to inform you of your test results.    Here are your recent results which are within the expected range. Please continue with your current plan of care and let us know if you have any questions or concerns.     Resulted Orders   Comprehensive metabolic panel   Result Value Ref Range    Sodium 139 135 - 145 mmol/L    Potassium 4.3 3.4 - 5.3 mmol/L    Carbon Dioxide (CO2) 24 22 - 29 mmol/L    Anion Gap 14 7 - 15 mmol/L    Urea Nitrogen 10.3 8.0 - 23.0 mg/dL    Creatinine 0.60 0.51 - 0.95 mg/dL    GFR Estimate >90 >60 mL/min/1.73m2      Comment:      eGFR calculated using 2021 CKD-EPI equation.    Calcium 9.1 8.8 - 10.4 mg/dL      Comment:      Reference intervals for this test were updated on 7/16/2024 to reflect our healthy population more accurately. There may be differences in the flagging of prior results with similar values performed with this method. Those prior results can be interpreted in the context of the updated reference intervals.    Chloride 101 98 - 107 mmol/L    Glucose 98 70 - 99 mg/dL    Alkaline Phosphatase 116 40 - 150 U/L    AST 22 0 - 45 U/L    ALT 26 0 - 50 U/L    Protein Total 7.0 6.4 - 8.3 g/dL    Albumin 4.3 3.5 - 5.2 g/dL    Bilirubin Total 0.5 <=1.2 mg/dL       If you have any questions or concerns, please call the clinic at the number listed above.       Sincerely,      Arlene Crabtree MD

## 2024-09-25 ENCOUNTER — TELEPHONE (OUTPATIENT)
Dept: FAMILY MEDICINE | Facility: CLINIC | Age: 74
End: 2024-09-25
Payer: COMMERCIAL

## 2024-09-25 LAB
ALBUMIN SERPL BCG-MCNC: 4.3 G/DL (ref 3.5–5.2)
ALP SERPL-CCNC: 116 U/L (ref 40–150)
ALT SERPL W P-5'-P-CCNC: 26 U/L (ref 0–50)
ANION GAP SERPL CALCULATED.3IONS-SCNC: 14 MMOL/L (ref 7–15)
AST SERPL W P-5'-P-CCNC: 22 U/L (ref 0–45)
BACTERIA UR CULT: NORMAL
BILIRUB SERPL-MCNC: 0.5 MG/DL
BUN SERPL-MCNC: 10.3 MG/DL (ref 8–23)
CALCIUM SERPL-MCNC: 9.1 MG/DL (ref 8.8–10.4)
CHLORIDE SERPL-SCNC: 101 MMOL/L (ref 98–107)
CREAT SERPL-MCNC: 0.6 MG/DL (ref 0.51–0.95)
EGFRCR SERPLBLD CKD-EPI 2021: >90 ML/MIN/1.73M2
GLUCOSE SERPL-MCNC: 98 MG/DL (ref 70–99)
HCO3 SERPL-SCNC: 24 MMOL/L (ref 22–29)
POTASSIUM SERPL-SCNC: 4.3 MMOL/L (ref 3.4–5.3)
PROT SERPL-MCNC: 7 G/DL (ref 6.4–8.3)
PTH-INTACT SERPL-MCNC: 47 PG/ML (ref 15–65)
SODIUM SERPL-SCNC: 139 MMOL/L (ref 135–145)
TRANSFERRIN SERPL-MCNC: 212 MG/DL (ref 200–360)
TSH SERPL DL<=0.005 MIU/L-ACNC: 1.32 UIU/ML (ref 0.3–4.2)
VIT D+METAB SERPL-MCNC: 43 NG/ML (ref 20–50)

## 2024-09-25 NOTE — TELEPHONE ENCOUNTER
Received fax from [employer] that needs provider review and to sign. Form placed in Dr. Hernandez inbox.

## 2024-09-25 NOTE — RESULT ENCOUNTER NOTE
Routing to PCP to review and address - seen in clinic yesterday- I have not seen since 9/5/24 for a virtual visit

## 2024-09-26 ENCOUNTER — MEDICAL CORRESPONDENCE (OUTPATIENT)
Dept: HEALTH INFORMATION MANAGEMENT | Facility: CLINIC | Age: 74
End: 2024-09-26
Payer: COMMERCIAL

## 2024-09-26 ENCOUNTER — TELEPHONE (OUTPATIENT)
Dept: UROLOGY | Facility: CLINIC | Age: 74
End: 2024-09-26
Payer: COMMERCIAL

## 2024-09-26 ENCOUNTER — TELEPHONE (OUTPATIENT)
Dept: FAMILY MEDICINE | Facility: CLINIC | Age: 74
End: 2024-09-26

## 2024-09-26 NOTE — TELEPHONE ENCOUNTER
September 26, 2024    Home health orders was picked up from outbox of  Dr. Hernandez.  sent via fax to [facility]. Original sent to scan and copy in [facility] bin.    SUMI Campbell

## 2024-09-26 NOTE — TELEPHONE ENCOUNTER
In general, according to up-to-date there does not seem to be a significant increase in cardiovascular disease risks with topical vaginal estrogen.  Given her TIA history, I feel it would be appropriate to consider this if urology feels like it would aid in her urinary symptoms.    In regards to her breast cancer history,  should generally be safe but it is generally recommended patient's discussing this with her oncologist first to develop their own treatment plan.  If patient would like a consult with oncology before making this decision, I can place a referral.  Otherwise an E consult would be another option to oncology to get their opinion on the matter.  This would be a quicker option if she would still like the opinion from an oncologist    Karson Jacinto PA-C

## 2024-09-26 NOTE — TELEPHONE ENCOUNTER
Called patient and informed her that her mirabegron (MYRBETRIQ) 25 MG 24 hr tablet  was sent.  Receipt confirmed by pharmacy (9/23/2024  1:56 PM CDT) Patient stated she will call the pharmacy to double check. Prior Auth started in case that is the reason the pharmacy has not filled the medication.     Jayda Mcmullen LPN on 9/26/2024 at 10:52 AM

## 2024-09-26 NOTE — TELEPHONE ENCOUNTER
General Call    Contacts       Contact Date/Time Type Contact Phone/Fax    09/26/2024 11:45 AM CDT Phone (Incoming) Mirna Grijalva (Self) [phone] (M)          Reason for Call:    Patient saw urology Monday and was told that she needs an estrogen cream. Below is the note from Urology. Please send medication if you are willing to, she told me she had her mastectomy in 1988 and has been prescribed this many times in the past with no issues. Pharmacy attached.     Plan:  -  strongly advise starting estrogen cream for UTI prevention, only if cleared by PCP given her hx of TIA and breast cancer.  if not cleared by PCP, consider methenamine nightly vs low dose abx.      Could we send this information to you in [employer] or would you prefer to receive a phone call?:   Patient would prefer a phone call   Okay to leave a detailed message?: Yes at Cell number on file:    Telephone Information:   Mobile [phone]

## 2024-09-26 NOTE — TELEPHONE ENCOUNTER
[facility] Call Center    Phone Message    May a detailed message be left on voicemail: yes     Reason for Call: Other: PT was wondering why her prescription that  Reji was to suppose send to the pharmacy was not called in yet. Please call pt  back. [facility] is where she wants it sent. Thanks.     Action Taken: Other: [facility]    Travel Screening: Not Applicable     Date of Service:

## 2024-09-26 NOTE — TELEPHONE ENCOUNTER
"Called and spoke to patient who is aware that the prescription was sent to her [facility] in [place]. Informed patient that a prior authorization was needed and this has been sent to our prior authorization team to review. Patient stated, \"Well yeah but it costs $300. I can't do that.\" Informed  patient that the PA team will review and she will be contacted with additional information.    Patient inquiring if there is something else she can be prescribed instead. Writer asked if she has been on other medications for urinary urgency in the past. Patient stated,\"Yes, for years through [place].\" Per patient, she is unable to recall the previous medications that she was taking. Informed patient that a message will be sent to Lise Mendoza PA-C with request to review and advise. Patient aware that she will be contacted with additional information.    Shayy Goldstein RN, BSN      "

## 2024-09-26 NOTE — TELEPHONE ENCOUNTER
Reached out to dejah keith regarding myrbetriq not being sent in by her urologist. Pharmacy said patient came to pick in up  and was told it was $313 dollars. Came back a second day and decided did not want medication. Please let patient know to contact pharmacy if she wants medication.    Brenda Rasmussen RN

## 2024-09-26 NOTE — TELEPHONE ENCOUNTER
[facility] Call Center    Phone Message    May a detailed message be left on voicemail: yes    Reason for Call: Other: pt calling and says prescription never reached her pharmacy   [facility], please reach out   Action Taken: Other: urology    Travel Screening: Not Applicable     Date of Service:

## 2024-09-26 NOTE — TELEPHONE ENCOUNTER
Prior Authorization Specialty Medication Request    Medication/Dose: mirabegron (MYRBETRIQ) 25 MG 24 hr tablet  Diagnosis and ICD code (if different than what is on RX):    New/renewal/insurance change PA/secondary ins. PA:  Previously Tried and Failed:      Important Lab Values:   Rationale:     Insurance   Primary:   Insurance ID:      Secondary (if applicable):  Insurance ID:      Pharmacy Information (if different than what is on RX)  Name:    Phone:    Fax    Clinic Information  [facility]

## 2024-09-26 NOTE — TELEPHONE ENCOUNTER
Outgoing call to patient. She states that after insurance coverage the estrogen cream is $300. She plans to reach out to her urologist for alternative options that may be more affordable.

## 2024-09-27 NOTE — TELEPHONE ENCOUNTER
Unfortunately there are no other options unless gemtesa is cheaper. Do not recommend antimuscarinics given age and has glaucoma. Antimuscarinics are contraindicated with those who have glaucoma.    tulio         Received message above from Lise Mendoza PA-C. Left voicemail for patient to call the clinic back to discuss the information above. Prior auth is still in process.     Jayda Mcmullen LPN on 9/27/2024 at 8:50 AM

## 2024-09-28 ENCOUNTER — HOSPITAL ENCOUNTER (EMERGENCY)
Facility: HOSPITAL | Age: 74
Discharge: HOME | End: 2024-09-28
Attending: EMERGENCY MEDICINE | Admitting: EMERGENCY MEDICINE
Payer: COMMERCIAL

## 2024-09-28 ENCOUNTER — NURSE TRIAGE (OUTPATIENT)
Dept: NURSING | Facility: CLINIC | Age: 74
End: 2024-09-28
Payer: COMMERCIAL

## 2024-09-28 VITALS
DIASTOLIC BLOOD PRESSURE: 105 MMHG | OXYGEN SATURATION: 96 % | BODY MASS INDEX: 36.37 KG/M2 | RESPIRATION RATE: 16 BRPM | TEMPERATURE: 97.6 F | WEIGHT: 213 LBS | SYSTOLIC BLOOD PRESSURE: 174 MMHG | HEIGHT: 64 IN | HEART RATE: 105 BPM

## 2024-09-28 DIAGNOSIS — R23.3 EASY BRUISING: ICD-10-CM

## 2024-09-28 LAB
ALBUMIN SERPL BCG-MCNC: 4.2 G/DL (ref 3.5–5.2)
ALP SERPL-CCNC: 113 U/L (ref 40–150)
ALT SERPL W P-5'-P-CCNC: 27 U/L (ref 0–50)
ANION GAP SERPL CALCULATED.3IONS-SCNC: 14 MMOL/L (ref 7–15)
APTT PPP: 28 SECONDS (ref 22–38)
AST SERPL W P-5'-P-CCNC: 26 U/L (ref 0–45)
BASOPHILS # BLD AUTO: 0.1 10E3/UL (ref 0–0.2)
BASOPHILS NFR BLD AUTO: 1 %
BILIRUB DIRECT SERPL-MCNC: <0.2 MG/DL (ref 0–0.3)
BILIRUB SERPL-MCNC: 0.5 MG/DL
BUN SERPL-MCNC: 8.3 MG/DL (ref 8–23)
CALCIUM SERPL-MCNC: 9.1 MG/DL (ref 8.8–10.4)
CHLORIDE SERPL-SCNC: 100 MMOL/L (ref 98–107)
CREAT SERPL-MCNC: 0.52 MG/DL (ref 0.51–0.95)
EGFRCR SERPLBLD CKD-EPI 2021: >90 ML/MIN/1.73M2
EOSINOPHIL # BLD AUTO: 0.2 10E3/UL (ref 0–0.7)
EOSINOPHIL NFR BLD AUTO: 4 %
ERYTHROCYTE [DISTWIDTH] IN BLOOD BY AUTOMATED COUNT: 13.8 % (ref 10–15)
ETHANOL SERPL-MCNC: <0.01 G/DL
GLUCOSE SERPL-MCNC: 121 MG/DL (ref 70–99)
HCO3 SERPL-SCNC: 23 MMOL/L (ref 22–29)
HCT VFR BLD AUTO: 42.4 % (ref 35–47)
HGB BLD-MCNC: 14.6 G/DL (ref 11.7–15.7)
HOLD SPECIMEN: NORMAL
HOLD SPECIMEN: NORMAL
IMM GRANULOCYTES # BLD: 0.1 10E3/UL
IMM GRANULOCYTES NFR BLD: 1 %
INR PPP: 0.93 (ref 0.85–1.15)
LIPASE SERPL-CCNC: 18 U/L (ref 13–60)
LYMPHOCYTES # BLD AUTO: 1.6 10E3/UL (ref 0.8–5.3)
LYMPHOCYTES NFR BLD AUTO: 30 %
MCH RBC QN AUTO: 37.8 PG (ref 26.5–33)
MCHC RBC AUTO-ENTMCNC: 34.4 G/DL (ref 31.5–36.5)
MCV RBC AUTO: 110 FL (ref 78–100)
MONOCYTES # BLD AUTO: 0.7 10E3/UL (ref 0–1.3)
MONOCYTES NFR BLD AUTO: 13 %
NEUTROPHILS # BLD AUTO: 2.9 10E3/UL (ref 1.6–8.3)
NEUTROPHILS NFR BLD AUTO: 52 %
NRBC # BLD AUTO: 0 10E3/UL
NRBC BLD AUTO-RTO: 0 /100
PLATELET # BLD AUTO: 197 10E3/UL (ref 150–450)
POTASSIUM SERPL-SCNC: 4.3 MMOL/L (ref 3.4–5.3)
PROT SERPL-MCNC: 6.9 G/DL (ref 6.4–8.3)
RBC # BLD AUTO: 3.86 10E6/UL (ref 3.8–5.2)
SODIUM SERPL-SCNC: 137 MMOL/L (ref 135–145)
WBC # BLD AUTO: 5.5 10E3/UL (ref 4–11)

## 2024-09-28 PROCEDURE — 85025 COMPLETE CBC W/AUTO DIFF WBC: CPT | Performed by: EMERGENCY MEDICINE

## 2024-09-28 PROCEDURE — 36415 COLL VENOUS BLD VENIPUNCTURE: CPT | Performed by: EMERGENCY MEDICINE

## 2024-09-28 PROCEDURE — 82248 BILIRUBIN DIRECT: CPT | Performed by: EMERGENCY MEDICINE

## 2024-09-28 PROCEDURE — 83690 ASSAY OF LIPASE: CPT | Performed by: EMERGENCY MEDICINE

## 2024-09-28 PROCEDURE — 82077 ASSAY SPEC XCP UR&BREATH IA: CPT | Performed by: EMERGENCY MEDICINE

## 2024-09-28 PROCEDURE — 85730 THROMBOPLASTIN TIME PARTIAL: CPT | Performed by: EMERGENCY MEDICINE

## 2024-09-28 PROCEDURE — 85610 PROTHROMBIN TIME: CPT | Performed by: EMERGENCY MEDICINE

## 2024-09-28 PROCEDURE — 99283 EMERGENCY DEPT VISIT LOW MDM: CPT | Performed by: EMERGENCY MEDICINE

## 2024-09-28 ASSESSMENT — COLUMBIA-SUICIDE SEVERITY RATING SCALE - C-SSRS
6. HAVE YOU EVER DONE ANYTHING, STARTED TO DO ANYTHING, OR PREPARED TO DO ANYTHING TO END YOUR LIFE?: NO
2. HAVE YOU ACTUALLY HAD ANY THOUGHTS OF KILLING YOURSELF IN THE PAST MONTH?: NO
1. IN THE PAST MONTH, HAVE YOU WISHED YOU WERE DEAD OR WISHED YOU COULD GO TO SLEEP AND NOT WAKE UP?: NO

## 2024-09-28 ASSESSMENT — ENCOUNTER SYMPTOMS
COUGH: 0
FEVER: 0
SHORTNESS OF BREATH: 0

## 2024-09-28 ASSESSMENT — ACTIVITIES OF DAILY LIVING (ADL)
ADLS_ACUITY_SCORE: 36
ADLS_ACUITY_SCORE: 38

## 2024-09-28 NOTE — DISCHARGE INSTRUCTIONS
Just time your blood test look great.  I think this is more a side effect of the medicine causing the appropriate amount of blood thinning that you need to help protect against future strokes.  I do not think this represents true purpura.  I think this is just representing easier bruising than what you had had prior to starting the aspirin and Plavix.    Please follow-up with family doctor this week for reevaluation.    Return to the emergency department if you develop bloody urine, blood in your stools, bruising inside your mouth or on your tongue, bleeding on the whites of your eyes, new vision changes, new and persistent headache, or any other concerns.    Thank you for choosing [facility] Emergency [facility].  It has been my pleasure caring for you today.     ~Dr. Kyler MD

## 2024-09-28 NOTE — ED PROVIDER NOTES
"    EMERGENCY DEPARTMENT ENCOUNTER      NAME: Mirna Grijalva  AGE: 74 year old female  YOB: 1950  MRN: [id number]  EVALUATION DATE & TIME: No admission date for patient encounter.    PCP: Derrick Jacinto    ED PROVIDER: Kenia Chávez M.D.        Chief Complaint   Patient presents with    Skin Discoloration         FINAL IMPRESSION:    1. Easy bruising            MEDICAL DECISION MAKING:    Mirna Grijalva is a 74 year old female with history of TIA, alcohol use who presents to the ER with complaints of \"purpura\".    Patient states that she has had TIAs, most recent earlier this month.  She was placed on Plavix and \"nearly immediately\" she started developing bruising on her body.     Laboratories including platelets and CBC look great.  This does not look like vasculitis or true purpura to me.  Seems more consistent with just easy bruising.  Discussed with hematology and reviewed all of her lab results.  He agreed that this seems more consistent with easy bruising and would not recommend stopping her aspirin or Plavix.  He agrees that this is unlikely to represent true purpura or vasculitis.  She is not complaining of anything to suggest otherwise bleeding such as headache, new neurologic changes, hematuria, bloody stools, etc.      ED COURSE:  11:52 AM  I met with the patient to gather history and perform my exam. ED course and treatment discussed.  Discoloration of extremities can sometimes be concerning for things like aortic dissection this does appear to be more like bruising and purpura and less like vascular occlusion color changes.  Her feet and hands are otherwise warm and has palpable pulses.  X-ray was reviewed from outpatient setting and no fracture appreciated on report.  Will check laboratories.    12:58 PM  Spoke with hematology and he would not recommend stopping her dual platelet therapy including aspirin and Plavix unless she starts having bloody stools, bloody urine, or much " worse bruising or signs of bleeding.  At this time he states to continue exactly what she is doing with regards to her aspirin and Plavix.    1:49 PM  Updated patient and family on results and my discussion with hematology.  Explained that this likely represents just easy bruising which can be an expected side effect of dual platelet therapy.  They understand what to watch for including high risk bloody urine, bloody stools, bladder bruising inside the mouth, scleral hemorrhage, etc.  Will have her follow-up with primary care this week for reevaluation.  She denies any headache or neurologic changes.  I do not think she requires any other CT imaging.  Platelets within normal limits.  I do not think this represents a true vasculitis or true purpura.  Patient does have a bruising on her foot, but looking at the shoes she is wearing it may be somewhat consistent with those shoes and daughter states that she has other shoes at home that they will encourage her to use.  It is also possible she could have just bumped her foot or even her hand where she is having the bruising.  She also states she recently had an IV in that hand which could also be causing this bruising.  I do not think this represents superficial thrombophlebitis, DVT, etc.    At this time I do not have concerns for recurrent stroke, aortic dissection or rupture, thromboembolic events, concerning petechia or purpura, vasculitis, etc.    At the conclusion of the encounter I discussed the results of all of the tests and the disposition. Their questions were answered. The patient (and any family present) acknowledged understanding and were agreeable with the care plan.    CONSULTANTS:  Hematology - Dr. Whitman         MEDICATIONS GIVEN IN THE EMERGENCY:  Medications - No data to display        NEW PRESCRIPTIONS STARTED AT TODAY'S ER VISIT     Medication List      There are no discharge medications for this visit.    "          CONDITION:  stable        DISPOSITION:  D.c home with family         =================================================================  =================================================================  TRIAGE ASSESSMENT:  The pt presents shaking states she doesn't have tremors but feels anxious. Pt states she has pupura. Left foot is ecchymotic, multiple areas left had, pt states all over her body. Pt was started on plavix 2-3 weeks ago for TIA and states she has purpura from the plavix. Pt states she was seen by her PMD this past week for the same sx's and had the foot xray done and no changes were made to her medication at that time. No recent trauma.      Triage Assessment (Adult)       Row Name 09/28/24 1148          Triage Assessment    Airway WDL WDL        Respiratory WDL    Respiratory WDL WDL        Skin Circulation/Temperature WDL    Skin Circulation/Temperature WDL X;circulation        Cardiac WDL    Cardiac WDL WDL        Peripheral/Neurovascular WDL    Peripheral Neurovascular WDL WDL        Cognitive/Neuro/Behavioral WDL    Cognitive/Neuro/Behavioral WDL WDL                          ED Triage Vitals [09/28/24 1145]   Enc Vitals Group      BP (!) 174/105      Pulse 105      Resp 16      Temp 97.6  F (36.4  C)      Temp src Temporal      SpO2 96 %      Weight 96.6 kg (213 lb)      Height 1.626 m (5' 4\")          ================================================================  ================================================================    HPI    Patient information was obtained from: patient and family    Use of Intrepreter: N/A      Mirna Grijalva is a 74 year old female with history of TIA, alcohol use who presents to the ER with complaints of purpura.    Patient states that she has had TIAs, most recent earlier this month.  She was placed on Plavix and \"nearly immediately\" she started developing bruising on her body.     Patient notes that she has bruising to the left foot near her toes. "  She saw primary care a couple of days ago who did an x-ray that was reported to be negative for any fractures.  Continues the Plavix but now feels like she is getting more areas of bruising and purpura.  She denies any trauma.    Denies any headache, chest pain, shortness of breath, fevers, abdominal pain, vomiting or diarrhea.    In privacy daughter states that patient does drink quite heavily.      CHART REVIEW:  EXAM: XR FOOT LEFT G/E 3 VIEWS  LOCATION: [facility]  DATE: 9/24/2024     INDICATION:  Ecchymosis  COMPARISON: None.                                                                      IMPRESSION: Degenerative change at the first MTP joint. Diffuse demineralization. Hammertoe deformities. Plantar calcaneal spurring. No evidence for acute fracture.  =======================  Hospitalist Discharge Summary       Date of Admission:  9/14/2024  Date of Discharge:  9/15/2024  Discharging Provider: Shaka Munoz DO  Discharge Service: Hospitalist Service        Discharge Diagnoses  Amaurosis fugax vs central retinal artery occlusion  45% R ICA stenosis     Hospital Course  Patient is a 74-year-old female with HTN, chronic HFpEF and prior amaurosis fugax in July who presents with similar presentation of right sided blurry vision.  Stroke neurology consulted.  MRI brain and orbits with and without contrast showed no acute stroke.  MRA head and neck showed moderate right ICA stenosis, no comment from radiology about right ophthalmic artery, motion artifact which limits study.  Stroke neurology recommends dual antiplatelet therapy for at least 3 months.  Will continue home high intensity statin  Patient's vision is back to baseline.  Advised patient to see her ophthalmologist this week and will set her up with a neurology follow-up in 4 weeks.    REVIEW OF SYSTEMS  Review of Systems   Constitutional:  Negative for fever.   Respiratory:  Negative for cough and shortness of breath.     All other systems reviewed and are negative.        PAST MEDICAL HISTORY:  Past Medical History:   Diagnosis Date    Arthritis years ago?    Cancer (H) 1988  & 2016?    Coronary artery disease May 2024    Glaucoma (increased eye pressure) About 11 years    Hypertension years ago?    Mumps     Nonsenile cataract About 11 years    Thyroid cancer (H)     TIA (transient ischemic attack) 04/27/2024    right eye         PAST SURGICAL HISTORY:  Past Surgical History:   Procedure Laterality Date    BIOPSY      BREAST SURGERY      CATARACT IOL, RT/LT Left 2022?    With Istent    CYSTOSCOPY      GLAUCOMA SURGERY  2022?    GYN SURGERY           CURRENT MEDICATIONS:    Prior to Admission medications    Medication Sig Start Date End Date Taking? Authorizing Provider   acetaminophen (TYLENOL) 500 MG tablet Take 1,000 mg by mouth every 6 hours.    Unknown, Entered By History   aspirin 81 MG EC tablet Take 1 tablet (81 mg) by mouth daily 7/24/24   Ambar Hernandez,    atorvastatin (LIPITOR) 80 MG tablet Take 1 tablet (80 mg) by mouth every evening 8/20/24   Opal Smis NP   bimatoprost (LUMIGAN) 0.01 % SOLN Place 1 drop into the right eye at bedtime 7/24/24   Ryan Koo MD   cephALEXin (KEFLEX) 500 MG capsule Take 1 capsule (500 mg) by mouth 2 times daily. 9/24/24   Derrick Jacinto PA-C   clopidogrel (PLAVIX) 75 MG tablet Take 1 tablet (75 mg) by mouth or NG Tube daily. 9/15/24   Shaka Munoz DO   Cranberry-Vitamin C (CRANBERRY CONCENTRATE/VITAMINC) 65191-813 MG CAPS Take 2 capsules by mouth daily    Unknown, Entered By History   empagliflozin (JARDIANCE) 10 MG TABS tablet Take 1 tablet (10 mg) by mouth daily 7/15/24   Ambar Hernandez,    furosemide (LASIX) 20 MG tablet Take 1 tablet (20 mg) by mouth daily 8/14/24   Kim Mayorga MD   levothyroxine (SYNTHROID/LEVOTHROID) 137 MCG tablet Take 1 tablet (137 mcg) by mouth daily. 8/30/24   Arlene Crabtree MD   magnesium 250 MG tablet  9/16/24   Reported,  Patient   Melatonin 10-10 MG TBCR Take 1 tablet by mouth at bedtime    Reported, Patient   metoprolol tartrate (LOPRESSOR) 25 MG tablet Take 1 tablet (25 mg) by mouth 2 times daily 5/28/24   Fred Potter APRN CNP   mirabegron (MYRBETRIQ) 25 MG 24 hr tablet Take 1 tablet (25 mg) by mouth daily. 9/23/24   Lise Mendoza PA-C   potassium chloride luis angel ER (KLOR-CON M10) 10 MEQ CR tablet Take 1 tablet (10 mEq) by mouth 2 times daily. 9/24/24   Ambar Hernandez DO   sertraline (ZOLOFT) 25 MG tablet Take 1 tablet (25 mg) by mouth daily 8/16/24   Jennifer Noble MD   thiamine (B-1) 100 MG tablet Take 1 tablet (100 mg) by mouth daily 8/5/24   Cathryn Chase MD         ALLERGIES:  Allergies   Allergen Reactions    Atenolol      Rash and Insomnia    Eliquis [Apixaban]      Wanting to faint    Nitrofurantoin      'Bad, itchy, feverish, rash on arms and legs'    Sulfa Antibiotics      Rash on feet    Penicillins Rash         FAMILY HISTORY:  Family History   Problem Relation Age of Onset    Glaucoma Mother     Hypertension Mother         Alzheimers and arthritis    Thyroid Disease Mother     Eye Surgery Mother     Glasses (<7 y/o) Mother     Cancer Father     Diabetes Father         And Hemochromatosis and cancer    Glasses (<7 y/o) Father     Hypertension Brother         Alzheimers    Hypertension Sister         Heart failure         SOCIAL HISTORY:  Social History     Socioeconomic History    Marital status:    Tobacco Use    Smoking status: Former     Current packs/day: 1.00     Average packs/day: 1 pack/day for 20.0 years (20.0 ttl pk-yrs)     Types: Cigarettes     Passive exposure: Never    Smokeless tobacco: Never   Vaping Use    Vaping status: Never Used   Substance and Sexual Activity    Alcohol use: Yes    Drug use: Never    Sexual activity: Not Currently     Partners: Female     Birth control/protection: Post-menopausal     Social Determinants of Health     Financial Resource Strain: Low Risk   "(1/23/2024)    Financial Resource Strain     Within the past 12 months, have you or your family members you live with been unable to get utilities (heat, electricity) when it was really needed?: No   Food Insecurity: Low Risk  (1/23/2024)    Food Insecurity     Within the past 12 months, did you worry that your food would run out before you got money to buy more?: No     Within the past 12 months, did the food you bought just not last and you didn t have money to get more?: No   Transportation Needs: Low Risk  (1/23/2024)    Transportation Needs     Within the past 12 months, has lack of transportation kept you from medical appointments, getting your medicines, non-medical meetings or appointments, work, or from getting things that you need?: No   Interpersonal Safety: Low Risk  (8/21/2024)    Interpersonal Safety     Do you feel physically and emotionally safe where you currently live?: Yes     Within the past 12 months, have you been hit, slapped, kicked or otherwise physically hurt by someone?: No     Within the past 12 months, have you been humiliated or emotionally abused in other ways by your partner or ex-partner?: No   Housing Stability: Low Risk  (1/23/2024)    Housing Stability     Do you have housing? : Yes     Are you worried about losing your housing?: No         VITALS:  Patient Vitals for the past 24 hrs:   BP Temp Temp src Pulse Resp SpO2 Height Weight   09/28/24 1145 (!) 174/105 97.6  F (36.4  C) Temporal 105 16 96 % 1.626 m (5' 4\") 96.6 kg (213 lb)       Wt Readings from Last 3 Encounters:   09/28/24 96.6 kg (213 lb)   09/24/24 96.8 kg (213 lb 8 oz)   09/14/24 96.2 kg (212 lb)       Estimated Creatinine Clearance: 103.9 mL/min (based on SCr of 0.52 mg/dL).    PHYSICAL EXAM    Constitutional:  Well developed, Well nourished, NAD  HENT:  Normocephalic, Atraumatic, Bilateral external ears normal, Nose normal. Neck- Supple, No stridor.   Eyes:  PERRL, EOMI, Conjunctiva normal, No " discharge.  Respiratory:  Normal breath sounds, No respiratory distress, No wheezing, Speaks full sentences easily. No cough.   Cardiovascular:  Normal heart rate, Regular rhythm, No murmurs, No rubs, No gallops. Chest wall nontender.   GI:  No excessive obesity.  Bowel sounds normal, Soft, No tenderness, No masses, No flank tenderness. No rebound or guarding.   : deferred  Musculoskeletal: 2+ DP pulses. No cyanosis, No clubbing. Good range of motion in all major joints. No tenderness to palpation or major deformities noted.   Integument:  Warm, Dry, No erythema, No rash.  No petechiae.   Neurologic:  Alert & oriented x 3, No focal deficits noted.   Psychiatric:  Affect normal, Cooperative         LAB:  All pertinent labs reviewed and interpreted.  Recent Results (from the past 24 hour(s))   Basic metabolic panel    Collection Time: 09/28/24 12:09 PM   Result Value Ref Range    Sodium 137 135 - 145 mmol/L    Potassium 4.3 3.4 - 5.3 mmol/L    Chloride 100 98 - 107 mmol/L    Carbon Dioxide (CO2) 23 22 - 29 mmol/L    Anion Gap 14 7 - 15 mmol/L    Urea Nitrogen 8.3 8.0 - 23.0 mg/dL    Creatinine 0.52 0.51 - 0.95 mg/dL    GFR Estimate >90 >60 mL/min/1.73m2    Calcium 9.1 8.8 - 10.4 mg/dL    Glucose 121 (H) 70 - 99 mg/dL   INR    Collection Time: 09/28/24 12:09 PM   Result Value Ref Range    INR 0.93 0.85 - 1.15   PTT    Collection Time: 09/28/24 12:09 PM   Result Value Ref Range    aPTT 28 22 - 38 Seconds   Hepatic function panel    Collection Time: 09/28/24 12:09 PM   Result Value Ref Range    Protein Total 6.9 6.4 - 8.3 g/dL    Albumin 4.2 3.5 - 5.2 g/dL    Bilirubin Total 0.5 <=1.2 mg/dL    Alkaline Phosphatase 113 40 - 150 U/L    AST 26 0 - 45 U/L    ALT 27 0 - 50 U/L    Bilirubin Direct <0.20 0.00 - 0.30 mg/dL   Lipase    Collection Time: 09/28/24 12:09 PM   Result Value Ref Range    Lipase 18 13 - 60 U/L   Alcohol level blood    Collection Time: 09/28/24 12:09 PM   Result Value Ref Range    Alcohol ethyl <0.01  "<=0.01 g/dL   Extra Red Top Tube    Collection Time: 09/28/24 12:09 PM   Result Value Ref Range    Hold Specimen JIC    Extra Green Top (Lithium Heparin) Tube    Collection Time: 09/28/24 12:09 PM   Result Value Ref Range    Hold Specimen JIC    CBC with platelets and differential    Collection Time: 09/28/24 12:09 PM   Result Value Ref Range    WBC Count 5.5 4.0 - 11.0 10e3/uL    RBC Count 3.86 3.80 - 5.20 10e6/uL    Hemoglobin 14.6 11.7 - 15.7 g/dL    Hematocrit 42.4 35.0 - 47.0 %     (H) 78 - 100 fL    MCH 37.8 (H) 26.5 - 33.0 pg    MCHC 34.4 31.5 - 36.5 g/dL    RDW 13.8 10.0 - 15.0 %    Platelet Count 197 150 - 450 10e3/uL    % Neutrophils 52 %    % Lymphocytes 30 %    % Monocytes 13 %    % Eosinophils 4 %    % Basophils 1 %    % Immature Granulocytes 1 %    NRBCs per 100 WBC 0 <1 /100    Absolute Neutrophils 2.9 1.6 - 8.3 10e3/uL    Absolute Lymphocytes 1.6 0.8 - 5.3 10e3/uL    Absolute Monocytes 0.7 0.0 - 1.3 10e3/uL    Absolute Eosinophils 0.2 0.0 - 0.7 10e3/uL    Absolute Basophils 0.1 0.0 - 0.2 10e3/uL    Absolute Immature Granulocytes 0.1 <=0.4 10e3/uL    Absolute NRBCs 0.0 10e3/uL       No results found for: \"ABORH\"        RADIOLOGY:  Reviewed all pertinent imaging. Please see official radiology report.    No orders to display         EKG:    none      PROCEDURES:  none    Medical Decision Making  Obtained supplemental history:Supplemental history obtained?: Documented in chart and Family Member/Significant Other  Reviewed external records: External records reviewed?: Documented in chart and Prior Imaging: see HPI  Care impacted by chronic illness:Documented in Chart  Did you consider but not order tests?: Work up considered but not performed and documented in chart, if applicable  Did you interpret images independently?: Independent interpretation of ECG and images noted in documentation, when applicable.  Consultation discussion with other provider:Did you involve another provider (consultant, " , pharmacy, etc.)?: I discussed the care with another health care provider, see documentation for details.  Discharge. I recommended the patient continue their current prescription strength medication(s): Aspirin and Plavix. I considered admission, but ultimately discharged patient as laboratories reassuring in discussion with hematology.    MIPS: Not Applicable    Kenia Chávez M.D. [facility]  Emergency Medicine and Medical Toxicology  [facility] EMERGENCY DEPARTMENT  [address] [place] [id number]  [phone]  Dept: [phone]           Kenia Chávez MD  09/28/24 3709

## 2024-09-28 NOTE — ED TRIAGE NOTES
The pt presents shaking states she doesn't have tremors but feels anxious. Pt states she has pupura. Left foot is ecchymotic, multiple areas left had, pt states all over her body. Pt was started on plavix 2-3 weeks ago for TIA and states she has purpura from the plavix. Pt states she was seen by her PMD this past week for the same sx's and had the foot xray done and no changes were made to her medication at that time. No recent trauma.      Triage Assessment (Adult)       Row Name 09/28/24 1148          Triage Assessment    Airway WDL WDL        Respiratory WDL    Respiratory WDL WDL        Skin Circulation/Temperature WDL    Skin Circulation/Temperature WDL X;circulation        Cardiac WDL    Cardiac WDL WDL        Peripheral/Neurovascular WDL    Peripheral Neurovascular WDL WDL        Cognitive/Neuro/Behavioral WDL    Cognitive/Neuro/Behavioral WDL WDL

## 2024-09-28 NOTE — TELEPHONE ENCOUNTER
Nurse Triage SBAR    Situation: Bruising     Background: Patient calling. Pt is on Plavix - restarted it a few days ago.     Assessment: She has been noticing bruising on her skin. Entire left foot. Some swelling to the left foot. The purple coloration has gotten bigger. Her left hand also has large purple discoloration. Pt states the bruising spread to another portion of her foot.     Protocol Recommended Disposition: See Physician within 4 hours (Or PCP Triage)    Recommendation: According to the protocol, Patient should be seen within 4 hours (Or PCP Triage). Advised Patient that the patient needs to be seen within 4 hours (Or PCP Triage). Care advice given. Patient verbalizes understanding and agrees with plan of care. Reviewed concerning symptoms and when to call back.     Kim Contreras RN Nursing Advisor 9/28/2024 11:01 AM    Reason for Disposition   [1] Raised bruise AND [2] size > 2 inches (5 cm) AND [3] getting bigger    Additional Information   Negative: Shock suspected (e.g., cold/pale/clammy skin, too weak to stand, low BP, rapid pulse)   Negative: Sounds like a life-threatening emergency to the triager   Negative: Bruises with fever   Negative: Tiny bruises (spots or dots) of unknown cause   Negative: Bruise(s) of forehead or head   Negative: Bruise(s) of face or jaw   Negative: Followed an injury, and triager doesn't know which injury guideline to use first   Negative: Post-operative bruising   Negative: Dizziness or lightheadedness   Negative: [1] Bruise on head, face, chest, or abdomen AND [2] taking Coumadin (warfarin) or other strong blood thinner, or known bleeding disorder (e.g., thrombocytopenia)   Negative: Unexplained bleeding from another site (e.g., gums, nose, urine) as well   Negative: Patient sounds very sick or weak to the triager   Negative: [1] Not caused by an injury AND [2] 5 or more bruises now    Protocols used: Bruises-A-AH

## 2024-09-30 ENCOUNTER — MYC REFILL (OUTPATIENT)
Dept: FAMILY MEDICINE | Facility: CLINIC | Age: 74
End: 2024-09-30

## 2024-09-30 ENCOUNTER — VIRTUAL VISIT (OUTPATIENT)
Dept: FAMILY MEDICINE | Facility: CLINIC | Age: 74
End: 2024-09-30
Payer: COMMERCIAL

## 2024-09-30 DIAGNOSIS — I50.30 HEART FAILURE WITH PRESERVED EJECTION FRACTION, NYHA CLASS I (H): ICD-10-CM

## 2024-09-30 DIAGNOSIS — G45.9 TIA (TRANSIENT ISCHEMIC ATTACK): ICD-10-CM

## 2024-09-30 DIAGNOSIS — R58 ECCHYMOSIS: Primary | ICD-10-CM

## 2024-09-30 PROCEDURE — 99213 OFFICE O/P EST LOW 20 MIN: CPT | Mod: 95 | Performed by: PHYSICIAN ASSISTANT

## 2024-09-30 NOTE — PROGRESS NOTES
Mirna is a 74 year old who is being evaluated via a billable video visit.    How would you like to obtain your AVS? MyChart  If the video visit is dropped, the invitation should be resent by: Text to cell phone: [phone]  Will anyone else be joining your video visit? No      Assessment & Plan     Ecchymosis  Reassured patient bruising is likely secondary to her being on blood thinner. Per work up in ER and consultation with hematology, should remain on ASA and Plavix. Discussed if bloody stools, head trauma or vomiting blood should be seen in clinic.    TIA (transient ischemic attack)  Hx of TIA x 2, now on DAPT. Continue x 3 months per Neurology recommendation. Has scheduled follow up with Neurology and Ophthalmology.          MED REC REQUIRED  Post Medication Reconciliation Status: patient was not discharged from an inpatient facility or TCU    Risks, benefits and alternatives were discussed with patient. Agreeable to the plan of care.      Subjective   Mirna is a 74 year old, presenting for the following health issues:  Hospital F/U (Follow up on foot discoloration.  The black coloration got worse since she was seen by [facility].  The foot started changing colors after starting Plavix.  Black under toes, bruised on the top of the foot and lighter on the sides.  No known injury.  Neg x rays.  Painful to touch.  )      Video Start Time: 5:04 PM    [facility]        Hospital Follow-up Visit:    Hospital/Nursing Home/IP Rehab Facility: [facility]  Date of Admission: 09/28/24  Date of Discharge: 09/28/24  Reason(s) for Admission: Bruised foot  Was the patient in the ICU or did the patient experience delirium during hospitalization?  No  Do you have any other stressors you would like to discuss with your provider? No    Problems taking medications regularly:  None  Medication changes since discharge: None  Problems adhering to non-medication therapy:  None    Summary of hospitalization:  [facility] discharge summary reviewed  Diagnostic Tests/Treatments reviewed.  Follow up needed: none  Other Healthcare Providers Involved in Patient s Care:         None  Update since discharge: stable.         Plan of care communicated with patient and family           Patient is seen today via video visit for ER follow up for bruising  She notes she restarted DAPT (recently restarted Plavix) for second TIA and since then developed bruising  Concerned about why so bruised and cause  Also wondering why her Plavix was discontinued.      Review of Systems  Constitutional, HEENT, cardiovascular, pulmonary, gi and gu systems are negative, except as otherwise noted.      Objective    Vitals - Patient Reported  Weight (Patient Reported): 96.2 kg (212 lb)      Vitals:  No vitals were obtained today due to virtual visit.    Physical Exam   GENERAL: alert and no distress  EYES: Eyes grossly normal to inspection.  No discharge or erythema, or obvious scleral/conjunctival abnormalities.  RESP: No audible wheeze, cough, or visible cyanosis.    SKIN: large bruise on foot  NEURO: Cranial nerves grossly intact.  Mentation and speech appropriate for age.  PSYCH: Appropriate affect, tone, and pace of words          Video-Visit Details    Type of service:  Video Visit   Video End Time:5:19 PM  Originating Location (pt. Location): Home    Distant Location (provider location):  On-site  Platform used for Video Visit: Nuno  Signed Electronically by: Jena Pearson PA-C

## 2024-09-30 NOTE — TELEPHONE ENCOUNTER
PA Initiation    Medication: MIRABEGRON ER 25 MG PO TB24  Insurance Company: [employer] - Phone [phone] Fax [phone]  Pharmacy Filling the Rx: [facility][id number] - [place], [place] - [facility] [address]  Filling Pharmacy Phone: [phone]  Filling Pharmacy Fax: [phone]  Start Date: 9/30/2024

## 2024-10-01 NOTE — TELEPHONE ENCOUNTER
10/1 Spoke with patient and informed her that we are waiting still to hear back from PA for medication - patient verbalized understanding and understands we will follow up with her once we know more.     Orly Jackson MA on 10/1/2024 at 1:34 PM

## 2024-10-02 ENCOUNTER — DOCUMENTATION ONLY (OUTPATIENT)
Dept: OTHER | Facility: CLINIC | Age: 74
End: 2024-10-02
Payer: COMMERCIAL

## 2024-10-02 ENCOUNTER — TELEPHONE (OUTPATIENT)
Dept: FAMILY MEDICINE | Facility: CLINIC | Age: 74
End: 2024-10-02
Payer: COMMERCIAL

## 2024-10-02 ENCOUNTER — MEDICAL CORRESPONDENCE (OUTPATIENT)
Dept: HEALTH INFORMATION MANAGEMENT | Facility: CLINIC | Age: 74
End: 2024-10-02
Payer: COMMERCIAL

## 2024-10-02 NOTE — TELEPHONE ENCOUNTER
Received fax from [employer] that needs provider review and to sign. Form placed in Dr. Hernandez inbox.     #[phone]

## 2024-10-03 ENCOUNTER — TELEPHONE (OUTPATIENT)
Dept: UROLOGY | Facility: CLINIC | Age: 74
End: 2024-10-03
Payer: COMMERCIAL

## 2024-10-03 ENCOUNTER — HOSPITAL ENCOUNTER (OUTPATIENT)
Dept: CT IMAGING | Facility: HOSPITAL | Age: 74
Discharge: HOME | End: 2024-10-03
Attending: STUDENT IN AN ORGANIZED HEALTH CARE EDUCATION/TRAINING PROGRAM | Admitting: STUDENT IN AN ORGANIZED HEALTH CARE EDUCATION/TRAINING PROGRAM
Payer: COMMERCIAL

## 2024-10-03 DIAGNOSIS — R91.8 PULMONARY NODULES: ICD-10-CM

## 2024-10-03 PROCEDURE — 250N000009 HC RX 250: Performed by: STUDENT IN AN ORGANIZED HEALTH CARE EDUCATION/TRAINING PROGRAM

## 2024-10-03 PROCEDURE — 250N000011 HC RX IP 250 OP 636: Performed by: STUDENT IN AN ORGANIZED HEALTH CARE EDUCATION/TRAINING PROGRAM

## 2024-10-03 PROCEDURE — 71260 CT THORAX DX C+: CPT

## 2024-10-03 RX ORDER — IOPAMIDOL 755 MG/ML
90 INJECTION, SOLUTION INTRAVASCULAR ONCE
Status: COMPLETED | OUTPATIENT
Start: 2024-10-03 | End: 2024-10-03

## 2024-10-03 RX ADMIN — SODIUM CHLORIDE 60 ML: 9 INJECTION, SOLUTION INTRAVENOUS at 14:20

## 2024-10-03 RX ADMIN — IOPAMIDOL 90 ML: 755 INJECTION, SOLUTION INTRAVENOUS at 14:19

## 2024-10-03 NOTE — TELEPHONE ENCOUNTER
[facility] Call Center    Phone Message    May a detailed message be left on voicemail: no     Reason for Call: Other: Patient called and stated that insurance approved mybetriq.     Action Taken: Other: [facility]    Travel Screening: Not Applicable

## 2024-10-03 NOTE — TELEPHONE ENCOUNTER
Prior Authorization Not Needed per Insurance    Medication: MIRABEGRON ER 25 MG PO TB24  Insurance Company: [employer] - Phone [phone] Fax [phone]  Expected CoPay: $    Pharmacy Filling the Rx: [facility] #[id number] - [place] [id number]  Pharmacy Notified: YES      Patient Notified: Instructed pharmacy to notify patient once order is ready.

## 2024-10-04 LAB — SCANNED LAB RESULT: ABNORMAL

## 2024-10-04 NOTE — TELEPHONE ENCOUNTER
Per 9/26/24 prior authorization encounter, PA not needed. Pharmacy to update patient once prescription is ready for .    Shayy Goldstein RN, BSN

## 2024-10-07 DIAGNOSIS — M81.0 OSTEOPOROSIS WITHOUT CURRENT PATHOLOGICAL FRACTURE, UNSPECIFIED OSTEOPOROSIS TYPE: Primary | ICD-10-CM

## 2024-10-07 DIAGNOSIS — E03.9 HYPOTHYROIDISM, UNSPECIFIED TYPE: ICD-10-CM

## 2024-10-08 ENCOUNTER — TELEPHONE (OUTPATIENT)
Dept: UROLOGY | Facility: CLINIC | Age: 74
End: 2024-10-08
Payer: COMMERCIAL

## 2024-10-08 DIAGNOSIS — Z53.9 DIAGNOSIS NOT YET DEFINED: Primary | ICD-10-CM

## 2024-10-08 DIAGNOSIS — R39.15 URINARY URGENCY: Primary | ICD-10-CM

## 2024-10-08 PROCEDURE — G0179 MD RECERTIFICATION HHA PT: HCPCS | Performed by: PHYSICIAN ASSISTANT

## 2024-10-08 NOTE — TELEPHONE ENCOUNTER
[facility] Call Center    Phone Message    May a detailed message be left on voicemail: yes     Reason for Call: Patient called back saying they got this script through pharmacy, but it cost $300 with insurance. Patient is seeking an alternative medication. Please call patient with any questions.    Action Taken: Other: [facility] - [facility]    Travel Screening: Not Applicable     Date of Service:

## 2024-10-08 NOTE — TELEPHONE ENCOUNTER
[facility] Call Center    Phone Message    May a detailed message be left on voicemail: yes     Reason for Call: Patient is awaiting this medication to be sent to pharmacy. Please call patient with any questions. Thanks!    Action Taken: Other: [facility]    Travel Screening: Not Applicable     Date of Service:

## 2024-10-08 NOTE — TELEPHONE ENCOUNTER
Lise Mendoza, ISRA[place]  You; [facility] [place] minutes ago (2:37 PM)    KB  See if that was with a PA or not. If was with a PA or PA denied, recommend gemtesa 75mg once daily. Can take up to 6 weeks to take full effect. No significant side effects with gemtesa to note. But if develops any possible side effects to let us know. If gemtesa too expensive to contact clinic.    tulio    Received above message from provider.  Orly Jackson MA on 10/8/2024 at 2:40 PM

## 2024-10-08 NOTE — TELEPHONE ENCOUNTER
10/8 Spoke with patient and informed her that a new medication of Gemtesa 75 mg once daily will be sent to her preferred pharmacy ([facility] in [place]) and that if medication is too expensive to give us a call. Also let patient know that can take up to 6 weeks to take full effect and no significant side effects. Patient is interested in trying and will call us if any questions may arise.     Routing to RN to send medication to [facility] in [place].     Orly Jackson MA on 10/8/2024 at 2:44 PM

## 2024-10-08 NOTE — TELEPHONE ENCOUNTER
Gemtesa 75mg daily ordered per Lise Mendoza PA-C. Prescription sent to [facility] in [place] per patient request.    Shayy Goldstein RN, BSN

## 2024-10-08 NOTE — TELEPHONE ENCOUNTER
"10/8 Spoke with patient and let her know that a message was sent onto Lise Mendoza PA-C to advise that medication is too costly and if there is any other options we can send in for patient - let her know we will call back as soon as we hear more. Patient verbalized understanding and would like a call back ASAP because she is very \"sore\".    Orly Jackson MA on 10/8/2024 at 2:26 PM    "

## 2024-10-08 NOTE — TELEPHONE ENCOUNTER
[facility] Call Center    Phone Message    May a detailed message be left on voicemail: yes     Reason for Call: Other:   Pt said Myrbepriq 25 Mg was $300 copay with insurance and refused to pay that much. Pt wants to kno if she can get another medication close to that? please call pt back.    Action Taken: Other:   [facility]    Travel Screening: Not Applicable     Date of Service:

## 2024-10-09 DIAGNOSIS — R91.8 ABNORMAL FINDING ON LUNG IMAGING: Primary | ICD-10-CM

## 2024-10-13 ENCOUNTER — MYC MEDICAL ADVICE (OUTPATIENT)
Dept: FAMILY MEDICINE | Facility: CLINIC | Age: 74
End: 2024-10-13
Payer: COMMERCIAL

## 2024-10-13 DIAGNOSIS — F41.1 GAD (GENERALIZED ANXIETY DISORDER): ICD-10-CM

## 2024-10-14 PROBLEM — G45.3: Status: RESOLVED | Noted: 2024-04-28 | Resolved: 2024-10-14

## 2024-10-14 RX ORDER — SERTRALINE HYDROCHLORIDE 25 MG/1
25 TABLET, FILM COATED ORAL DAILY
Qty: 90 TABLET | Refills: 0 | Status: SHIPPED | OUTPATIENT
Start: 2024-10-14

## 2024-10-15 NOTE — PROGRESS NOTES
"        Assessment/Recommendations   Assessment:    1.  Chronic Heart Failure with Preserved Ejection Fraction, NYHA Class I:   Echocardiogram on 5/12/2024 showed EF of 60 to 65% with small pericardial effusion with no indication of cardiac tamponade.    Previous echo on 4/27/2024 showed EF of 60 to 65% with no wall motion abnormalities with grade 1 diastolic dysfunction and no pericardial effusion seen at that time.    Patient was treated with IV Bumex and was discharged on oral Bumex.  Current home weight is 213-215 lbs    Patient is well compensated on exam.    2.  Hypertension: Blood pressure stable.    3.  Nonsustained ventricular tachycardia: Zio patch study on 8/23/2024 showed sinus rhythm average heart rate 89 bpm, 5 episodes of NSVT with longest 20 beats with fastest 179 bpm.  No sustained tachyarrhythmia, no atrial fibrillation, no pauses greater than 3 seconds.     4 Syncope : Patient was in the hospital in August 2024 with syncope.  Suspected orthostatic possibly due to dehydration from increased diuretic therapy.    5.  Recent amaurosis fugax: On aspirin and Plavix for at least 3 months per neurology.  Patient was recommended to follow-up as an outpatient.    6.  Hypokalemia: On potassium chloride.  Most recent potassium is within normal limit.    #  Recurrent UTI: Patient expressed frustration with recurrent UTI.  She is concerned that she is on Jardiance and possible adverse effect of UTI.    Plan/Recommendation:  -Stop Jardiance given recurrent UTI  Consider adding MRA therapy in the near future for heart failure management-    Follow up with Dr. Molina in 2 months.     The longitudinal plan of care for heart failure with preserved ejection fraction, NSVT,  hypertension was addressed during this visit.?Due to the added complexity in care, I will continue to support Mirna Grijalva in the subsequent management of this condition(s) and with the ongoing continuity of care of this condition(s)\". "     History of Present Illness/Subjective    Ms. Mirna Grijalva is a 74 year old female with a past medical history of hypertension, hyperlipidemia, hypothyroidism, history of parathyroid nodules with status post parathyroidectomy, recurrent UTI, recent diagnosis of amaurosis fugax, sinus tachycardia with PVCs per EKG in August 2023, and recent hospitalization with acute hypoxic respiratory failure (resolved), and acute heart failure with preserved ejection fraction who is seen at [facility] for post hospitalization heart failure follow up.     Patient was hospitalized from May 12-15 with acute hypoxic respiratory failure (resolved) with 2 days of progressive shortness of breath and dry cough and found to have acute pulmonary edema and acute heart failure with preserved ejection fraction.    Patient was hospitalized in August with syncope.  Cardiac MRI showed normal LV function with EF of 55.2% with no myocardial scar, no evidence of myocardial iron overload, no evidence of hepatic iron overload, normal RV systolic function with no significant valve abnormalities noted.  She was hospitalized in September with blurry vision.  Neurology was consulted.  MRI of brain and orbits showed no acute stroke however MRI of head and neck showed moderate right ICA stenosis.  She was recommended to follow-up with ophthalmology and neurology.      She denies fatigue, lightheadedness, shortness of breath, dyspnea on exertion, orthopnea, PND, palpitations, chest pain, abdominal fullness/bloating, and lower extremity edema.     She reports adequate fluid intake.  She reports following low-sodium diet.    Cardiac MRI from 8/2024-reviewed:  SUMMARY   1.  Normal left ventricular size, wall thickness and systolic function. The quantified left ventricular  ejection fraction is 55.2%.  No myocardial scar is identified. Normal pre-contrast T1:1014 ms.  Normal T2*  myocardium: 34 ms (normal > 20 ms on 1.5 T  scanner).  No evidence of myocardial iron overload.   2. T2* liver: 20.4 ms (normal >11.4 ms).  No evidence of hepatic ion overload.      3.  Normal right ventricular size and systolic function.  RVEF: 62%.   4.  No significant valvular abnormalities.    Limited ECHO from 5/12/24-Reviewed:   Interpretation Summary   Left ventricular size, wall motion and function are normal. The ejection  fraction is 60-65%.  Normal right ventricle size and systolic function.  IVC diameter <2.1 cm collapsing >50% with sniff suggests a normal RA pressure  of 3 mmHg.  Small pericardial effusion  There are no echocardiographic indications of cardiac tamponade.  ______________________________________________________________________________  Left Ventricle  Left ventricular size, wall motion and function are normal. The ejection  fraction is 60-65%. No regional wall motion abnormalities noted.    ECHO from 4/27/24: Reviewed  Interpretation Summary     The left ventricle is normal in size. There is mild concentric left  ventricular hypertrophy.  Left ventricular systolic function is normal. The visual ejection fraction is  60-65%. No regional wall motion abnormalities noted.  Grade I or early diastolic dysfunction.     The right ventricle is normal in size and function.  The left atrium is mildly dilated. Right atrium not well visualized.  IVC diameter <2.1 cm collapsing >50% with sniff suggests a normal RA pressure  of 3 mmHg.  There is no comparison study available.     Physical Examination Review of Systems   /80 (BP Location: Right arm, Patient Position: Sitting, Cuff Size: Adult Large)   Pulse 85   Resp 16   Wt 97.4 kg (214 lb 12.8 oz)   LMP  (LMP Unknown)   SpO2 97%   BMI 36.87 kg/m    Body mass index is 36.87 kg/m .  Wt Readings from Last 3 Encounters:   10/17/24 97.4 kg (214 lb 12.8 oz)   09/28/24 96.6 kg (213 lb)   09/24/24 96.8 kg (213 lb 8 oz)     General Appearance:   no distress, normal body habitus   ENT/Mouth:  membranes moist, no oral lesions or bleeding gums.      EYES:  no scleral icterus, normal conjunctivae   Neck: no  thyromegaly   Chest/Lungs:   lungs are clear to auscultation, no rales or wheezing, equal chest wall expansion    Cardiovascular:   Heart rate regular.  Normal first and second heart sounds with no murmurs, rubs, or gallops; Jugular venous pressure flat with no edema bilaterally    Abdomen:  no organomegaly, masses, bruits, or tenderness; bowel sounds are present   Extremities   no cyanosis or clubbing    CMS intact.   Skin: no xanthelasma, warm.    Neurologic: alert and oriented; no tremors   Psychiatric: Appears anxious                                                   Negative unless noted in HPI     Medical History  Surgical History Family History Social History   Past Medical History:   Diagnosis Date    Arthritis years ago?    Cancer (H) 1988  & 2016?    Coronary artery disease May 2024    Glaucoma (increased eye pressure) About 11 years    Hypertension years ago?    Mumps     Nonsenile cataract About 11 years    Thyroid cancer (H)     TIA (transient ischemic attack) 04/27/2024    right eye    Past Surgical History:   Procedure Laterality Date    BIOPSY      BREAST SURGERY      CATARACT IOL, RT/LT Left 2022?    With Istent    CYSTOSCOPY      GLAUCOMA SURGERY  2022?    GYN SURGERY      Family History   Problem Relation Age of Onset    Glaucoma Mother     Hypertension Mother         Alzheimers and arthritis    Thyroid Disease Mother     Eye Surgery Mother     Glasses (<7 y/o) Mother     Cancer Father     Diabetes Father         And Hemochromatosis and cancer    Glasses (<7 y/o) Father     Hypertension Brother         Alzheimers    Hypertension Sister         Heart failure    Social History     Socioeconomic History    Marital status:      Spouse name: Not on file    Number of children: Not on file    Years of education: Not on file    Highest education level: Not on file   Occupational  History    Not on file   Tobacco Use    Smoking status: Former     Current packs/day: 1.00     Average packs/day: 1 pack/day for 20.0 years (20.0 ttl pk-yrs)     Types: Cigarettes     Passive exposure: Never    Smokeless tobacco: Never   Vaping Use    Vaping status: Never Used   Substance and Sexual Activity    Alcohol use: Yes    Drug use: Never    Sexual activity: Not Currently     Partners: Female     Birth control/protection: Post-menopausal   Other Topics Concern    Not on file   Social History Narrative    Not on file     Social Determinants of Health     Financial Resource Strain: Low Risk  (1/23/2024)    Financial Resource Strain     Within the past 12 months, have you or your family members you live with been unable to get utilities (heat, electricity) when it was really needed?: No   Food Insecurity: Low Risk  (1/23/2024)    Food Insecurity     Within the past 12 months, did you worry that your food would run out before you got money to buy more?: No     Within the past 12 months, did the food you bought just not last and you didn t have money to get more?: No   Transportation Needs: Low Risk  (1/23/2024)    Transportation Needs     Within the past 12 months, has lack of transportation kept you from medical appointments, getting your medicines, non-medical meetings or appointments, work, or from getting things that you need?: No   Physical Activity: Not on file   Stress: Not on file   Social Connections: Not on file   Interpersonal Safety: Low Risk  (8/21/2024)    Interpersonal Safety     Do you feel physically and emotionally safe where you currently live?: Yes     Within the past 12 months, have you been hit, slapped, kicked or otherwise physically hurt by someone?: No     Within the past 12 months, have you been humiliated or emotionally abused in other ways by your partner or ex-partner?: No   Housing Stability: Low Risk  (1/23/2024)    Housing Stability     Do you have housing? : Yes     Are you  worried about losing your housing?: No          Medications  Allergies   Current Outpatient Medications   Medication Sig Dispense Refill    acetaminophen (TYLENOL) 500 MG tablet Take 1,000 mg by mouth every 6 hours.      aspirin 81 MG EC tablet Take 1 tablet (81 mg) by mouth daily 90 tablet 2    atorvastatin (LIPITOR) 80 MG tablet Take 1 tablet (80 mg) by mouth every evening 90 tablet 2    bimatoprost (LUMIGAN) 0.01 % SOLN Place 1 drop into the right eye at bedtime 7.5 mL 3    clopidogrel (PLAVIX) 75 MG tablet Take 1 tablet (75 mg) by mouth or NG Tube daily. 30 tablet 3    Cranberry-Vitamin C (CRANBERRY CONCENTRATE/VITAMINC) 73597-465 MG CAPS Take 2 capsules by mouth daily      furosemide (LASIX) 20 MG tablet Take 1 tablet (20 mg) by mouth daily. 90 tablet 1    GINSENG-GINKGO-B FNDRU-Q-N-CA OR Take by mouth.      levothyroxine (SYNTHROID/LEVOTHROID) 137 MCG tablet Take 1 tablet (137 mcg) by mouth daily. 90 tablet 1    Melatonin 10-10 MG TBCR Take 1 tablet by mouth at bedtime      metoprolol tartrate (LOPRESSOR) 25 MG tablet Take 1 tablet (25 mg) by mouth 2 times daily 180 tablet 1    potassium chloride luis angel ER (KLOR-CON M10) 10 MEQ CR tablet Take 1 tablet (10 mEq) by mouth 2 times daily. 180 tablet 1    sertraline (ZOLOFT) 25 MG tablet Take 1 tablet (25 mg) by mouth daily. 90 tablet 0    thiamine (B-1) 100 MG tablet Take 1 tablet (100 mg) by mouth daily 100 tablet 3    vibegron (GEMTESA) 75 MG TABS tablet Take 1 tablet (75 mg) by mouth daily. 30 tablet 11    Allergies   Allergen Reactions    Jardiance [Empagliflozin] Other (See Comments)     Recurrent UTI    Atenolol      Rash and Insomnia    Eliquis [Apixaban]      Wanting to faint    Nitrofurantoin      'Bad, itchy, feverish, rash on arms and legs'    Sulfa Antibiotics      Rash on feet    Penicillins Rash         Lab Results    Chemistry/lipid CBC Cardiac Enzymes/BNP/TSH/INR   Lab Results   Component Value Date    CHOL 186 09/14/2024    HDL 78 09/14/2024    TRIG  110 09/14/2024    BUN 8.3 09/28/2024     09/28/2024    CO2 23 09/28/2024    Lab Results   Component Value Date    WBC 5.5 09/28/2024    HGB 14.6 09/28/2024    HCT 42.4 09/28/2024     (H) 09/28/2024     09/28/2024    Lab Results   Component Value Date    TSH 1.32 09/24/2024    INR 0.93 09/28/2024        30  minutes spent on the date of encounter doing chart review, review of test results, interpretation with above tests, patient visit, and documentation.        This note has been dictated using voice recognition software. Any grammatical, typographical, or context distortions are unintentional and inherent to the software

## 2024-10-17 ENCOUNTER — OFFICE VISIT (OUTPATIENT)
Dept: CARDIOLOGY | Facility: CLINIC | Age: 74
End: 2024-10-17
Attending: NURSE PRACTITIONER
Payer: COMMERCIAL

## 2024-10-17 VITALS
BODY MASS INDEX: 36.87 KG/M2 | HEART RATE: 85 BPM | SYSTOLIC BLOOD PRESSURE: 130 MMHG | DIASTOLIC BLOOD PRESSURE: 80 MMHG | OXYGEN SATURATION: 97 % | RESPIRATION RATE: 16 BRPM | WEIGHT: 214.8 LBS

## 2024-10-17 DIAGNOSIS — R55 SYNCOPE, UNSPECIFIED SYNCOPE TYPE: ICD-10-CM

## 2024-10-17 DIAGNOSIS — G45.3 AMAUROSIS FUGAX: ICD-10-CM

## 2024-10-17 DIAGNOSIS — I10 PRIMARY HYPERTENSION: ICD-10-CM

## 2024-10-17 DIAGNOSIS — I47.29 NSVT (NONSUSTAINED VENTRICULAR TACHYCARDIA) (H): ICD-10-CM

## 2024-10-17 DIAGNOSIS — E87.6 HYPOKALEMIA: ICD-10-CM

## 2024-10-17 DIAGNOSIS — I50.30 HEART FAILURE WITH PRESERVED EJECTION FRACTION, NYHA CLASS I (H): Primary | ICD-10-CM

## 2024-10-17 PROCEDURE — G2211 COMPLEX E/M VISIT ADD ON: HCPCS | Performed by: NURSE PRACTITIONER

## 2024-10-17 PROCEDURE — 99214 OFFICE O/P EST MOD 30 MIN: CPT | Performed by: NURSE PRACTITIONER

## 2024-10-17 RX ORDER — FUROSEMIDE 20 MG/1
20 TABLET ORAL DAILY
Qty: 90 TABLET | Refills: 1 | Status: SHIPPED | OUTPATIENT
Start: 2024-10-17

## 2024-10-17 NOTE — PATIENT INSTRUCTIONS
Mirna Grijalva,    It was a pleasure to see you today at the [facility].     My recommendations after this visit include:    - Stop Jardiance    - Low sodium diet < 2000 mg/day, daily weight monitoring, and maintain adequate fluid intake at 50 to 60 ounces per day    -Please call if you experience persistent weight gain 2 to 3 pounds 2 days in a row or 5 pounds in a week with shortness of breath, abdominal bloating and leg swelling    - Follow up with Dr. Molina in 2 months     - Please call Heart Failure Nurse Line at [phone], if you have any questions or concerns

## 2024-10-17 NOTE — LETTER
10/17/2024    Derrick Jacinto PA-C  [address] [place] [id number]    RE: Mirna Grijalva       Dear Colleague,     I had the pleasure of seeing Mirna Grijalva in the [facility].          Assessment/Recommendations   Assessment:    1.  Chronic Heart Failure with Preserved Ejection Fraction, NYHA Class I:   Echocardiogram on 5/12/2024 showed EF of 60 to 65% with small pericardial effusion with no indication of cardiac tamponade.    Previous echo on 4/27/2024 showed EF of 60 to 65% with no wall motion abnormalities with grade 1 diastolic dysfunction and no pericardial effusion seen at that time.    Patient was treated with IV Bumex and was discharged on oral Bumex.  Current home weight is 213-215 lbs    Patient is well compensated on exam.    2.  Hypertension: Blood pressure stable.    3.  Nonsustained ventricular tachycardia: Zio patch study on 8/23/2024 showed sinus rhythm average heart rate 89 bpm, 5 episodes of NSVT with longest 20 beats with fastest 179 bpm.  No sustained tachyarrhythmia, no atrial fibrillation, no pauses greater than 3 seconds.     4 Syncope : Patient was in the hospital in August 2024 with syncope.  Suspected orthostatic possibly due to dehydration from increased diuretic therapy.    5.  Recent amaurosis fugax: On aspirin and Plavix for at least 3 months per neurology.  Patient was recommended to follow-up as an outpatient.    6.  Hypokalemia: On potassium chloride.  Most recent potassium is within normal limit.    #  Recurrent UTI: Patient expressed frustration with recurrent UTI.  She is concerned that she is on Jardiance and possible adverse effect of UTI.    Plan/Recommendation:  -Stop Jardiance given recurrent UTI  Consider adding MRA therapy in the near future for heart failure management-    Follow up with Dr. Molina in 2 months.     The longitudinal plan of care for heart failure with preserved ejection fraction, NSVT,  hypertension was addressed during  "this visit.?Due to the added complexity in care, I will continue to support Mirna Grijalva in the subsequent management of this condition(s) and with the ongoing continuity of care of this condition(s)\".     History of Present Illness/Subjective    Ms. Mirna Grijalva is a 74 year old female with a past medical history of hypertension, hyperlipidemia, hypothyroidism, history of parathyroid nodules with status post parathyroidectomy, recurrent UTI, recent diagnosis of amaurosis fugax, sinus tachycardia with PVCs per EKG in August 2023, and recent hospitalization with acute hypoxic respiratory failure (resolved), and acute heart failure with preserved ejection fraction who is seen at [facility] for post hospitalization heart failure follow up.     Patient was hospitalized from May 12-15 with acute hypoxic respiratory failure (resolved) with 2 days of progressive shortness of breath and dry cough and found to have acute pulmonary edema and acute heart failure with preserved ejection fraction.    Patient was hospitalized in August with syncope.  Cardiac MRI showed normal LV function with EF of 55.2% with no myocardial scar, no evidence of myocardial iron overload, no evidence of hepatic iron overload, normal RV systolic function with no significant valve abnormalities noted.  She was hospitalized in September with blurry vision.  Neurology was consulted.  MRI of brain and orbits showed no acute stroke however MRI of head and neck showed moderate right ICA stenosis.  She was recommended to follow-up with ophthalmology and neurology.      She denies fatigue, lightheadedness, shortness of breath, dyspnea on exertion, orthopnea, PND, palpitations, chest pain, abdominal fullness/bloating, and lower extremity edema.     She reports adequate fluid intake.  She reports following low-sodium diet.    Cardiac MRI from 8/2024-reviewed:  SUMMARY   1.  Normal left ventricular size, wall thickness and " systolic function. The quantified left ventricular  ejection fraction is 55.2%.  No myocardial scar is identified. Normal pre-contrast T1:1014 ms.  Normal T2*  myocardium: 34 ms (normal > 20 ms on 1.5 T scanner).  No evidence of myocardial iron overload.   2. T2* liver: 20.4 ms (normal >11.4 ms).  No evidence of hepatic ion overload.      3.  Normal right ventricular size and systolic function.  RVEF: 62%.   4.  No significant valvular abnormalities.    Limited ECHO from 5/12/24-Reviewed:   Interpretation Summary   Left ventricular size, wall motion and function are normal. The ejection  fraction is 60-65%.  Normal right ventricle size and systolic function.  IVC diameter <2.1 cm collapsing >50% with sniff suggests a normal RA pressure  of 3 mmHg.  Small pericardial effusion  There are no echocardiographic indications of cardiac tamponade.  ______________________________________________________________________________  Left Ventricle  Left ventricular size, wall motion and function are normal. The ejection  fraction is 60-65%. No regional wall motion abnormalities noted.    ECHO from 4/27/24: Reviewed  Interpretation Summary     The left ventricle is normal in size. There is mild concentric left  ventricular hypertrophy.  Left ventricular systolic function is normal. The visual ejection fraction is  60-65%. No regional wall motion abnormalities noted.  Grade I or early diastolic dysfunction.     The right ventricle is normal in size and function.  The left atrium is mildly dilated. Right atrium not well visualized.  IVC diameter <2.1 cm collapsing >50% with sniff suggests a normal RA pressure  of 3 mmHg.  There is no comparison study available.     Physical Examination Review of Systems   /80 (BP Location: Right arm, Patient Position: Sitting, Cuff Size: Adult Large)   Pulse 85   Resp 16   Wt 97.4 kg (214 lb 12.8 oz)   LMP  (LMP Unknown)   SpO2 97%   BMI 36.87 kg/m    Body mass index is 36.87 kg/m .  Wt  Readings from Last 3 Encounters:   10/17/24 97.4 kg (214 lb 12.8 oz)   09/28/24 96.6 kg (213 lb)   09/24/24 96.8 kg (213 lb 8 oz)     General Appearance:   no distress, normal body habitus   ENT/Mouth: membranes moist, no oral lesions or bleeding gums.      EYES:  no scleral icterus, normal conjunctivae   Neck: no  thyromegaly   Chest/Lungs:   lungs are clear to auscultation, no rales or wheezing, equal chest wall expansion    Cardiovascular:   Heart rate regular.  Normal first and second heart sounds with no murmurs, rubs, or gallops; Jugular venous pressure flat with no edema bilaterally    Abdomen:  no organomegaly, masses, bruits, or tenderness; bowel sounds are present   Extremities   no cyanosis or clubbing    CMS intact.   Skin: no xanthelasma, warm.    Neurologic: alert and oriented; no tremors   Psychiatric: Appears anxious                                                   Negative unless noted in HPI     Medical History  Surgical History Family History Social History   Past Medical History:   Diagnosis Date     Arthritis years ago?     Cancer (H) 1988  & 2016?     Coronary artery disease May 2024     Glaucoma (increased eye pressure) About 11 years     Hypertension years ago?     Mumps      Nonsenile cataract About 11 years     Thyroid cancer (H)      TIA (transient ischemic attack) 04/27/2024    right eye    Past Surgical History:   Procedure Laterality Date     BIOPSY       BREAST SURGERY       CATARACT IOL, RT/LT Left 2022?    With Istent     CYSTOSCOPY       GLAUCOMA SURGERY  2022?     GYN SURGERY      Family History   Problem Relation Age of Onset     Glaucoma Mother      Hypertension Mother         Alzheimers and arthritis     Thyroid Disease Mother      Eye Surgery Mother      Glasses (<9 y/o) Mother      Cancer Father      Diabetes Father         And Hemochromatosis and cancer     Glasses (<9 y/o) Father      Hypertension Brother         Alzheimers     Hypertension Sister         Heart failure     Social History     Socioeconomic History     Marital status:      Spouse name: Not on file     Number of children: Not on file     Years of education: Not on file     Highest education level: Not on file   Occupational History     Not on file   Tobacco Use     Smoking status: Former     Current packs/day: 1.00     Average packs/day: 1 pack/day for 20.0 years (20.0 ttl pk-yrs)     Types: Cigarettes     Passive exposure: Never     Smokeless tobacco: Never   Vaping Use     Vaping status: Never Used   Substance and Sexual Activity     Alcohol use: Yes     Drug use: Never     Sexual activity: Not Currently     Partners: Female     Birth control/protection: Post-menopausal   Other Topics Concern     Not on file   Social History Narrative     Not on file     Social Determinants of Health     Financial Resource Strain: Low Risk  (1/23/2024)    Financial Resource Strain      Within the past 12 months, have you or your family members you live with been unable to get utilities (heat, electricity) when it was really needed?: No   Food Insecurity: Low Risk  (1/23/2024)    Food Insecurity      Within the past 12 months, did you worry that your food would run out before you got money to buy more?: No      Within the past 12 months, did the food you bought just not last and you didn t have money to get more?: No   Transportation Needs: Low Risk  (1/23/2024)    Transportation Needs      Within the past 12 months, has lack of transportation kept you from medical appointments, getting your medicines, non-medical meetings or appointments, work, or from getting things that you need?: No   Physical Activity: Not on file   Stress: Not on file   Social Connections: Not on file   Interpersonal Safety: Low Risk  (8/21/2024)    Interpersonal Safety      Do you feel physically and emotionally safe where you currently live?: Yes      Within the past 12 months, have you been hit, slapped, kicked or otherwise physically hurt by someone?:  No      Within the past 12 months, have you been humiliated or emotionally abused in other ways by your partner or ex-partner?: No   Housing Stability: Low Risk  (1/23/2024)    Housing Stability      Do you have housing? : Yes      Are you worried about losing your housing?: No          Medications  Allergies   Current Outpatient Medications   Medication Sig Dispense Refill     acetaminophen (TYLENOL) 500 MG tablet Take 1,000 mg by mouth every 6 hours.       aspirin 81 MG EC tablet Take 1 tablet (81 mg) by mouth daily 90 tablet 2     atorvastatin (LIPITOR) 80 MG tablet Take 1 tablet (80 mg) by mouth every evening 90 tablet 2     bimatoprost (LUMIGAN) 0.01 % SOLN Place 1 drop into the right eye at bedtime 7.5 mL 3     clopidogrel (PLAVIX) 75 MG tablet Take 1 tablet (75 mg) by mouth or NG Tube daily. 30 tablet 3     Cranberry-Vitamin C (CRANBERRY CONCENTRATE/VITAMINC) 64192-763 MG CAPS Take 2 capsules by mouth daily       furosemide (LASIX) 20 MG tablet Take 1 tablet (20 mg) by mouth daily. 90 tablet 1     GINSENG-GINKGO-B KDODL-M-Q-CA OR Take by mouth.       levothyroxine (SYNTHROID/LEVOTHROID) 137 MCG tablet Take 1 tablet (137 mcg) by mouth daily. 90 tablet 1     Melatonin 10-10 MG TBCR Take 1 tablet by mouth at bedtime       metoprolol tartrate (LOPRESSOR) 25 MG tablet Take 1 tablet (25 mg) by mouth 2 times daily 180 tablet 1     potassium chloride luis angel ER (KLOR-CON M10) 10 MEQ CR tablet Take 1 tablet (10 mEq) by mouth 2 times daily. 180 tablet 1     sertraline (ZOLOFT) 25 MG tablet Take 1 tablet (25 mg) by mouth daily. 90 tablet 0     thiamine (B-1) 100 MG tablet Take 1 tablet (100 mg) by mouth daily 100 tablet 3     vibegron (GEMTESA) 75 MG TABS tablet Take 1 tablet (75 mg) by mouth daily. 30 tablet 11    Allergies   Allergen Reactions     Jardiance [Empagliflozin] Other (See Comments)     Recurrent UTI     Atenolol      Rash and Insomnia     Eliquis [Apixaban]      Wanting to faint     Nitrofurantoin      'Bad,  itchy, feverish, rash on arms and legs'     Sulfa Antibiotics      Rash on feet     Penicillins Rash         Lab Results    Chemistry/lipid CBC Cardiac Enzymes/BNP/TSH/INR   Lab Results   Component Value Date    CHOL 186 09/14/2024    HDL 78 09/14/2024    TRIG 110 09/14/2024    BUN 8.3 09/28/2024     09/28/2024    CO2 23 09/28/2024    Lab Results   Component Value Date    WBC 5.5 09/28/2024    HGB 14.6 09/28/2024    HCT 42.4 09/28/2024     (H) 09/28/2024     09/28/2024    Lab Results   Component Value Date    TSH 1.32 09/24/2024    INR 0.93 09/28/2024        30  minutes spent on the date of encounter doing chart review, review of test results, interpretation with above tests, patient visit, and documentation.        This note has been dictated using voice recognition software. Any grammatical, typographical, or context distortions are unintentional and inherent to the software          Thank you for allowing me to participate in the care of your patient.      Sincerely,     MEERA Regalado CNP     [facility]  cc:   MEERA Regalado CNP  [address]  [place] [phone]

## 2024-10-18 ENCOUNTER — TELEPHONE (OUTPATIENT)
Dept: FAMILY MEDICINE | Facility: CLINIC | Age: 74
End: 2024-10-18
Payer: COMMERCIAL

## 2024-10-18 NOTE — TELEPHONE ENCOUNTER
The patient reported that she is currently experiencing a UTI and believes it may be due to her medication, Jardiance, which she noted lists UTI as a common side effect. She mentioned that her cardiologist advised her to stop taking Jardiance. Now that she has the UTI, she is requesting a prescription to treat it.

## 2024-10-19 SDOH — HEALTH STABILITY: PHYSICAL HEALTH
ON AVERAGE, HOW MANY DAYS PER WEEK DO YOU ENGAGE IN MODERATE TO STRENUOUS EXERCISE (LIKE A BRISK WALK)?: PATIENT DECLINED

## 2024-10-19 SDOH — HEALTH STABILITY: PHYSICAL HEALTH: ON AVERAGE, HOW MANY MINUTES DO YOU ENGAGE IN EXERCISE AT THIS LEVEL?: PATIENT DECLINED

## 2024-10-19 ASSESSMENT — SOCIAL DETERMINANTS OF HEALTH (SDOH): HOW OFTEN DO YOU GET TOGETHER WITH FRIENDS OR RELATIVES?: MORE THAN THREE TIMES A WEEK

## 2024-10-21 ENCOUNTER — OFFICE VISIT (OUTPATIENT)
Dept: FAMILY MEDICINE | Facility: CLINIC | Age: 74
End: 2024-10-21
Payer: COMMERCIAL

## 2024-10-21 VITALS
WEIGHT: 217.2 LBS | HEART RATE: 82 BPM | BODY MASS INDEX: 37.08 KG/M2 | HEIGHT: 64 IN | OXYGEN SATURATION: 95 % | SYSTOLIC BLOOD PRESSURE: 129 MMHG | DIASTOLIC BLOOD PRESSURE: 82 MMHG | TEMPERATURE: 97.8 F

## 2024-10-21 DIAGNOSIS — I10 PRIMARY HYPERTENSION: ICD-10-CM

## 2024-10-21 DIAGNOSIS — N39.0 ACUTE UTI (URINARY TRACT INFECTION): Primary | ICD-10-CM

## 2024-10-21 DIAGNOSIS — R30.0 DYSURIA: ICD-10-CM

## 2024-10-21 DIAGNOSIS — Z23 ENCOUNTER FOR IMMUNIZATION: ICD-10-CM

## 2024-10-21 LAB
ALBUMIN UR-MCNC: NEGATIVE MG/DL
APPEARANCE UR: ABNORMAL
BACTERIA #/AREA URNS HPF: ABNORMAL /HPF
BILIRUB UR QL STRIP: NEGATIVE
COLOR UR AUTO: ABNORMAL
GLUCOSE UR STRIP-MCNC: NEGATIVE MG/DL
HGB UR QL STRIP: ABNORMAL
KETONES UR STRIP-MCNC: NEGATIVE MG/DL
LEUKOCYTE ESTERASE UR QL STRIP: ABNORMAL
NITRATE UR QL: NEGATIVE
PH UR STRIP: 6 [PH] (ref 5–8)
RBC #/AREA URNS AUTO: ABNORMAL /HPF
SP GR UR STRIP: 1.01 (ref 1–1.03)
SQUAMOUS #/AREA URNS AUTO: ABNORMAL /LPF
UROBILINOGEN UR STRIP-ACNC: 0.2 E.U./DL
WBC #/AREA URNS AUTO: ABNORMAL /HPF
WBC CLUMPS #/AREA URNS HPF: PRESENT /HPF

## 2024-10-21 PROCEDURE — 99214 OFFICE O/P EST MOD 30 MIN: CPT | Performed by: PHYSICIAN ASSISTANT

## 2024-10-21 PROCEDURE — 90662 IIV NO PRSV INCREASED AG IM: CPT | Performed by: PHYSICIAN ASSISTANT

## 2024-10-21 PROCEDURE — 87086 URINE CULTURE/COLONY COUNT: CPT | Performed by: PHYSICIAN ASSISTANT

## 2024-10-21 PROCEDURE — G0008 ADMIN INFLUENZA VIRUS VAC: HCPCS | Performed by: PHYSICIAN ASSISTANT

## 2024-10-21 PROCEDURE — 90480 ADMN SARSCOV2 VAC 1/ONLY CMP: CPT | Performed by: PHYSICIAN ASSISTANT

## 2024-10-21 PROCEDURE — 90677 PCV20 VACCINE IM: CPT | Performed by: PHYSICIAN ASSISTANT

## 2024-10-21 PROCEDURE — 81001 URINALYSIS AUTO W/SCOPE: CPT | Performed by: PHYSICIAN ASSISTANT

## 2024-10-21 PROCEDURE — 91320 SARSCV2 VAC 30MCG TRS-SUC IM: CPT | Performed by: PHYSICIAN ASSISTANT

## 2024-10-21 PROCEDURE — G0009 ADMIN PNEUMOCOCCAL VACCINE: HCPCS | Performed by: PHYSICIAN ASSISTANT

## 2024-10-21 RX ORDER — CIPROFLOXACIN 250 MG/1
250 TABLET, FILM COATED ORAL 2 TIMES DAILY
Qty: 6 TABLET | Refills: 0 | Status: SHIPPED | OUTPATIENT
Start: 2024-10-21 | End: 2024-10-24

## 2024-10-21 ASSESSMENT — ENCOUNTER SYMPTOMS: DYSURIA: 1

## 2024-10-21 NOTE — PROGRESS NOTES
Assessment & Plan     Acute UTI (urinary tract infection)  Dysuria  - ciprofloxacin (CIPRO) 250 MG tablet  Dispense: 6 tablet; Refill: 0  - UA Macroscopic with reflex to Microscopic and Culture - Clinic Collect  - INFLUENZA HIGH DOSE, TRIVALENT, PF (FLUZONE)  - COVID-19 12+ (PFIZER)  - Urine Microscopic Exam  - Urine Culture    Patient is here today for possible UTI, has been having symptoms since last week. UA today does show large leukocyte esterase along with WBCs. Will treat with Cipro given multiple medication allergies.  Encouraged to push fluids. UC sent. Recommend follow up if symptoms worsen or do not improve.    Primary hypertension  Chronic issue, initially elevated in office but improved upon recheck.    Encounter for immunization  - INFLUENZA HIGH DOSE, TRIVALENT, PF (FLUZONE)  - COVID-19 12+ (PFIZER)  - PNEUMOCOCCAL 20 VALENT CONJUGATE (PREVNAR 20)            Risks, benefits and alternatives were discussed with patient. Agreeable to the plan of care.      Tien Bradley is a 74 year old, presenting for the following health issues:  Dysuria      10/21/2024    10:43 AM   Additional Questions   Roomed by MARY LOU Clark CMA([facility])   Accompanied by Spouse     Dysuria    History of Present Illness       Reason for visit:  UTI  Symptom onset:  3-7 days ago  Symptoms include:  Urgency and labia pain  Symptom intensity:  Moderate  Symptom progression:  Staying the same  Had these symptoms before:  Yes  Has tried/received treatment for these symptoms:  Yes  Previous treatment was successful:  Yes  Prior treatment description:  UTI meds  What makes it worse:  Jardiance.  It is a side effect happening.  What makes it better:  No   She is taking medications regularly.       Patient is here today for evaluation of painful urination  Ongoing since last week, very very painful along with urinary frequency  No fever, chills, abdominal pain, pelvic pain, or flank pain  She has hx of recurrent UTI at baseline  Also notes  "was started on Jardiance which was stopped by her cardiologist due to her symptoms.      Review of Systems  Constitutional, HEENT, cardiovascular, pulmonary, gi and gu systems are negative, except as otherwise noted.      Objective    BP (!) 143/96   Pulse 78   Temp 97.8  F (36.6  C) (Oral)   Ht 1.626 m (5' 4\")   Wt 98.5 kg (217 lb 3.2 oz)   LMP  (LMP Unknown)   SpO2 95%   BMI 37.28 kg/m    Body mass index is 37.28 kg/m .  Physical Exam   GENERAL: alert and no distress  NECK: no adenopathy, no asymmetry, masses, or scars  RESP: lungs clear to auscultation - no rales, rhonchi or wheezes  CV: regular rate and rhythm, normal S1 S2, no S3 or S4, no murmur, click or rub, no peripheral edema  ABDOMEN: soft, nontender, no hepatosplenomegaly, no masses and bowel sounds normal, no CVA tenderness  MS: no gross musculoskeletal defects noted, no edema    Results for orders placed or performed in visit on 10/21/24 (from the past 24 hour(s))   UA Macroscopic with reflex to Microscopic and Culture - Clinic Collect    Specimen: Urine, Clean Catch   Result Value Ref Range    Color Urine Light Yellow Colorless, Straw, Light Yellow, Yellow    Appearance Urine Slightly Cloudy (A) Clear    Glucose Urine Negative Negative mg/dL    Bilirubin Urine Negative Negative    Ketones Urine Negative Negative mg/dL    Specific Gravity Urine 1.010 1.005 - 1.030    Blood Urine Trace (A) Negative    pH Urine 6.0 5.0 - 8.0    Protein Albumin Urine Negative Negative mg/dL    Urobilinogen Urine 0.2 0.2, 1.0 E.U./dL    Nitrite Urine Negative Negative    Leukocyte Esterase Urine Large (A) Negative   Urine Microscopic Exam   Result Value Ref Range    Bacteria Urine None Seen None Seen /HPF    RBC Urine 0-2 0-2 /HPF /HPF    WBC Urine 25-50 (A) 0-5 /HPF /HPF    Squamous Epithelials Urine Few (A) None Seen /LPF    WBC Clumps Urine Present (A) None Seen /HPF           Signed Electronically by: Jena Pearson PA-C    "

## 2024-10-21 NOTE — PROGRESS NOTES
Prior to immunization administration, verified patients identity using patient s name and date of birth. Please see Immunization Activity for additional information.     Screening Questionnaire for Adult Immunization    Are you sick today?   No   Do you have allergies to medications, food, a vaccine component or latex?   Yes   Have you ever had a serious reaction after receiving a vaccination?   No   Do you have a long-term health problem with heart, lung, kidney, or metabolic disease (e.g., diabetes), asthma, a blood disorder, no spleen, complement component deficiency, a cochlear implant, or a spinal fluid leak?  Are you on long-term aspirin therapy?   No   Do you have cancer, leukemia, HIV/AIDS, or any other immune system problem?   No   Do you have a parent, brother, or sister with an immune system problem?   No   In the past 3 months, have you taken medications that affect  your immune system, such as prednisone, other steroids, or anticancer drugs; drugs for the treatment of rheumatoid arthritis, Crohn s disease, or psoriasis; or have you had radiation treatments?   No   Have you had a seizure, or a brain or other nervous system problem?   No   During the past year, have you received a transfusion of blood or blood    products, or been given immune (gamma) globulin or antiviral drug?   No   For women: Are you pregnant or is there a chance you could become       pregnant during the next month?   No   Have you received any vaccinations in the past 4 weeks?   No     Immunization questionnaire was positive for at least one answer.  Notified PERLITA Shearer.      Patient instructed to remain in clinic for 15 minutes afterwards, and to report any adverse reactions.     Screening performed by Marium Clark MA on 10/21/2024 at 11:19 AM.

## 2024-10-22 LAB — BACTERIA UR CULT: NORMAL

## 2024-10-23 ASSESSMENT — PATIENT HEALTH QUESTIONNAIRE - PHQ9
SUM OF ALL RESPONSES TO PHQ QUESTIONS 1-9: 0
SUM OF ALL RESPONSES TO PHQ QUESTIONS 1-9: 0

## 2024-10-24 ENCOUNTER — TELEPHONE (OUTPATIENT)
Dept: FAMILY MEDICINE | Facility: CLINIC | Age: 74
End: 2024-10-24

## 2024-10-24 ENCOUNTER — OFFICE VISIT (OUTPATIENT)
Dept: FAMILY MEDICINE | Facility: CLINIC | Age: 74
End: 2024-10-24
Attending: PHYSICIAN ASSISTANT
Payer: COMMERCIAL

## 2024-10-24 VITALS
HEART RATE: 45 BPM | OXYGEN SATURATION: 97 % | WEIGHT: 220 LBS | HEIGHT: 63 IN | BODY MASS INDEX: 38.98 KG/M2 | SYSTOLIC BLOOD PRESSURE: 128 MMHG | TEMPERATURE: 98 F | DIASTOLIC BLOOD PRESSURE: 70 MMHG | RESPIRATION RATE: 16 BRPM

## 2024-10-24 DIAGNOSIS — K76.0 FATTY LIVER: ICD-10-CM

## 2024-10-24 DIAGNOSIS — N39.0 RECURRENT UTI: ICD-10-CM

## 2024-10-24 DIAGNOSIS — F10.90 ALCOHOL USE DISORDER: ICD-10-CM

## 2024-10-24 DIAGNOSIS — Z00.00 ENCOUNTER FOR MEDICARE ANNUAL WELLNESS EXAM: Primary | ICD-10-CM

## 2024-10-24 DIAGNOSIS — R30.0 DYSURIA: ICD-10-CM

## 2024-10-24 DIAGNOSIS — Z11.59 NEED FOR HEPATITIS C SCREENING TEST: ICD-10-CM

## 2024-10-24 DIAGNOSIS — I49.3 PVC'S (PREMATURE VENTRICULAR CONTRACTIONS): ICD-10-CM

## 2024-10-24 DIAGNOSIS — Z11.59 ENCOUNTER FOR SCREENING FOR OTHER VIRAL DISEASES: ICD-10-CM

## 2024-10-24 DIAGNOSIS — N30.00 ACUTE CYSTITIS WITHOUT HEMATURIA: ICD-10-CM

## 2024-10-24 LAB
ALBUMIN UR-MCNC: NEGATIVE MG/DL
APPEARANCE UR: ABNORMAL
ATRIAL RATE - MUSE: 82 BPM
BACTERIA #/AREA URNS HPF: ABNORMAL /HPF
BILIRUB UR QL STRIP: NEGATIVE
COLOR UR AUTO: YELLOW
DIASTOLIC BLOOD PRESSURE - MUSE: NORMAL MMHG
GLUCOSE UR STRIP-MCNC: NEGATIVE MG/DL
HGB UR QL STRIP: ABNORMAL
INTERPRETATION ECG - MUSE: NORMAL
KETONES UR STRIP-MCNC: NEGATIVE MG/DL
LEUKOCYTE ESTERASE UR QL STRIP: ABNORMAL
NITRATE UR QL: NEGATIVE
P AXIS - MUSE: 49 DEGREES
PH UR STRIP: 5.5 [PH] (ref 5–8)
PR INTERVAL - MUSE: 140 MS
QRS DURATION - MUSE: 118 MS
QT - MUSE: 404 MS
QTC - MUSE: 472 MS
R AXIS - MUSE: 68 DEGREES
RBC #/AREA URNS AUTO: ABNORMAL /HPF
SP GR UR STRIP: 1.01 (ref 1–1.03)
SQUAMOUS #/AREA URNS AUTO: ABNORMAL /LPF
SYSTOLIC BLOOD PRESSURE - MUSE: NORMAL MMHG
T AXIS - MUSE: 16 DEGREES
UROBILINOGEN UR STRIP-ACNC: 0.2 E.U./DL
VENTRICULAR RATE- MUSE: 82 BPM
WBC #/AREA URNS AUTO: ABNORMAL /HPF

## 2024-10-24 PROCEDURE — 86704 HEP B CORE ANTIBODY TOTAL: CPT | Mod: GZ | Performed by: PHYSICIAN ASSISTANT

## 2024-10-24 PROCEDURE — 87088 URINE BACTERIA CULTURE: CPT | Performed by: PHYSICIAN ASSISTANT

## 2024-10-24 PROCEDURE — 87186 SC STD MICRODIL/AGAR DIL: CPT | Performed by: PHYSICIAN ASSISTANT

## 2024-10-24 PROCEDURE — 86706 HEP B SURFACE ANTIBODY: CPT | Mod: GZ | Performed by: PHYSICIAN ASSISTANT

## 2024-10-24 PROCEDURE — 93010 ELECTROCARDIOGRAM REPORT: CPT | Performed by: INTERNAL MEDICINE

## 2024-10-24 PROCEDURE — 86708 HEPATITIS A ANTIBODY: CPT | Performed by: PHYSICIAN ASSISTANT

## 2024-10-24 PROCEDURE — 87086 URINE CULTURE/COLONY COUNT: CPT | Performed by: PHYSICIAN ASSISTANT

## 2024-10-24 PROCEDURE — G0438 PPPS, INITIAL VISIT: HCPCS | Performed by: PHYSICIAN ASSISTANT

## 2024-10-24 PROCEDURE — 81001 URINALYSIS AUTO W/SCOPE: CPT | Performed by: PHYSICIAN ASSISTANT

## 2024-10-24 PROCEDURE — 86803 HEPATITIS C AB TEST: CPT | Performed by: PHYSICIAN ASSISTANT

## 2024-10-24 PROCEDURE — 93005 ELECTROCARDIOGRAM TRACING: CPT | Performed by: PHYSICIAN ASSISTANT

## 2024-10-24 PROCEDURE — 99214 OFFICE O/P EST MOD 30 MIN: CPT | Mod: 25 | Performed by: PHYSICIAN ASSISTANT

## 2024-10-24 PROCEDURE — 36415 COLL VENOUS BLD VENIPUNCTURE: CPT | Performed by: PHYSICIAN ASSISTANT

## 2024-10-24 RX ORDER — NALTREXONE HYDROCHLORIDE 50 MG/1
50 TABLET, FILM COATED ORAL DAILY
Qty: 90 TABLET | Refills: 3 | Status: SHIPPED | OUTPATIENT
Start: 2024-10-24

## 2024-10-24 NOTE — PATIENT INSTRUCTIONS
Patient Education   Preventive Care Advice   This is general advice given by our system to help you stay healthy. However, your care team may have specific advice just for you. Please talk to your care team about your preventive care needs.  Nutrition  Eat 5 or more servings of fruits and vegetables each day.  Try wheat bread, brown rice and whole grain pasta (instead of white bread, rice, and pasta).  Get enough calcium and vitamin D. Check the label on foods and aim for 100% of the RDA (recommended daily allowance).  Lifestyle  Exercise at least 150 minutes each week  (30 minutes a day, 5 days a week).  Do muscle strengthening activities 2 days a week. These help control your weight and prevent disease.  No smoking.  Wear sunscreen to prevent skin cancer.  Have a dental exam and cleaning every 6 months.  Yearly exams  See your health care team every year to talk about:  Any changes in your health.  Any medicines your care team has prescribed.  Preventive care, family planning, and ways to prevent chronic diseases.  Shots (vaccines)   HPV shots (up to age 26), if you've never had them before.  Hepatitis B shots (up to age 59), if you've never had them before.  COVID-19 shot: Get this shot when it's due.  Flu shot: Get a flu shot every year.  Tetanus shot: Get a tetanus shot every 10 years.  Pneumococcal, hepatitis A, and RSV shots: Ask your care team if you need these based on your risk.  Shingles shot (for age 50 and up)  General health tests  Diabetes screening:  Starting at age 35, Get screened for diabetes at least every 3 years.  If you are younger than age 35, ask your care team if you should be screened for diabetes.  Cholesterol test: At age 39, start having a cholesterol test every 5 years, or more often if advised.  Bone density scan (DEXA): At age 50, ask your care team if you should have this scan for osteoporosis (brittle bones).  Hepatitis C: Get tested at least once in your life.  STIs (sexually  transmitted infections)  Before age 24: Ask your care team if you should be screened for STIs.  After age 24: Get screened for STIs if you're at risk. You are at risk for STIs (including HIV) if:  You are sexually active with more than one person.  You don't use condoms every time.  You or a partner was diagnosed with a sexually transmitted infection.  If you are at risk for HIV, ask about PrEP medicine to prevent HIV.  Get tested for HIV at least once in your life, whether you are at risk for HIV or not.  Cancer screening tests  Cervical cancer screening: If you have a cervix, begin getting regular cervical cancer screening tests starting at age 21.  Breast cancer scan (mammogram): If you've ever had breasts, begin having regular mammograms starting at age 40. This is a scan to check for breast cancer.  Colon cancer screening: It is important to start screening for colon cancer at age 45.  Have a colonoscopy test every 10 years (or more often if you're at risk) Or, ask your provider about stool tests like a FIT test every year or Cologuard test every 3 years.  To learn more about your testing options, visit:   .  For help making a decision, visit:   https://bit.ly/kc89055.  Prostate cancer screening test: If you have a prostate, ask your care team if a prostate cancer screening test (PSA) at age 55 is right for you.  Lung cancer screening: If you are a current or former smoker ages 50 to 80, ask your care team if ongoing lung cancer screenings are right for you.  For informational purposes only. Not to replace the advice of your health care provider. Copyright   2023 [facility]. All rights reserved. Clinically reviewed by the [facility]. [employer] 260552 - REV 01/24.  Learning About Activities of Daily Living  What are activities of daily living?     Activities of daily living (ADLs) are the basic self-care tasks you do every day. These include eating, bathing, dressing,  and moving around.  As you age, and if you have health problems, you may find that it's harder to do some of these tasks. If so, your doctor can suggest ideas that may help.  To measure what kind of help you may need, your doctor will ask how well you are able to do ADLs. Let your doctor know if there are any tasks that you are having trouble doing. This is an important first step to getting help. And when you have the help you need, you can stay as independent as possible.  How will a doctor assess your ADLs?  Asking about ADLs is part of a routine health checkup your doctor will likely do as you age. Your health check might be done in a doctor's office, in your home, or at a hospital. The goal is to find out if you are having any problems that could make it hard to care for yourself or that make it unsafe for you to be on your own.  To measure your ADLs, your doctor will ask how hard it is for you to do routine tasks. Your doctor may also want to know if you have changed the way you do a task because of a health problem. Your doctor may watch how you:  Walk back and forth.  Keep your balance while you stand or walk.  Move from sitting to standing or from a bed to a chair.  Button or unbutton a shirt or sweater.  Remove and put on your shoes.  It's common to feel a little worried or anxious if you find you can't do all the things you used to be able to do. Talking with your doctor about ADLs is a way to make sure you're as safe as possible and able to care for yourself as well as you can. You may want to bring a caregiver, friend, or family member to your checkup. They can help you talk to your doctor.  Follow-up care is a key part of your treatment and safety. Be sure to make and go to all appointments, and call your doctor if you are having problems. It's also a good idea to know your test results and keep a list of the medicines you take.  Current as of: October 24, 2023  Content Version: 14.2 2024 [facility]  [employer].   Care instructions adapted under license by your healthcare professional. If you have questions about a medical condition or this instruction, always ask your healthcare professional. [employer] disclaims any warranty or liability for your use of this information.    Preventing Falls: Care Instructions  Injuries and health problems such as trouble walking or poor eyesight can increase your risk of falling. So can some medicines. But there are things you can do to help prevent falls. You can exercise to get stronger. You can also arrange your home to make it safer.    Talk to your doctor about the medicines you take. Ask if any of them increase the risk of falls and whether they can be changed or stopped.   Try to exercise regularly. It can help improve your strength and balance. This can help lower your risk of falling.         Practice fall safety and prevention.   Wear low-heeled shoes that fit well and give your feet good support. Talk to your doctor if you have foot problems that make this hard.  Carry a cellphone or wear a medical alert device that you can use to call for help.  Use stepladders instead of chairs to reach high objects. Don't climb if you're at risk for falls. Ask for help, if needed.  Wear the correct eyeglasses, if you need them.        Make your home safer.   Remove rugs, cords, clutter, and furniture from walkways.  Keep your house well lit. Use night-lights in hallways and bathrooms.  Install and use sturdy handrails on stairways.  Wear nonskid footwear, even inside. Don't walk barefoot or in socks without shoes.        Be safe outside.   Use handrails, curb cuts, and ramps whenever possible.  Keep your hands free by using a shoulder bag or backpack.  Try to walk in well-lit areas. Watch out for uneven ground, changes in pavement, and debris.  Be careful in the winter. Walk on the grass or gravel when sidewalks are slippery. Use de-icer on steps and walkways.  "Add non-slip devices to shoes.    Put grab bars and nonskid mats in your shower or tub and near the toilet. Try to use a shower chair or bath bench when bathing.   Get into a tub or shower by putting in your weaker leg first. Get out with your strong side first. Have a phone or medical alert device in the bathroom with you.   Where can you learn more?  Go to https://www.[employer].net/patiented  Enter G117 in the search box to learn more about \"Preventing Falls: Care Instructions.\"  Current as of: July 17, 2023  Content Version: 14.2 2024 [employer].   Care instructions adapted under license by your healthcare professional. If you have questions about a medical condition or this instruction, always ask your healthcare professional. [employer] disclaims any warranty or liability for your use of this information.    Bladder Training: Care Instructions  Your Care Instructions     Bladder training is used to treat urge incontinence and stress incontinence. Urge incontinence means that the need to urinate comes on so fast that you can't get to a toilet in time. Stress incontinence means that you leak urine because of pressure on your bladder. For example, it may happen when you laugh, cough, or lift something heavy.  Bladder training can increase how long you can wait before you have to urinate. It can also help your bladder hold more urine. And it can give you better control over the urge to urinate.  It is important to remember that bladder training takes a few weeks to a few months to make a difference. You may not see results right away, but don't give up.  Follow-up care is a key part of your treatment and safety. Be sure to make and go to all appointments, and call your doctor if you are having problems. It's also a good idea to know your test results and keep a list of the medicines you take.  How can you care for yourself at home?  Work with your doctor to come up with a bladder " "training program that is right for you. You may use one or more of the following methods.  Delayed urination  In the beginning, try to keep from urinating for 5 minutes after you first feel the need to go.  While you wait, take deep, slow breaths to relax. Kegel exercises can also help you delay the need to go to the bathroom.  After some practice, when you can easily wait 5 minutes to urinate, try to wait 10 minutes before you urinate.  Slowly increase the waiting period until you are able to control when you have to urinate.  Scheduled urination  Empty your bladder when you first wake up in the morning.  Schedule times throughout the day when you will urinate.  Start by going to the bathroom every hour, even if you don't need to go.  Slowly increase the time between trips to the bathroom.  When you have found a schedule that works well for you, keep doing it.  If you wake up during the night and have to urinate, do it. Apply your schedule to waking hours only.  Kegel exercises  These tighten and strengthen pelvic muscles, which can help you control the flow of urine. (If doing these exercises causes pain, stop doing them and talk with your doctor.) To do Kegel exercises:  Squeeze your muscles as if you were trying not to pass gas. Or squeeze your muscles as if you were stopping the flow of urine. Your belly, legs, and buttocks shouldn't move.  Hold the squeeze for 3 seconds, then relax for 5 to 10 seconds.  Start with 3 seconds, then add 1 second each week until you are able to squeeze for 10 seconds.  Repeat the exercise 10 times a session. Do 3 to 8 sessions a day.  When should you call for help?  Watch closely for changes in your health, and be sure to contact your doctor if:    Your incontinence is getting worse.     You do not get better as expected.   Where can you learn more?  Go to https://www.healthwise.net/patiented  Enter V684 in the search box to learn more about \"Bladder Training: Care " "Instructions.\"  Current as of: November 15, 2023  Content Version: 14.2 2024 [facility] [employer].   Care instructions adapted under license by your healthcare professional. If you have questions about a medical condition or this instruction, always ask your healthcare professional. [employer] disclaims any warranty or liability for your use of this information.       "

## 2024-10-24 NOTE — PROGRESS NOTES
Preventive Care Visit  [facility]  Derrick Jacinto PA-C, Family Medicine  Oct 24, 2024      Assessment & Plan     Encounter for Medicare annual wellness exam  Other than below, stable wellness visit.  Continue to focus on healthy lifestyle changes as below as well.  Recheck annually fasting labs are up-to-date through cardiology    Need for hepatitis C screening test    - Hepatitis C Screen Reflex to HCV RNA Quant and Genotype; Future  - Hepatitis C Screen Reflex to HCV RNA Quant and Genotype    Dysuria  Will recheck UA today.  Symptoms have improved.  Questionable UTI versus contamination history.  Possible interstitial cystitis.  Will have see urology for a second opinion through [facility].  If UA shows signs of persistent infection, will reculture and consider Keflex course.  If no improvement with both Keflex as well as recent Cipro, and culture returns no obvious infection, likely interstitial cystitis.    Recurrent UTI  As above  - Adult Urology  Referral; Future  - UA Macroscopic with reflex to Microscopic and Culture - Lab Collect; Future  - UA Macroscopic with reflex to Microscopic and Culture - Lab Collect  - Urine Microscopic Exam  - Urine Culture    Fatty liver  Known history fib for an intermediate risk range.  Likely related to age.  However given this we will have consult with hepatology for possible fibrotic scan.  Discussed recommendations of vaccinations for hepatitis A and B.  Will check immunity status to these and will updates if necessary.  - Hepatitis B core antibody; Future  - Hepatitis B Surface Antibody; Future  - Hepatitis A Antibody Total; Future  - Hepatitis B core antibody  - Hepatitis B Surface Antibody  - Hepatitis A Antibody Total  - Adult GI  Referral - Consult Only; Future    Alcohol use disorder  Per patient presents.  Struggling with cravings.  Discussed naltrexone as a once a day option over her 's acamprosate.   Educated given her  has cirrhosis this is why he is on acamprosate not naltrexone.  Patient does not have cirrhosis.  Based off this patient would like to attempt naltrexone.  Sent to pharmacy.  Recent LFTs within normal limits.  Recommending LFTs at 3-month intervals while taking.  - naltrexone (DEPADE/REVIA) 50 MG tablet; Take 1 tablet (50 mg) by mouth daily.  Medication use and side effects discussed with the patient. Patient is in complete understanding and agreement with plan.     Encounter for screening for other viral diseases  As above   - Hepatitis B core antibody; Future  - Hepatitis B Surface Antibody; Future  - Hepatitis B core antibody  - Hepatitis B Surface Antibody    PVC's (premature ventricular contractions)  Noted on exam.  Concerns of possible rate control of A-fib given cardiac history.  EKG obtained showing PVCs.  Reassured given asymptomatic.  - EKG 12-lead, tracing only    Patient has been advised of split billing requirements and indicates understanding: Yes        Counseling  Appropriate preventive services were addressed with this patient via screening, questionnaire, or discussion as appropriate for fall prevention, nutrition, physical activity, Tobacco-use cessation, social engagement, weight loss and cognition.  Checklist reviewing preventive services available has been given to the patient.  Reviewed patient's diet, addressing concerns and/or questions.   She is at risk for psychosocial distress and has been provided with information to reduce risk.   Updated plan of care.  Patient reported difficulty with activities of daily living were addressed today.Information on urinary incontinence and treatment options given to patient.         Tien Bradley is a 74 year old, presenting for the following:  Wellness Visit, Transient Ischemic Attack, and Alcohol Problem        10/24/2024     1:07 PM   Additional Questions   Roomed by Marlena HICKS CMA   Accompanied by Daughter            HPI    74-year-old female with a past history of hypothyroidism, history of thyroid cancer, hereditary hemochromatosis recent ferritin within normal limits, heart failure with preserved ejection fraction, osteoporosis with primary hyperparathyroidism, and history of recurrent TIAs.    She presents today for her annual wellness visit.  As well as to discuss below concerns.    History of recurrent urinary tract infections and urge incontinence.  Has seen urology in the past has not been happy with her care.  Recently was restarted on Gemtsa and is taken in the past.  I did have a discussion with her given her recurrent UTIs about possible topical estradiol.  Urology felt may be appropriate as long as no concerns given her TIA history and coronary artery disease.  Per up-to-date, no evidence showed any correlation with increased risk of coronary artery disease or stroke or breast cancer.  Based off this it was attempted.  However, patient could not afford this medication so never was started.  She is requesting a referral for a possible second opinion.    She was recently seen for presumed UTI which grew out a mix of euro vaginal ashleigh.  She was told to stop her ciprofloxacin however, she continued this.  Her symptoms did improve no longer having burning.    She also would like to discuss aids to help with alcohol cravings.  Patient has a history of fatty liver and recent LFTs within normal limits.  However she struggles to reduce wine intake and averages 3 glasses of wine a night.  Her daughter is with her today and there seems to be some disagreements on the number of ounces that are in each glass.  Possibly more than 3 a night.   also suffers from alcoholism and has cirrhosis.  He is taking acamprosate and no longer consuming alcohol.    In regards to her heart failure history, stable followed closely by cardiology.  Given her recent increase of urinary tract infections her cardiologist stopped her  Jardiance.      FIB-4 Calculation: 1.93 at 9/28/2024 12:09 PM  Calculated from:  SGOT/AST: 26 U/L at 9/28/2024 12:09 PM  SGPT/ALT: 27 U/L at 9/28/2024 12:09 PM  Platelets: 197 10e3/uL at 9/28/2024 12:09 PM  Age: 74 years      Health Care Directive  Patient has a Health Care Directive on file  Advance care planning document is on file and is current.      10/19/2024   General Health   How would you rate your overall physical health? (!) FAIR   Feel stress (tense, anxious, or unable to sleep) Only a little      (!) STRESS CONCERN      10/19/2024   Nutrition   Diet: Low salt            10/19/2024   Exercise   Days per week of moderate/strenous exercise Patient declined   Average minutes spent exercising at this level Patient declined            10/19/2024   Social Factors   Frequency of gathering with friends or relatives More than three times a week   Worry food won't last until get money to buy more No   Food not last or not have enough money for food? No   Do you have housing? (Housing is defined as stable permanent housing and does not include staying ouside in a car, in a tent, in an abandoned building, in an overnight shelter, or couch-surfing.) Yes   Are you worried about losing your housing? No   Lack of transportation? No   Unable to get utilities (heat,electricity)? Yes   Want help with housing or utility concern? No      (!) FINANCIAL RESOURCE STRAIN CONCERN      10/21/2024   Fall Risk   Gait Speed Test (Document in seconds) 4.8   Gait Speed Test Interpretation Less than or equal to 5.00 seconds - PASS             10/19/2024   Activities of Daily Living- Home Safety   Needs help with the following daily activites Housework   Safety concerns in the home None of the above            10/19/2024   Dental   Dentist two times every year? Yes            10/19/2024   Hearing Screening   Hearing concerns? None of the above            10/19/2024   Driving Risk Screening   Patient/family members have concerns about  driving No            10/19/2024   General Alertness/Fatigue Screening   Have you been more tired than usual lately? No            10/19/2024   Urinary Incontinence Screening   Bothered by leaking urine in past 6 months Yes            10/19/2024   TB Screening   Were you born outside of the [place]? No          Today's PHQ-9 Score:       10/23/2024     1:45 PM   PHQ-9 SCORE   PHQ-9 Total Score MyChart 0   PHQ-9 Total Score 0        Patient-reported         10/19/2024   Substance Use   Alcohol more than 3/day or more than 7/wk No   Do you have a current opioid prescription? No   How severe/bad is pain from 1 to 10? 0/10 (No Pain)   Do you use any other substances recreationally? No        Social History     Tobacco Use    Smoking status: Former     Current packs/day: 1.00     Average packs/day: 1 pack/day for 20.0 years (20.0 ttl pk-yrs)     Types: Cigarettes     Passive exposure: Never    Smokeless tobacco: Never   Vaping Use    Vaping status: Never Used   Substance Use Topics    Alcohol use: Yes    Drug use: Never           8/22/2024   LAST FHS-7 RESULTS   1st degree relative breast or ovarian cancer No   Any relative bilateral breast cancer No   Any male have breast cancer No   Any ONE woman have BOTH breast AND ovarian cancer No   Any woman with breast cancer before 50yrs No   2 or more relatives with breast AND/OR ovarian cancer No   2 or more relatives with breast AND/OR bowel cancer No           Mammogram Screening - After age 74- determine frequency with patient based on health status, life expectancy and patient goals    ASCVD Risk   The ASCVD Risk score (Tigist BLOOD, et al., 2019) failed to calculate for the following reasons:    Risk score cannot be calculated because patient has a medical history suggesting prior/existing ASCVD            Reviewed and updated as needed this visit by Provider   Tobacco  Allergies  Meds  Problems  Med Hx  Surg Hx  Fam Hx            Current providers sharing in care  "for this patient include:  Patient Care Team:  Derrick Jacinto PA-C as PCP - General (Family Medicine)  Ambar Hernandez DO as Assigned PCP  Lise Mendoza PA-C as Physician Assistant  Lise Mendoza PA-C as Assigned Surgical Provider  Ryan Koo MD as MD (Ophthalmology)  Sravanthi Mullins MD as MD (Neurology)  Gustavo Davis MD as MD (Neurology)  Lise Mendoza PA-C as Physician Assistant  Arlene Crabtree MD as Assigned Endocrinology Provider  Aurelio Salinas MD as Assigned Musculoskeletal Provider  Fred Potter APRN CNP as Assigned Heart and Vascular Provider    The following health maintenance items are reviewed in Epic and correct as of today:  Health Maintenance   Topic Date Due    HF ACTION PLAN  Never done    DEPRESSION ACTION PLAN  Never done    HEPATITIS C SCREENING  Never done    RSV VACCINE (1 - 1-dose 75+ series) Never done    DTAP/TDAP/TD IMMUNIZATION (1 - Tdap) 03/30/2034 (Originally 3/30/2024)    HEPATIC PANEL  12/28/2024    BMP  03/28/2025    PHQ-9  04/24/2025    ANNUAL REVIEW OF HM ORDERS  08/05/2025    LIPID  09/14/2025    ALT  09/28/2025    CBC  09/28/2025    LUNG CANCER SCREENING  10/03/2025    MEDICARE ANNUAL WELLNESS VISIT  10/24/2025    FALL RISK ASSESSMENT  10/24/2025    GLUCOSE  09/28/2027    ADVANCE CARE PLANNING  10/24/2029    DEXA  02/13/2039    TSH W/FREE T4 REFLEX  Completed    INFLUENZA VACCINE  Completed    Pneumococcal Vaccine: 65+ Years  Completed    ZOSTER IMMUNIZATION  Completed    COVID-19 Vaccine  Completed    HPV IMMUNIZATION  Aged Out    MENINGITIS IMMUNIZATION  Aged Out    RSV MONOCLONAL ANTIBODY  Aged Out    MAMMO SCREENING  Discontinued          Objective    Exam  /70 (BP Location: Left arm, Patient Position: Sitting, Cuff Size: Adult Regular)   Pulse (!) 45   Temp 98  F (36.7  C) (Oral)   Resp 16   Ht 1.6 m (5' 3\")   Wt 99.8 kg (220 lb)   LMP  (LMP Unknown)   SpO2 97%   BMI 38.97 kg/m     Estimated body " "mass index is 38.97 kg/m  as calculated from the following:    Height as of this encounter: 1.6 m (5' 3\").    Weight as of this encounter: 99.8 kg (220 lb).    Physical Exam  GENERAL: alert and no distress  RESP: lungs clear to auscultation - no rales, rhonchi or wheezes  CV: irregularly irregular rhythm  PSYCH: mentation appears normal, affect normal/bright    EKG:    Sinus rhythm with frequent Premature ventricular complexes  leslye pattern of bigeminy  Possible Left atrial enlargement  Right bundle branch block           10/24/2024   Mini Cog   Clock Draw Score 2 Normal   3 Item Recall 3 objects recalled   Mini Cog Total Score 5                 Signed Electronically by: Derrick Jacinto PA-C    "

## 2024-10-24 NOTE — TELEPHONE ENCOUNTER
General Call      Reason for Call:  patient called in and stated that she keeps getting reminders [facility] on [facility]. I told her this has to be an automated reminder. I checked labs to see if something was flagged for her to come back and there was nothing to indicate that we wanted her to come back soon. Also no upcoming appts with us at other locations on that day.

## 2024-10-25 ENCOUNTER — TELEPHONE (OUTPATIENT)
Dept: UROLOGY | Facility: CLINIC | Age: 74
End: 2024-10-25

## 2024-10-25 LAB
HAV AB SER QL IA: NONREACTIVE
HBV CORE AB SERPL QL IA: NONREACTIVE
HBV SURFACE AB SERPL IA-ACNC: <3.5 M[IU]/ML
HBV SURFACE AB SERPL IA-ACNC: NONREACTIVE M[IU]/ML
HCV AB SERPL QL IA: NONREACTIVE

## 2024-10-25 NOTE — TELEPHONE ENCOUNTER
Called patient and discussed the following. Gave patient the number HIMs where they can request there records to be sent. Patient stated she wanted to go to [facility] staff informed patient they would be able to see all our notes through the shared system.     Jayda Mcmullen LPN on 10/25/2024 at 3:51 PM

## 2024-10-25 NOTE — TELEPHONE ENCOUNTER
[facility] Call Center    Phone Message    May a detailed message be left on voicemail: yes     Reason for Call: Other: Patient called in regards to having medical records faxed over to      :  [facility] phone [phone]  Address : [address] [place] [address] , [facility] [address]  , [place], [place] , [address] . Please call patient      Action Taken: Other: [facility]    Travel Screening: Not Applicable     Date of Service:

## 2024-10-27 LAB
BACTERIA UR CULT: ABNORMAL
BACTERIA UR CULT: ABNORMAL

## 2024-10-28 ENCOUNTER — TELEPHONE (OUTPATIENT)
Dept: FAMILY MEDICINE | Facility: CLINIC | Age: 74
End: 2024-10-28
Payer: COMMERCIAL

## 2024-10-28 RX ORDER — DOXYCYCLINE 100 MG/1
100 CAPSULE ORAL 2 TIMES DAILY
Qty: 14 CAPSULE | Refills: 0 | Status: SHIPPED | OUTPATIENT
Start: 2024-10-28

## 2024-10-28 NOTE — TELEPHONE ENCOUNTER
Called patient and left message to call back to discuss lab results below. Please relay result note to patient and assist with any recommended follow up scheduling. If there are further questions or concerns please transfer to nurse line or take a message for the provider.    Thank you,    Romeo Olvera RN     [facility]    ----- Message from Derrick Chiang sent at 10/28/2024  9:33 AM CDT -----  Please call patient.  Urine culture returned showing 2 separate bacteria.  May very well be contamination however 1 of these bacteria is a bacteria that is resistant to a lot of antibiotics and should be treated for.  This is called an MRSA.  I have sent an antibiotic called doxycycline to take twice daily for 7 days.  I would then recheck urine in approximately 2 weeks assuming no new urinary symptoms develop.  If urinary symptoms develop, I would recommend patient being seen again.    Sent doxycycline to her pharmacy    Karson chiang, pac

## 2024-10-28 NOTE — TELEPHONE ENCOUNTER
Patient called back., relayed message below. Patient understood all of the instructions.    Romeo Olvera RN     [facility]

## 2024-10-29 ENCOUNTER — TELEPHONE (OUTPATIENT)
Dept: FAMILY MEDICINE | Facility: CLINIC | Age: 74
End: 2024-10-29
Payer: COMMERCIAL

## 2024-10-29 DIAGNOSIS — K86.2 PANCREATIC CYST: Primary | ICD-10-CM

## 2024-10-29 DIAGNOSIS — R91.8 PULMONARY NODULES: ICD-10-CM

## 2024-10-29 NOTE — TELEPHONE ENCOUNTER
Order/Referral Request    Who is requesting: Pt    Orders being requested: Feb 26 or after ct scan for pancrease with or w/o contrast and April 9 or after ct chest with and w/o contrast    Reason service is needed/diagnosis: Orders were submitted previously, but pt would like orders in current providers name.    When are orders needed by: ASAP    Has this been discussed with Provider: No    Does patient have a preference on a Group/Provider/Facility?      Does patient have an appointment scheduled?: No    Where to send orders: Place orders within Epic    Could we [facility] or would you prefer to receive a phone call?:   No preference   Okay to leave a detailed message?: Yes at Home number on file [phone] (home) or Cell number on file:    Telephone Information:   Mobile [phone]

## 2024-11-05 ENCOUNTER — MYC MEDICAL ADVICE (OUTPATIENT)
Dept: FAMILY MEDICINE | Facility: CLINIC | Age: 74
End: 2024-11-05
Payer: COMMERCIAL

## 2024-11-05 DIAGNOSIS — N63.20 MASS OF LEFT BREAST, UNSPECIFIED QUADRANT: Primary | ICD-10-CM

## 2024-11-05 DIAGNOSIS — R92.2 INCONCLUSIVE MAMMOGRAM: ICD-10-CM

## 2024-11-06 ENCOUNTER — TRANSFERRED RECORDS (OUTPATIENT)
Dept: HEALTH INFORMATION MANAGEMENT | Facility: CLINIC | Age: 74
End: 2024-11-06
Payer: COMMERCIAL

## 2024-11-07 NOTE — TELEPHONE ENCOUNTER
Call received from [facility] regarding Pt.   Spoke with: Tamanna  Pt's location at SNF: [facility]    Call regarding: FYI    Assessment:     Patient passed away early this morning.     Any recommendations or new orders?     I have also ordered the breast ultrasound.    Karson Jacinto PA-C

## 2024-11-08 ENCOUNTER — NURSE TRIAGE (OUTPATIENT)
Dept: FAMILY MEDICINE | Facility: CLINIC | Age: 74
End: 2024-11-08

## 2024-11-08 ENCOUNTER — TELEPHONE (OUTPATIENT)
Dept: FAMILY MEDICINE | Facility: CLINIC | Age: 74
End: 2024-11-08

## 2024-11-08 ENCOUNTER — OFFICE VISIT (OUTPATIENT)
Dept: FAMILY MEDICINE | Facility: CLINIC | Age: 74
End: 2024-11-08
Payer: COMMERCIAL

## 2024-11-08 VITALS
HEART RATE: 51 BPM | SYSTOLIC BLOOD PRESSURE: 136 MMHG | OXYGEN SATURATION: 98 % | RESPIRATION RATE: 18 BRPM | TEMPERATURE: 97.8 F | WEIGHT: 217.8 LBS | BODY MASS INDEX: 38.58 KG/M2 | DIASTOLIC BLOOD PRESSURE: 70 MMHG

## 2024-11-08 DIAGNOSIS — N39.0 RECURRENT UTI: ICD-10-CM

## 2024-11-08 DIAGNOSIS — L30.4 INTERTRIGO: Primary | ICD-10-CM

## 2024-11-08 LAB
ALBUMIN UR-MCNC: NEGATIVE MG/DL
APPEARANCE UR: CLEAR
BACTERIA #/AREA URNS HPF: ABNORMAL /HPF
BILIRUB UR QL STRIP: NEGATIVE
COLOR UR AUTO: YELLOW
GLUCOSE UR STRIP-MCNC: NEGATIVE MG/DL
HGB UR QL STRIP: ABNORMAL
KETONES UR STRIP-MCNC: NEGATIVE MG/DL
LEUKOCYTE ESTERASE UR QL STRIP: ABNORMAL
NITRATE UR QL: NEGATIVE
PH UR STRIP: 5.5 [PH] (ref 5–8)
RBC #/AREA URNS AUTO: ABNORMAL /HPF
SP GR UR STRIP: 1.01 (ref 1–1.03)
SQUAMOUS #/AREA URNS AUTO: ABNORMAL /LPF
UROBILINOGEN UR STRIP-ACNC: 0.2 E.U./DL
WBC #/AREA URNS AUTO: ABNORMAL /HPF
WBC CLUMPS #/AREA URNS HPF: PRESENT /HPF

## 2024-11-08 PROCEDURE — G2211 COMPLEX E/M VISIT ADD ON: HCPCS | Performed by: FAMILY MEDICINE

## 2024-11-08 PROCEDURE — 87086 URINE CULTURE/COLONY COUNT: CPT | Performed by: FAMILY MEDICINE

## 2024-11-08 PROCEDURE — 99213 OFFICE O/P EST LOW 20 MIN: CPT | Performed by: FAMILY MEDICINE

## 2024-11-08 PROCEDURE — 81001 URINALYSIS AUTO W/SCOPE: CPT | Performed by: FAMILY MEDICINE

## 2024-11-08 RX ORDER — KETOCONAZOLE 20 MG/G
CREAM TOPICAL DAILY
Qty: 60 G | Refills: 0 | Status: SHIPPED | OUTPATIENT
Start: 2024-11-08 | End: 2024-11-22

## 2024-11-08 RX ORDER — NYSTATIN 100000 [USP'U]/G
POWDER TOPICAL 2 TIMES DAILY
Qty: 60 G | Refills: 0 | Status: SHIPPED | OUTPATIENT
Start: 2024-11-08 | End: 2024-11-08

## 2024-11-08 NOTE — TELEPHONE ENCOUNTER
Nurse Triage SBAR    Is this a 2nd Level Triage? NO    Situation: Skin Concern     Background: On antibiotic for recurrent UTI.     Assessment: Patient contacting clinic reporting skin concern in skin fold on abdomen. States it started two days ago as a small area that has now spread within skin fold. Is approximately 3 inches in size. Started about the size of a half dollar. Patient states skin is raw and painful.Reports skin is on the damp side but denies pus like or bloody drainage. Denies fevers or red streaking. Burns to the touch.     Protocol Recommended Disposition:   See in Office Today    Recommendation: Scheduled office visit today per patient availability.  Advised if this needs to be cancelled to be seen in urgent care. Call back prior to visit for worsening symptoms.  Ensure area remains dry.     Routed to provider    Does the patient meet one of the following criteria for ADS visit consideration? 16+ years old, with an [facility] PCP     TIP  Providers, please consider if this condition is appropriate for management at one of our Acute and Diagnostic Services sites.     If patient is a good candidate, please use dotphrase <dot>triageresponse and select Refer to ADS to document.       Reason for Disposition   Looks infected and large red area (> 2 inches or 5 cm)    Additional Information   Negative: Major bleeding (actively dripping or spurting) that can't be stopped   Negative: Amputation   Negative: Shock suspected (e.g., cold/pale/clammy skin, too weak to stand, low BP, rapid pulse)   Negative: Knife wound (or other possibly deep cut) to chest, abdomen, back, neck, or head   Negative: Sounds like a life-threatening emergency to the triager   Negative: Animal bite   Negative: Burn   Negative: Foreign body in skin (e.g., splinter, sliver)   Negative: Human bite   Negative: Puncture wound   Negative: Wound infection suspected (cut or other wound now looks infected)   Negative: High pressure injection  injury (e.g., from grease gun or paint gun, usually work-related)   Negative: Skin loss goes very deep (e.g., can see bones or tendons)   Negative: Skin loss involves more than 10% of body surface area   Negative: Skin is split open or gaping (length > 1/2 inch or 12 mm on the skin, 1/4 inch or 6 mm on the face)   Negative: Bleeding won't stop after 10 minutes of direct pressure (using correct technique)   Negative: Deep cut and can see bone or tendons   Negative: Dirt in the wound and not removed after 15 minutes of scrubbing   Negative: Wound causes numbness (i.e., loss of sensation)   Negative: Wound causes weakness (i.e., decreased ability to move hand, finger, toe)   Negative: Sounds like a serious injury to the triager   Negative: SEVERE pain (e.g., excruciating)   Negative: Looks infected (spreading redness, red streak, pus) and fever    Protocols used: Skin Injury-A-OH

## 2024-11-08 NOTE — TELEPHONE ENCOUNTER
Incoming call from [facility]. They state they received a prescription for ketoconazole. They need the specific grams to use daily as well as the location of the rash.    Spoke with Jena, verbal to apply 4 grams per day for 14 days to right lower abdominal area

## 2024-11-08 NOTE — PROGRESS NOTES
"  Assessment & Plan     Intertrigo  Given patient's history as well as findings on exam, suspect rash likely due to intertrigo.  Was initially going to prescribe topical nystatin powder, though I did discontinue this medication, called patient, and we decided to treat with topical ketoconazole instead to help with anti-inflammatory effects.  She may do topically once daily for 2 to 4 weeks or until rash is resolved.  If rash is persisting return to clinic for further evaluation, consider oral antifungal if needed.  - ketoconazole (NIZORAL) 2 % external cream; Apply topically daily for 14 days.    Recurrent UTI  Previous provider had ordered follow-up UA given previous urine culture showed MRSA.  She denies any symptoms today.  - UA Macroscopic with reflex to Microscopic and Culture - Lab Collect  - Urine Microscopic Exam            FUTURE APPOINTMENTS:       - Follow-up visit in 2-4 weeks if symptoms persist, sooner if they worsen.      Tien Bradley is a 74 year old female, new to me, here today with daughter \"Chel\", presenting for the following health issues:  Derm Problem (Skin concern, pt states she has skin patch. Pt states she had/has MRSA on her vaginal )  She was seen by Karson Jacinto on October 24 for her annual wellness visit, at that time also had recurrent UTI symptoms which she is being followed by urology.  Urine culture at that time did show MRSA, was treated with a 7-day course of doxycycline.  She also states that at that time she noted a small rash in her right lower abdomen area, however since then it has increased in size.  She has applied topical Desitin to the area without improvement and the rash has grown in size.        11/8/2024    11:30 AM   Additional Questions   Roomed by Loan WANG CMA   Accompanied by daughter     History of Present Illness       Reason for visit:  Irritation has spread.  Symptom onset:  1-3 days ago  Symptoms include:  Very irritated right side of lower " stomach.  Symptom intensity:  Severe  Symptom progression:  Worsening  Had these symptoms before:  No  What makes it worse:  No  What makes it better:  No   She is taking medications regularly.                     Objective    /70 (BP Location: Left arm, Patient Position: Sitting, Cuff Size: Adult Large)   Pulse 51   Temp 97.8  F (36.6  C) (Oral)   Resp 18   Wt 98.8 kg (217 lb 12.8 oz)   LMP  (LMP Unknown)   SpO2 98%   BMI 38.58 kg/m    Body mass index is 38.58 kg/m .  Physical Exam   GENERAL: alert and no distress  MS: no gross musculoskeletal defects noted, no edema  SKIN: moist appearing erythematous patch -  noted along right lower abdomen fold/pannus.            Signed Electronically by: Shayy Ennis MD

## 2024-11-10 LAB — BACTERIA UR CULT: NORMAL

## 2024-11-17 DIAGNOSIS — R00.0 TACHYCARDIA: ICD-10-CM

## 2024-11-18 ENCOUNTER — HOSPITAL ENCOUNTER (OUTPATIENT)
Dept: CT IMAGING | Facility: HOSPITAL | Age: 74
Discharge: HOME | End: 2024-11-18
Payer: COMMERCIAL

## 2024-11-18 DIAGNOSIS — R31.29 OTHER MICROSCOPIC HEMATURIA: ICD-10-CM

## 2024-11-18 LAB
CREAT BLD-MCNC: 0.7 MG/DL (ref 0.6–1.1)
EGFRCR SERPLBLD CKD-EPI 2021: >60 ML/MIN/1.73M2

## 2024-11-18 PROCEDURE — 74178 CT ABD&PLV WO CNTR FLWD CNTR: CPT

## 2024-11-18 PROCEDURE — 250N000009 HC RX 250

## 2024-11-18 PROCEDURE — 82565 ASSAY OF CREATININE: CPT

## 2024-11-18 PROCEDURE — 250N000011 HC RX IP 250 OP 636

## 2024-11-18 RX ORDER — IOPAMIDOL 755 MG/ML
90 INJECTION, SOLUTION INTRAVASCULAR ONCE
Status: COMPLETED | OUTPATIENT
Start: 2024-11-18 | End: 2024-11-18

## 2024-11-18 RX ADMIN — SODIUM CHLORIDE 95 ML: 9 INJECTION, SOLUTION INTRAVENOUS at 15:51

## 2024-11-18 RX ADMIN — IOPAMIDOL 90 ML: 755 INJECTION, SOLUTION INTRAVENOUS at 15:50

## 2024-11-21 RX ORDER — METOPROLOL TARTRATE 25 MG/1
25 TABLET, FILM COATED ORAL 2 TIMES DAILY
Qty: 180 TABLET | Refills: 0 | Status: SHIPPED | OUTPATIENT
Start: 2024-11-21

## 2024-11-25 ENCOUNTER — OFFICE VISIT (OUTPATIENT)
Dept: OPHTHALMOLOGY | Facility: CLINIC | Age: 74
End: 2024-11-25
Payer: COMMERCIAL

## 2024-11-25 DIAGNOSIS — H40.1131 PRIMARY OPEN ANGLE GLAUCOMA (POAG) OF BOTH EYES, MILD STAGE: Primary | ICD-10-CM

## 2024-11-25 DIAGNOSIS — H25.811 COMBINED FORMS OF AGE-RELATED CATARACT OF RIGHT EYE: ICD-10-CM

## 2024-11-25 PROCEDURE — 92083 EXTENDED VISUAL FIELD XM: CPT | Performed by: OPHTHALMOLOGY

## 2024-11-25 PROCEDURE — 92133 CPTRZD OPH DX IMG PST SGM ON: CPT | Performed by: OPHTHALMOLOGY

## 2024-11-25 PROCEDURE — 92012 INTRM OPH EXAM EST PATIENT: CPT | Performed by: OPHTHALMOLOGY

## 2024-11-25 ASSESSMENT — PACHYMETRY
OD_CT(UM): 538
OS_CT(UM): 573
EXAM_DATE: 11/25/2024

## 2024-11-25 ASSESSMENT — REFRACTION_WEARINGRX
OS_SPHERE: PLANO
OD_CYLINDER: +1.75
OS_CYLINDER: +1.75
OS_ADD: +2.50
OD_ADD: +2.50
SPECS_TYPE: PAL
OD_SPHERE: -0.25
OS_AXIS: 167
OD_AXIS: 033

## 2024-11-25 ASSESSMENT — VISUAL ACUITY
OD_PH_CC: 20/30
OD_PH_CC+: -2
METHOD: SNELLEN - LINEAR
CORRECTION_TYPE: GLASSES
OS_CC: 20/20
OD_CC+: -2
OS_CC+: -2
OD_CC: 20/50

## 2024-11-25 ASSESSMENT — REFRACTION_MANIFEST
OD_SPHERE: -0.50
OD_CYLINDER: +0.75
OD_ADD: +2.50
OD_AXIS: 030

## 2024-11-25 ASSESSMENT — EXTERNAL EXAM - LEFT EYE: OS_EXAM: NORMAL

## 2024-11-25 ASSESSMENT — SLIT LAMP EXAM - LIDS
COMMENTS: 1+ DERMATOCHALASIS
COMMENTS: 1+ DERMATOCHALASIS

## 2024-11-25 ASSESSMENT — TONOMETRY
OD_IOP_MMHG: 13
IOP_METHOD: APPLANATION
OS_IOP_MMHG: 12

## 2024-11-25 ASSESSMENT — EXTERNAL EXAM - RIGHT EYE: OD_EXAM: NORMAL

## 2024-11-25 NOTE — PROGRESS NOTES
"Current Eye Medications:  lumigan - right eye QD - last dose: 5am today     Subjective:  HVF, OCT and IOP - pt reports another TIA within right eye and heart failure since last visit. Vision appears more blurred in general as compared to last visit.     Objective:  See Ophthalmology Exam.      Assessment:  Visually significant cataract right eye in patient with mild open angle glaucoma.  Cornea right eye slightly thin.     Plan:  Continue:   Lumigan every evening in the right eye.     May use artificial tears up to four times a day (like Refresh Optive, Systane Balance, or TheraTears. Avoid \"get the red out\" drops and generic artifical tears).     Wait at least 5 minutes between drops if using more than one at a time.     Referral sent to Dr. Lee for a cataract +/- MIGS evaluation. His office will call you to schedule an appointment.     Ryan Koo M.D.  [phone]       "

## 2024-11-25 NOTE — PATIENT INSTRUCTIONS
"Continue:   Lumigan every evening in the right eye.     May use artificial tears up to four times a day (like Refresh Optive, Systane Balance, or TheraTears. Avoid \"get the red out\" drops and generic artifical tears).     Wait at least 5 minutes between drops if using more than one at a time.     Referral sent to Dr. Lee for a cataract evaluation. His office will call you to schedule an appointment.     Ryan Koo M.D.  [phone]    "

## 2024-11-25 NOTE — LETTER
"11/25/2024      Mirna Grijalva  [address]  [place] [address]      Dear Colleague,    Thank you for referring your patient, Mirna Grijalva, to the [facility]. Please see a copy of my visit note below.    Current Eye Medications:  lumigan - right eye QD - last dose: 5am today     Subjective:  HVF, OCT and IOP - pt reports another TIA within right eye and heart failure since last visit. Vision appears more blurred in general as compared to last visit.     Objective:  See Ophthalmology Exam.      Assessment:  Visually significant cataract right eye in patient with mild open angle glaucoma.  Cornea right eye slightly thin.     Plan:  Continue:   Lumigan every evening in the right eye.     May use artificial tears up to four times a day (like Refresh Optive, Systane Balance, or TheraTears. Avoid \"get the red out\" drops and generic artifical tears).     Wait at least 5 minutes between drops if using more than one at a time.     Referral sent to Dr. Lee for a cataract +/- MIGS evaluation. His office will call you to schedule an appointment.     Ryan Koo M.D.  [phone]           Again, thank you for allowing me to participate in the care of your patient.        Sincerely,        Ryan Koo MD  "

## 2024-12-02 ENCOUNTER — ANCILLARY PROCEDURE (OUTPATIENT)
Dept: MAMMOGRAPHY | Facility: CLINIC | Age: 74
End: 2024-12-02
Attending: PHYSICIAN ASSISTANT
Payer: COMMERCIAL

## 2024-12-02 DIAGNOSIS — N63.20 MASS OF LEFT BREAST, UNSPECIFIED QUADRANT: ICD-10-CM

## 2024-12-02 DIAGNOSIS — R92.2 INCONCLUSIVE MAMMOGRAM: ICD-10-CM

## 2024-12-02 PROCEDURE — 76642 ULTRASOUND BREAST LIMITED: CPT | Mod: LT

## 2024-12-06 ENCOUNTER — TRANSFERRED RECORDS (OUTPATIENT)
Dept: HEALTH INFORMATION MANAGEMENT | Facility: CLINIC | Age: 74
End: 2024-12-06
Payer: COMMERCIAL

## 2024-12-12 ENCOUNTER — OFFICE VISIT (OUTPATIENT)
Dept: CARDIOLOGY | Facility: CLINIC | Age: 74
End: 2024-12-12
Payer: COMMERCIAL

## 2024-12-12 VITALS
DIASTOLIC BLOOD PRESSURE: 76 MMHG | WEIGHT: 213 LBS | RESPIRATION RATE: 16 BRPM | BODY MASS INDEX: 37.74 KG/M2 | SYSTOLIC BLOOD PRESSURE: 120 MMHG | HEART RATE: 76 BPM | HEIGHT: 63 IN

## 2024-12-12 DIAGNOSIS — I50.30 HEART FAILURE WITH PRESERVED EJECTION FRACTION, NYHA CLASS I (H): Primary | ICD-10-CM

## 2024-12-12 RX ORDER — CEPHALEXIN 500 MG/1
500 TABLET, FILM COATED ORAL 2 TIMES DAILY
COMMUNITY

## 2024-12-12 NOTE — LETTER
12/12/2024    Derrick Jacinto PA-C  [address]  [facility] [address]    RE: Mirna Grijalva       Dear Colleague,     I had the pleasure of seeing Mirna Grijalva in the [facility].    HEART CARE NOTE          Assessment/Recommendations     1. HFpEF c/b severe ADHF  Assessment / Plan  Near euvolemia and denies HF symptoms of orthopnea, PND, edema - no changes to regimen at this time  Patient is high risk for adverse cardiac events 2/2 advanced age, frailty, HTN  GDMT as detailed below; mainstay of treatment for HFpEF includes diuretics and adequate BP control (class I) and SGLT2-I (class 2a); additional medical therapy (ARNI, MRA, ARB) demonstrated less robust evidence for indication but may be considered per guideline recommendations (2b); no indication for BBlockers      Current Pharmacotherapy AHA Guideline-Directed Medical Therapy   Losartan 100 mg daily - held ARNI/ARB   Spironolactone not started  MRA   SGLT2 inhibitor: not started SGLT2-I    Furosemide 20 mg daily Loop diuretic       3. HTN  Assessment / Plan  Adequately controlled - no changes to regimen at this time    30 minutes spent reviewing prior records (including documentation, laboratory studies, cardiac testing/imaging), history and physical exam, planning, and subsequent documentation.      The longitudinal plan of care for HFpEF was addressed during this visit. Due to the added complexity in care, I will continue to support  Ms. Mirna Barrios the subsequent management of this condition(s) and with the ongoing continuity of care of this condition(s).      History of Present Illness/Subjective    Ms. Mirna Grijalva is a 74 year old female with a PMHx significant for (per Epic notation) essential HTN, HLD, hypothyroidism, recurrent UTI, recent amaurosis fugax (4/27/24) who was admitted on 5/12/2024 for acute hypoxic respiratory failure. Presented to the ED with 2 days of progressive SOB, dry cough. Hypoxic in ED requiring 3  "L NC O2. CT chest showing moderate pulmonary edema (BNP within normal limits).      Today, Mrs. Grijalva denies acute cardiac events or complaints; Management plan as detailed above     ECG: Personally reviewed. normal sinus rhythm, occasional PVC noted, unifocal.     ECHO (personnaly Reviewed on 5/13/24):   Left ventricular size, wall motion and function are normal. The ejection  fraction is 60-65%.  Normal right ventricle size and systolic function.  IVC diameter <2.1 cm collapsing >50% with sniff suggests a normal RA pressure  of 3 mmHg.  Small pericardial effusion  There are no echocardiographic indications of cardiac tamponade.    Lab results: personally reviewed December 12, 2024; notable for renal function wnl    Medical history and pertinent documents reviewed in Care Everywhere please where applicable see details above        Physical Examination Review of Systems   /76 (BP Location: Left arm, Patient Position: Sitting, Cuff Size: Adult Regular)   Pulse 76   Resp 16   Ht 1.6 m (5' 3\")   Wt 96.6 kg (213 lb)   LMP  (LMP Unknown)   BMI 37.73 kg/m    Body mass index is 37.73 kg/m .  Wt Readings from Last 3 Encounters:   12/12/24 96.6 kg (213 lb)   11/08/24 98.8 kg (217 lb 12.8 oz)   10/24/24 99.8 kg (220 lb)     General Appearance:   no distress, normal body habitus   ENT/Mouth: membranes moist, no oral lesions or bleeding gums.      EYES:  no scleral icterus, normal conjunctivae   Neck: no carotid bruits or thyromegaly   Chest/Lungs:   lungs are clear to auscultation, no rales or wheezing, equal chest wall expansion    Cardiovascular:   Regular. Normal first and second heart sounds with no murmurs, rubs, or gallops; the carotid, radial and posterior tibial pulses are intact, no JVD or LE edema bilaterally    Abdomen:  no organomegaly, masses, bruits, or tenderness; bowel sounds are present   Extremities: no cyanosis or clubbing   Skin: no xanthelasma, warm.    Neurologic: NAD     Psychiatric: alert " and oriented x3, calm     A complete 10 systems ROS was reviewed  And is negative except what is listed in the HPI.          Medical History  Surgical History Family History Social History   Past Medical History:   Diagnosis Date     Arthritis years ago?     Cancer (H) 1988  & 2016?     Coronary artery disease May 2024     Glaucoma (increased eye pressure) About 11 years     Hypertension years ago?     Mumps      Nonsenile cataract About 11 years     Thyroid cancer (H)      TIA (transient ischemic attack) 04/27/2024    right eye    Past Surgical History:   Procedure Laterality Date     BIOPSY       BREAST SURGERY       CATARACT IOL, RT/LT Left 2022?    With Istent     CYSTOSCOPY       GLAUCOMA SURGERY  2022?     GYN SURGERY      no family history of premature coronary artery disease Social History     Socioeconomic History     Marital status:      Spouse name: Not on file     Number of children: Not on file     Years of education: Not on file     Highest education level: Not on file   Occupational History     Not on file   Tobacco Use     Smoking status: Former     Current packs/day: 1.00     Average packs/day: 1 pack/day for 20.0 years (20.0 ttl pk-yrs)     Types: Cigarettes     Passive exposure: Never     Smokeless tobacco: Never   Vaping Use     Vaping status: Never Used   Substance and Sexual Activity     Alcohol use: Yes     Drug use: Never     Sexual activity: Not Currently     Partners: Female     Birth control/protection: Post-menopausal   Other Topics Concern     Not on file   Social History Narrative     Not on file     Social Drivers of Health     Financial Resource Strain: High Risk (10/19/2024)    Financial Resource Strain      Within the past 12 months, have you or your family members you live with been unable to get utilities (heat, electricity) when it was really needed?: Yes   Food Insecurity: Low Risk  (10/19/2024)    Food Insecurity      Within the past 12 months, did you worry that your  food would run out before you got money to buy more?: No      Within the past 12 months, did the food you bought just not last and you didn t have money to get more?: No   Transportation Needs: Low Risk  (10/19/2024)    Transportation Needs      Within the past 12 months, has lack of transportation kept you from medical appointments, getting your medicines, non-medical meetings or appointments, work, or from getting things that you need?: No   Physical Activity: Patient Declined (10/19/2024)    Exercise Vital Sign      Days of Exercise per Week: Patient declined      Minutes of Exercise per Session: Patient declined   Stress: No Stress Concern Present (10/19/2024)    [employer] - Occupational Stress Questionnaire      Feeling of Stress : Only a little   Social Connections: Unknown (10/19/2024)    Social Connection and Isolation Panel [NHANES]      Frequency of Communication with Friends and Family: Not on file      Frequency of Social Gatherings with Friends and Family: More than three times a week      Attends Spiritism Services: Not on file      Active Member of Clubs or Organizations: Not on file      Attends Club or Organization Meetings: Not on file      Marital Status: Not on file   Interpersonal Safety: Low Risk  (11/8/2024)    Interpersonal Safety      Do you feel physically and emotionally safe where you currently live?: Yes      Within the past 12 months, have you been hit, slapped, kicked or otherwise physically hurt by someone?: No      Within the past 12 months, have you been humiliated or emotionally abused in other ways by your partner or ex-partner?: No   Housing Stability: Low Risk  (10/19/2024)    Housing Stability      Do you have housing? : Yes      Are you worried about losing your housing?: No           Lab Results    Chemistry/lipid CBC Cardiac Enzymes/BNP/TSH/INR   Lab Results   Component Value Date    CHOL 186 09/14/2024    HDL 78 09/14/2024    TRIG 110  "09/14/2024    BUN 8.3 09/28/2024     09/28/2024    CO2 23 09/28/2024    Lab Results   Component Value Date    WBC 5.5 09/28/2024    HGB 14.6 09/28/2024    HCT 42.4 09/28/2024     (H) 09/28/2024     09/28/2024    Lab Results   Component Value Date    TSH 1.32 09/24/2024    INR 0.93 09/28/2024     No results found for: \"CKTOTAL\", \"CKMB\", \"TROPONINI\"       Weight:    Wt Readings from Last 3 Encounters:   11/08/24 98.8 kg (217 lb 12.8 oz)   10/24/24 99.8 kg (220 lb)   10/21/24 98.5 kg (217 lb 3.2 oz)       Allergies  Allergies   Allergen Reactions     Jardiance [Empagliflozin] Other (See Comments)     Recurrent UTI     Atenolol      Rash and Insomnia     Eliquis [Apixaban]      Wanting to faint     Nitrofurantoin      'Bad, itchy, feverish, rash on arms and legs'     Sulfa Antibiotics      Rash on feet     Ketoconazole Rash     Topical ketoconazole     Penicillins Rash         Surgical History  Past Surgical History:   Procedure Laterality Date     BIOPSY       BREAST SURGERY       CATARACT IOL, RT/LT Left 2022?    With Istent     CYSTOSCOPY       GLAUCOMA SURGERY  2022?     GYN SURGERY         Social History  Tobacco:   History   Smoking Status     Former     Types: Cigarettes   Smokeless Tobacco     Never    Alcohol:   Social History    Substance and Sexual Activity      Alcohol use: Yes   Illicit Drugs:   History   Drug Use Unknown       Family History  Family History   Problem Relation Age of Onset     Glaucoma Mother      Hypertension Mother         Alzheimers and arthritis     Thyroid Disease Mother      Eye Surgery Mother      Glasses (<7 y/o) Mother      Cancer Father      Diabetes Father         And Hemochromatosis and cancer     Glasses (<7 y/o) Father      Hypertension Brother         Alzheimers     Hypertension Sister         Heart failure          Gelacio Molina MD on 12/12/2024      cc: Derrick Jacinto      Thank you for allowing me to participate in the care of your " patient.      Sincerely,     Gelacio Molina MD     [facility]  cc:   MEERA Regalado CNP  [address]  [place] [address]

## 2024-12-12 NOTE — PROGRESS NOTES
HEART CARE NOTE          Assessment/Recommendations     1. HFpEF c/b severe ADHF  Assessment / Plan  Near euvolemia and denies HF symptoms of orthopnea, PND, edema - no changes to regimen at this time  Patient is high risk for adverse cardiac events 2/2 advanced age, frailty, HTN  GDMT as detailed below; mainstay of treatment for HFpEF includes diuretics and adequate BP control (class I) and SGLT2-I (class 2a); additional medical therapy (ARNI, MRA, ARB) demonstrated less robust evidence for indication but may be considered per guideline recommendations (2b); no indication for BBlockers      Current Pharmacotherapy AHA Guideline-Directed Medical Therapy   Losartan 100 mg daily - held ARNI/ARB   Spironolactone not started  MRA   SGLT2 inhibitor: not started SGLT2-I    Furosemide 20 mg daily Loop diuretic       3. HTN  Assessment / Plan  Adequately controlled - no changes to regimen at this time    30 minutes spent reviewing prior records (including documentation, laboratory studies, cardiac testing/imaging), history and physical exam, planning, and subsequent documentation.      The longitudinal plan of care for HFpEF was addressed during this visit. Due to the added complexity in care, I will continue to support  Ms. Mirna Grijalvain the subsequent management of this condition(s) and with the ongoing continuity of care of this condition(s).      History of Present Illness/Subjective    Ms. Mirna Grijalva is a 74 year old female with a PMHx significant for (per Epic notation) essential HTN, HLD, hypothyroidism, recurrent UTI, recent amaurosis fugax (4/27/24) who was admitted on 5/12/2024 for acute hypoxic respiratory failure. Presented to the ED with 2 days of progressive SOB, dry cough. Hypoxic in ED requiring 3 L NC O2. CT chest showing moderate pulmonary edema (BNP within normal limits).      Today, Mrs. Grijalva denies acute cardiac events or complaints; Management plan as detailed above     ECG: Personally reviewed.  "normal sinus rhythm, occasional PVC noted, unifocal.     ECHO (personnaly Reviewed on 5/13/24):   Left ventricular size, wall motion and function are normal. The ejection  fraction is 60-65%.  Normal right ventricle size and systolic function.  IVC diameter <2.1 cm collapsing >50% with sniff suggests a normal RA pressure  of 3 mmHg.  Small pericardial effusion  There are no echocardiographic indications of cardiac tamponade.    Lab results: personally reviewed December 12, 2024; notable for renal function wnl    Medical history and pertinent documents reviewed in Care Everywhere please where applicable see details above        Physical Examination Review of Systems   /76 (BP Location: Left arm, Patient Position: Sitting, Cuff Size: Adult Regular)   Pulse 76   Resp 16   Ht 1.6 m (5' 3\")   Wt 96.6 kg (213 lb)   LMP  (LMP Unknown)   BMI 37.73 kg/m    Body mass index is 37.73 kg/m .  Wt Readings from Last 3 Encounters:   12/12/24 96.6 kg (213 lb)   11/08/24 98.8 kg (217 lb 12.8 oz)   10/24/24 99.8 kg (220 lb)     General Appearance:   no distress, normal body habitus   ENT/Mouth: membranes moist, no oral lesions or bleeding gums.      EYES:  no scleral icterus, normal conjunctivae   Neck: no carotid bruits or thyromegaly   Chest/Lungs:   lungs are clear to auscultation, no rales or wheezing, equal chest wall expansion    Cardiovascular:   Regular. Normal first and second heart sounds with no murmurs, rubs, or gallops; the carotid, radial and posterior tibial pulses are intact, no JVD or LE edema bilaterally    Abdomen:  no organomegaly, masses, bruits, or tenderness; bowel sounds are present   Extremities: no cyanosis or clubbing   Skin: no xanthelasma, warm.    Neurologic: NAD     Psychiatric: alert and oriented x3, calm     A complete 10 systems ROS was reviewed  And is negative except what is listed in the HPI.          Medical History  Surgical History Family History Social History   Past Medical " History:   Diagnosis Date    Arthritis years ago?    Cancer (H) 1988  & 2016?    Coronary artery disease May 2024    Glaucoma (increased eye pressure) About 11 years    Hypertension years ago?    Mumps     Nonsenile cataract About 11 years    Thyroid cancer (H)     TIA (transient ischemic attack) 04/27/2024    right eye    Past Surgical History:   Procedure Laterality Date    BIOPSY      BREAST SURGERY      CATARACT IOL, RT/LT Left 2022?    With Istent    CYSTOSCOPY      GLAUCOMA SURGERY  2022?    GYN SURGERY      no family history of premature coronary artery disease Social History     Socioeconomic History    Marital status:      Spouse name: Not on file    Number of children: Not on file    Years of education: Not on file    Highest education level: Not on file   Occupational History    Not on file   Tobacco Use    Smoking status: Former     Current packs/day: 1.00     Average packs/day: 1 pack/day for 20.0 years (20.0 ttl pk-yrs)     Types: Cigarettes     Passive exposure: Never    Smokeless tobacco: Never   Vaping Use    Vaping status: Never Used   Substance and Sexual Activity    Alcohol use: Yes    Drug use: Never    Sexual activity: Not Currently     Partners: Female     Birth control/protection: Post-menopausal   Other Topics Concern    Not on file   Social History Narrative    Not on file     Social Drivers of Health     Financial Resource Strain: High Risk (10/19/2024)    Financial Resource Strain     Within the past 12 months, have you or your family members you live with been unable to get utilities (heat, electricity) when it was really needed?: Yes   Food Insecurity: Low Risk  (10/19/2024)    Food Insecurity     Within the past 12 months, did you worry that your food would run out before you got money to buy more?: No     Within the past 12 months, did the food you bought just not last and you didn t have money to get more?: No   Transportation Needs: Low Risk  (10/19/2024)    Transportation  Needs     Within the past 12 months, has lack of transportation kept you from medical appointments, getting your medicines, non-medical meetings or appointments, work, or from getting things that you need?: No   Physical Activity: Patient Declined (10/19/2024)    Exercise Vital Sign     Days of Exercise per Week: Patient declined     Minutes of Exercise per Session: Patient declined   Stress: No Stress Concern Present (10/19/2024)    [employer] - Occupational Stress Questionnaire     Feeling of Stress : Only a little   Social Connections: Unknown (10/19/2024)    Social Connection and Isolation Panel [NHANES]     Frequency of Communication with Friends and Family: Not on file     Frequency of Social Gatherings with Friends and Family: More than three times a week     Attends [employer] Services: Not on file     Active Member of Clubs or Organizations: Not on file     Attends Club or Organization Meetings: Not on file     Marital Status: Not on file   Interpersonal Safety: Low Risk  (11/8/2024)    Interpersonal Safety     Do you feel physically and emotionally safe where you currently live?: Yes     Within the past 12 months, have you been hit, slapped, kicked or otherwise physically hurt by someone?: No     Within the past 12 months, have you been humiliated or emotionally abused in other ways by your partner or ex-partner?: No   Housing Stability: Low Risk  (10/19/2024)    Housing Stability     Do you have housing? : Yes     Are you worried about losing your housing?: No           Lab Results    Chemistry/lipid CBC Cardiac Enzymes/BNP/TSH/INR   Lab Results   Component Value Date    CHOL 186 09/14/2024    HDL 78 09/14/2024    TRIG 110 09/14/2024    BUN 8.3 09/28/2024     09/28/2024    CO2 23 09/28/2024    Lab Results   Component Value Date    WBC 5.5 09/28/2024    HGB 14.6 09/28/2024    HCT 42.4 09/28/2024     (H) 09/28/2024     09/28/2024    Lab Results   Component  "Value Date    TSH 1.32 09/24/2024    INR 0.93 09/28/2024     No results found for: \"CKTOTAL\", \"CKMB\", \"TROPONINI\"       Weight:    Wt Readings from Last 3 Encounters:   11/08/24 98.8 kg (217 lb 12.8 oz)   10/24/24 99.8 kg (220 lb)   10/21/24 98.5 kg (217 lb 3.2 oz)       Allergies  Allergies   Allergen Reactions    Jardiance [Empagliflozin] Other (See Comments)     Recurrent UTI    Atenolol      Rash and Insomnia    Eliquis [Apixaban]      Wanting to faint    Nitrofurantoin      'Bad, itchy, feverish, rash on arms and legs'    Sulfa Antibiotics      Rash on feet    Ketoconazole Rash     Topical ketoconazole    Penicillins Rash         Surgical History  Past Surgical History:   Procedure Laterality Date    BIOPSY      BREAST SURGERY      CATARACT IOL, RT/LT Left 2022?    With Istent    CYSTOSCOPY      GLAUCOMA SURGERY  2022?    GYN SURGERY         Social History  Tobacco:   History   Smoking Status    Former    Types: Cigarettes   Smokeless Tobacco    Never    Alcohol:   Social History    Substance and Sexual Activity      Alcohol use: Yes   Illicit Drugs:   History   Drug Use Unknown       Family History  Family History   Problem Relation Age of Onset    Glaucoma Mother     Hypertension Mother         Alzheimers and arthritis    Thyroid Disease Mother     Eye Surgery Mother     Glasses (<9 y/o) Mother     Cancer Father     Diabetes Father         And Hemochromatosis and cancer    Glasses (<9 y/o) Father     Hypertension Brother         Alzheimers    Hypertension Sister         Heart failure          Gelacio Molina MD on 12/12/2024      cc: Derrick Jacinto    "

## 2024-12-28 ENCOUNTER — MYC REFILL (OUTPATIENT)
Dept: FAMILY MEDICINE | Facility: CLINIC | Age: 74
End: 2024-12-28
Payer: COMMERCIAL

## 2024-12-28 DIAGNOSIS — G45.9 TIA (TRANSIENT ISCHEMIC ATTACK): ICD-10-CM

## 2024-12-28 DIAGNOSIS — F41.1 GAD (GENERALIZED ANXIETY DISORDER): ICD-10-CM

## 2024-12-28 DIAGNOSIS — G45.3 AMAUROSIS FUGAX: ICD-10-CM

## 2024-12-29 ENCOUNTER — MYC MEDICAL ADVICE (OUTPATIENT)
Dept: FAMILY MEDICINE | Facility: CLINIC | Age: 74
End: 2024-12-29
Payer: COMMERCIAL

## 2024-12-30 RX ORDER — CLOPIDOGREL BISULFATE 75 MG/1
75 TABLET ORAL DAILY
Qty: 30 TABLET | Refills: 0 | Status: SHIPPED | OUTPATIENT
Start: 2024-12-30

## 2024-12-30 RX ORDER — SERTRALINE HYDROCHLORIDE 25 MG/1
25 TABLET, FILM COATED ORAL DAILY
Qty: 90 TABLET | Refills: 0 | Status: SHIPPED | OUTPATIENT
Start: 2024-12-30

## 2025-01-08 ENCOUNTER — MYC REFILL (OUTPATIENT)
Dept: FAMILY MEDICINE | Facility: CLINIC | Age: 75
End: 2025-01-08
Payer: COMMERCIAL

## 2025-01-08 DIAGNOSIS — F41.1 GAD (GENERALIZED ANXIETY DISORDER): ICD-10-CM

## 2025-01-08 RX ORDER — SERTRALINE HYDROCHLORIDE 25 MG/1
25 TABLET, FILM COATED ORAL DAILY
Qty: 90 TABLET | Refills: 0 | OUTPATIENT
Start: 2025-01-08

## 2025-01-08 NOTE — TELEPHONE ENCOUNTER
LM for pt to call back, did mention there refill was ok on the pharmacy end just needed to update insurance  Pascale Madrid, CMA

## 2025-01-08 NOTE — TELEPHONE ENCOUNTER
Reason for Call:  Medication or medication refill:    Do you use a [facility]? no Name of the pharmacy and phone number for the current request:      [id number] - [place], [place] - [facility]       Name of the medication requested:       Disp Refills Start End JASVIR    sertraline (ZOLOFT) 25 MG tablet 90 tablet 0 12/30/2024 -- No   Sig - Route: Take 1 tablet (25 mg) by mouth daily. - Oral   Sent to pharmacy as: Sertraline HCl 25 MG Oral Tablet (ZOLOFT)   Class: E-Prescribe   Order: [id number]   E-Prescribing Status: Receipt confirmed by pharmacy (12/30/2024 11:01 AM CST)       Other request: Patient is very frustrated repeats that she needs her prescription refilled.  Relayed to patient that Rx sent on 12/30/24 and [facility] received at 11:01 AM.    Relayed to patient that appears insurance needed to be updated, patient yells in frustration NO IT DOESN'T.    Per Patient, her  is at [facility] now. Relayed to patient Rx has been confirmed and pharmacy may need updated insurance.    Epic was not [facility] with [facility]. Writer updated epic during call.     Call taken on 1/8/2025 at 4:12 PM by Juliana Tinoco

## 2025-01-10 ENCOUNTER — MEDICAL CORRESPONDENCE (OUTPATIENT)
Dept: HEALTH INFORMATION MANAGEMENT | Facility: CLINIC | Age: 75
End: 2025-01-10
Payer: COMMERCIAL

## 2025-01-10 ENCOUNTER — TRANSFERRED RECORDS (OUTPATIENT)
Dept: HEALTH INFORMATION MANAGEMENT | Facility: CLINIC | Age: 75
End: 2025-01-10
Payer: COMMERCIAL

## 2025-01-13 ENCOUNTER — TELEPHONE (OUTPATIENT)
Dept: ALLERGY | Facility: CLINIC | Age: 75
End: 2025-01-13
Payer: COMMERCIAL

## 2025-01-21 ENCOUNTER — LAB (OUTPATIENT)
Dept: LAB | Facility: CLINIC | Age: 75
End: 2025-01-21
Payer: COMMERCIAL

## 2025-01-21 DIAGNOSIS — Z11.59 ENCOUNTER FOR SCREENING FOR OTHER VIRAL DISEASES: ICD-10-CM

## 2025-01-21 DIAGNOSIS — D18.03 LIVER HEMANGIOMA: ICD-10-CM

## 2025-01-21 DIAGNOSIS — K76.0 FATTY LIVER: Primary | ICD-10-CM

## 2025-01-21 DIAGNOSIS — K76.0 FATTY LIVER: ICD-10-CM

## 2025-01-21 LAB
ERYTHROCYTE [DISTWIDTH] IN BLOOD BY AUTOMATED COUNT: 13.9 % (ref 10–15)
HCT VFR BLD AUTO: 42.9 % (ref 35–47)
HGB BLD-MCNC: 14 G/DL (ref 11.7–15.7)
INR PPP: 0.93 (ref 0.85–1.15)
MCH RBC QN AUTO: 33.6 PG (ref 26.5–33)
MCHC RBC AUTO-ENTMCNC: 32.6 G/DL (ref 31.5–36.5)
MCV RBC AUTO: 103 FL (ref 78–100)
PLATELET # BLD AUTO: 200 10E3/UL (ref 150–450)
RBC # BLD AUTO: 4.17 10E6/UL (ref 3.8–5.2)
WBC # BLD AUTO: 7.2 10E3/UL (ref 4–11)

## 2025-01-21 PROCEDURE — 87340 HEPATITIS B SURFACE AG IA: CPT

## 2025-01-21 PROCEDURE — 85027 COMPLETE CBC AUTOMATED: CPT

## 2025-01-21 PROCEDURE — 82728 ASSAY OF FERRITIN: CPT

## 2025-01-21 PROCEDURE — 36415 COLL VENOUS BLD VENIPUNCTURE: CPT

## 2025-01-21 PROCEDURE — 85610 PROTHROMBIN TIME: CPT

## 2025-01-21 PROCEDURE — 83550 IRON BINDING TEST: CPT

## 2025-01-21 PROCEDURE — 80053 COMPREHEN METABOLIC PANEL: CPT

## 2025-01-21 PROCEDURE — 83540 ASSAY OF IRON: CPT

## 2025-01-21 PROCEDURE — 82248 BILIRUBIN DIRECT: CPT

## 2025-01-22 LAB
ALBUMIN SERPL BCG-MCNC: 4.1 G/DL (ref 3.5–5.2)
ALP SERPL-CCNC: 158 U/L (ref 40–150)
ALT SERPL W P-5'-P-CCNC: 44 U/L (ref 0–50)
ANION GAP SERPL CALCULATED.3IONS-SCNC: 11 MMOL/L (ref 7–15)
AST SERPL W P-5'-P-CCNC: 33 U/L (ref 0–45)
BILIRUB DIRECT SERPL-MCNC: <0.2 MG/DL (ref 0–0.3)
BILIRUB SERPL-MCNC: 0.5 MG/DL
BUN SERPL-MCNC: 13.7 MG/DL (ref 8–23)
CALCIUM SERPL-MCNC: 8.8 MG/DL (ref 8.8–10.4)
CHLORIDE SERPL-SCNC: 101 MMOL/L (ref 98–107)
CREAT SERPL-MCNC: 0.64 MG/DL (ref 0.51–0.95)
EGFRCR SERPLBLD CKD-EPI 2021: >90 ML/MIN/1.73M2
GLUCOSE SERPL-MCNC: 110 MG/DL (ref 70–99)
HBV SURFACE AG SERPL QL IA: NONREACTIVE
HCO3 SERPL-SCNC: 27 MMOL/L (ref 22–29)
IRON BINDING CAPACITY (ROCHE): 258 UG/DL (ref 240–430)
IRON SATN MFR SERPL: 39 % (ref 15–46)
IRON SERPL-MCNC: 100 UG/DL (ref 37–145)
POTASSIUM SERPL-SCNC: 4.7 MMOL/L (ref 3.4–5.3)
PROT SERPL-MCNC: 6.8 G/DL (ref 6.4–8.3)
SODIUM SERPL-SCNC: 139 MMOL/L (ref 135–145)

## 2025-01-23 LAB — FERRITIN SERPL-MCNC: 205 NG/ML (ref 11–328)

## 2025-01-25 ENCOUNTER — MYC REFILL (OUTPATIENT)
Dept: FAMILY MEDICINE | Facility: CLINIC | Age: 75
End: 2025-01-25
Payer: COMMERCIAL

## 2025-01-25 DIAGNOSIS — G45.3 AMAUROSIS FUGAX: ICD-10-CM

## 2025-01-25 DIAGNOSIS — G45.9 TIA (TRANSIENT ISCHEMIC ATTACK): ICD-10-CM

## 2025-01-27 RX ORDER — CLOPIDOGREL BISULFATE 75 MG/1
75 TABLET ORAL DAILY
Qty: 30 TABLET | Refills: 0 | Status: SHIPPED | OUTPATIENT
Start: 2025-01-27

## 2025-02-06 ENCOUNTER — TRANSFERRED RECORDS (OUTPATIENT)
Dept: HEALTH INFORMATION MANAGEMENT | Facility: CLINIC | Age: 75
End: 2025-02-06
Payer: COMMERCIAL

## 2025-02-20 ENCOUNTER — TELEPHONE (OUTPATIENT)
Dept: CARDIOLOGY | Facility: CLINIC | Age: 75
End: 2025-02-20
Payer: COMMERCIAL

## 2025-02-20 DIAGNOSIS — R00.0 TACHYCARDIA: ICD-10-CM

## 2025-02-20 RX ORDER — METOPROLOL TARTRATE 25 MG/1
25 TABLET, FILM COATED ORAL 2 TIMES DAILY
Qty: 180 TABLET | Refills: 1 | Status: SHIPPED | OUTPATIENT
Start: 2025-02-20 | End: 2025-02-20

## 2025-02-20 RX ORDER — METOPROLOL TARTRATE 25 MG/1
25 TABLET, FILM COATED ORAL 2 TIMES DAILY
Qty: 180 TABLET | Refills: 3 | Status: SHIPPED | OUTPATIENT
Start: 2025-02-20

## 2025-02-20 NOTE — TELEPHONE ENCOUNTER
Thank you Fred.  Medication refilled and sent to primary pharm.    Elbert Hernandez RN    -------------------    You can order under me or Dr. Molina.  Looks like she saw Dr. Molina in December.  Thanks Elbert ARRIOLA     ----------------  .     Nolan Ibarra, there is a refill request for Metoprolol Tartrate 25 MG that has you as the prescriber going back to May 2024.  I wanted to confirm if you want me to refill under you or if there is a group you would defer to.     Thank you!     Elbert Hernandez RN

## 2025-02-20 NOTE — TELEPHONE ENCOUNTER
Nolan Ibarra, there is a refill request for Metoprolol Tartrate 25 MG that has you as the prescriber going back to May 2024.  I wanted to confirm if you want me to refill under you or if there is a group you would defer to.    Thank you!    Elbert Hernandez RN

## 2025-03-07 ENCOUNTER — MYC REFILL (OUTPATIENT)
Dept: ENDOCRINOLOGY | Facility: CLINIC | Age: 75
End: 2025-03-07

## 2025-03-07 ENCOUNTER — MYC REFILL (OUTPATIENT)
Dept: FAMILY MEDICINE | Facility: CLINIC | Age: 75
End: 2025-03-07

## 2025-03-07 DIAGNOSIS — E03.9 HYPOTHYROIDISM, UNSPECIFIED TYPE: ICD-10-CM

## 2025-03-07 DIAGNOSIS — Z85.850 HISTORY OF THYROID CANCER: ICD-10-CM

## 2025-03-07 DIAGNOSIS — G45.9 TIA (TRANSIENT ISCHEMIC ATTACK): ICD-10-CM

## 2025-03-07 DIAGNOSIS — G45.3 AMAUROSIS FUGAX: ICD-10-CM

## 2025-03-10 RX ORDER — CLOPIDOGREL BISULFATE 75 MG/1
75 TABLET ORAL DAILY
Qty: 30 TABLET | Refills: 0 | Status: SHIPPED | OUTPATIENT
Start: 2025-03-10

## 2025-03-10 RX ORDER — LEVOTHYROXINE SODIUM 137 UG/1
137 TABLET ORAL DAILY
Qty: 90 TABLET | Refills: 0 | Status: SHIPPED | OUTPATIENT
Start: 2025-03-10

## 2025-03-10 NOTE — TELEPHONE ENCOUNTER
Requested Prescriptions   Pending Prescriptions Disp Refills    levothyroxine (SYNTHROID/LEVOTHROID) 137 MCG tablet 90 tablet 1     Sig: Take 1 tablet (137 mcg) by mouth daily.       Thyroid Protocol Passed - 3/10/2025 10:02 AM        Passed - Patient is 12 years or older        Passed - Medication is active on med list and the sig matches. RN to manually verify dose and sig if red X/fail.     If the protocol passes (green check), you do not need to verify med dose and sig.    A prescription matches if they are the same clinical intention.    For Example: once daily and every morning are the same.    The protocol can not identify upper and lower case letters as matching and will fail.     For Example: Take 1 tablet (50 mg) by mouth daily     TAKE 1 TABLET (50 MG) BY MOUTH DAILY    For all fails (red x), verify dose and sig.    If the refill does match what is on file, the RN can still proceed to approve the refill request.       If they do not match, route to the appropriate provider.             Passed - Recent (12 mo) or future (90 days) visit within the authorizing provider's specialty     The patient must have completed an in-person or virtual visit within the past 12 months or has a future visit scheduled within the next 90 days with the authorizing provider s specialty.  Urgent care and e-visits do not qualify as an office visit for this protocol.          Passed - Medication indicated for associated diagnosis     Medication is associated with one or more of the following diagnoses:  Hypothyroidism  Thyroid stimulating hormone suppression therapy  Thyroid cancer  Acquired atrophy of thyroid          Passed - Normal TSH on file in past 12 months     Recent Labs   Lab Test 03/07/25  1351   TSH 0.69              Passed - No active pregnancy on record     If patient is pregnant or has had a positive pregnancy test, please check TSH.          Passed - No positive pregnancy test in past 12 months     If patient is  pregnant or has had a positive pregnancy test, please check TSH.

## 2025-03-14 ENCOUNTER — TELEPHONE (OUTPATIENT)
Dept: OPHTHALMOLOGY | Facility: CLINIC | Age: 75
End: 2025-03-14
Payer: COMMERCIAL

## 2025-03-14 DIAGNOSIS — H25.811 COMBINED FORMS OF AGE-RELATED CATARACT OF RIGHT EYE: Primary | ICD-10-CM

## 2025-03-14 DIAGNOSIS — H40.1131 PRIMARY OPEN ANGLE GLAUCOMA (POAG) OF BOTH EYES, MILD STAGE: ICD-10-CM

## 2025-03-14 NOTE — TELEPHONE ENCOUNTER
[facility] Call Center    Phone Message    May a detailed message be left on voicemail: yes     Reason for Call: Form or Letter   Type or form/letter needing completion: Cataract Eval referral to [facility] in [place]   Provider: Dr Koo  Date form needed: Soon  Once completed: Fax form to: [phone], [facility]    Patient does not want to go to [place] and would prefer to go somewhere closer to home. Please send referral to [facility] per patients request. Thank you.    Action Taken: Other: [facility]    Travel Screening: Not Applicable

## 2025-03-18 DIAGNOSIS — E87.6 HYPOKALEMIA: ICD-10-CM

## 2025-03-18 RX ORDER — POTASSIUM CHLORIDE 750 MG/1
10 TABLET, EXTENDED RELEASE ORAL 2 TIMES DAILY
Qty: 180 TABLET | Refills: 0 | Status: SHIPPED | OUTPATIENT
Start: 2025-03-18

## 2025-03-18 NOTE — TELEPHONE ENCOUNTER
[facility] Refill Request    potassium chloride luis angel ER (KLOR-CON M10) 10 MEQ CR tablet

## 2025-03-25 ENCOUNTER — TRANSFERRED RECORDS (OUTPATIENT)
Dept: HEALTH INFORMATION MANAGEMENT | Facility: CLINIC | Age: 75
End: 2025-03-25

## 2025-03-26 ENCOUNTER — MEDICAL CORRESPONDENCE (OUTPATIENT)
Dept: HEALTH INFORMATION MANAGEMENT | Facility: CLINIC | Age: 75
End: 2025-03-26
Payer: COMMERCIAL

## 2025-04-10 ENCOUNTER — MYC REFILL (OUTPATIENT)
Dept: FAMILY MEDICINE | Facility: CLINIC | Age: 75
End: 2025-04-10
Payer: COMMERCIAL

## 2025-04-10 DIAGNOSIS — G45.3 AMAUROSIS FUGAX: ICD-10-CM

## 2025-04-10 DIAGNOSIS — G45.9 TIA (TRANSIENT ISCHEMIC ATTACK): ICD-10-CM

## 2025-04-10 DIAGNOSIS — F41.1 GAD (GENERALIZED ANXIETY DISORDER): ICD-10-CM

## 2025-04-10 RX ORDER — SERTRALINE HYDROCHLORIDE 25 MG/1
25 TABLET, FILM COATED ORAL DAILY
Qty: 90 TABLET | Refills: 0 | Status: SHIPPED | OUTPATIENT
Start: 2025-04-10

## 2025-04-10 RX ORDER — CLOPIDOGREL BISULFATE 75 MG/1
75 TABLET ORAL DAILY
Qty: 30 TABLET | Refills: 2 | Status: SHIPPED | OUTPATIENT
Start: 2025-04-10

## 2025-04-14 ENCOUNTER — APPOINTMENT (OUTPATIENT)
Dept: CT IMAGING | Facility: HOSPITAL | Age: 75
End: 2025-04-14
Attending: STUDENT IN AN ORGANIZED HEALTH CARE EDUCATION/TRAINING PROGRAM
Payer: COMMERCIAL

## 2025-04-14 ENCOUNTER — HOSPITAL ENCOUNTER (EMERGENCY)
Facility: HOSPITAL | Age: 75
Discharge: HOME | End: 2025-04-14
Attending: STUDENT IN AN ORGANIZED HEALTH CARE EDUCATION/TRAINING PROGRAM | Admitting: STUDENT IN AN ORGANIZED HEALTH CARE EDUCATION/TRAINING PROGRAM
Payer: COMMERCIAL

## 2025-04-14 VITALS
HEART RATE: 79 BPM | OXYGEN SATURATION: 97 % | TEMPERATURE: 97.6 F | WEIGHT: 215 LBS | SYSTOLIC BLOOD PRESSURE: 152 MMHG | DIASTOLIC BLOOD PRESSURE: 82 MMHG | RESPIRATION RATE: 20 BRPM | BODY MASS INDEX: 38.09 KG/M2

## 2025-04-14 DIAGNOSIS — R10.11 RUQ ABDOMINAL PAIN: ICD-10-CM

## 2025-04-14 DIAGNOSIS — R07.89 CHEST WALL PAIN: ICD-10-CM

## 2025-04-14 LAB
ALBUMIN SERPL BCG-MCNC: 4 G/DL (ref 3.5–5.2)
ALBUMIN UR-MCNC: NEGATIVE MG/DL
ALP SERPL-CCNC: 127 U/L (ref 40–150)
ALT SERPL W P-5'-P-CCNC: 61 U/L (ref 0–50)
ANION GAP SERPL CALCULATED.3IONS-SCNC: 11 MMOL/L (ref 7–15)
APPEARANCE UR: CLEAR
AST SERPL W P-5'-P-CCNC: 28 U/L (ref 0–45)
BASOPHILS # BLD AUTO: 0.1 10E3/UL (ref 0–0.2)
BASOPHILS NFR BLD AUTO: 1 %
BILIRUB SERPL-MCNC: 0.6 MG/DL
BILIRUB UR QL STRIP: NEGATIVE
BUN SERPL-MCNC: 11 MG/DL (ref 8–23)
CALCIUM SERPL-MCNC: 9.4 MG/DL (ref 8.8–10.4)
CHLORIDE SERPL-SCNC: 104 MMOL/L (ref 98–107)
COLOR UR AUTO: ABNORMAL
CREAT SERPL-MCNC: 0.65 MG/DL (ref 0.51–0.95)
EGFRCR SERPLBLD CKD-EPI 2021: 90 ML/MIN/1.73M2
EOSINOPHIL # BLD AUTO: 0.3 10E3/UL (ref 0–0.7)
EOSINOPHIL NFR BLD AUTO: 4 %
ERYTHROCYTE [DISTWIDTH] IN BLOOD BY AUTOMATED COUNT: 13.1 % (ref 10–15)
GLUCOSE SERPL-MCNC: 123 MG/DL (ref 70–99)
GLUCOSE UR STRIP-MCNC: NEGATIVE MG/DL
HCO3 SERPL-SCNC: 25 MMOL/L (ref 22–29)
HCT VFR BLD AUTO: 45.7 % (ref 35–47)
HGB BLD-MCNC: 15.2 G/DL (ref 11.7–15.7)
HGB UR QL STRIP: NEGATIVE
IMM GRANULOCYTES # BLD: 0 10E3/UL
IMM GRANULOCYTES NFR BLD: 0 %
KETONES UR STRIP-MCNC: NEGATIVE MG/DL
LEUKOCYTE ESTERASE UR QL STRIP: NEGATIVE
LIPASE SERPL-CCNC: 19 U/L (ref 13–60)
LYMPHOCYTES # BLD AUTO: 2.2 10E3/UL (ref 0.8–5.3)
LYMPHOCYTES NFR BLD AUTO: 30 %
MCH RBC QN AUTO: 32.5 PG (ref 26.5–33)
MCHC RBC AUTO-ENTMCNC: 33.3 G/DL (ref 31.5–36.5)
MCV RBC AUTO: 98 FL (ref 78–100)
MONOCYTES # BLD AUTO: 0.8 10E3/UL (ref 0–1.3)
MONOCYTES NFR BLD AUTO: 11 %
NEUTROPHILS # BLD AUTO: 4 10E3/UL (ref 1.6–8.3)
NEUTROPHILS NFR BLD AUTO: 54 %
NITRATE UR QL: NEGATIVE
NRBC # BLD AUTO: 0 10E3/UL
NRBC BLD AUTO-RTO: 0 /100
PH UR STRIP: 7.5 [PH] (ref 5–7)
PLATELET # BLD AUTO: 187 10E3/UL (ref 150–450)
POTASSIUM SERPL-SCNC: 4.3 MMOL/L (ref 3.4–5.3)
PROT SERPL-MCNC: 6.6 G/DL (ref 6.4–8.3)
RBC # BLD AUTO: 4.68 10E6/UL (ref 3.8–5.2)
RBC URINE: 0 /HPF
SODIUM SERPL-SCNC: 140 MMOL/L (ref 135–145)
SP GR UR STRIP: 1.02 (ref 1–1.03)
SQUAMOUS EPITHELIAL: 1 /HPF
TROPONIN T SERPL HS-MCNC: 12 NG/L
TROPONIN T SERPL HS-MCNC: 9 NG/L
UROBILINOGEN UR STRIP-MCNC: NORMAL MG/DL
WBC # BLD AUTO: 7.4 10E3/UL (ref 4–11)
WBC URINE: <1 /HPF

## 2025-04-14 PROCEDURE — 85004 AUTOMATED DIFF WBC COUNT: CPT | Performed by: STUDENT IN AN ORGANIZED HEALTH CARE EDUCATION/TRAINING PROGRAM

## 2025-04-14 PROCEDURE — 250N000011 HC RX IP 250 OP 636: Performed by: STUDENT IN AN ORGANIZED HEALTH CARE EDUCATION/TRAINING PROGRAM

## 2025-04-14 PROCEDURE — 250N000013 HC RX MED GY IP 250 OP 250 PS 637: Performed by: STUDENT IN AN ORGANIZED HEALTH CARE EDUCATION/TRAINING PROGRAM

## 2025-04-14 PROCEDURE — 99285 EMERGENCY DEPT VISIT HI MDM: CPT | Mod: 25

## 2025-04-14 PROCEDURE — 80053 COMPREHEN METABOLIC PANEL: CPT | Performed by: STUDENT IN AN ORGANIZED HEALTH CARE EDUCATION/TRAINING PROGRAM

## 2025-04-14 PROCEDURE — 84484 ASSAY OF TROPONIN QUANT: CPT | Performed by: STUDENT IN AN ORGANIZED HEALTH CARE EDUCATION/TRAINING PROGRAM

## 2025-04-14 PROCEDURE — 81001 URINALYSIS AUTO W/SCOPE: CPT | Performed by: STUDENT IN AN ORGANIZED HEALTH CARE EDUCATION/TRAINING PROGRAM

## 2025-04-14 PROCEDURE — 74177 CT ABD & PELVIS W/CONTRAST: CPT

## 2025-04-14 PROCEDURE — 83690 ASSAY OF LIPASE: CPT | Performed by: STUDENT IN AN ORGANIZED HEALTH CARE EDUCATION/TRAINING PROGRAM

## 2025-04-14 PROCEDURE — 36415 COLL VENOUS BLD VENIPUNCTURE: CPT | Performed by: STUDENT IN AN ORGANIZED HEALTH CARE EDUCATION/TRAINING PROGRAM

## 2025-04-14 PROCEDURE — 93005 ELECTROCARDIOGRAM TRACING: CPT | Performed by: STUDENT IN AN ORGANIZED HEALTH CARE EDUCATION/TRAINING PROGRAM

## 2025-04-14 PROCEDURE — 71275 CT ANGIOGRAPHY CHEST: CPT

## 2025-04-14 RX ORDER — ACETAMINOPHEN 325 MG/1
650 TABLET ORAL ONCE
Status: COMPLETED | OUTPATIENT
Start: 2025-04-14 | End: 2025-04-14

## 2025-04-14 RX ORDER — IOPAMIDOL 755 MG/ML
90 INJECTION, SOLUTION INTRAVASCULAR ONCE
Status: COMPLETED | OUTPATIENT
Start: 2025-04-14 | End: 2025-04-14

## 2025-04-14 RX ORDER — LIDOCAINE 4 G/G
1 PATCH TOPICAL ONCE
Status: DISCONTINUED | OUTPATIENT
Start: 2025-04-14 | End: 2025-04-14 | Stop reason: HOSPADM

## 2025-04-14 RX ADMIN — IOPAMIDOL 90 ML: 755 INJECTION, SOLUTION INTRAVENOUS at 15:58

## 2025-04-14 RX ADMIN — ACETAMINOPHEN 650 MG: 325 TABLET ORAL at 13:23

## 2025-04-14 RX ADMIN — LIDOCAINE 1 PATCH: 4 PATCH TOPICAL at 17:52

## 2025-04-14 ASSESSMENT — COLUMBIA-SUICIDE SEVERITY RATING SCALE - C-SSRS
2. HAVE YOU ACTUALLY HAD ANY THOUGHTS OF KILLING YOURSELF IN THE PAST MONTH?: NO
1. IN THE PAST MONTH, HAVE YOU WISHED YOU WERE DEAD OR WISHED YOU COULD GO TO SLEEP AND NOT WAKE UP?: NO
6. HAVE YOU EVER DONE ANYTHING, STARTED TO DO ANYTHING, OR PREPARED TO DO ANYTHING TO END YOUR LIFE?: NO

## 2025-04-14 ASSESSMENT — ACTIVITIES OF DAILY LIVING (ADL)
ADLS_ACUITY_SCORE: 57
ADLS_ACUITY_SCORE: 57

## 2025-04-14 NOTE — ED TRIAGE NOTES
She started to have right sided abdominal pain a few hours ago when she woke up. Also nauseated. No vomiting.

## 2025-04-14 NOTE — ED PROVIDER NOTES
NAME: Mirna Grijalva  AGE: 75 year old female  YOB: 1950  MRN: [id number]  EVALUATION DATE & TIME: No admission date for patient encounter.    PCP: Derrick Jacinto  ED PROVIDER: Minra Diego MD.    Chief Complaint   Patient presents with    Abdominal Pain       FINAL IMPRESSION:  1. Chest wall pain    2. RUQ abdominal pain        MEDICAL DECISION MAKIN:14 PM I met with the patient, obtained history, performed an initial exam, and discussed options and plan for diagnostics and treatment here in the ED.   4:43 PM I rechecked the patient and updated her on workup results.    76 y/o F with h/o HFpEF, back pain, HTN, HLP, hypothyroidism, NSVT who presents with abdominal pain.  This morning she developed an intense intermittent pulsing pain to her right upper quadrant/right lower chest area.  No trauma to the area.  No signs of shingles on exam. DX: ACS, PE, pneumonia, pleural effusion, rib fracture, pericarditis, muscle strain, pneumothorax, ulcer, pancreatitis, appendicitis, cholelithiasis, cholecystitis, appendicitis, colitis, kidney stone, UTI. Location of pain not suggestive of ovarian pathology. Not sudden tearing pain, not hypertensive and CT PE scan without findings of aortic dissection, do not suspect this.    EKG shows sinus rhythm with PVCs and bigeminy pattern.  This is similar to prior.  Troponin 12-->9 on recheck and overall presentation atypical for ACS. UA does not appear infected. CBC, CMP notable for just mild ALT elevation (61) otherwise normal LFTs.    CT PE / CT abd/pelvis: no PE. Interlobural septal thickening suggests mild fluid overload. RLL Lobulated subpleural pulmonary nodule noted Informed of this, will need follow up). Some possible finding of gastroenteritis noted.    She does not appear volume up on exam, she reports no shortness of breath - do not suspect significant CHF exacerbation.      I considered and offered RUQ US but on reassessment has only focal pain to  her right lateral rib no RUQ tenderness and she really feels like it is her rib that is causing pain. She was given tylenol and lidocaine patch her. She doesn't feel she needs anything more for pain. She feels comfortable with plan for discharge with close outpatient follow up. Strict return precautions provided.    Medical Decision Making      Considered admission but discharged given reassuring workup, vitals and exam    MIPS (CTPE, Dental pain, Yun, Sinusitis, Asthma/COPD, Head Trauma): CT Pulmonary Angiogram:Patient is moderate to high risk for PE.    SEPSIS: None    MEDICATIONS GIVEN IN THE EMERGENCY:  Medications   Lidocaine (LIDOCARE) 4 % Patch 1 patch (1 patch Transdermal $Patch/Med Applied 4/14/25 9611)   acetaminophen (TYLENOL) tablet 650 mg (650 mg Oral $Given 4/14/25 1323)   iopamidol (ISOVUE-370) solution 90 mL (90 mLs Intravenous $Given 4/14/25 5198)       NEW PRESCRIPTIONS STARTED AT TODAY'S ER VISIT:  Discharge Medication List as of 4/14/2025  5:45 PM           =================================================================  HPI    Patient information was obtained from: the patient and patient's significant other  Use of : N/A       Mirna Grijalva is a 75 year old female with a past medical history of acute pulmonary edema, acute respiratory failure with hypoxia, HTN, TIA, and hypokalemia among other, who presents with abdominal pain.    The patient reports she has been experiencing an intermittent throbbing right side abdominal pain since she woke up this morning. She endorses having a normal bowel movement with no pain relief. She states she has a history of hysterectomy. She took tylenol around 03:00 this morning. The patient denies having nausea, vomiting, diarrhea, hematochezia, black stools, dysuria, fever, cough, chest pain, increased pain with deep breath, and recent falls. No other complaints.    PHYSICAL EXAM:    Vitals: BP (!) 152/82   Pulse 79   Temp 97.6  F (36.4  C)  (Temporal)   Resp 20   Wt 97.5 kg (215 lb)   LMP  (LMP Unknown)   SpO2 97%   BMI 38.09 kg/m     Constitutional: Well developed, well nourished. No acute distress.  HENT: Normocephalic, atraumatic. Neck-gross ROM intact.   Eyes: Pupils mid-range, sclera white  Respiratory: CTAB, no respiratory distress  Cardiovascular: Normal heart rate, regular rhythm. Trace lower extremity edema  GI: Soft, not distended, initially a little bit of tenderness to the RUQ but post tender over the right lower ribs, no overlying skin changes noted to this area  Musculoskeletal: Moving extremities intentionally and without pain. No obvious deformity.  Skin: Warm, dry, no rash.  Neurologic: Alert & oriented, speech clear, no focal deficits noted    LAB:  All pertinent labs reviewed and interpreted.  Labs Ordered and Resulted from Time of ED Arrival to Time of ED Departure   COMPREHENSIVE METABOLIC PANEL - Abnormal       Result Value    Sodium 140      Potassium 4.3      Carbon Dioxide (CO2) 25      Anion Gap 11      Urea Nitrogen 11.0      Creatinine 0.65      GFR Estimate 90      Calcium 9.4      Chloride 104      Glucose 123 (*)     Alkaline Phosphatase 127      AST 28      ALT 61 (*)     Protein Total 6.6      Albumin 4.0      Bilirubin Total 0.6     ROUTINE UA WITH MICROSCOPIC REFLEX TO CULTURE - Abnormal    Color Urine Light Yellow      Appearance Urine Clear      Glucose Urine Negative      Bilirubin Urine Negative      Ketones Urine Negative      Specific Gravity Urine 1.024      Blood Urine Negative      pH Urine 7.5 (*)     Protein Albumin Urine Negative      Urobilinogen Urine Normal      Nitrite Urine Negative      Leukocyte Esterase Urine Negative      RBC Urine 0      WBC Urine <1      Squamous Epithelials Urine 1     LIPASE - Normal    Lipase 19     TROPONIN T, HIGH SENSITIVITY - Normal    Troponin T, High Sensitivity 12     TROPONIN T, HIGH SENSITIVITY - Normal    Troponin T, High Sensitivity 9     CBC WITH PLATELETS  AND DIFFERENTIAL    WBC Count 7.4      RBC Count 4.68      Hemoglobin 15.2      Hematocrit 45.7      MCV 98      MCH 32.5      MCHC 33.3      RDW 13.1      Platelet Count 187      % Neutrophils 54      % Lymphocytes 30      % Monocytes 11      % Eosinophils 4      % Basophils 1      % Immature Granulocytes 0      NRBCs per 100 WBC 0      Absolute Neutrophils 4.0      Absolute Lymphocytes 2.2      Absolute Monocytes 0.8      Absolute Eosinophils 0.3      Absolute Basophils 0.1      Absolute Immature Granulocytes 0.0      Absolute NRBCs 0.0         RADIOLOGY:  CT Abdomen Pelvis w Contrast   Final Result   IMPRESSION:   1.  Chest CTA negative for acute pulmonary embolus and thoracic aortic aneurysm/dissection.   2.  Interlobular septal thickening suggests mild fluid overload. No pleural effusion.   3.  Interval enlargement of lobulated subpleural pulmonary nodule right lower lobe. Waxing and waning of size and somewhat tubular shaped could represent endobronchial mucoid impaction. Recommend additional short interval follow-up CT in 3 months.   4.  Scattered air-fluid levels throughout nondilated gastrointestinal tract could represent nonspecific gastroenteritis. No additional acute abnormalities to account for symptoms of right upper quadrant pain.      CT Chest Pulmonary Embolism w Contrast   Final Result   IMPRESSION:   1.  Chest CTA negative for acute pulmonary embolus and thoracic aortic aneurysm/dissection.   2.  Interlobular septal thickening suggests mild fluid overload. No pleural effusion.   3.  Interval enlargement of lobulated subpleural pulmonary nodule right lower lobe. Waxing and waning of size and somewhat tubular shaped could represent endobronchial mucoid impaction. Recommend additional short interval follow-up CT in 3 months.   4.  Scattered air-fluid levels throughout nondilated gastrointestinal tract could represent nonspecific gastroenteritis. No additional acute abnormalities to account for  symptoms of right upper quadrant pain.          EKG:   Performed at: 13:47  Impression: Possible left atrial enlargement. Incomplete right bundle branch block. Borderline ECG.  Rate: 80 BPM  Rhythm: Sinus rhythm with frequent premature ventricular complexes in a pattern of bigeminy  QRS Interval: 116 ms  QTc Interval: 489 ms  Comparison: No significant changes compared with previous  I have independently reviewed and interpreted the EKG(s) documented above.       I, Morris Humphries, am serving as a scribe to document services personally performed by Dr. Mirna Diego based on my observation and the provider's statements to me. I, Mirna Diego MD attest that Morris Humphries is acting in a scribe capacity, has observed my performance of the services and has documented them in accordance with my direction.    Mirna Diego M.D.  Emergency Medicine  [facility] EMERGENCY DEPARTMENT  [address] [place] [id number]  [phone]  Dept: [phone]     Mirna Diego MD  04/14/25 1927

## 2025-04-14 NOTE — DISCHARGE INSTRUCTIONS
As we discussed your workup today was generally reassuring.  Unclear the exact cause of your symptoms possibly musculoskeletal strain or spasm.  Continue with Tylenol at home.  You can also try lidocaine patches (12 hours at at time, then 12 hours off).     Follow-up closely with your primary care doctor. There was a pulmonary nodule that I want to ensure you follow up on.    Return to the emergency room with significant increased chest or abdominal pain, difficulty breathing, fevers or other worsening symptoms or concerns.

## 2025-04-14 NOTE — ED NOTES
Bed: [facility]-D  Expected date:   Expected time:   Means of arrival: Walked  Comments:

## 2025-04-15 ENCOUNTER — TELEPHONE (OUTPATIENT)
Dept: GASTROENTEROLOGY | Facility: CLINIC | Age: 75
End: 2025-04-15
Payer: COMMERCIAL

## 2025-04-15 NOTE — TELEPHONE ENCOUNTER
Incoming call from patient stating she is scheduled on 4/24/25 for a GI consult. Patient wondering if there are any other clinic locations closer to patient. Writer assisted in warm transferring patient to scheduling to assist.       Beth Harley RN  Endoscopy Procedure Pre Assessment  [phone] option 3

## 2025-04-16 LAB
ATRIAL RATE - MUSE: 80 BPM
DIASTOLIC BLOOD PRESSURE - MUSE: NORMAL MMHG
INTERPRETATION ECG - MUSE: NORMAL
P AXIS - MUSE: 30 DEGREES
PR INTERVAL - MUSE: 138 MS
QRS DURATION - MUSE: 116 MS
QT - MUSE: 424 MS
QTC - MUSE: 489 MS
R AXIS - MUSE: 53 DEGREES
SYSTOLIC BLOOD PRESSURE - MUSE: NORMAL MMHG
T AXIS - MUSE: 28 DEGREES
VENTRICULAR RATE- MUSE: 80 BPM

## 2025-04-24 ENCOUNTER — TRANSFERRED RECORDS (OUTPATIENT)
Dept: HEALTH INFORMATION MANAGEMENT | Facility: CLINIC | Age: 75
End: 2025-04-24

## 2025-04-28 ASSESSMENT — PATIENT HEALTH QUESTIONNAIRE - PHQ9
SUM OF ALL RESPONSES TO PHQ QUESTIONS 1-9: 0
SUM OF ALL RESPONSES TO PHQ QUESTIONS 1-9: 0

## 2025-04-29 ENCOUNTER — OFFICE VISIT (OUTPATIENT)
Dept: FAMILY MEDICINE | Facility: CLINIC | Age: 75
End: 2025-04-29
Payer: COMMERCIAL

## 2025-04-29 VITALS
SYSTOLIC BLOOD PRESSURE: 133 MMHG | BODY MASS INDEX: 38.54 KG/M2 | RESPIRATION RATE: 16 BRPM | TEMPERATURE: 97.6 F | WEIGHT: 217.5 LBS | OXYGEN SATURATION: 97 % | HEART RATE: 75 BPM | DIASTOLIC BLOOD PRESSURE: 71 MMHG | HEIGHT: 63 IN

## 2025-04-29 DIAGNOSIS — E66.812 CLASS 2 SEVERE OBESITY DUE TO EXCESS CALORIES WITH SERIOUS COMORBIDITY AND BODY MASS INDEX (BMI) OF 38.0 TO 38.9 IN ADULT (H): ICD-10-CM

## 2025-04-29 DIAGNOSIS — R91.8 PULMONARY NODULES: ICD-10-CM

## 2025-04-29 DIAGNOSIS — F10.90 ALCOHOL USE DISORDER: ICD-10-CM

## 2025-04-29 DIAGNOSIS — E66.01 CLASS 2 SEVERE OBESITY DUE TO EXCESS CALORIES WITH SERIOUS COMORBIDITY AND BODY MASS INDEX (BMI) OF 38.0 TO 38.9 IN ADULT (H): ICD-10-CM

## 2025-04-29 DIAGNOSIS — R07.81 RIB PAIN ON RIGHT SIDE: Primary | ICD-10-CM

## 2025-04-29 DIAGNOSIS — R79.89 ELEVATED LFTS: ICD-10-CM

## 2025-04-29 DIAGNOSIS — I47.29 NSVT (NONSUSTAINED VENTRICULAR TACHYCARDIA) (H): ICD-10-CM

## 2025-04-29 PROBLEM — I50.30 HEART FAILURE WITH PRESERVED EJECTION FRACTION, NYHA CLASS I (H): Status: RESOLVED | Noted: 2024-05-12 | Resolved: 2025-04-29

## 2025-04-29 PROBLEM — J81.0 ACUTE PULMONARY EDEMA (H): Status: RESOLVED | Noted: 2024-05-12 | Resolved: 2025-04-29

## 2025-04-29 PROBLEM — J96.01 ACUTE RESPIRATORY FAILURE WITH HYPOXIA (H): Status: RESOLVED | Noted: 2024-05-12 | Resolved: 2025-04-29

## 2025-04-29 PROCEDURE — 99214 OFFICE O/P EST MOD 30 MIN: CPT | Performed by: PHYSICIAN ASSISTANT

## 2025-04-29 PROCEDURE — 3075F SYST BP GE 130 - 139MM HG: CPT | Performed by: PHYSICIAN ASSISTANT

## 2025-04-29 PROCEDURE — G2211 COMPLEX E/M VISIT ADD ON: HCPCS | Performed by: PHYSICIAN ASSISTANT

## 2025-04-29 PROCEDURE — 3078F DIAST BP <80 MM HG: CPT | Performed by: PHYSICIAN ASSISTANT

## 2025-04-29 RX ORDER — ESTRADIOL 0.1 MG/G
CREAM VAGINAL
COMMUNITY

## 2025-04-29 NOTE — PROGRESS NOTES
Assessment & Plan     Rib pain on right side  Unclear of exact etiology.  Symptoms are now resolved.  Possible muscle spasm/muscular skeletal.  However choledocholithiasis/hepatic etiology cannot be ruled out.  ALT was slightly elevated.  Recommending recheck hepatic panel in approximately 1 month.  If persistently elevated, right upper quadrant ultrasound to be considered.  Otherwise if symptoms return, this to be pursued.    Pulmonary nodules  Again noted incidentally on CT.  This has now grown in size since check in October.  Based off this, we will maintain radiology recommendations and recheck in 3 months.  Patient has appointment scheduled on 2 May for 6-month CT recheck.  Recommending canceling this and rescheduling in July.  - CT Chest w/o Contrast; Future    Elevated LFTs  As above  - Hepatic panel (Albumin, ALT, AST, Bili, Alk Phos, TP); Future    Alcohol use disorder  No longer having any cravings.  Not a concern to patient.  Will discontinue naltrexone based off of this.  Follow-up as needed      (I47.29) NSVT (nonsustained ventricular tachycardia) (H)  Comment: Past history.  No current recurrence.  EKG showed no SVT at recent ER visit.  Denies any symptoms.  Follows cardiology as well.  Last seen in December.  Future follow-up in place  Plan:     (E66.812,  E66.01,  Z68.38) Class 2 severe obesity due to excess calories with serious comorbidity and body mass index (BMI) of 38.0 to 38.9 in adult (H)  Comment: BMI persistent at 38.5.  Continue to practice healthy diet and exercise habits.  Morbid obesity in place based off of BMI greater than 35 as well as past history of TIAs.  Plan:       Tien Bradley is a 75 year old, presenting for the following health issues:  ER F/U        4/29/2025     2:34 PM   Additional Questions   Roomed by Marlena HICKS CMA   Accompanied by Self         4/29/2025   Declines Weight   Did patient decline having their weight taken? Yes     HPI          ED/UC  "Followup:    Facility:  [facility] ER  Date of visit: 4/14/25   Reason for visit: Side pain  Current Status: Symptoms went away.      Patient is a 75-year-old female with a past history of TIA, multiple pulmonary nodules, alcohol use, hypertension, hypothyroidism.  She presents today for ER follow-up.  She presented to the emergency room on 14 April due to sudden acute right side/rib pain.  Evaluation at the ER including CT PE evaluation as well as CT abdomen and pelvis as well as EKG with troponins and serum analysis showed no obvious etiology to symptoms.  Symptoms resolved prior to ER visit being completed.  Based off of this no ultrasound of right upper quadrant was obtained.  Of note ALT was slightly elevated compared to previous 2 weeks prior    Of note, incidental nodule seen again on right lower lobe on chest CT.  This had grown from previous 6-month prior CT.  It was recommended based off of waxing and waning size by reading radiologist to repeat in 3 months.    Today she states she has had no recurring symptoms.  No pain.  No history of previous symptoms similar.  Feels at baseline.  Denies any cough or shortness of breath.    She continues to take all of her medications as prescribed including her naltrexone.  States no longer having cravings from alcohol.  Her and her  drink only nonalcoholic beverages.  Has no concerns for alcohol use.          Objective    /71 (BP Location: Left arm, Patient Position: Sitting, Cuff Size: Adult Regular)   Pulse 75   Temp 97.6  F (36.4  C) (Oral)   Resp 16   Ht 1.6 m (5' 3\")   Wt 98.7 kg (217 lb 8 oz)   LMP  (LMP Unknown)   SpO2 97%   BMI 38.53 kg/m    Body mass index is 38.53 kg/m .  Physical Exam   GENERAL: alert and no distress  RESP: lungs clear to auscultation - no rales, rhonchi or wheezes  CV: Frequent ectopic beats.  PSYCH: mentation appears normal, affect normal/bright          Signed Electronically by: Derrick Jacinto PA-C  The " longitudinal plan of care for the diagnosis(es)/condition(s) as documented were addressed during this visit. Due to the added complexity in care, I will continue to support Mirna in the subsequent management and with ongoing continuity of care.

## 2025-05-06 ENCOUNTER — OFFICE VISIT (OUTPATIENT)
Dept: ALLERGY | Facility: CLINIC | Age: 75
End: 2025-05-06
Payer: COMMERCIAL

## 2025-05-06 VITALS
OXYGEN SATURATION: 98 % | BODY MASS INDEX: 37.94 KG/M2 | RESPIRATION RATE: 18 BRPM | WEIGHT: 214.2 LBS | HEART RATE: 87 BPM

## 2025-05-06 DIAGNOSIS — Z88.1 ALLERGY TO MULTIPLE ANTIBIOTICS: ICD-10-CM

## 2025-05-06 DIAGNOSIS — Z88.0 PENICILLIN ALLERGY: Primary | ICD-10-CM

## 2025-05-06 PROCEDURE — 95076 INGEST CHALLENGE INI 120 MIN: CPT | Performed by: ALLERGY & IMMUNOLOGY

## 2025-05-06 PROCEDURE — 99203 OFFICE O/P NEW LOW 30 MIN: CPT | Mod: 25 | Performed by: ALLERGY & IMMUNOLOGY

## 2025-05-06 NOTE — PROGRESS NOTES
Tien Bradley is a 75 year old, presenting for the following health issues:  Allergy Consult (Antibiotic allergy)    HPI      Chief complaint: Multiple antibiotic allergies    History of present illness: This is a pleasant 75-year-old woman that I was asked to see for evaluation of antibiotic allergies by Lise Castaneda.  Patient has multiple antibiotic allergies and does have chronic urinary tract infections.  They are looking for other antibiotic choices to treat her infections.  She does not recall much of the history with her antibiotic allergies.  She does note penicillin was an injectable medication that she received as a child and she remembers a rash that was very uncomfortable.  She does not think it necessitated hospitalization and she did not need epinephrine.  Does not recall any skin sloughing or mucosal lesions.  She states similarly with nitrofurantoin she had a rash but is unclear when this happened.  She cannot recall.  In the record is listed as a bad itchy rash feverish that was all over her body.  Again no other mention in the record that I can find.  She also has listed sulfa and thinks this was a rash but again difficult to know history and she is not sure when this occurred.    Past medical history: Back pain, amaurosis fugax, pulmonary nodules, postoperative hypothyroidism, NSVT  , History of glaucoma        Objective    Pulse 87   Resp 18   Wt 97.2 kg (214 lb 3.2 oz)   LMP  (LMP Unknown)   SpO2 98%   BMI 37.94 kg/m    Body mass index is 37.94 kg/m .  Physical Exam     Gen: Pleasant female not in acute distress  HEENT: Eyes no erythema of the bulbar or palpebral conjunctiva, no edema. Nose: No congestion Mouth: Throat clear, no lip or tongue edema.   Respiratory: Clear to auscultation bilaterally, no adventitious breath sounds  Skin: No rashes or lesions  Psych: Alert and oriented times 3    Start time:1310  End time:1433    Oral Challenge     Antibiotic/Concentration  Amoxicillin 250 mg/5ml   1/100 Dose --   1/10 Dose 1ml at 1310   Full Dose 10 ml at 1332   Observation Discharged at 1433         Impression report and plan:    1.  Penicillin allergy      Patient has a 2-6% chance of having an IgE mediated reaction to penicillin antibiotic.  This does not predict non-IgE mediated reactions.    2.  Nitrofurantoin allergy    Would recommend an office challenge and patient will be contacted to schedule.  Risk of anaphylaxis discussed    3.  Sulfa allergy    Sulfa causes many non-IgE-mediated reactions.  For this reason would avoid for now.            Signed Electronically by: Maribell Clark MD

## 2025-05-06 NOTE — PATIENT INSTRUCTIONS
2-6% chance of having an allergic reaction to penicillin antibiotic.  This does not predict non-IgE allergic reactions.      Nitrofurantoin allergy    Challenge 1.5 hours, healthy, we will contact you    Sulfa --would avoid for now

## 2025-05-06 NOTE — LETTER
5/6/2025      Miran Grijalva  [address] [facility] [address]      Dear Colleague,    Thank you for referring your patient, Mirna Grijalva, to the [facility]. Please see a copy of my visit note below.          Subjective  Mirna is a 75 year old, presenting for the following health issues:  Allergy Consult (Antibiotic allergy)    HPI      Chief complaint: Multiple antibiotic allergies    History of present illness: This is a pleasant 75-year-old woman that I was asked to see for evaluation of antibiotic allergies by Lise Castaneda.  Patient has multiple antibiotic allergies and does have chronic urinary tract infections.  They are looking for other antibiotic choices to treat her infections.  She does not recall much of the history with her antibiotic allergies.  She does note penicillin was an injectable medication that she received as a child and she remembers a rash that was very uncomfortable.  She does not think it necessitated hospitalization and she did not need epinephrine.  Does not recall any skin sloughing or mucosal lesions.  She states similarly with nitrofurantoin she had a rash but is unclear when this happened.  She cannot recall.  In the record is listed as a bad itchy rash feverish that was all over her body.  Again no other mention in the record that I can find.  She also has listed sulfa and thinks this was a rash but again difficult to know history and she is not sure when this occurred.    Past medical history: Back pain, amaurosis fugax, pulmonary nodules, postoperative hypothyroidism, NSVT  , History of glaucoma        Objective   Pulse 87   Resp 18   Wt 97.2 kg (214 lb 3.2 oz)   LMP  (LMP Unknown)   SpO2 98%   BMI 37.94 kg/m    Body mass index is 37.94 kg/m .  Physical Exam     Gen: Pleasant female not in acute distress  HEENT: Eyes no erythema of the bulbar or palpebral conjunctiva, no edema. Nose: No congestion Mouth: Throat clear, no lip or tongue edema.    Respiratory: Clear to auscultation bilaterally, no adventitious breath sounds  Skin: No rashes or lesions  Psych: Alert and oriented times 3    Start time:1310  End time:1433    Oral Challenge     Antibiotic/Concentration Amoxicillin 250 mg/5ml   1/100 Dose --   1/10 Dose 1ml at 1310   Full Dose 10 ml at 1332   Observation Discharged at 1433         Impression report and plan:    1.  Penicillin allergy      Patient has a 2-6% chance of having an IgE mediated reaction to penicillin antibiotic.  This does not predict non-IgE mediated reactions.    2.  Nitrofurantoin allergy    Would recommend an office challenge and patient will be contacted to schedule.  Risk of anaphylaxis discussed    3.  Sulfa allergy    Sulfa causes many non-IgE-mediated reactions.  For this reason would avoid for now.            Signed Electronically by: Maribell Clark MD        Again, thank you for allowing me to participate in the care of your patient.        Sincerely,        Maribell Clark MD    Electronically signed

## 2025-05-07 ENCOUNTER — TELEPHONE (OUTPATIENT)
Dept: ALLERGY | Facility: CLINIC | Age: 75
End: 2025-05-07
Payer: COMMERCIAL

## 2025-05-07 NOTE — TELEPHONE ENCOUNTER
Left a message 5/7 patient need to schedule drug challenge for NITROFURATOIN. Patient must be off antihistamine 7 days prior and this can be done at 12:40 spot with Dr. Clark.

## 2025-05-11 ENCOUNTER — MYC REFILL (OUTPATIENT)
Dept: CARDIOLOGY | Facility: CLINIC | Age: 75
End: 2025-05-11
Payer: COMMERCIAL

## 2025-05-11 DIAGNOSIS — E87.6 HYPOKALEMIA: ICD-10-CM

## 2025-05-12 RX ORDER — FUROSEMIDE 20 MG/1
20 TABLET ORAL DAILY
Qty: 90 TABLET | Refills: 1 | Status: SHIPPED | OUTPATIENT
Start: 2025-05-12

## 2025-05-14 ENCOUNTER — TRANSFERRED RECORDS (OUTPATIENT)
Dept: HEALTH INFORMATION MANAGEMENT | Facility: CLINIC | Age: 75
End: 2025-05-14
Payer: COMMERCIAL

## 2025-05-17 ENCOUNTER — MYC REFILL (OUTPATIENT)
Dept: CARDIOLOGY | Facility: CLINIC | Age: 75
End: 2025-05-17
Payer: COMMERCIAL

## 2025-05-17 ENCOUNTER — MYC REFILL (OUTPATIENT)
Dept: INTERNAL MEDICINE | Facility: CLINIC | Age: 75
End: 2025-05-17
Payer: COMMERCIAL

## 2025-05-17 DIAGNOSIS — E87.6 HYPOKALEMIA: ICD-10-CM

## 2025-05-17 DIAGNOSIS — R00.0 TACHYCARDIA: ICD-10-CM

## 2025-05-17 DIAGNOSIS — G45.3 AFX (AMAUROSIS FUGAX): ICD-10-CM

## 2025-05-18 DIAGNOSIS — G45.3 AFX (AMAUROSIS FUGAX): ICD-10-CM

## 2025-05-19 RX ORDER — ATORVASTATIN CALCIUM 80 MG/1
80 TABLET, FILM COATED ORAL EVERY EVENING
Qty: 90 TABLET | Refills: 0 | OUTPATIENT
Start: 2025-05-19

## 2025-05-19 RX ORDER — ATORVASTATIN CALCIUM 80 MG/1
80 TABLET, FILM COATED ORAL EVERY EVENING
Qty: 90 TABLET | Refills: 2 | Status: SHIPPED | OUTPATIENT
Start: 2025-05-19

## 2025-05-20 ENCOUNTER — TRANSFERRED RECORDS (OUTPATIENT)
Dept: HEALTH INFORMATION MANAGEMENT | Facility: CLINIC | Age: 75
End: 2025-05-20
Payer: COMMERCIAL

## 2025-05-20 RX ORDER — FUROSEMIDE 20 MG/1
20 TABLET ORAL DAILY
Qty: 90 TABLET | Refills: 1 | OUTPATIENT
Start: 2025-05-20

## 2025-05-20 RX ORDER — METOPROLOL TARTRATE 25 MG/1
25 TABLET, FILM COATED ORAL 2 TIMES DAILY
Qty: 180 TABLET | Refills: 3 | OUTPATIENT
Start: 2025-05-20

## 2025-05-29 ENCOUNTER — TRANSFERRED RECORDS (OUTPATIENT)
Dept: HEALTH INFORMATION MANAGEMENT | Facility: CLINIC | Age: 75
End: 2025-05-29
Payer: COMMERCIAL

## 2025-06-11 DIAGNOSIS — E87.6 HYPOKALEMIA: ICD-10-CM

## 2025-06-11 RX ORDER — POTASSIUM CHLORIDE 750 MG/1
10 TABLET, EXTENDED RELEASE ORAL 2 TIMES DAILY
Qty: 180 TABLET | Refills: 0 | Status: SHIPPED | OUTPATIENT
Start: 2025-06-11

## 2025-06-11 NOTE — PROGRESS NOTES
"        Assessment/Recommendations   Assessment:      #  Chronic Heart Failure with Preserved Ejection Fraction, NYHA Class II:   Echocardiogram on 5/12/2024 showed EF of 60 to 65% with small pericardial effusion with no indication of cardiac tamponade.    Previous echo on 4/27/2024 showed EF of 60 to 65% with no wall motion abnormalities with grade 1 diastolic dysfunction and no pericardial effusion seen at that time.    Patient is well compensated on exam except clinic weight is up few pounds.  She does not weigh herself at home.  She reports following low-sodium diet.  She reports adequate fluid intake.    # Hypertension: Blood pressure stable    #  Nonsustained ventricular tachycardia: Zio patch study on 8/23/2024 showed sinus rhythm average heart rate 89 bpm, 5 episodes of NSVT with longest 20 beats with fastest 179 bpm.  No sustained tachyarrhythmia, no atrial fibrillation, no pauses greater than 3 seconds.  Stable and asymptomatic    #  Hypokalemia: On potassium chloride.  Most recent potassium within normal limit    #  Recurrent UTI: Patient expressed frustration with recurrent UTI.  She is concerned that she is on Jardiance and possible adverse effect of UTI.    Plan/Recommendation:  -Stop Jardiance given recurrent UTI  Consider adding MRA therapy in the near future for heart failure management  -Repeat BMP and NT proBNP during next follow-up visit with PCP.  - Consider starting on MRA therapy if stable renal function.  Discussed in clinic today.  Discussed benefit and side effect.  Patient is agreeable.    Follow up with Dr. Molina in 2 months.     The longitudinal plan of care for heart failure with preserved ejection fraction, NSVT,  hypertension was addressed during this visit.?Due to the added complexity in care, I will continue to support Mirna Grijalva in the subsequent management of this condition(s) and with the ongoing continuity of care of this condition(s)\".     History of Present " Illness/Subjective    Ms. Mirna Grijalva is a 74 year old female with a past medical history of hypertension, hyperlipidemia, hypothyroidism, history of parathyroid nodules with status post parathyroidectomy, recurrent UTI, amaurosis fugax, sinus tachycardia with PVCs per EKG in August 2023,hospitalization in May 2020 for with acute hypoxic respiratory failure (resolved), and acute heart failure with preserved ejection fraction, hospitalization in August 2024 with syncope, and hospitalization in September 2024 with blurry vision with MRI of brain without acute stroke but noted moderate right ICA stenosis who is seen at [facility] for post hospitalization heart failure follow up.     Cardiac MRI in August 2024 showed normal LV function with EF of 55.2% with no myocardial scar, no evidence of myocardial iron overload, no evidence of hepatic iron overload, normal RV systolic function with no significant valve abnormalities noted.      Patient was last seen by me in October 2024.  Patient recently saw Dr. Molina.    Today, Mirna reports stable from heart failure standpoint.  She denies fatigue, lightheadedness, shortness of breath, dyspnea on exertion, orthopnea, PND, palpitations, chest pain, abdominal fullness/bloating, and lower extremity edema.     Cardiac MRI from 8/2024-reviewed:  SUMMARY   1.  Normal left ventricular size, wall thickness and systolic function. The quantified left ventricular  ejection fraction is 55.2%.  No myocardial scar is identified. Normal pre-contrast T1:1014 ms.  Normal T2*  myocardium: 34 ms (normal > 20 ms on 1.5 T scanner).  No evidence of myocardial iron overload.   2. T2* liver: 20.4 ms (normal >11.4 ms).  No evidence of hepatic ion overload.      3.  Normal right ventricular size and systolic function.  RVEF: 62%.   4.  No significant valvular abnormalities.    Limited ECHO from 5/12/24-Reviewed:   Interpretation Summary   Left ventricular size,  wall motion and function are normal. The ejection  fraction is 60-65%.  Normal right ventricle size and systolic function.  IVC diameter <2.1 cm collapsing >50% with sniff suggests a normal RA pressure  of 3 mmHg.  Small pericardial effusion  There are no echocardiographic indications of cardiac tamponade.  ______________________________________________________________________________  Left Ventricle  Left ventricular size, wall motion and function are normal. The ejection  fraction is 60-65%. No regional wall motion abnormalities noted.    ECHO from 4/27/24: Reviewed  Interpretation Summary     The left ventricle is normal in size. There is mild concentric left  ventricular hypertrophy.  Left ventricular systolic function is normal. The visual ejection fraction is  60-65%. No regional wall motion abnormalities noted.  Grade I or early diastolic dysfunction.     The right ventricle is normal in size and function.  The left atrium is mildly dilated. Right atrium not well visualized.  IVC diameter <2.1 cm collapsing >50% with sniff suggests a normal RA pressure  of 3 mmHg.  There is no comparison study available.     Physical Examination Review of Systems   /84 (BP Location: Right arm, Patient Position: Sitting, Cuff Size: Adult Large)   Pulse 84   Resp 16   Wt 100.2 kg (221 lb)   LMP  (LMP Unknown)   SpO2 96%   BMI 39.15 kg/m    Body mass index is 39.15 kg/m .  Wt Readings from Last 3 Encounters:   06/12/25 100.2 kg (221 lb)   05/06/25 97.2 kg (214 lb 3.2 oz)   04/29/25 98.7 kg (217 lb 8 oz)     General Appearance:   no distress, normal body habitus   ENT/Mouth: membranes moist, no oral lesions or bleeding gums.      EYES:  no scleral icterus, normal conjunctivae   Neck: no  thyromegaly   Chest/Lungs:   lungs are clear to auscultation, no rales or wheezing, equal chest wall expansion    Cardiovascular:   Heart rate regular.  Normal first and second heart sounds with no murmurs, rubs, or gallops; Jugular  venous pressure flat with no edema bilaterally    Abdomen:  no organomegaly, masses, bruits, or tenderness; bowel sounds are present   Extremities   no cyanosis or clubbing    CMS intact.   Skin: no xanthelasma, warm.    Neurologic: alert and oriented; no tremors   Psychiatric: Appears anxious                                                   Negative unless noted in HPI     Medical History  Surgical History Family History Social History   Past Medical History:   Diagnosis Date    Arthritis years ago?    Cancer (H) 1988  & 2016?    Coronary artery disease May 2024    Glaucoma (increased eye pressure) About 11 years    Hypertension years ago?    Mumps     Nonsenile cataract About 11 years    Thyroid cancer (H)     TIA (transient ischemic attack) 04/27/2024    right eye    Past Surgical History:   Procedure Laterality Date    BIOPSY      BREAST SURGERY      CATARACT IOL, RT/LT Left 2022?    With Istent    CYSTOSCOPY      GLAUCOMA SURGERY  2022?    GYN SURGERY      Family History   Problem Relation Age of Onset    Glaucoma Mother     Hypertension Mother         Alzheimers and arthritis    Thyroid Disease Mother     Eye Surgery Mother     Glasses (<9 y/o) Mother     Cancer Father     Diabetes Father         And Hemochromatosis and cancer    Glasses (<9 y/o) Father     Hypertension Brother         Alzheimers    Hypertension Sister         Heart failure    Social History     Socioeconomic History    Marital status:      Spouse name: Not on file    Number of children: Not on file    Years of education: Not on file    Highest education level: Not on file   Occupational History    Not on file   Tobacco Use    Smoking status: Former     Current packs/day: 1.00     Average packs/day: 1 pack/day for 20.0 years (20.0 ttl pk-yrs)     Types: Cigarettes     Passive exposure: Never    Smokeless tobacco: Never   Vaping Use    Vaping status: Never Used   Substance and Sexual Activity    Alcohol use: Yes    Drug use: Never     Sexual activity: Not Currently     Partners: Female     Birth control/protection: Post-menopausal   Other Topics Concern    Not on file   Social History Narrative    Not on file     Social Drivers of Health     Financial Resource Strain: High Risk (10/19/2024)    Financial Resource Strain     Within the past 12 months, have you or your family members you live with been unable to get utilities (heat, electricity) when it was really needed?: Yes   Food Insecurity: Low Risk  (10/19/2024)    Food Insecurity     Within the past 12 months, did you worry that your food would run out before you got money to buy more?: No     Within the past 12 months, did the food you bought just not last and you didn t have money to get more?: No   Transportation Needs: Low Risk  (10/19/2024)    Transportation Needs     Within the past 12 months, has lack of transportation kept you from medical appointments, getting your medicines, non-medical meetings or appointments, work, or from getting things that you need?: No   Physical Activity: Patient Declined (10/19/2024)    Exercise Vital Sign     Days of Exercise per Week: Patient declined     Minutes of Exercise per Session: Patient declined   Stress: No Stress Concern Present (10/19/2024)    [employer] - Occupational Stress Questionnaire     Feeling of Stress : Only a little   Social Connections: Unknown (10/19/2024)    Social Connection and Isolation Panel [NHANES]     Frequency of Communication with Friends and Family: Not on file     Frequency of Social Gatherings with Friends and Family: More than three times a week     Attends Yarsani Services: Not on file     Active Member of Clubs or Organizations: Not on file     Attends Club or Organization Meetings: Not on file     Marital Status: Not on file   Interpersonal Safety: Low Risk  (11/8/2024)    Interpersonal Safety     Do you feel physically and emotionally safe where you currently live?: Yes     Within  the past 12 months, have you been hit, slapped, kicked or otherwise physically hurt by someone?: No     Within the past 12 months, have you been humiliated or emotionally abused in other ways by your partner or ex-partner?: No   Housing Stability: Low Risk  (10/19/2024)    Housing Stability     Do you have housing? : Yes     Are you worried about losing your housing?: No          Medications  Allergies   Current Outpatient Medications   Medication Sig Dispense Refill    acetaminophen (TYLENOL) 500 MG tablet Take 1,000 mg by mouth every 6 hours.      aspirin 81 MG EC tablet Take 1 tablet (81 mg) by mouth daily 90 tablet 2    atorvastatin (LIPITOR) 80 MG tablet Take 1 tablet (80 mg) by mouth every evening. 90 tablet 2    bimatoprost (LUMIGAN) 0.01 % SOLN Place 1 drop into the right eye at bedtime 7.5 mL 3    clopidogrel (PLAVIX) 75 MG tablet Take 1 tablet (75 mg) by mouth or NG Tube daily. 30 tablet 2    Cranberry-Vitamin C (CRANBERRY CONCENTRATE/VITAMINC) 58940-828 MG CAPS Take 2 capsules by mouth daily      estradiol (ESTRACE) 0.1 MG/GM vaginal cream Apply fingerful amount (0.5 g) vaginally 3 nights per week*      furosemide (LASIX) 20 MG tablet Take 1 tablet (20 mg) by mouth daily. 90 tablet 1    levothyroxine (SYNTHROID/LEVOTHROID) 137 MCG tablet Take 1 tablet (137 mcg) by mouth daily. 90 tablet 0    Melatonin 10-10 MG TBCR Take 1 tablet by mouth at bedtime      metoprolol tartrate (LOPRESSOR) 25 MG tablet Take 1 tablet (25 mg) by mouth 2 times daily. 180 tablet 3    potassium chloride luis angel ER (KLOR-CON M10) 10 MEQ CR tablet Take 1 tablet (10 mEq) by mouth 2 times daily. 180 tablet 0    sertraline (ZOLOFT) 25 MG tablet Take 1 tablet (25 mg) by mouth daily. 90 tablet 0    thiamine (B-1) 100 MG tablet Take 1 tablet (100 mg) by mouth daily 100 tablet 3    vibegron (GEMTESA) 75 MG TABS tablet Take 1 tablet (75 mg) by mouth daily. 30 tablet 11    Allergies   Allergen Reactions    Jardiance [Empagliflozin] Other (See  Comments)     Recurrent UTI    Atenolol      Rash and Insomnia    Eliquis [Apixaban]      Wanting to faint    Nitrofurantoin      'Bad, itchy, feverish, rash on arms and legs'    Sulfa Antibiotics      Rash on feet    Ketoconazole Rash     Topical ketoconazole         Lab Results    Chemistry/lipid CBC Cardiac Enzymes/BNP/TSH/INR   Lab Results   Component Value Date    CHOL 186 09/14/2024    HDL 78 09/14/2024    TRIG 110 09/14/2024    BUN 11.0 04/14/2025     04/14/2025    CO2 25 04/14/2025    Lab Results   Component Value Date    WBC 7.4 04/14/2025    HGB 15.2 04/14/2025    HCT 45.7 04/14/2025    MCV 98 04/14/2025     04/14/2025    Lab Results   Component Value Date    TSH 0.69 03/07/2025    INR 0.93 01/21/2025        30  minutes spent on the date of encounter doing chart review, review of test results, interpretation with above tests, patient visit, and documentation.        This note has been dictated using voice recognition software. Any grammatical, typographical, or context distortions are unintentional and inherent to the software

## 2025-06-12 ENCOUNTER — OFFICE VISIT (OUTPATIENT)
Dept: CARDIOLOGY | Facility: CLINIC | Age: 75
End: 2025-06-12
Attending: INTERNAL MEDICINE
Payer: COMMERCIAL

## 2025-06-12 VITALS
DIASTOLIC BLOOD PRESSURE: 84 MMHG | SYSTOLIC BLOOD PRESSURE: 138 MMHG | RESPIRATION RATE: 16 BRPM | WEIGHT: 221 LBS | HEART RATE: 84 BPM | BODY MASS INDEX: 39.15 KG/M2 | OXYGEN SATURATION: 96 %

## 2025-06-12 DIAGNOSIS — I50.30 HEART FAILURE WITH PRESERVED EJECTION FRACTION, NYHA CLASS I (H): ICD-10-CM

## 2025-06-12 DIAGNOSIS — I47.29 NSVT (NONSUSTAINED VENTRICULAR TACHYCARDIA) (H): ICD-10-CM

## 2025-06-12 DIAGNOSIS — E87.6 HYPOKALEMIA: Primary | ICD-10-CM

## 2025-06-12 DIAGNOSIS — I10 PRIMARY HYPERTENSION: ICD-10-CM

## 2025-06-12 NOTE — LETTER
6/12/2025    Derrick Jacinto PA-C  [address] [place] [id number]    RE: Mirna Grijalva       Dear Colleague,     I had the pleasure of seeing Mirna Grijalva in the [facility].          Assessment/Recommendations   Assessment:      #  Chronic Heart Failure with Preserved Ejection Fraction, NYHA Class II:   Echocardiogram on 5/12/2024 showed EF of 60 to 65% with small pericardial effusion with no indication of cardiac tamponade.    Previous echo on 4/27/2024 showed EF of 60 to 65% with no wall motion abnormalities with grade 1 diastolic dysfunction and no pericardial effusion seen at that time.    Patient is well compensated on exam except clinic weight is up few pounds.  She does not weigh herself at home.  She reports following low-sodium diet.  She reports adequate fluid intake.    # Hypertension: Blood pressure stable    #  Nonsustained ventricular tachycardia: Zio patch study on 8/23/2024 showed sinus rhythm average heart rate 89 bpm, 5 episodes of NSVT with longest 20 beats with fastest 179 bpm.  No sustained tachyarrhythmia, no atrial fibrillation, no pauses greater than 3 seconds.  Stable and asymptomatic    #  Hypokalemia: On potassium chloride.  Most recent potassium within normal limit    #  Recurrent UTI: Patient expressed frustration with recurrent UTI.  She is concerned that she is on Jardiance and possible adverse effect of UTI.    Plan/Recommendation:  -Stop Jardiance given recurrent UTI  Consider adding MRA therapy in the near future for heart failure management  -Repeat BMP and NT proBNP during next follow-up visit with PCP.  - Consider starting on MRA therapy if stable renal function.  Discussed in clinic today.  Discussed benefit and side effect.  Patient is agreeable.    Follow up with Dr. Molina in 2 months.     The longitudinal plan of care for heart failure with preserved ejection fraction, NSVT,  hypertension was addressed during this visit.?Due to the added  "complexity in care, I will continue to support Mirna Grijalva in the subsequent management of this condition(s) and with the ongoing continuity of care of this condition(s)\".     History of Present Illness/Subjective    Ms. Mirna Grijalva is a 74 year old female with a past medical history of hypertension, hyperlipidemia, hypothyroidism, history of parathyroid nodules with status post parathyroidectomy, recurrent UTI, amaurosis fugax, sinus tachycardia with PVCs per EKG in August 2023,hospitalization in May 2020 for with acute hypoxic respiratory failure (resolved), and acute heart failure with preserved ejection fraction, hospitalization in August 2024 with syncope, and hospitalization in September 2024 with blurry vision with MRI of brain without acute stroke but noted moderate right ICA stenosis who is seen at [facility] for post hospitalization heart failure follow up.     Cardiac MRI in August 2024 showed normal LV function with EF of 55.2% with no myocardial scar, no evidence of myocardial iron overload, no evidence of hepatic iron overload, normal RV systolic function with no significant valve abnormalities noted.      Patient was last seen by me in October 2024.  Patient recently saw Dr. Molina.    Today, Mirna reports stable from heart failure standpoint.  She denies fatigue, lightheadedness, shortness of breath, dyspnea on exertion, orthopnea, PND, palpitations, chest pain, abdominal fullness/bloating, and lower extremity edema.     Cardiac MRI from 8/2024-reviewed:  SUMMARY   1.  Normal left ventricular size, wall thickness and systolic function. The quantified left ventricular  ejection fraction is 55.2%.  No myocardial scar is identified. Normal pre-contrast T1:1014 ms.  Normal T2*  myocardium: 34 ms (normal > 20 ms on 1.5 T scanner).  No evidence of myocardial iron overload.   2. T2* liver: 20.4 ms (normal >11.4 ms).  No evidence of hepatic ion overload.      3.  " Normal right ventricular size and systolic function.  RVEF: 62%.   4.  No significant valvular abnormalities.    Limited ECHO from 5/12/24-Reviewed:   Interpretation Summary   Left ventricular size, wall motion and function are normal. The ejection  fraction is 60-65%.  Normal right ventricle size and systolic function.  IVC diameter <2.1 cm collapsing >50% with sniff suggests a normal RA pressure  of 3 mmHg.  Small pericardial effusion  There are no echocardiographic indications of cardiac tamponade.  ______________________________________________________________________________  Left Ventricle  Left ventricular size, wall motion and function are normal. The ejection  fraction is 60-65%. No regional wall motion abnormalities noted.    ECHO from 4/27/24: Reviewed  Interpretation Summary     The left ventricle is normal in size. There is mild concentric left  ventricular hypertrophy.  Left ventricular systolic function is normal. The visual ejection fraction is  60-65%. No regional wall motion abnormalities noted.  Grade I or early diastolic dysfunction.     The right ventricle is normal in size and function.  The left atrium is mildly dilated. Right atrium not well visualized.  IVC diameter <2.1 cm collapsing >50% with sniff suggests a normal RA pressure  of 3 mmHg.  There is no comparison study available.     Physical Examination Review of Systems   /84 (BP Location: Right arm, Patient Position: Sitting, Cuff Size: Adult Large)   Pulse 84   Resp 16   Wt 100.2 kg (221 lb)   LMP  (LMP Unknown)   SpO2 96%   BMI 39.15 kg/m    Body mass index is 39.15 kg/m .  Wt Readings from Last 3 Encounters:   06/12/25 100.2 kg (221 lb)   05/06/25 97.2 kg (214 lb 3.2 oz)   04/29/25 98.7 kg (217 lb 8 oz)     General Appearance:   no distress, normal body habitus   ENT/Mouth: membranes moist, no oral lesions or bleeding gums.      EYES:  no scleral icterus, normal conjunctivae   Neck: no  thyromegaly   Chest/Lungs:   lungs  are clear to auscultation, no rales or wheezing, equal chest wall expansion    Cardiovascular:   Heart rate regular.  Normal first and second heart sounds with no murmurs, rubs, or gallops; Jugular venous pressure flat with no edema bilaterally    Abdomen:  no organomegaly, masses, bruits, or tenderness; bowel sounds are present   Extremities   no cyanosis or clubbing    CMS intact.   Skin: no xanthelasma, warm.    Neurologic: alert and oriented; no tremors   Psychiatric: Appears anxious                                                   Negative unless noted in HPI     Medical History  Surgical History Family History Social History   Past Medical History:   Diagnosis Date     Arthritis years ago?     Cancer (H) 1988  & 2016?     Coronary artery disease May 2024     Glaucoma (increased eye pressure) About 11 years     Hypertension years ago?     Mumps      Nonsenile cataract About 11 years     Thyroid cancer (H)      TIA (transient ischemic attack) 04/27/2024    right eye    Past Surgical History:   Procedure Laterality Date     BIOPSY       BREAST SURGERY       CATARACT IOL, RT/LT Left 2022?    With Istent     CYSTOSCOPY       GLAUCOMA SURGERY  2022?     GYN SURGERY      Family History   Problem Relation Age of Onset     Glaucoma Mother      Hypertension Mother         Alzheimers and arthritis     Thyroid Disease Mother      Eye Surgery Mother      Glasses (<7 y/o) Mother      Cancer Father      Diabetes Father         And Hemochromatosis and cancer     Glasses (<7 y/o) Father      Hypertension Brother         Alzheimers     Hypertension Sister         Heart failure    Social History     Socioeconomic History     Marital status:      Spouse name: Not on file     Number of children: Not on file     Years of education: Not on file     Highest education level: Not on file   Occupational History     Not on file   Tobacco Use     Smoking status: Former     Current packs/day: 1.00     Average packs/day: 1 pack/day  for 20.0 years (20.0 ttl pk-yrs)     Types: Cigarettes     Passive exposure: Never     Smokeless tobacco: Never   Vaping Use     Vaping status: Never Used   Substance and Sexual Activity     Alcohol use: Yes     Drug use: Never     Sexual activity: Not Currently     Partners: Female     Birth control/protection: Post-menopausal   Other Topics Concern     Not on file   Social History Narrative     Not on file     Social Drivers of Health     Financial Resource Strain: High Risk (10/19/2024)    Financial Resource Strain      Within the past 12 months, have you or your family members you live with been unable to get utilities (heat, electricity) when it was really needed?: Yes   Food Insecurity: Low Risk  (10/19/2024)    Food Insecurity      Within the past 12 months, did you worry that your food would run out before you got money to buy more?: No      Within the past 12 months, did the food you bought just not last and you didn t have money to get more?: No   Transportation Needs: Low Risk  (10/19/2024)    Transportation Needs      Within the past 12 months, has lack of transportation kept you from medical appointments, getting your medicines, non-medical meetings or appointments, work, or from getting things that you need?: No   Physical Activity: Patient Declined (10/19/2024)    Exercise Vital Sign      Days of Exercise per Week: Patient declined      Minutes of Exercise per Session: Patient declined   Stress: No Stress Concern Present (10/19/2024)    [employer] - Occupational Stress Questionnaire      Feeling of Stress : Only a little   Social Connections: Unknown (10/19/2024)    Social Connection and Isolation Panel [NHANES]      Frequency of Communication with Friends and Family: Not on file      Frequency of Social Gatherings with Friends and Family: More than three times a week      Attends Mandaen Services: Not on file      Active Member of Clubs or Organizations: Not on file       Attends Club or Organization Meetings: Not on file      Marital Status: Not on file   Interpersonal Safety: Low Risk  (11/8/2024)    Interpersonal Safety      Do you feel physically and emotionally safe where you currently live?: Yes      Within the past 12 months, have you been hit, slapped, kicked or otherwise physically hurt by someone?: No      Within the past 12 months, have you been humiliated or emotionally abused in other ways by your partner or ex-partner?: No   Housing Stability: Low Risk  (10/19/2024)    Housing Stability      Do you have housing? : Yes      Are you worried about losing your housing?: No          Medications  Allergies   Current Outpatient Medications   Medication Sig Dispense Refill     acetaminophen (TYLENOL) 500 MG tablet Take 1,000 mg by mouth every 6 hours.       aspirin 81 MG EC tablet Take 1 tablet (81 mg) by mouth daily 90 tablet 2     atorvastatin (LIPITOR) 80 MG tablet Take 1 tablet (80 mg) by mouth every evening. 90 tablet 2     bimatoprost (LUMIGAN) 0.01 % SOLN Place 1 drop into the right eye at bedtime 7.5 mL 3     clopidogrel (PLAVIX) 75 MG tablet Take 1 tablet (75 mg) by mouth or NG Tube daily. 30 tablet 2     Cranberry-Vitamin C (CRANBERRY CONCENTRATE/VITAMINC) 37834-293 MG CAPS Take 2 capsules by mouth daily       estradiol (ESTRACE) 0.1 MG/GM vaginal cream Apply fingerful amount (0.5 g) vaginally 3 nights per week*       furosemide (LASIX) 20 MG tablet Take 1 tablet (20 mg) by mouth daily. 90 tablet 1     levothyroxine (SYNTHROID/LEVOTHROID) 137 MCG tablet Take 1 tablet (137 mcg) by mouth daily. 90 tablet 0     Melatonin 10-10 MG TBCR Take 1 tablet by mouth at bedtime       metoprolol tartrate (LOPRESSOR) 25 MG tablet Take 1 tablet (25 mg) by mouth 2 times daily. 180 tablet 3     potassium chloride luis angel ER (KLOR-CON M10) 10 MEQ CR tablet Take 1 tablet (10 mEq) by mouth 2 times daily. 180 tablet 0     sertraline (ZOLOFT) 25 MG tablet Take 1 tablet (25 mg) by mouth  daily. 90 tablet 0     thiamine (B-1) 100 MG tablet Take 1 tablet (100 mg) by mouth daily 100 tablet 3     vibegron (GEMTESA) 75 MG TABS tablet Take 1 tablet (75 mg) by mouth daily. 30 tablet 11    Allergies   Allergen Reactions     Jardiance [Empagliflozin] Other (See Comments)     Recurrent UTI     Atenolol      Rash and Insomnia     Eliquis [Apixaban]      Wanting to faint     Nitrofurantoin      'Bad, itchy, feverish, rash on arms and legs'     Sulfa Antibiotics      Rash on feet     Ketoconazole Rash     Topical ketoconazole         Lab Results    Chemistry/lipid CBC Cardiac Enzymes/BNP/TSH/INR   Lab Results   Component Value Date    CHOL 186 09/14/2024    HDL 78 09/14/2024    TRIG 110 09/14/2024    BUN 11.0 04/14/2025     04/14/2025    CO2 25 04/14/2025    Lab Results   Component Value Date    WBC 7.4 04/14/2025    HGB 15.2 04/14/2025    HCT 45.7 04/14/2025    MCV 98 04/14/2025     04/14/2025    Lab Results   Component Value Date    TSH 0.69 03/07/2025    INR 0.93 01/21/2025        30  minutes spent on the date of encounter doing chart review, review of test results, interpretation with above tests, patient visit, and documentation.        This note has been dictated using voice recognition software. Any grammatical, typographical, or context distortions are unintentional and inherent to the software          Thank you for allowing me to participate in the care of your patient.      Sincerely,     MEERA Regalado [facility]  cc:   Gelacio Molina MD  [address]  [place] [address]

## 2025-06-12 NOTE — PATIENT INSTRUCTIONS
Mirna Grijalva,    It was a pleasure to see you today at the [facility].     My recommendations after this visit include:    - No medications changes made today    - Please get your lab work (BMP and NTproNBP level) on 6/17/25 during next appointment with primary provider    - Low sodium diet < 2000 mg/day, daily weight monitoring, and maintain adequate fluid intake at 50 to 60 ounces per day    -Please call if you experience persistent weight gain 2 to 3 pounds 2 days in a row or 5 pounds in a week with shortness of breath, abdominal bloating and leg swelling    - Follow up with Dr. Molina in 2 months     - Please call Heart Failure Nurse Line at [phone], if you have any questions or concerns

## 2025-06-13 ENCOUNTER — TELEPHONE (OUTPATIENT)
Dept: FAMILY MEDICINE | Facility: CLINIC | Age: 75
End: 2025-06-13

## 2025-06-14 ENCOUNTER — MYC REFILL (OUTPATIENT)
Dept: FAMILY MEDICINE | Facility: CLINIC | Age: 75
End: 2025-06-14
Payer: COMMERCIAL

## 2025-06-14 ENCOUNTER — MYC REFILL (OUTPATIENT)
Dept: ENDOCRINOLOGY | Facility: CLINIC | Age: 75
End: 2025-06-14
Payer: COMMERCIAL

## 2025-06-14 DIAGNOSIS — G45.3 AMAUROSIS FUGAX: ICD-10-CM

## 2025-06-14 DIAGNOSIS — G45.9 TIA (TRANSIENT ISCHEMIC ATTACK): ICD-10-CM

## 2025-06-14 DIAGNOSIS — Z85.850 HISTORY OF THYROID CANCER: ICD-10-CM

## 2025-06-14 DIAGNOSIS — E03.9 HYPOTHYROIDISM, UNSPECIFIED TYPE: ICD-10-CM

## 2025-06-14 DIAGNOSIS — G45.3 AFX (AMAUROSIS FUGAX): ICD-10-CM

## 2025-06-16 ENCOUNTER — MYC MEDICAL ADVICE (OUTPATIENT)
Dept: ALLERGY | Facility: CLINIC | Age: 75
End: 2025-06-16
Payer: COMMERCIAL

## 2025-06-16 RX ORDER — CLOPIDOGREL BISULFATE 75 MG/1
75 TABLET ORAL DAILY
Qty: 30 TABLET | Refills: 2 | OUTPATIENT
Start: 2025-06-16

## 2025-06-16 RX ORDER — LEVOTHYROXINE SODIUM 137 UG/1
137 TABLET ORAL DAILY
Qty: 90 TABLET | Refills: 0 | Status: SHIPPED | OUTPATIENT
Start: 2025-06-16

## 2025-06-16 RX ORDER — ASPIRIN 81 MG/1
81 TABLET ORAL DAILY
Qty: 90 TABLET | Refills: 2 | Status: SHIPPED | OUTPATIENT
Start: 2025-06-16

## 2025-06-16 NOTE — TELEPHONE ENCOUNTER
Requested Prescriptions   Pending Prescriptions Disp Refills    levothyroxine (SYNTHROID/LEVOTHROID) 137 MCG tablet 90 tablet 0     Sig: Take 1 tablet (137 mcg) by mouth daily.       Thyroid Protocol Passed - 6/16/2025 11:12 AM        Passed - Patient is 12 years or older        Passed - Medication is active on med list and the sig matches. RN to manually verify dose and sig if red X/fail.     If the protocol passes (green check), you do not need to verify med dose and sig.    A prescription matches if they are the same clinical intention.    For Example: once daily and every morning are the same.    The protocol can not identify upper and lower case letters as matching and will fail.     For Example: Take 1 tablet (50 mg) by mouth daily     TAKE 1 TABLET (50 MG) BY MOUTH DAILY    For all fails (red x), verify dose and sig.    If the refill does match what is on file, the RN can still proceed to approve the refill request.       If they do not match, route to the appropriate provider.             Passed - Recent (12 month) or future (90 days) visit with authorizing provider's specialty (provided they have been seen in the past 15 months)     The patient must have completed an in-person or virtual visit within the past 12 months or has a future visit scheduled within the next 90 days with the authorizing provider s specialty.  Urgent care and e-visits do not qualify as an office visit for this protocol.          Passed - Medication indicated for associated diagnosis     Medication is associated with one or more of the following diagnoses:  Hypothyroidism  Thyroid stimulating hormone suppression therapy  Thyroid cancer  Acquired atrophy of thyroid          Passed - Normal TSH on file in past 12 months     Recent Labs   Lab Test 03/07/25  1351   TSH 0.69              Passed - No active pregnancy on record     If patient is pregnant or has had a positive pregnancy test, please check TSH.          Passed - No  positive pregnancy test in past 12 months     If patient is pregnant or has had a positive pregnancy test, please check TSH.

## 2025-06-23 ENCOUNTER — RESULTS FOLLOW-UP (OUTPATIENT)
Dept: FAMILY MEDICINE | Facility: CLINIC | Age: 75
End: 2025-06-23
Payer: COMMERCIAL

## 2025-06-23 ENCOUNTER — RESULTS FOLLOW-UP (OUTPATIENT)
Dept: FAMILY MEDICINE | Facility: CLINIC | Age: 75
End: 2025-06-23

## 2025-06-23 DIAGNOSIS — R91.8 PULMONARY NODULES: Primary | ICD-10-CM

## 2025-06-24 ENCOUNTER — NURSE TRIAGE (OUTPATIENT)
Dept: FAMILY MEDICINE | Facility: CLINIC | Age: 75
End: 2025-06-24
Payer: COMMERCIAL

## 2025-06-24 NOTE — TELEPHONE ENCOUNTER
Patient calls to speak with nurse at [facility]. Patient reports urinary symptoms ongoing for one week. States urinary frequency is worse today, reports urinating every one. Urine is clear. States she is not drinking fluids at this time due to frequency.     Patient requesting urinalysis orders, she is requesting to drop off urine sample. States she does not want to go in to the Emergency Department and lay in a bed all night. Patient becomes tearful and frustrated with the recommendation to go to Urgent Care.

## 2025-06-24 NOTE — TELEPHONE ENCOUNTER
Provider reviewed- patient must be seen, will not order without visit.    Can go to UC or ER.    Jena Pearson PA-C

## 2025-06-24 NOTE — TELEPHONE ENCOUNTER
Nurse Triage SBAR    Is this a 2nd Level Triage? YES, LICENSED PRACTITIONER REVIEW IS REQUIRED    Situation: UTI symptom    Background: Chronic UTI's    Assessment: Pt called and wanted to get orders to go to lab and get urine sample done to test for UTI. RN educated that we can't send her to lab without provider orders and a provider who evaluates pt first. Pt got frustrated and said her PCP knows she has chronic UTI. Pt reports only symptom right now is urinary frequency, but knows she has UTI because she gets them so often. She's been having urge to go every 30min-1hr with no relief.  Denies burning urination, itchiness, cloudy urine, fever, confusion, discharge, foul odor.    Protocol Recommended Disposition:   See in Office Today or Tomorrow    Recommendation: RN educated to go to urgent care to be seen sooner, but pt refused. Pt stated refusing to go to urgent care and ED because wait times are too long. RN educated urgent care should be faster than ED and to be seen there since PCP not in office today. Pt still refused and persistently requested to see if a covering provider can order urine sample to be obtained in lab so that she can know if she has UTI or not. Routing to [facility] team for review    Routed to provider    Does the patient meet one of the following criteria for ADS visit consideration? 16+ years old, with an [facility] PCP     TIP  Providers, please consider if this condition is appropriate for management at one of our Acute and Diagnostic Services sites.     If patient is a good candidate, please use dotphrase <dot>triageresponse and select Refer to ADS to document.    Reason for Disposition   Patient wants to be seen    Additional Information   Negative: Shock suspected (e.g., cold/pale/clammy skin, too weak to stand, low BP, rapid pulse)   Negative: Sounds like a life-threatening emergency to the triager   Negative: Followed a female genital area injury (e.g., labia, vagina, vulva)   Negative:  "Followed a male genital area injury (penis, scrotum)   Negative: Vaginal discharge   Negative: Pus (white, yellow) or bloody discharge from end of penis   Negative: Pain or burning with passing urine (urination) and pregnant   Negative: Pain or burning with passing urine (urination) and female   Negative: Pain or burning with passing urine (urination) and male   Negative: Pain or itching in the vulvar area   Negative: Pain in scrotum is main symptom   Negative: Blood in the urine is main symptom   Negative: Symptoms arising from use of a urinary catheter (e.g., coude, Yun)   Negative: Decreased urination and drinking very little and dehydration suspected (e.g., dark urine, no urine > 12 hours, very dry mouth, very lightheaded)   Negative: Patient sounds very sick or weak to the triager   Negative: Fever > 100.4 F  (38.0 C)   Negative: Side (flank) or lower back pain present   Negative: Bad or foul-smelling urine   Negative: Urinating more frequently than usual (i.e., frequency) OR new-onset of the feeling of an urgent need to urinate (i.e., urgency)   Negative: Can't control passage of urine (i.e., urinary incontinence) and new-onset (< 2 weeks) or getting worse    Answer Assessment - Initial Assessment Questions  1. SYMPTOM: \"What's the main symptom you're concerned about?\" (e.g., frequency, incontinence)      Frequency  2. ONSET: \"When did the  frequency  start?\"      Yesterday  3. PAIN: \"Is there any pain?\" If Yes, ask: \"How bad is it?\" (Scale: 1-10; mild, moderate, severe)      No  4. CAUSE: \"What do you think is causing the symptoms?\"      UTI  5. OTHER SYMPTOMS: \"Do you have any other symptoms?\" (e.g., blood in urine, fever, flank pain, pain with urination)      No  6. PREGNANCY: \"Is there any chance you are pregnant?\" \"When was your last menstrual period?\"      NA    Protocols used: Urinary Symptoms-A-OH    "

## 2025-06-24 NOTE — TELEPHONE ENCOUNTER
Patient notified of Jena's response and recommendation. Appointment scheduled with Dr. Muhammad at [facility] tomorrow, 6/25/25.     RUPINDER Rosario   [facility]

## 2025-06-25 ENCOUNTER — OFFICE VISIT (OUTPATIENT)
Dept: FAMILY MEDICINE | Facility: CLINIC | Age: 75
End: 2025-06-25
Payer: COMMERCIAL

## 2025-06-25 VITALS
RESPIRATION RATE: 16 BRPM | HEART RATE: 62 BPM | WEIGHT: 219.2 LBS | OXYGEN SATURATION: 97 % | TEMPERATURE: 98.6 F | SYSTOLIC BLOOD PRESSURE: 114 MMHG | DIASTOLIC BLOOD PRESSURE: 68 MMHG | HEIGHT: 64 IN | BODY MASS INDEX: 37.42 KG/M2

## 2025-06-25 DIAGNOSIS — R39.15 URINARY URGENCY: ICD-10-CM

## 2025-06-25 DIAGNOSIS — R30.0 DYSURIA: Primary | ICD-10-CM

## 2025-06-25 LAB
ALBUMIN UR-MCNC: NEGATIVE MG/DL
APPEARANCE UR: CLEAR
BACTERIA #/AREA URNS HPF: ABNORMAL /HPF
BILIRUB UR QL STRIP: NEGATIVE
COLOR UR AUTO: YELLOW
GLUCOSE UR STRIP-MCNC: NEGATIVE MG/DL
HGB UR QL STRIP: ABNORMAL
KETONES UR STRIP-MCNC: NEGATIVE MG/DL
LEUKOCYTE ESTERASE UR QL STRIP: ABNORMAL
NITRATE UR QL: NEGATIVE
PH UR STRIP: 5.5 [PH] (ref 5–7)
RBC #/AREA URNS AUTO: ABNORMAL /HPF
SP GR UR STRIP: 1.01 (ref 1–1.03)
SQUAMOUS #/AREA URNS AUTO: ABNORMAL /LPF
UROBILINOGEN UR STRIP-ACNC: 0.2 E.U./DL
WBC #/AREA URNS AUTO: ABNORMAL /HPF
WBC CLUMPS #/AREA URNS HPF: PRESENT /HPF

## 2025-06-25 PROCEDURE — 87086 URINE CULTURE/COLONY COUNT: CPT | Performed by: FAMILY MEDICINE

## 2025-06-25 PROCEDURE — 99213 OFFICE O/P EST LOW 20 MIN: CPT | Performed by: FAMILY MEDICINE

## 2025-06-25 PROCEDURE — 87088 URINE BACTERIA CULTURE: CPT | Performed by: FAMILY MEDICINE

## 2025-06-25 PROCEDURE — 81001 URINALYSIS AUTO W/SCOPE: CPT | Performed by: FAMILY MEDICINE

## 2025-06-25 PROCEDURE — 87186 SC STD MICRODIL/AGAR DIL: CPT | Performed by: FAMILY MEDICINE

## 2025-06-25 PROCEDURE — 3074F SYST BP LT 130 MM HG: CPT | Performed by: FAMILY MEDICINE

## 2025-06-25 PROCEDURE — 1126F AMNT PAIN NOTED NONE PRSNT: CPT | Performed by: FAMILY MEDICINE

## 2025-06-25 PROCEDURE — 3078F DIAST BP <80 MM HG: CPT | Performed by: FAMILY MEDICINE

## 2025-06-25 RX ORDER — CIPROFLOXACIN 250 MG/1
250 TABLET, FILM COATED ORAL 2 TIMES DAILY
Qty: 6 TABLET | Refills: 0 | Status: SHIPPED | OUTPATIENT
Start: 2025-06-25 | End: 2025-06-25

## 2025-06-25 RX ORDER — CIPROFLOXACIN 250 MG/1
250 TABLET, FILM COATED ORAL 2 TIMES DAILY
Qty: 6 TABLET | Refills: 0 | Status: SHIPPED | OUTPATIENT
Start: 2025-06-25

## 2025-06-25 ASSESSMENT — PAIN SCALES - GENERAL: PAINLEVEL_OUTOF10: NO PAIN (0)

## 2025-06-25 NOTE — PROGRESS NOTES
"  Assessment & Plan     Dysuria  - UA Macroscopic with reflex to Microscopic and Culture - Clinic Collect  - UA Microscopic with Reflex to Culture  - Urine Culture    Urinary urgency  - Ob/Gyn  Referral; Future    BMI  Estimated body mass index is 37.92 kg/m  as calculated from the following:    Height as of this encounter: 1.619 m (5' 3.75\").    Weight as of this encounter: 99.4 kg (219 lb 3.2 oz).   Weight management plan: Discussed healthy diet and exercise guidelines      Tien Bradley is a 75 year old, presenting for the following health issues:  Urinary Problem        6/25/2025    10:04 AM   Additional Questions   Roomed by Cehlsea Anne CMA   Accompanied by Self     History of Present Illness       Reason for visit:  UTI  Symptoms include:  Urgency  Symptom intensity:  Moderate  Symptom progression:  Worsening  Had these symptoms before:  Yes  Has tried/received treatment for these symptoms:  Yes  Previous treatment was successful:  Yes  Prior treatment description:  RX  What makes it worse:  No  What makes it better:  No   She is taking medications regularly.       Review of Systems  Constitutional, HEENT, cardiovascular, pulmonary, gi and gu systems are negative, except as otherwise noted.      Objective    /68 (BP Location: Right arm, Patient Position: Sitting, Cuff Size: Adult Regular)   Pulse 62   Temp 98.6  F (37  C) (Tympanic)   Resp 16   Ht 1.619 m (5' 3.75\")   Wt 99.4 kg (219 lb 3.2 oz)   LMP  (LMP Unknown)   SpO2 97%   BMI 37.92 kg/m    Body mass index is 37.92 kg/m .    Physical Exam   GENERAL: alert and no distress  NECK: no adenopathy, no asymmetry, masses, or scars  RESP: lungs clear to auscultation - no rales, rhonchi or wheezes  CV: regular rate and rhythm, normal S1 S2, no S3 or S4, no murmur, click or rub, no peripheral edema  ABDOMEN: soft, nontender, no hepatosplenomegaly, no masses and bowel sounds normal  MS: no gross musculoskeletal defects noted, no edema    "     Signed Electronically by: Sumit Muhammad MD  {Email feedback regarding this note to [email or web address]   :[id number]}

## 2025-06-26 ENCOUNTER — PATIENT OUTREACH (OUTPATIENT)
Dept: CARE COORDINATION | Facility: CLINIC | Age: 75
End: 2025-06-26
Payer: COMMERCIAL

## 2025-06-26 ENCOUNTER — TELEPHONE (OUTPATIENT)
Dept: FAMILY MEDICINE | Facility: CLINIC | Age: 75
End: 2025-06-26

## 2025-06-26 LAB
BACTERIA UR CULT: ABNORMAL
BACTERIA UR CULT: ABNORMAL

## 2025-06-27 LAB
BACTERIA UR CULT: ABNORMAL
BACTERIA UR CULT: ABNORMAL

## 2025-06-30 ENCOUNTER — RESULTS FOLLOW-UP (OUTPATIENT)
Dept: FAMILY MEDICINE | Facility: CLINIC | Age: 75
End: 2025-06-30

## 2025-06-30 ENCOUNTER — PATIENT OUTREACH (OUTPATIENT)
Dept: CARE COORDINATION | Facility: CLINIC | Age: 75
End: 2025-06-30
Payer: COMMERCIAL

## 2025-07-02 ENCOUNTER — TRANSFERRED RECORDS (OUTPATIENT)
Dept: HEALTH INFORMATION MANAGEMENT | Facility: CLINIC | Age: 75
End: 2025-07-02
Payer: COMMERCIAL

## 2025-07-08 ENCOUNTER — MYC REFILL (OUTPATIENT)
Dept: FAMILY MEDICINE | Facility: CLINIC | Age: 75
End: 2025-07-08
Payer: COMMERCIAL

## 2025-07-08 DIAGNOSIS — G45.3 AMAUROSIS FUGAX: ICD-10-CM

## 2025-07-08 DIAGNOSIS — F41.1 GAD (GENERALIZED ANXIETY DISORDER): ICD-10-CM

## 2025-07-08 DIAGNOSIS — G45.9 TIA (TRANSIENT ISCHEMIC ATTACK): ICD-10-CM

## 2025-07-08 RX ORDER — SERTRALINE HYDROCHLORIDE 25 MG/1
25 TABLET, FILM COATED ORAL DAILY
Qty: 90 TABLET | Refills: 0 | Status: SHIPPED | OUTPATIENT
Start: 2025-07-08

## 2025-07-08 RX ORDER — CLOPIDOGREL BISULFATE 75 MG/1
75 TABLET ORAL DAILY
Qty: 90 TABLET | Refills: 2 | Status: SHIPPED | OUTPATIENT
Start: 2025-07-08

## 2025-07-08 NOTE — TELEPHONE ENCOUNTER
Medication Request  Medication name: sertraline (ZOLOFT) 25 MG tablet   Requested Pharmacy: [facility]  When was patient last seen for this?:  06/25/2025  Patient offered appointment:  no  Okay to leave a detailed message: no

## 2025-07-09 ENCOUNTER — TELEPHONE (OUTPATIENT)
Dept: FAMILY MEDICINE | Facility: CLINIC | Age: 75
End: 2025-07-09

## 2025-07-09 DIAGNOSIS — R30.0 DYSURIA: Primary | ICD-10-CM

## 2025-07-09 RX ORDER — CLOPIDOGREL BISULFATE 75 MG/1
75 TABLET ORAL DAILY
Qty: 30 TABLET | Refills: 0 | OUTPATIENT
Start: 2025-07-09

## 2025-07-09 NOTE — TELEPHONE ENCOUNTER
S-(situation): Patient is calling to request treatment for urinary symptoms. She is wondering if she can get antibiotics until she can be seen by urology.     B-(background): Patient recently had a UTI with e.coli and some strepcococcus anginosus. Was treated with Cipro for 3 days. patient stated her symptoms were improved back to baseline. Supposed to see urology. She has an appointment soon with urology. .     A-(assessment): Today has has had increased urinary frequency and urgency. Her urine is clear. She has urinated 6x in the last 1.5 hours. She has needed to wear depends because she has had some leaking. No fever. No back pain or abdominal pains.     R-(recommendations): Wait for a call back.       [facility] is [facility].     Suzanne Honeycutt RN on 7/9/2025 at 2:23 PM

## 2025-07-09 NOTE — TELEPHONE ENCOUNTER
Patient called back to the clinic she was transferred to this RN to try to get her in for an appointment.She is upset that she cannot just leave a urine sample since that will tell him if there is an infection. RN explained that she needs to be seen to get orders.  [facility] is full and [facility] is full. Patient stated she is already established with a urologist in [place]. RN recommended that she call her urologist since this is a recurrent concern that should be managed by them. Maybe they would be will to order a UA for her. Suzanne Honeycutt RN on 7/9/2025 at 3:47 PM

## 2025-07-09 NOTE — TELEPHONE ENCOUNTER
Call placed to Patient.  No answer.  Left message with call back number for Patient to return call.    Bryon WINKLER, Clinic RN    [facility]

## 2025-07-09 NOTE — TELEPHONE ENCOUNTER
Earliest appointment I can get her is the 16th.   Sounds like she needs to be seen sooner than that.    Any suggestions?

## 2025-07-11 ENCOUNTER — TRANSFERRED RECORDS (OUTPATIENT)
Dept: HEALTH INFORMATION MANAGEMENT | Facility: CLINIC | Age: 75
End: 2025-07-11
Payer: COMMERCIAL

## 2025-07-14 ENCOUNTER — HOSPITAL ENCOUNTER (OUTPATIENT)
Dept: CT IMAGING | Facility: HOSPITAL | Age: 75
Discharge: HOME | End: 2025-07-14
Attending: PHYSICIAN ASSISTANT
Payer: COMMERCIAL

## 2025-07-14 DIAGNOSIS — R91.8 PULMONARY NODULES: ICD-10-CM

## 2025-07-14 DIAGNOSIS — K86.2 PANCREATIC CYST: ICD-10-CM

## 2025-07-14 PROCEDURE — 250N000011 HC RX IP 250 OP 636: Performed by: PHYSICIAN ASSISTANT

## 2025-07-14 PROCEDURE — 74170 CT ABD WO CNTRST FLWD CNTRST: CPT

## 2025-07-14 PROCEDURE — 71250 CT THORAX DX C-: CPT

## 2025-07-14 PROCEDURE — 250N000009 HC RX 250: Performed by: PHYSICIAN ASSISTANT

## 2025-07-14 RX ORDER — IOPAMIDOL 755 MG/ML
90 INJECTION, SOLUTION INTRAVASCULAR ONCE
Status: COMPLETED | OUTPATIENT
Start: 2025-07-14 | End: 2025-07-14

## 2025-07-14 RX ADMIN — SODIUM CHLORIDE 100 ML: 9 INJECTION, SOLUTION INTRAVENOUS at 13:20

## 2025-07-14 RX ADMIN — IOPAMIDOL 90 ML: 755 INJECTION, SOLUTION INTRAVENOUS at 13:19

## 2025-07-16 ENCOUNTER — TRANSFERRED RECORDS (OUTPATIENT)
Dept: HEALTH INFORMATION MANAGEMENT | Facility: CLINIC | Age: 75
End: 2025-07-16
Payer: COMMERCIAL

## 2025-07-22 ENCOUNTER — TRANSFERRED RECORDS (OUTPATIENT)
Dept: HEALTH INFORMATION MANAGEMENT | Facility: CLINIC | Age: 75
End: 2025-07-22
Payer: COMMERCIAL

## 2025-07-23 ENCOUNTER — HOSPITAL ENCOUNTER (OUTPATIENT)
Dept: CT IMAGING | Facility: HOSPITAL | Age: 75
Discharge: HOME | End: 2025-07-23
Attending: PHYSICIAN ASSISTANT
Payer: COMMERCIAL

## 2025-07-23 LAB
CREAT BLD-MCNC: 0.7 MG/DL (ref 0.5–1)
EGFRCR SERPLBLD CKD-EPI 2021: >60 ML/MIN/1.73M2

## 2025-07-23 PROCEDURE — 250N000009 HC RX 250

## 2025-07-23 PROCEDURE — 82565 ASSAY OF CREATININE: CPT

## 2025-07-23 PROCEDURE — 250N000011 HC RX IP 250 OP 636

## 2025-07-23 PROCEDURE — 70496 CT ANGIOGRAPHY HEAD: CPT

## 2025-07-23 RX ORDER — IOPAMIDOL 755 MG/ML
67 INJECTION, SOLUTION INTRAVASCULAR ONCE
Status: COMPLETED | OUTPATIENT
Start: 2025-07-23 | End: 2025-07-23

## 2025-07-23 RX ADMIN — SODIUM CHLORIDE 100 ML: 9 INJECTION, SOLUTION INTRAVENOUS at 14:32

## 2025-07-23 RX ADMIN — IOPAMIDOL 67 ML: 755 INJECTION, SOLUTION INTRAVENOUS at 14:32

## 2025-08-18 ENCOUNTER — TRANSFERRED RECORDS (OUTPATIENT)
Dept: MULTI SPECIALTY CLINIC | Facility: CLINIC | Age: 75
End: 2025-08-18
Payer: COMMERCIAL

## 2025-08-18 LAB
ALT SERPL-CCNC: 48 IU/L (ref 0–32)
AST SERPL-CCNC: 34 IU/L (ref 0–40)
CREATININE (EXTERNAL): 0.65 MG/DL (ref 0.57–1)
GFR ESTIMATED (EXTERNAL): 91 ML/MIN/1.73
GLUCOSE (EXTERNAL): 88 MG/DL (ref 70–99)
INR (EXTERNAL): 0.9 (ref 0.9–1.2)
POTASSIUM (EXTERNAL): 4.7 MMOL/L (ref 3.5–5.2)

## 2025-08-21 ENCOUNTER — TRANSFERRED RECORDS (OUTPATIENT)
Facility: HOSPITAL | Age: 75
End: 2025-08-21

## 2025-08-25 ENCOUNTER — ANCILLARY PROCEDURE (OUTPATIENT)
Dept: MAMMOGRAPHY | Facility: CLINIC | Age: 75
End: 2025-08-25
Attending: PHYSICIAN ASSISTANT
Payer: COMMERCIAL

## 2025-08-25 DIAGNOSIS — Z12.31 VISIT FOR SCREENING MAMMOGRAM: ICD-10-CM

## 2025-08-25 DIAGNOSIS — R92.8 BI-RADS CATEGORY 3 MAMMOGRAM RESULT: ICD-10-CM

## 2025-08-25 DIAGNOSIS — R92.8 OTHER ABNORMAL AND INCONCLUSIVE FINDINGS ON DIAGNOSTIC IMAGING OF BREAST: ICD-10-CM

## 2025-08-25 PROCEDURE — 76642 ULTRASOUND BREAST LIMITED: CPT | Mod: LT

## 2025-08-25 PROCEDURE — G0279 TOMOSYNTHESIS, MAMMO: HCPCS

## 2025-08-26 DIAGNOSIS — F10.90 ALCOHOL USE: ICD-10-CM

## 2025-08-26 RX ORDER — LANOLIN ALCOHOL/MO/W.PET/CERES
100 CREAM (GRAM) TOPICAL DAILY
Qty: 100 TABLET | Refills: 3 | Status: SHIPPED | OUTPATIENT
Start: 2025-08-26

## 2025-08-27 ENCOUNTER — TRANSFERRED RECORDS (OUTPATIENT)
Facility: HOSPITAL | Age: 75
End: 2025-08-27

## 2025-09-02 DIAGNOSIS — E03.9 HYPOTHYROIDISM, UNSPECIFIED TYPE: ICD-10-CM

## 2025-09-02 DIAGNOSIS — Z85.850 HISTORY OF THYROID CANCER: ICD-10-CM

## 2025-09-03 RX ORDER — LEVOTHYROXINE SODIUM 137 UG/1
137 TABLET ORAL DAILY
Qty: 90 TABLET | Refills: 0 | Status: SHIPPED | OUTPATIENT
Start: 2025-09-03